# Patient Record
Sex: MALE | Race: WHITE | NOT HISPANIC OR LATINO | Employment: OTHER | URBAN - METROPOLITAN AREA
[De-identification: names, ages, dates, MRNs, and addresses within clinical notes are randomized per-mention and may not be internally consistent; named-entity substitution may affect disease eponyms.]

---

## 2017-02-17 ENCOUNTER — HOSPITAL ENCOUNTER (OUTPATIENT)
Dept: LAB | Facility: MEDICAL CENTER | Age: 77
End: 2017-02-17
Attending: FAMILY MEDICINE
Payer: MEDICARE

## 2017-02-17 LAB
BASOPHILS # BLD AUTO: 0.16 K/UL (ref 0–0.12)
BASOPHILS NFR BLD AUTO: 2 % (ref 0–1.8)
EOSINOPHIL # BLD: 0.45 K/UL (ref 0–0.51)
EOSINOPHIL NFR BLD AUTO: 5.6 % (ref 0–6.9)
ERYTHROCYTE [DISTWIDTH] IN BLOOD BY AUTOMATED COUNT: 53.2 FL (ref 35.9–50)
HCT VFR BLD AUTO: 48 % (ref 42–52)
HGB BLD-MCNC: 15.8 G/DL (ref 14–18)
IMM GRANULOCYTES # BLD AUTO: 0.02 K/UL (ref 0–0.11)
IMM GRANULOCYTES NFR BLD AUTO: 0.3 % (ref 0–0.9)
LYMPHOCYTES # BLD: 1.81 K/UL (ref 1–4.8)
LYMPHOCYTES NFR BLD AUTO: 22.7 % (ref 22–41)
MCH RBC QN AUTO: 31.3 PG (ref 27–33)
MCHC RBC AUTO-ENTMCNC: 32.9 G/DL (ref 33.7–35.3)
MCV RBC AUTO: 95 FL (ref 81.4–97.8)
MONOCYTES # BLD: 0.49 K/UL (ref 0–0.85)
MONOCYTES NFR BLD AUTO: 6.1 % (ref 0–13.4)
NEUTROPHILS # BLD: 5.04 K/UL (ref 1.82–7.42)
NEUTROPHILS NFR BLD AUTO: 63.3 % (ref 44–72)
NRBC # BLD AUTO: 0 K/UL
NRBC BLD-RTO: 0 /100 WBC
PLATELET # BLD AUTO: 294 K/UL (ref 164–446)
PMV BLD AUTO: 9.1 FL (ref 9–12.9)
RBC # BLD AUTO: 5.05 M/UL (ref 4.7–6.1)
VIT B12 SERPL-MCNC: >1500 PG/ML (ref 211–911)
WBC # BLD AUTO: 8 K/UL (ref 4.8–10.8)

## 2017-02-17 PROCEDURE — 85025 COMPLETE CBC W/AUTO DIFF WBC: CPT

## 2017-02-17 PROCEDURE — 82607 VITAMIN B-12: CPT

## 2017-02-17 PROCEDURE — 36415 COLL VENOUS BLD VENIPUNCTURE: CPT

## 2017-09-15 ENCOUNTER — HOSPITAL ENCOUNTER (OUTPATIENT)
Dept: LAB | Facility: MEDICAL CENTER | Age: 77
End: 2017-09-15
Attending: FAMILY MEDICINE
Payer: MEDICARE

## 2017-09-15 LAB
ALBUMIN SERPL BCP-MCNC: 4.5 G/DL (ref 3.2–4.9)
ALBUMIN/GLOB SERPL: 1.4 G/DL
ALP SERPL-CCNC: 100 U/L (ref 30–99)
ALT SERPL-CCNC: 15 U/L (ref 2–50)
ANION GAP SERPL CALC-SCNC: 10 MMOL/L (ref 0–11.9)
AST SERPL-CCNC: 18 U/L (ref 12–45)
BASOPHILS # BLD AUTO: 1.9 % (ref 0–1.8)
BASOPHILS # BLD: 0.15 K/UL (ref 0–0.12)
BILIRUB SERPL-MCNC: 1.8 MG/DL (ref 0.1–1.5)
BUN SERPL-MCNC: 15 MG/DL (ref 8–22)
CALCIUM SERPL-MCNC: 9.8 MG/DL (ref 8.5–10.5)
CHLORIDE SERPL-SCNC: 105 MMOL/L (ref 96–112)
CHOLEST SERPL-MCNC: 153 MG/DL (ref 100–199)
CO2 SERPL-SCNC: 27 MMOL/L (ref 20–33)
CREAT SERPL-MCNC: 0.93 MG/DL (ref 0.5–1.4)
CREAT UR-MCNC: 145.3 MG/DL
EOSINOPHIL # BLD AUTO: 0.26 K/UL (ref 0–0.51)
EOSINOPHIL NFR BLD: 3.2 % (ref 0–6.9)
ERYTHROCYTE [DISTWIDTH] IN BLOOD BY AUTOMATED COUNT: 46.2 FL (ref 35.9–50)
EST. AVERAGE GLUCOSE BLD GHB EST-MCNC: 123 MG/DL
GFR SERPL CREATININE-BSD FRML MDRD: >60 ML/MIN/1.73 M 2
GLOBULIN SER CALC-MCNC: 3.2 G/DL (ref 1.9–3.5)
GLUCOSE SERPL-MCNC: 97 MG/DL (ref 65–99)
HBA1C MFR BLD: 5.9 % (ref 0–5.6)
HCT VFR BLD AUTO: 50.7 % (ref 42–52)
HDLC SERPL-MCNC: 53 MG/DL
HGB BLD-MCNC: 16.7 G/DL (ref 14–18)
IMM GRANULOCYTES # BLD AUTO: 0.04 K/UL (ref 0–0.11)
IMM GRANULOCYTES NFR BLD AUTO: 0.5 % (ref 0–0.9)
LDLC SERPL CALC-MCNC: 88 MG/DL
LYMPHOCYTES # BLD AUTO: 2.03 K/UL (ref 1–4.8)
LYMPHOCYTES NFR BLD: 25.1 % (ref 22–41)
MCH RBC QN AUTO: 32.4 PG (ref 27–33)
MCHC RBC AUTO-ENTMCNC: 32.9 G/DL (ref 33.7–35.3)
MCV RBC AUTO: 98.4 FL (ref 81.4–97.8)
MICROALBUMIN UR-MCNC: 5.9 MG/DL
MICROALBUMIN/CREAT UR: 41 MG/G (ref 0–30)
MONOCYTES # BLD AUTO: 0.53 K/UL (ref 0–0.85)
MONOCYTES NFR BLD AUTO: 6.5 % (ref 0–13.4)
NEUTROPHILS # BLD AUTO: 5.09 K/UL (ref 1.82–7.42)
NEUTROPHILS NFR BLD: 62.8 % (ref 44–72)
NRBC # BLD AUTO: 0 K/UL
NRBC BLD AUTO-RTO: 0 /100 WBC
PLATELET # BLD AUTO: 249 K/UL (ref 164–446)
PMV BLD AUTO: 9.3 FL (ref 9–12.9)
POTASSIUM SERPL-SCNC: 5.3 MMOL/L (ref 3.6–5.5)
PROT SERPL-MCNC: 7.7 G/DL (ref 6–8.2)
RBC # BLD AUTO: 5.15 M/UL (ref 4.7–6.1)
SODIUM SERPL-SCNC: 142 MMOL/L (ref 135–145)
TRIGL SERPL-MCNC: 60 MG/DL (ref 0–149)
WBC # BLD AUTO: 8.1 K/UL (ref 4.8–10.8)

## 2017-09-15 PROCEDURE — 83036 HEMOGLOBIN GLYCOSYLATED A1C: CPT

## 2017-09-15 PROCEDURE — 85025 COMPLETE CBC W/AUTO DIFF WBC: CPT

## 2017-09-15 PROCEDURE — 36415 COLL VENOUS BLD VENIPUNCTURE: CPT

## 2017-09-15 PROCEDURE — 80053 COMPREHEN METABOLIC PANEL: CPT

## 2017-09-15 PROCEDURE — 82043 UR ALBUMIN QUANTITATIVE: CPT

## 2017-09-15 PROCEDURE — 82570 ASSAY OF URINE CREATININE: CPT

## 2017-09-15 PROCEDURE — 80061 LIPID PANEL: CPT

## 2018-08-02 ENCOUNTER — HOSPITAL ENCOUNTER (OUTPATIENT)
Dept: LAB | Facility: MEDICAL CENTER | Age: 78
End: 2018-08-02
Attending: FAMILY MEDICINE
Payer: MEDICARE

## 2018-08-02 LAB
ALBUMIN SERPL BCP-MCNC: 4.7 G/DL (ref 3.2–4.9)
ALBUMIN/GLOB SERPL: 1.9 G/DL
ALP SERPL-CCNC: 84 U/L (ref 30–99)
ALT SERPL-CCNC: 16 U/L (ref 2–50)
ANION GAP SERPL CALC-SCNC: 8 MMOL/L (ref 0–11.9)
AST SERPL-CCNC: 17 U/L (ref 12–45)
BASOPHILS # BLD AUTO: 1.5 % (ref 0–1.8)
BASOPHILS # BLD: 0.11 K/UL (ref 0–0.12)
BILIRUB SERPL-MCNC: 1.6 MG/DL (ref 0.1–1.5)
BUN SERPL-MCNC: 20 MG/DL (ref 8–22)
CALCIUM SERPL-MCNC: 9.4 MG/DL (ref 8.5–10.5)
CHLORIDE SERPL-SCNC: 107 MMOL/L (ref 96–112)
CHOLEST SERPL-MCNC: 155 MG/DL (ref 100–199)
CO2 SERPL-SCNC: 27 MMOL/L (ref 20–33)
CREAT SERPL-MCNC: 1.15 MG/DL (ref 0.5–1.4)
EOSINOPHIL # BLD AUTO: 0.17 K/UL (ref 0–0.51)
EOSINOPHIL NFR BLD: 2.3 % (ref 0–6.9)
ERYTHROCYTE [DISTWIDTH] IN BLOOD BY AUTOMATED COUNT: 46.7 FL (ref 35.9–50)
GLOBULIN SER CALC-MCNC: 2.5 G/DL (ref 1.9–3.5)
GLUCOSE SERPL-MCNC: 134 MG/DL (ref 65–99)
HCT VFR BLD AUTO: 48 % (ref 42–52)
HDLC SERPL-MCNC: 47 MG/DL
HGB BLD-MCNC: 16.3 G/DL (ref 14–18)
IMM GRANULOCYTES # BLD AUTO: 0.03 K/UL (ref 0–0.11)
IMM GRANULOCYTES NFR BLD AUTO: 0.4 % (ref 0–0.9)
LDLC SERPL CALC-MCNC: 95 MG/DL
LYMPHOCYTES # BLD AUTO: 1.52 K/UL (ref 1–4.8)
LYMPHOCYTES NFR BLD: 20.3 % (ref 22–41)
MCH RBC QN AUTO: 33.5 PG (ref 27–33)
MCHC RBC AUTO-ENTMCNC: 34 G/DL (ref 33.7–35.3)
MCV RBC AUTO: 98.6 FL (ref 81.4–97.8)
MONOCYTES # BLD AUTO: 0.42 K/UL (ref 0–0.85)
MONOCYTES NFR BLD AUTO: 5.6 % (ref 0–13.4)
NEUTROPHILS # BLD AUTO: 5.24 K/UL (ref 1.82–7.42)
NEUTROPHILS NFR BLD: 69.9 % (ref 44–72)
NRBC # BLD AUTO: 0 K/UL
NRBC BLD-RTO: 0 /100 WBC
PLATELET # BLD AUTO: 213 K/UL (ref 164–446)
PMV BLD AUTO: 9.7 FL (ref 9–12.9)
POTASSIUM SERPL-SCNC: 4.5 MMOL/L (ref 3.6–5.5)
PROT SERPL-MCNC: 7.2 G/DL (ref 6–8.2)
RBC # BLD AUTO: 4.87 M/UL (ref 4.7–6.1)
SODIUM SERPL-SCNC: 142 MMOL/L (ref 135–145)
TRIGL SERPL-MCNC: 65 MG/DL (ref 0–149)
WBC # BLD AUTO: 7.5 K/UL (ref 4.8–10.8)

## 2018-08-02 PROCEDURE — 80053 COMPREHEN METABOLIC PANEL: CPT

## 2018-08-02 PROCEDURE — 83036 HEMOGLOBIN GLYCOSYLATED A1C: CPT

## 2018-08-02 PROCEDURE — 80061 LIPID PANEL: CPT

## 2018-08-02 PROCEDURE — 85025 COMPLETE CBC W/AUTO DIFF WBC: CPT

## 2018-08-02 PROCEDURE — 36415 COLL VENOUS BLD VENIPUNCTURE: CPT

## 2018-08-03 LAB
EST. AVERAGE GLUCOSE BLD GHB EST-MCNC: 128 MG/DL
HBA1C MFR BLD: 6.1 % (ref 0–5.6)

## 2018-08-10 ENCOUNTER — APPOINTMENT (OUTPATIENT)
Dept: ADMISSIONS | Facility: MEDICAL CENTER | Age: 78
End: 2018-08-10
Attending: OPHTHALMOLOGY
Payer: MEDICARE

## 2018-08-10 DIAGNOSIS — Z01.810 PRE-OPERATIVE CARDIOVASCULAR EXAMINATION: ICD-10-CM

## 2018-08-10 LAB — EKG IMPRESSION: NORMAL

## 2018-08-14 ENCOUNTER — HOSPITAL ENCOUNTER (OUTPATIENT)
Facility: MEDICAL CENTER | Age: 78
End: 2018-08-14
Attending: OPHTHALMOLOGY | Admitting: OPHTHALMOLOGY
Payer: MEDICARE

## 2018-08-14 VITALS
WEIGHT: 171.52 LBS | HEIGHT: 69 IN | RESPIRATION RATE: 16 BRPM | BODY MASS INDEX: 25.4 KG/M2 | TEMPERATURE: 97.1 F | OXYGEN SATURATION: 96 % | SYSTOLIC BLOOD PRESSURE: 184 MMHG | DIASTOLIC BLOOD PRESSURE: 102 MMHG | HEART RATE: 57 BPM

## 2018-08-14 PROCEDURE — 700111 HCHG RX REV CODE 636 W/ 250 OVERRIDE (IP)

## 2018-08-14 PROCEDURE — 99153 MOD SED SAME PHYS/QHP EA: CPT | Performed by: OPHTHALMOLOGY

## 2018-08-14 PROCEDURE — 160002 HCHG RECOVERY MINUTES (STAT): Performed by: OPHTHALMOLOGY

## 2018-08-14 PROCEDURE — 502240 HCHG MISC OR SUPPLY RC 0272: Performed by: OPHTHALMOLOGY

## 2018-08-14 PROCEDURE — 160035 HCHG PACU - 1ST 60 MINS PHASE I: Performed by: OPHTHALMOLOGY

## 2018-08-14 PROCEDURE — 700101 HCHG RX REV CODE 250

## 2018-08-14 PROCEDURE — 160029 HCHG SURGERY MINUTES - 1ST 30 MINS LEVEL 4: Performed by: OPHTHALMOLOGY

## 2018-08-14 PROCEDURE — 160048 HCHG OR STATISTICAL LEVEL 1-5: Performed by: OPHTHALMOLOGY

## 2018-08-14 PROCEDURE — 99152 MOD SED SAME PHYS/QHP 5/>YRS: CPT | Performed by: OPHTHALMOLOGY

## 2018-08-14 PROCEDURE — 501539 HCHG TIP, PHACO: Performed by: OPHTHALMOLOGY

## 2018-08-14 PROCEDURE — 501749 HCHG SHELL REV 276: Performed by: OPHTHALMOLOGY

## 2018-08-14 PROCEDURE — 500855 HCHG NEEDLE, IRRIG CYSTOTOME 27G: Performed by: OPHTHALMOLOGY

## 2018-08-14 DEVICE — IMPLANTABLE DEVICE: Type: IMPLANTABLE DEVICE | Status: FUNCTIONAL

## 2018-08-14 RX ORDER — TETRACAINE HYDROCHLORIDE 5 MG/ML
SOLUTION OPHTHALMIC
Status: COMPLETED
Start: 2018-08-14 | End: 2018-08-14

## 2018-08-14 RX ORDER — TETRACAINE HYDROCHLORIDE 5 MG/ML
SOLUTION OPHTHALMIC
Status: DISCONTINUED | OUTPATIENT
Start: 2018-08-14 | End: 2018-08-14 | Stop reason: HOSPADM

## 2018-08-14 RX ORDER — MOXIFLOXACIN 5 MG/ML
SOLUTION/ DROPS OPHTHALMIC
Status: DISCONTINUED | OUTPATIENT
Start: 2018-08-14 | End: 2018-08-14 | Stop reason: HOSPADM

## 2018-08-14 RX ORDER — EPINEPHRINE 1 MG/ML(1)
AMPUL (ML) INJECTION
Status: DISCONTINUED | OUTPATIENT
Start: 2018-08-14 | End: 2018-08-14 | Stop reason: HOSPADM

## 2018-08-14 RX ORDER — SODIUM CHLORIDE, SODIUM LACTATE, POTASSIUM CHLORIDE, CALCIUM CHLORIDE 600; 310; 30; 20 MG/100ML; MG/100ML; MG/100ML; MG/100ML
INJECTION, SOLUTION INTRAVENOUS CONTINUOUS
Status: DISCONTINUED | OUTPATIENT
Start: 2018-08-14 | End: 2018-08-14 | Stop reason: HOSPADM

## 2018-08-14 RX ORDER — LIDOCAINE HYDROCHLORIDE 10 MG/ML
INJECTION, SOLUTION EPIDURAL; INFILTRATION; INTRACAUDAL; PERINEURAL
Status: DISCONTINUED | OUTPATIENT
Start: 2018-08-14 | End: 2018-08-14 | Stop reason: HOSPADM

## 2018-08-14 RX ORDER — PHENYLEPHRINE HYDROCHLORIDE 25 MG/ML
SOLUTION/ DROPS OPHTHALMIC
Status: COMPLETED
Start: 2018-08-14 | End: 2018-08-14

## 2018-08-14 RX ORDER — TROPICAMIDE 10 MG/ML
SOLUTION/ DROPS OPHTHALMIC
Status: COMPLETED
Start: 2018-08-14 | End: 2018-08-14

## 2018-08-14 RX ADMIN — SODIUM CHLORIDE, SODIUM LACTATE, POTASSIUM CHLORIDE, CALCIUM CHLORIDE: 600; 310; 30; 20 INJECTION, SOLUTION INTRAVENOUS at 06:45

## 2018-08-14 RX ADMIN — TROPICAMIDE 1 DROP: 10 SOLUTION/ DROPS OPHTHALMIC at 06:35

## 2018-08-14 RX ADMIN — TETRACAINE HYDROCHLORIDE 1 DROP: 5 SOLUTION OPHTHALMIC at 06:35

## 2018-08-14 RX ADMIN — PHENYLEPHRINE HYDROCHLORIDE 1 DROP: 2.5 SOLUTION/ DROPS OPHTHALMIC at 06:35

## 2018-08-14 ASSESSMENT — PAIN SCALES - GENERAL: PAINLEVEL_OUTOF10: 0

## 2018-08-14 NOTE — DISCHARGE INSTRUCTIONS
HOME CARE INSTRUCTIONS FOR CATARACT SURGERY    ACTIVITY: Rest and take it easy for the first 24 hours. We strongly suggest that a responsible adult remain with you during that time. It is normal to feel sleepy. We encourage you to not do anything that requires balance, judgment or coordination. Be extra careful when walking (with a dilated eye, it is easier to trip and fall).     FOR 24 HOURS, DO NOT:       Drive, operate machinery or run household appliances.        Drink beer or alcoholic beverages.        Make important decisions or sign legal documents.     DIET: To avoid nausea, slowly advance diet as tolerated, avoiding spicy or greasy foods for the first meal.     MEDICATIONS: Resume taking daily medication. You may take Tylenol for mild discomfort, if needed.     SURGICAL DRESSING: Eye shield as instructed by your doctor. Dark glasses should be worn while in the sunlight.     Follow your Physician's instruction Sheet. Eye Kit Given    A follow-up appointment is scheduled with your doctor tomorrow at ___9:15____ am.     You should call 911 if you develop problems with breathing or chest pain.  You should CALL YOUR PHYSICIAN if you develop: Sharp stabbing pain or sudden change in vision in your operative eye. If you are unable to contact your doctor or the surgical center, you should go to the nearest emergency room or urgent care center.   Physician's telephone # __Dr. Santana [phone]______    If any questions arise, call your doctor. If your doctor is not available, please feel free to call Same Day Surgery at [phone]. You can also call the [employer] Hotline open 24 hours/day, 7 days/week and speak to a nurse at [phone] or [phone].     I acknowledge receipt and understanding of these Home Care Instructions.    ______________________________     _______________________________            Signature of Patient / Responsible Adult                                                       RN  Signature    A registered nurse may call you a few days after your surgery to see how you are doing.   You may also receive a survey in the mail within the next two weeks and we ask that you take a few moments to complete and return the survey. Our goal is to provide you with very good care and we value your comments. Thank you for choosing [facility].

## 2018-08-14 NOTE — OR NURSING
0801 Received pt from OR, received report from Dr. Tan. Pt awake, resting comfortably in bed. Pt.'s VSS. Pt.'s left eye dilated.     0810 Pt.'s Wife called to bedside, pt meets criteria for discharge.     0815 Pt and Wife given instructions for discharge, evidence of understanding demonstrated.      0820 Pt ambulated out, gait steady.

## 2018-08-14 NOTE — OP REPORT
DATE OF SERVICE:  08/14/2018    PREOPERATIVE DIAGNOSIS:  Cataract, left eye.    POSTOPERATIVE DIAGNOSIS:  Cataract, left eye.    PROCEDURE:  Phacoemulsification with intraocular lens implant, left eye.    SURGEON:  Adolfo Santana MD    ANESTHESIA:  Local MAC.    PROSTHETIC DEVICES:  Henry intraocular lens, model AU00T0, 18.5 diopter,   serial number [id number].    INDICATIONS:  The patient has a visually significant cataract in the left eye.    DESCRIPTION OF OPERATION:  The patient was placed in the supine position on   the operating room table.  Appropriate anesthetic monitors were positioned.    The eye was prepped and draped in the usual sterile fashion for ocular surgery   using Betadine solution.  A wire lid speculum was positioned for exposure.  A   clear cornea temporal incision was made with a gretchen keratome and a   paracentesis was made with a gretchen knife.  The anterior chamber was filled   with viscoelastic and a continuous curvilinear capsulorrhexis was made with   the capsulorrhexis forceps.  The lens was hydrodissected and the nucleus was   removed using the phacoemulsification handpiece and the cortex was aspirated   with the IA handpiece.  The capsular bag was filled with viscoelastic and the   intraocular lens was positioned into the capsular bag.  Residual viscoelastic   was aspirated from the anterior chamber, and the wound was hydrated with   saline solution producing a watertight closure.  The wire lid speculum was   removed and the patient was taken to the recovery room in stable condition.       ____________________________________     MD FRANCESCO SALDAÑA / NTS    DD:  08/14/2018 10:31:52  DT:  08/14/2018 10:37:32    D#:  [id number]  Job#:  [id number]

## 2018-08-14 NOTE — OR NURSING
0650: pt's bp very elevated, 184/102 after the 3rd time checking, pt is on bp meds but states he doesn't know if he is usually controlled, anesthesia notified, pt encouraged to monitor bp daily at the same time and f/u with pcp if remains elevated.

## 2018-08-28 ENCOUNTER — HOSPITAL ENCOUNTER (OUTPATIENT)
Facility: MEDICAL CENTER | Age: 78
End: 2018-08-28
Attending: OPHTHALMOLOGY | Admitting: OPHTHALMOLOGY
Payer: MEDICARE

## 2018-08-28 VITALS
SYSTOLIC BLOOD PRESSURE: 159 MMHG | OXYGEN SATURATION: 98 % | WEIGHT: 168.65 LBS | HEIGHT: 69 IN | HEART RATE: 68 BPM | DIASTOLIC BLOOD PRESSURE: 89 MMHG | BODY MASS INDEX: 24.98 KG/M2 | TEMPERATURE: 97.8 F | RESPIRATION RATE: 16 BRPM

## 2018-08-28 PROCEDURE — 99153 MOD SED SAME PHYS/QHP EA: CPT | Performed by: OPHTHALMOLOGY

## 2018-08-28 PROCEDURE — 160002 HCHG RECOVERY MINUTES (STAT): Performed by: OPHTHALMOLOGY

## 2018-08-28 PROCEDURE — 501539 HCHG TIP, PHACO: Performed by: OPHTHALMOLOGY

## 2018-08-28 PROCEDURE — 501749 HCHG SHELL REV 276

## 2018-08-28 PROCEDURE — 700101 HCHG RX REV CODE 250

## 2018-08-28 PROCEDURE — 700111 HCHG RX REV CODE 636 W/ 250 OVERRIDE (IP)

## 2018-08-28 PROCEDURE — 502240 HCHG MISC OR SUPPLY RC 0272: Performed by: OPHTHALMOLOGY

## 2018-08-28 PROCEDURE — V2787 ASTIGMATISM-CORRECT FUNCTION: HCPCS | Performed by: OPHTHALMOLOGY

## 2018-08-28 PROCEDURE — 160035 HCHG PACU - 1ST 60 MINS PHASE I: Performed by: OPHTHALMOLOGY

## 2018-08-28 PROCEDURE — 500855 HCHG NEEDLE, IRRIG CYSTOTOME 27G: Performed by: OPHTHALMOLOGY

## 2018-08-28 PROCEDURE — 160048 HCHG OR STATISTICAL LEVEL 1-5: Performed by: OPHTHALMOLOGY

## 2018-08-28 PROCEDURE — 99152 MOD SED SAME PHYS/QHP 5/>YRS: CPT | Performed by: OPHTHALMOLOGY

## 2018-08-28 PROCEDURE — 160029 HCHG SURGERY MINUTES - 1ST 30 MINS LEVEL 4: Performed by: OPHTHALMOLOGY

## 2018-08-28 PROCEDURE — 160041 HCHG SURGERY MINUTES - EA ADDL 1 MIN LEVEL 4: Performed by: OPHTHALMOLOGY

## 2018-08-28 DEVICE — IMPLANTABLE DEVICE: Type: IMPLANTABLE DEVICE | Status: FUNCTIONAL

## 2018-08-28 RX ORDER — TROPICAMIDE 10 MG/ML
SOLUTION/ DROPS OPHTHALMIC
Status: COMPLETED
Start: 2018-08-28 | End: 2018-08-28

## 2018-08-28 RX ORDER — LIDOCAINE HYDROCHLORIDE 10 MG/ML
INJECTION, SOLUTION EPIDURAL; INFILTRATION; INTRACAUDAL; PERINEURAL
Status: DISCONTINUED
Start: 2018-08-28 | End: 2018-08-28 | Stop reason: HOSPADM

## 2018-08-28 RX ORDER — TETRACAINE HYDROCHLORIDE 5 MG/ML
SOLUTION OPHTHALMIC
Status: COMPLETED
Start: 2018-08-28 | End: 2018-08-28

## 2018-08-28 RX ORDER — SODIUM CHLORIDE, SODIUM LACTATE, POTASSIUM CHLORIDE, CALCIUM CHLORIDE 600; 310; 30; 20 MG/100ML; MG/100ML; MG/100ML; MG/100ML
INJECTION, SOLUTION INTRAVENOUS CONTINUOUS
Status: DISCONTINUED | OUTPATIENT
Start: 2018-08-28 | End: 2018-08-28 | Stop reason: HOSPADM

## 2018-08-28 RX ORDER — PHENYLEPHRINE HYDROCHLORIDE 25 MG/ML
SOLUTION/ DROPS OPHTHALMIC
Status: COMPLETED
Start: 2018-08-28 | End: 2018-08-28

## 2018-08-28 RX ADMIN — TROPICAMIDE 1 DROP: 10 SOLUTION/ DROPS OPHTHALMIC at 06:45

## 2018-08-28 RX ADMIN — SODIUM CHLORIDE, SODIUM LACTATE, POTASSIUM CHLORIDE, CALCIUM CHLORIDE: 600; 310; 30; 20 INJECTION, SOLUTION INTRAVENOUS at 06:50

## 2018-08-28 RX ADMIN — TETRACAINE HYDROCHLORIDE 1 DROP: 5 SOLUTION OPHTHALMIC at 06:45

## 2018-08-28 RX ADMIN — PHENYLEPHRINE HYDROCHLORIDE 1 DROP: 2.5 SOLUTION/ DROPS OPHTHALMIC at 06:45

## 2018-08-28 ASSESSMENT — PAIN SCALES - GENERAL: PAINLEVEL_OUTOF10: 0

## 2018-08-28 NOTE — DISCHARGE INSTRUCTIONS
HOME CARE INSTRUCTIONS FOR CATARACT SURGERY    ACTIVITY: Rest and take it easy for the first 24 hours. We strongly suggest that a responsible adult remain with you during that time. It is normal to feel sleepy. We encourage you to not do anything that requires balance, judgment or coordination. Be extra careful when walking (with a dilated eye, it is easier to trip and fall).     FOR 24 HOURS, DO NOT:       Drive, operate machinery or run household appliances.        Drink beer or alcoholic beverages.        Make important decisions or sign legal documents.     DIET: To avoid nausea, slowly advance diet as tolerated, avoiding spicy or greasy foods for the first meal.     MEDICATIONS: Resume taking daily medication. You may take Tylenol for mild discomfort, if needed.     SURGICAL DRESSING: Eye shield as instructed by your doctor. Dark glasses should be worn while in the sunlight.     Follow your Physician's instruction Sheet. Eye Kit Given    A follow-up appointment is scheduled with your doctor tomorrow at 9:15 am.     You should call 911 if you develop problems with breathing or chest pain.  You should CALL YOUR PHYSICIAN if you develop: Sharp stabbing pain or sudden change in vision in your operative eye. If you are unable to contact your doctor or the surgical center, you should go to the nearest emergency room or urgent care center.   Physician's telephone # [phone]    If any questions arise, call your doctor. If your doctor is not available, please feel free to call Same Day Surgery at [phone]. You can also call the [employer] Hotline open 24 hours/day, 7 days/week and speak to a nurse at [phone] or [phone].     I acknowledge receipt and understanding of these Home Care Instructions.    ______________________________     _______________________________            Signature of Patient / Responsible Adult                                                       RN Signature    A registered nurse may  call you a few days after your surgery to see how you are doing.   You may also receive a survey in the mail within the next two weeks and we ask that you take a few moments to complete and return the survey. Our goal is to provide you with very good care and we value your comments. Thank you for choosing [facility].

## 2018-08-28 NOTE — OP REPORT
DATE OF SERVICE:  08/28/2018    PREOPERATIVE DIAGNOSIS:  Cataract, right eye.    POSTOPERATIVE DIAGNOSIS:  Cataract, right eye.    PROCEDURE:  Phacoemulsification with intraocular lens implant, right eye.    SURGEON:  Adolfo Santana MD    ANESTHESIA:  Local MAC.    PROSTHETIC DEVICES:  Henry intraocular lens, model SN6AT4, 19.0D +2.25D axis 14 degrees, serial #[id number].    INDICATIONS:  The patient has a visually significant cataract in the right   eye.  Additionally, he has corneal astigmatism.  Following discussion of the   risks and benefits of surgery, the decision was made to proceed with elective   cataract surgery using a toric lens implant.    DESCRIPTION OF OPERATION:  The patient was placed in the supine position on   the operating room table.  Appropriate anesthetic monitors were positioned.    The eye was prepped and draped in the usual sterile fashion for ocular surgery   using Betadine solution.  A wire lid speculum was positioned for exposure.  A   clear cornea temporal incision was made with a gretchen keratome and a   paracentesis was made with a gretchen knife.  The anterior chamber was filled   with viscoelastic and a continuous curvilinear capsulorrhexis was made with   the capsulorrhexis forceps.  The lens was hydrodissected and the nucleus was   removed using the phacoemulsification handpiece and the cortex was aspirated   with the IA handpiece.  The capsular bag was filled with viscoelastic and the   intraocular lens was positioned into the capsular bag in the correct orientation to reduce astigmatism. Residual viscoelastic was aspirated from the anterior chamber, and the wound was hydrated with   saline solution producing a watertight closure.  The wire lid speculum was   removed and the patient was taken to the recovery room in stable condition.       ____________________________________     ADOLFO SANTANA MD    CAM / NTS    DD:  08/28/2018 15:40:17  DT:  08/28/2018 15:50:17    D#:   [id number]  Job#:  [id number]

## 2018-09-30 ENCOUNTER — APPOINTMENT (OUTPATIENT)
Dept: RADIOLOGY | Facility: MEDICAL CENTER | Age: 78
DRG: 871 | End: 2018-09-30
Attending: EMERGENCY MEDICINE
Payer: MEDICARE

## 2018-09-30 ENCOUNTER — HOSPITAL ENCOUNTER (INPATIENT)
Facility: MEDICAL CENTER | Age: 78
LOS: 2 days | DRG: 871 | End: 2018-10-02
Attending: EMERGENCY MEDICINE | Admitting: INTERNAL MEDICINE
Payer: MEDICARE

## 2018-09-30 DIAGNOSIS — L03.116 CELLULITIS OF LEFT LOWER EXTREMITY: ICD-10-CM

## 2018-09-30 PROBLEM — I10 HYPERTENSION, ESSENTIAL: Status: ACTIVE | Noted: 2018-09-30

## 2018-09-30 PROBLEM — G93.41 ENCEPHALOPATHY IN SEPSIS: Status: ACTIVE | Noted: 2018-09-30

## 2018-09-30 PROBLEM — F10.90 ALCOHOL USE DISORDER: Status: ACTIVE | Noted: 2018-09-30

## 2018-09-30 PROBLEM — L03.90 CELLULITIS: Status: ACTIVE | Noted: 2018-09-30

## 2018-09-30 PROBLEM — N17.9 AKI (ACUTE KIDNEY INJURY) (HCC): Status: ACTIVE | Noted: 2018-09-30

## 2018-09-30 PROBLEM — R73.03 PRE-DIABETES: Status: ACTIVE | Noted: 2018-09-30

## 2018-09-30 PROBLEM — E78.5 DYSLIPIDEMIA: Status: ACTIVE | Noted: 2018-09-30

## 2018-09-30 PROBLEM — A41.9 SEPSIS (HCC): Status: ACTIVE | Noted: 2018-09-30

## 2018-09-30 LAB
ALBUMIN SERPL BCP-MCNC: 4.2 G/DL (ref 3.2–4.9)
ALBUMIN/GLOB SERPL: 1.2 G/DL
ALP SERPL-CCNC: 94 U/L (ref 30–99)
ALT SERPL-CCNC: 27 U/L (ref 2–50)
ANION GAP SERPL CALC-SCNC: 9 MMOL/L (ref 0–11.9)
APPEARANCE UR: CLEAR
AST SERPL-CCNC: 28 U/L (ref 12–45)
BASOPHILS # BLD AUTO: 0.9 % (ref 0–1.8)
BASOPHILS # BLD: 0.1 K/UL (ref 0–0.12)
BILIRUB SERPL-MCNC: 2.8 MG/DL (ref 0.1–1.5)
BILIRUB UR QL STRIP.AUTO: NEGATIVE
BUN SERPL-MCNC: 30 MG/DL (ref 8–22)
CALCIUM SERPL-MCNC: 8.8 MG/DL (ref 8.4–10.2)
CHLORIDE SERPL-SCNC: 106 MMOL/L (ref 96–112)
CO2 SERPL-SCNC: 20 MMOL/L (ref 20–33)
COLOR UR: YELLOW
CREAT SERPL-MCNC: 1.23 MG/DL (ref 0.5–1.4)
EOSINOPHIL # BLD AUTO: 0.07 K/UL (ref 0–0.51)
EOSINOPHIL NFR BLD: 0.6 % (ref 0–6.9)
ERYTHROCYTE [DISTWIDTH] IN BLOOD BY AUTOMATED COUNT: 45.1 FL (ref 35.9–50)
GLOBULIN SER CALC-MCNC: 3.5 G/DL (ref 1.9–3.5)
GLUCOSE SERPL-MCNC: 128 MG/DL (ref 65–99)
GLUCOSE UR STRIP.AUTO-MCNC: NEGATIVE MG/DL
HCT VFR BLD AUTO: 43.3 % (ref 42–52)
HGB BLD-MCNC: 14.9 G/DL (ref 14–18)
IMM GRANULOCYTES # BLD AUTO: 0.08 K/UL (ref 0–0.11)
IMM GRANULOCYTES NFR BLD AUTO: 0.7 % (ref 0–0.9)
KETONES UR STRIP.AUTO-MCNC: 15 MG/DL
LACTATE BLD-SCNC: 1.3 MMOL/L (ref 0.5–2)
LACTATE BLD-SCNC: 1.3 MMOL/L (ref 0.5–2)
LACTATE BLD-SCNC: 1.4 MMOL/L (ref 0.5–2)
LACTATE BLD-SCNC: 1.5 MMOL/L (ref 0.5–2)
LEUKOCYTE ESTERASE UR QL STRIP.AUTO: NEGATIVE
LYMPHOCYTES # BLD AUTO: 1.02 K/UL (ref 1–4.8)
LYMPHOCYTES NFR BLD: 9 % (ref 22–41)
MCH RBC QN AUTO: 33.1 PG (ref 27–33)
MCHC RBC AUTO-ENTMCNC: 34.4 G/DL (ref 33.7–35.3)
MCV RBC AUTO: 96.2 FL (ref 81.4–97.8)
MICRO URNS: ABNORMAL
MONOCYTES # BLD AUTO: 0.91 K/UL (ref 0–0.85)
MONOCYTES NFR BLD AUTO: 8 % (ref 0–13.4)
NEUTROPHILS # BLD AUTO: 9.16 K/UL (ref 1.82–7.42)
NEUTROPHILS NFR BLD: 80.8 % (ref 44–72)
NITRITE UR QL STRIP.AUTO: NEGATIVE
NRBC # BLD AUTO: 0 K/UL
NRBC BLD-RTO: 0 /100 WBC
PH UR STRIP.AUTO: 5.5 [PH]
PLATELET # BLD AUTO: 198 K/UL (ref 164–446)
PMV BLD AUTO: 10 FL (ref 9–12.9)
POTASSIUM SERPL-SCNC: 3.7 MMOL/L (ref 3.6–5.5)
PROT SERPL-MCNC: 7.7 G/DL (ref 6–8.2)
PROT UR QL STRIP: NEGATIVE MG/DL
RBC # BLD AUTO: 4.5 M/UL (ref 4.7–6.1)
RBC UR QL AUTO: NEGATIVE
SODIUM SERPL-SCNC: 135 MMOL/L (ref 135–145)
SP GR UR REFRACTOMETRY: 1.02
WBC # BLD AUTO: 11.3 K/UL (ref 4.8–10.8)

## 2018-09-30 PROCEDURE — 96366 THER/PROPH/DIAG IV INF ADDON: CPT

## 2018-09-30 PROCEDURE — 36415 COLL VENOUS BLD VENIPUNCTURE: CPT

## 2018-09-30 PROCEDURE — 81003 URINALYSIS AUTO W/O SCOPE: CPT

## 2018-09-30 PROCEDURE — 83605 ASSAY OF LACTIC ACID: CPT | Mod: 91

## 2018-09-30 PROCEDURE — 770006 HCHG ROOM/CARE - MED/SURG/GYN SEMI*

## 2018-09-30 PROCEDURE — 85025 COMPLETE CBC W/AUTO DIFF WBC: CPT

## 2018-09-30 PROCEDURE — 700105 HCHG RX REV CODE 258: Performed by: INTERNAL MEDICINE

## 2018-09-30 PROCEDURE — 71045 X-RAY EXAM CHEST 1 VIEW: CPT

## 2018-09-30 PROCEDURE — 700111 HCHG RX REV CODE 636 W/ 250 OVERRIDE (IP): Performed by: EMERGENCY MEDICINE

## 2018-09-30 PROCEDURE — 80053 COMPREHEN METABOLIC PANEL: CPT

## 2018-09-30 PROCEDURE — A9270 NON-COVERED ITEM OR SERVICE: HCPCS | Performed by: INTERNAL MEDICINE

## 2018-09-30 PROCEDURE — 700105 HCHG RX REV CODE 258: Performed by: EMERGENCY MEDICINE

## 2018-09-30 PROCEDURE — 96367 TX/PROPH/DG ADDL SEQ IV INF: CPT

## 2018-09-30 PROCEDURE — 99223 1ST HOSP IP/OBS HIGH 75: CPT | Mod: AI | Performed by: INTERNAL MEDICINE

## 2018-09-30 PROCEDURE — 96365 THER/PROPH/DIAG IV INF INIT: CPT

## 2018-09-30 PROCEDURE — 700102 HCHG RX REV CODE 250 W/ 637 OVERRIDE(OP): Performed by: INTERNAL MEDICINE

## 2018-09-30 PROCEDURE — 70450 CT HEAD/BRAIN W/O DYE: CPT

## 2018-09-30 PROCEDURE — 99285 EMERGENCY DEPT VISIT HI MDM: CPT

## 2018-09-30 PROCEDURE — 87040 BLOOD CULTURE FOR BACTERIA: CPT

## 2018-09-30 PROCEDURE — 700111 HCHG RX REV CODE 636 W/ 250 OVERRIDE (IP): Performed by: INTERNAL MEDICINE

## 2018-09-30 PROCEDURE — 87086 URINE CULTURE/COLONY COUNT: CPT

## 2018-09-30 PROCEDURE — 93971 EXTREMITY STUDY: CPT | Mod: LT

## 2018-09-30 RX ORDER — LORAZEPAM 2 MG/ML
1.5 INJECTION INTRAMUSCULAR
Status: DISCONTINUED | OUTPATIENT
Start: 2018-09-30 | End: 2018-10-02 | Stop reason: HOSPADM

## 2018-09-30 RX ORDER — HEPARIN SODIUM 5000 [USP'U]/ML
5000 INJECTION, SOLUTION INTRAVENOUS; SUBCUTANEOUS EVERY 8 HOURS
Status: DISCONTINUED | OUTPATIENT
Start: 2018-09-30 | End: 2018-10-02 | Stop reason: HOSPADM

## 2018-09-30 RX ORDER — LORAZEPAM 1 MG/1
3 TABLET ORAL
Status: DISCONTINUED | OUTPATIENT
Start: 2018-09-30 | End: 2018-10-02 | Stop reason: HOSPADM

## 2018-09-30 RX ORDER — SODIUM CHLORIDE 9 MG/ML
1000 INJECTION, SOLUTION INTRAVENOUS CONTINUOUS
Status: DISCONTINUED | OUTPATIENT
Start: 2018-09-30 | End: 2018-10-02 | Stop reason: HOSPADM

## 2018-09-30 RX ORDER — SODIUM CHLORIDE 9 MG/ML
30 INJECTION, SOLUTION INTRAVENOUS
Status: DISCONTINUED | OUTPATIENT
Start: 2018-09-30 | End: 2018-10-02 | Stop reason: HOSPADM

## 2018-09-30 RX ORDER — LORAZEPAM 2 MG/ML
0.5 INJECTION INTRAMUSCULAR EVERY 4 HOURS PRN
Status: DISCONTINUED | OUTPATIENT
Start: 2018-09-30 | End: 2018-10-02 | Stop reason: HOSPADM

## 2018-09-30 RX ORDER — ATORVASTATIN CALCIUM 10 MG/1
10 TABLET, FILM COATED ORAL NIGHTLY
Status: DISCONTINUED | OUTPATIENT
Start: 2018-09-30 | End: 2018-10-02 | Stop reason: HOSPADM

## 2018-09-30 RX ORDER — LORAZEPAM 1 MG/1
0.5 TABLET ORAL EVERY 4 HOURS PRN
Status: DISCONTINUED | OUTPATIENT
Start: 2018-09-30 | End: 2018-10-02 | Stop reason: HOSPADM

## 2018-09-30 RX ORDER — METOPROLOL SUCCINATE 100 MG/1
100 TABLET, EXTENDED RELEASE ORAL DAILY
COMMUNITY
End: 2020-05-05 | Stop reason: SDUPTHER

## 2018-09-30 RX ORDER — AMOXICILLIN 250 MG
2 CAPSULE ORAL 2 TIMES DAILY
Status: DISCONTINUED | OUTPATIENT
Start: 2018-09-30 | End: 2018-10-02 | Stop reason: HOSPADM

## 2018-09-30 RX ORDER — ATORVASTATIN CALCIUM 10 MG/1
10 TABLET, FILM COATED ORAL DAILY
COMMUNITY
End: 2022-11-07

## 2018-09-30 RX ORDER — METOPROLOL SUCCINATE 25 MG/1
100 TABLET, EXTENDED RELEASE ORAL DAILY
Status: DISCONTINUED | OUTPATIENT
Start: 2018-09-30 | End: 2018-10-02 | Stop reason: HOSPADM

## 2018-09-30 RX ORDER — LORAZEPAM 2 MG/ML
1 INJECTION INTRAMUSCULAR
Status: DISCONTINUED | OUTPATIENT
Start: 2018-09-30 | End: 2018-10-02 | Stop reason: HOSPADM

## 2018-09-30 RX ORDER — AMLODIPINE BESYLATE 5 MG/1
5 TABLET ORAL DAILY
Status: DISCONTINUED | OUTPATIENT
Start: 2018-09-30 | End: 2018-10-02 | Stop reason: HOSPADM

## 2018-09-30 RX ORDER — ONDANSETRON 2 MG/ML
4 INJECTION INTRAMUSCULAR; INTRAVENOUS EVERY 4 HOURS PRN
Status: DISCONTINUED | OUTPATIENT
Start: 2018-09-30 | End: 2018-10-02 | Stop reason: HOSPADM

## 2018-09-30 RX ORDER — LORAZEPAM 1 MG/1
2 TABLET ORAL
Status: DISCONTINUED | OUTPATIENT
Start: 2018-09-30 | End: 2018-10-02 | Stop reason: HOSPADM

## 2018-09-30 RX ORDER — POLYETHYLENE GLYCOL 3350 17 G/17G
1 POWDER, FOR SOLUTION ORAL
Status: DISCONTINUED | OUTPATIENT
Start: 2018-09-30 | End: 2018-10-02 | Stop reason: HOSPADM

## 2018-09-30 RX ORDER — BISACODYL 10 MG
10 SUPPOSITORY, RECTAL RECTAL
Status: DISCONTINUED | OUTPATIENT
Start: 2018-09-30 | End: 2018-10-02 | Stop reason: HOSPADM

## 2018-09-30 RX ORDER — SODIUM CHLORIDE 9 MG/ML
500 INJECTION, SOLUTION INTRAVENOUS
Status: DISCONTINUED | OUTPATIENT
Start: 2018-09-30 | End: 2018-10-02 | Stop reason: HOSPADM

## 2018-09-30 RX ORDER — LORAZEPAM 2 MG/ML
2 INJECTION INTRAMUSCULAR
Status: DISCONTINUED | OUTPATIENT
Start: 2018-09-30 | End: 2018-10-02 | Stop reason: HOSPADM

## 2018-09-30 RX ORDER — SODIUM CHLORIDE 9 MG/ML
INJECTION, SOLUTION INTRAVENOUS CONTINUOUS
Status: DISCONTINUED | OUTPATIENT
Start: 2018-09-30 | End: 2018-10-02 | Stop reason: HOSPADM

## 2018-09-30 RX ORDER — AMLODIPINE BESYLATE 5 MG/1
5 TABLET ORAL DAILY
COMMUNITY

## 2018-09-30 RX ORDER — LORAZEPAM 1 MG/1
4 TABLET ORAL
Status: DISCONTINUED | OUTPATIENT
Start: 2018-09-30 | End: 2018-10-02 | Stop reason: HOSPADM

## 2018-09-30 RX ORDER — LORAZEPAM 1 MG/1
1 TABLET ORAL EVERY 4 HOURS PRN
Status: DISCONTINUED | OUTPATIENT
Start: 2018-09-30 | End: 2018-10-02 | Stop reason: HOSPADM

## 2018-09-30 RX ORDER — ONDANSETRON 4 MG/1
4 TABLET, ORALLY DISINTEGRATING ORAL EVERY 4 HOURS PRN
Status: DISCONTINUED | OUTPATIENT
Start: 2018-09-30 | End: 2018-10-02 | Stop reason: HOSPADM

## 2018-09-30 RX ORDER — ACETAMINOPHEN 325 MG/1
650 TABLET ORAL EVERY 6 HOURS PRN
Status: DISCONTINUED | OUTPATIENT
Start: 2018-09-30 | End: 2018-10-02 | Stop reason: HOSPADM

## 2018-09-30 RX ADMIN — SODIUM CHLORIDE: 9 INJECTION, SOLUTION INTRAVENOUS at 15:45

## 2018-09-30 RX ADMIN — VANCOMYCIN HYDROCHLORIDE 1900 MG: 5 INJECTION, POWDER, LYOPHILIZED, FOR SOLUTION INTRAVENOUS at 14:36

## 2018-09-30 RX ADMIN — HEPARIN SODIUM 5000 UNITS: 5000 INJECTION, SOLUTION INTRAVENOUS; SUBCUTANEOUS at 21:12

## 2018-09-30 RX ADMIN — METOPROLOL SUCCINATE 100 MG: 25 TABLET, EXTENDED RELEASE ORAL at 18:22

## 2018-09-30 RX ADMIN — SODIUM CHLORIDE 3 G: 900 INJECTION INTRAVENOUS at 13:50

## 2018-09-30 RX ADMIN — AMLODIPINE BESYLATE 5 MG: 5 TABLET ORAL at 18:21

## 2018-09-30 RX ADMIN — ATORVASTATIN CALCIUM 10 MG: 10 TABLET, FILM COATED ORAL at 21:12

## 2018-09-30 RX ADMIN — SODIUM CHLORIDE 1000 ML: 9 INJECTION, SOLUTION INTRAVENOUS at 16:40

## 2018-09-30 RX ADMIN — SODIUM CHLORIDE 3 G: 900 INJECTION INTRAVENOUS at 23:50

## 2018-09-30 RX ADMIN — SODIUM CHLORIDE 1000 ML: 9 INJECTION, SOLUTION INTRAVENOUS at 13:10

## 2018-09-30 RX ADMIN — HEPARIN SODIUM 5000 UNITS: 5000 INJECTION, SOLUTION INTRAVENOUS; SUBCUTANEOUS at 18:22

## 2018-09-30 RX ADMIN — SODIUM CHLORIDE 3 G: 900 INJECTION INTRAVENOUS at 18:23

## 2018-09-30 RX ADMIN — SENNOSIDES AND DOCUSATE SODIUM 2 TABLET: 8.6; 5 TABLET ORAL at 18:22

## 2018-09-30 ASSESSMENT — PATIENT HEALTH QUESTIONNAIRE - PHQ9
1. LITTLE INTEREST OR PLEASURE IN DOING THINGS: NOT AT ALL
1. LITTLE INTEREST OR PLEASURE IN DOING THINGS: NOT AT ALL
2. FEELING DOWN, DEPRESSED, IRRITABLE, OR HOPELESS: NOT AT ALL
SUM OF ALL RESPONSES TO PHQ9 QUESTIONS 1 AND 2: 0
SUM OF ALL RESPONSES TO PHQ9 QUESTIONS 1 AND 2: 0
2. FEELING DOWN, DEPRESSED, IRRITABLE, OR HOPELESS: NOT AT ALL

## 2018-09-30 ASSESSMENT — LIFESTYLE VARIABLES
NAUSEA AND VOMITING: NO NAUSEA AND NO VOMITING
ORIENTATION AND CLOUDING OF SENSORIUM: ORIENTED AND CAN DO SERIAL ADDITIONS
DOES PATIENT WANT TO STOP DRINKING: NO
ORIENTATION AND CLOUDING OF SENSORIUM: ORIENTED AND CAN DO SERIAL ADDITIONS
EVER FELT BAD OR GUILTY ABOUT YOUR DRINKING: NO
VISUAL DISTURBANCES: NOT PRESENT
NAUSEA AND VOMITING: NO NAUSEA AND NO VOMITING
TREMOR: NO TREMOR
AUDITORY DISTURBANCES: NOT PRESENT
ANXIETY: NO ANXIETY (AT EASE)
TOTAL SCORE: 2
AGITATION: NORMAL ACTIVITY
ON A TYPICAL DAY WHEN YOU DRINK ALCOHOL HOW MANY DRINKS DO YOU HAVE: 3
ALCOHOL_USE: YES
HOW MANY TIMES IN THE PAST YEAR HAVE YOU HAD 5 OR MORE DRINKS IN A DAY: 0
HEADACHE, FULLNESS IN HEAD: NOT PRESENT
HEADACHE, FULLNESS IN HEAD: NOT PRESENT
HAVE YOU EVER FELT YOU SHOULD CUT DOWN ON YOUR DRINKING: YES
AUDITORY DISTURBANCES: NOT PRESENT
CONSUMPTION TOTAL: POSITIVE
PAROXYSMAL SWEATS: NO SWEAT VISIBLE
TOTAL SCORE: 0
EVER HAD A DRINK FIRST THING IN THE MORNING TO STEADY YOUR NERVES TO GET RID OF A HANGOVER: NO
HAVE PEOPLE ANNOYED YOU BY CRITICIZING YOUR DRINKING: YES
VISUAL DISTURBANCES: NOT PRESENT
ANXIETY: NO ANXIETY (AT EASE)
AGITATION: NORMAL ACTIVITY
PAROXYSMAL SWEATS: NO SWEAT VISIBLE
TOTAL SCORE: 2
TOTAL SCORE: 0
TREMOR: NO TREMOR
AVERAGE NUMBER OF DAYS PER WEEK YOU HAVE A DRINK CONTAINING ALCOHOL: 3
TOTAL SCORE: 2

## 2018-09-30 ASSESSMENT — ENCOUNTER SYMPTOMS
CONSTIPATION: 0
NAUSEA: 0
FEVER: 0
SPEECH CHANGE: 1
DEPRESSION: 0
PALPITATIONS: 0
SHORTNESS OF BREATH: 0
DIARRHEA: 0
CHILLS: 1
WEAKNESS: 0
WEIGHT LOSS: 0
DIAPHORESIS: 0
VOMITING: 0
HEADACHES: 1
COUGH: 0
EYE DISCHARGE: 0
BLOOD IN STOOL: 0
DIZZINESS: 1
SPUTUM PRODUCTION: 0
ABDOMINAL PAIN: 0
EYE REDNESS: 0

## 2018-09-30 ASSESSMENT — COGNITIVE AND FUNCTIONAL STATUS - GENERAL
SUGGESTED CMS G CODE MODIFIER MOBILITY: CJ
MOBILITY SCORE: 21
DAILY ACTIVITIY SCORE: 23
SUGGESTED CMS G CODE MODIFIER DAILY ACTIVITY: CI
STANDING UP FROM CHAIR USING ARMS: A LITTLE
WALKING IN HOSPITAL ROOM: A LITTLE
DRESSING REGULAR LOWER BODY CLOTHING: A LITTLE
CLIMB 3 TO 5 STEPS WITH RAILING: A LITTLE

## 2018-09-30 ASSESSMENT — PAIN SCALES - GENERAL
PAINLEVEL_OUTOF10: 4
PAINLEVEL_OUTOF10: 1
PAINLEVEL_OUTOF10: 0

## 2018-09-30 NOTE — ASSESSMENT & PLAN NOTE
This is severe sepsis with the following associated acute organ dysfunction(s): acute kidney failure, metabolic/septic encephalopathy.   2/2 to left leg cellulitis.  Continue with IV Unasyn   Cultures pending.

## 2018-09-30 NOTE — ED TRIAGE NOTES
"C/O a partial fall \"a few days ago.\"  He slipped with his right leg over a tiled floor without hitting the floor, and soon after he experienced pain.  Swelling has also been progressive with diffuse redness since Thursday.   "

## 2018-09-30 NOTE — ED NOTES
Chief Complaint   Patient presents with   • Leg Injury     Mechanical GLF 6 days ago.  Redness/pain/sweling/heat to LLE since GLF.   • Slurred Speech     Since Friday.  Mild confusion per daughter.      Ambulated to room with above complaints.  Leg hot, swollen/paiful to touch.  Chart placed for ERP eval.

## 2018-09-30 NOTE — H&P
Hospital Medicine History & Physical Note    Date of Service  9/30/2018    Primary Care Physician  Pee CUNNINGHAM M.D.    Consultants  none    Code Status  Full    Chief Complaint  Leg injury with progressive swelling and redness and pain  Altered mentation    History of Presenting Illness  77 y.o. male who presented 9/30/2018 with splotchy red swollen left lower extremity, he fell sometime earlier last week date is uncertain as his story changes.  He had no syncope he said his foot just went out from underneath him, since that time he has had progressive redness swelling and pain in his leg, the pain is there initially when he starts to walk and then it improves as he keeps walking.  His daughter spoke with him on the phone Friday and he seemed to be slurring his words, he is slightly confused with inconsistent answers today.  He has had no documented fever or diaphoresis but has had chills.  He states his appetite is normal and he has had no nausea vomiting or diarrhea.  He denies dysuria.  He did have transient headache but says it is improved.  He has felt dizzy for the last couple of days.    Review of Systems  Review of Systems   Constitutional: Positive for chills. Negative for diaphoresis, fever, malaise/fatigue and weight loss.   HENT: Positive for nosebleeds (Recent).         Hoarseness for about the last week which the daughter says is improving   Eyes: Negative for discharge and redness.   Respiratory: Negative for cough, sputum production and shortness of breath.    Cardiovascular: Negative for chest pain and palpitations.   Gastrointestinal: Negative for abdominal pain, blood in stool, constipation, diarrhea, nausea and vomiting.   Musculoskeletal:        Left leg pain   Skin: Negative for itching and rash.   Neurological: Positive for dizziness, speech change (Slurred speech noted by daughter on Friday) and headaches. Negative for weakness.   Psychiatric/Behavioral: Negative for depression.         Mild confusion       Past Medical History   has a past medical history of Bowel habit changes; Cataract; Dental disorder; Snoring; and Stroke (HCC) (2013).  TIA, hypertension, dyslipidemia,, glucose intolerance    Surgical History   has a past surgical history that includes other; cataract phaco with iol (Left, 8/14/2018); and cataract phaco with iol (Right, 8/28/2018).  Remote tonsillectomy    Family History  Breast cancer, prostate cancer, esophageal cancer, aunt had diabetes-denies family history of heart disease    Social History   reports that he has never smoked. He has never used smokeless tobacco. He reports that he drinks alcohol. He reports that he does not use drugs.  , lives with his wife has 3 children two are local    Allergies  Allergies   Allergen Reactions   • Other Environmental      Seasonal allergies       Medications  Prior to Admission Medications   Prescriptions Last Dose Informant Patient Reported? Taking?   amLODIPine (NORVASC) 5 MG Tab UNK at UNK Patient Yes Yes   Sig: Take 5 mg by mouth every day.   atorvastatin (LIPITOR) 10 MG Tab   Yes Yes   Sig: Take 10 mg by mouth every evening.   metoprolol SR (TOPROL XL) 100 MG TABLET SR 24 HR UNK at UNK Patient Yes Yes   Sig: Take 100 mg by mouth every day.      Facility-Administered Medications: None       Physical Exam  Temp:  [36.8 °C (98.3 °F)] 36.8 °C (98.3 °F)  Pulse:  [70-84] 70  Resp:  [16] 16  BP: (132)/(85) 132/85    Physical Exam   Constitutional: He is oriented to person, place, and time. He appears well-developed and well-nourished. No distress.   Not overtly toxic   HENT:   Head: Normocephalic and atraumatic.   Right Ear: External ear normal.   Left Ear: External ear normal.   Mouth/Throat: Oropharynx is clear and moist. No oropharyngeal exudate.   Small eschar on right lower lip states his wife cut him shaving  Hoarse voice   Eyes: Pupils are equal, round, and reactive to light. Conjunctivae and EOM are normal. Right eye  exhibits no discharge. Left eye exhibits no discharge. No scleral icterus.   Neck: Neck supple.   Cardiovascular: Normal rate and regular rhythm.    Pulmonary/Chest: Effort normal.   Slightly diminished   Abdominal: Soft. Bowel sounds are normal. He exhibits no distension. There is no tenderness.   Musculoskeletal: He exhibits edema (2-3+ left lower extremity with splotchy bright red erythema extending from the ankle to the knee) and tenderness.   Neurological: He is alert and oriented to person, place, and time. No cranial nerve deficit.   Inconsistent historian  Strength 5 out of 5 bilateral  No pronator drift   Skin: Skin is warm and dry. He is not diaphoretic. There is erythema.   Psychiatric: He has a normal mood and affect.   Nursing note and vitals reviewed.      Laboratory:  Recent Labs      09/30/18   1255   WBC  11.3*   RBC  4.50*   HEMOGLOBIN  14.9   HEMATOCRIT  43.3   MCV  96.2   MCH  33.1*   MCHC  34.4   RDW  45.1   PLATELETCT  198   MPV  10.0     Recent Labs      09/30/18   1255   SODIUM  135   POTASSIUM  3.7   CHLORIDE  106   CO2  20   GLUCOSE  128*   BUN  30*   CREATININE  1.23   CALCIUM  8.8     Recent Labs      09/30/18   1255   ALTSGPT  27   ASTSGOT  28   ALKPHOSPHAT  94   TBILIRUBIN  2.8*   GLUCOSE  128*                 No results for input(s): TROPONINI in the last 72 hours.    Urinalysis:    No results found     Imaging:  US-EXTREMITY VENOUS LOWER UNILAT LEFT   Final Result      No left lower extremity deep venous thrombosis.      DX-CHEST-PORTABLE (1 VIEW)   Final Result      No acute cardiopulmonary abnormality identified.      CT-HEAD W/O   Final Result      1.  No acute intracranial abnormality      2.  Underlying white matter change, cerebral volume loss, and internal carotid artery atherosclerotic plaque            Assessment/Plan:  I anticipate this patient will require at least two midnights for appropriate medical management, necessitating inpatient admission.    Encephalopathy in  sepsis   Assessment & Plan    Patient has had mild altered mentation for the last few days  He has no focal neurologic deficits  Suspect is toxic encephalopathy due to sepsis  Continue to monitor        Alcohol use disorder   Assessment & Plan    [facility] protocol implemented        Sepsis ([facility])   Assessment & Plan    This is severe sepsis with the following associated acute organ dysfunction(s): acute kidney failure, metabolic/septic encephalopathy.   Due to cellulitis of the leg  Sepsis orders implemented  Continue Unasyn        DAKOTAH (acute kidney injury) ([facility])   Assessment & Plan    Suspect is due to sepsis as he does not appear to be dehydrated  Continue to monitor        Cellulitis   Assessment & Plan    Appears to be streptococcal  Received 1 dose of vancomycin in the ER  Continue Unasyn  Duplex negative for clot        Pre-diabetes   Assessment & Plan    Consistent carbohydrate diet  A1c in August was 6.1        Dyslipidemia   Assessment & Plan    Continue Lipitor        Hypertension, essential   Assessment & Plan    Continue Toprol Norvasc            VTE prophylaxis: Heparin

## 2018-09-30 NOTE — ASSESSMENT & PLAN NOTE
Resume IV Unasyn, was given 1 dose of vancomycin in ER.  LE Duplex: Negative for DVT.  Improving clinically, resume.

## 2018-09-30 NOTE — ED NOTES
Med Rec complete per Pt's daughter at bedside and [employer] @ [phone]  Allergies Reviewed  No ABX filled in the last 30 days

## 2018-10-01 LAB
ANION GAP SERPL CALC-SCNC: 5 MMOL/L (ref 0–11.9)
BASOPHILS # BLD AUTO: 0.9 % (ref 0–1.8)
BASOPHILS # BLD: 0.1 K/UL (ref 0–0.12)
BUN SERPL-MCNC: 18 MG/DL (ref 8–22)
CALCIUM SERPL-MCNC: 8.1 MG/DL (ref 8.4–10.2)
CHLORIDE SERPL-SCNC: 107 MMOL/L (ref 96–112)
CO2 SERPL-SCNC: 23 MMOL/L (ref 20–33)
CREAT SERPL-MCNC: 0.99 MG/DL (ref 0.5–1.4)
EOSINOPHIL # BLD AUTO: 0.22 K/UL (ref 0–0.51)
EOSINOPHIL NFR BLD: 1.9 % (ref 0–6.9)
ERYTHROCYTE [DISTWIDTH] IN BLOOD BY AUTOMATED COUNT: 46.5 FL (ref 35.9–50)
GLUCOSE SERPL-MCNC: 109 MG/DL (ref 65–99)
HCT VFR BLD AUTO: 40.7 % (ref 42–52)
HGB BLD-MCNC: 13.8 G/DL (ref 14–18)
IMM GRANULOCYTES # BLD AUTO: 0.1 K/UL (ref 0–0.11)
IMM GRANULOCYTES NFR BLD AUTO: 0.9 % (ref 0–0.9)
LACTATE BLD-SCNC: 1.4 MMOL/L (ref 0.5–2)
LYMPHOCYTES # BLD AUTO: 1.19 K/UL (ref 1–4.8)
LYMPHOCYTES NFR BLD: 10.3 % (ref 22–41)
MAGNESIUM SERPL-MCNC: 2.1 MG/DL (ref 1.5–2.5)
MCH RBC QN AUTO: 33.5 PG (ref 27–33)
MCHC RBC AUTO-ENTMCNC: 33.9 G/DL (ref 33.7–35.3)
MCV RBC AUTO: 98.8 FL (ref 81.4–97.8)
MONOCYTES # BLD AUTO: 0.88 K/UL (ref 0–0.85)
MONOCYTES NFR BLD AUTO: 7.6 % (ref 0–13.4)
NEUTROPHILS # BLD AUTO: 9.06 K/UL (ref 1.82–7.42)
NEUTROPHILS NFR BLD: 78.4 % (ref 44–72)
NRBC # BLD AUTO: 0 K/UL
NRBC BLD-RTO: 0 /100 WBC
PHOSPHATE SERPL-MCNC: 2.5 MG/DL (ref 2.5–4.5)
PLATELET # BLD AUTO: 205 K/UL (ref 164–446)
PMV BLD AUTO: 9.9 FL (ref 9–12.9)
POTASSIUM SERPL-SCNC: 3.5 MMOL/L (ref 3.6–5.5)
RBC # BLD AUTO: 4.12 M/UL (ref 4.7–6.1)
SODIUM SERPL-SCNC: 135 MMOL/L (ref 135–145)
WBC # BLD AUTO: 11.6 K/UL (ref 4.8–10.8)

## 2018-10-01 PROCEDURE — 99232 SBSQ HOSP IP/OBS MODERATE 35: CPT | Performed by: HOSPITALIST

## 2018-10-01 PROCEDURE — 85025 COMPLETE CBC W/AUTO DIFF WBC: CPT

## 2018-10-01 PROCEDURE — 83735 ASSAY OF MAGNESIUM: CPT

## 2018-10-01 PROCEDURE — 84100 ASSAY OF PHOSPHORUS: CPT

## 2018-10-01 PROCEDURE — A9270 NON-COVERED ITEM OR SERVICE: HCPCS | Performed by: INTERNAL MEDICINE

## 2018-10-01 PROCEDURE — HZ2ZZZZ DETOXIFICATION SERVICES FOR SUBSTANCE ABUSE TREATMENT: ICD-10-PCS | Performed by: HOSPITALIST

## 2018-10-01 PROCEDURE — 700105 HCHG RX REV CODE 258: Performed by: INTERNAL MEDICINE

## 2018-10-01 PROCEDURE — 770006 HCHG ROOM/CARE - MED/SURG/GYN SEMI*

## 2018-10-01 PROCEDURE — 80048 BASIC METABOLIC PNL TOTAL CA: CPT

## 2018-10-01 PROCEDURE — 700102 HCHG RX REV CODE 250 W/ 637 OVERRIDE(OP): Performed by: INTERNAL MEDICINE

## 2018-10-01 PROCEDURE — 83605 ASSAY OF LACTIC ACID: CPT

## 2018-10-01 PROCEDURE — 700111 HCHG RX REV CODE 636 W/ 250 OVERRIDE (IP): Performed by: INTERNAL MEDICINE

## 2018-10-01 PROCEDURE — 36415 COLL VENOUS BLD VENIPUNCTURE: CPT

## 2018-10-01 RX ADMIN — AMLODIPINE BESYLATE 5 MG: 5 TABLET ORAL at 05:33

## 2018-10-01 RX ADMIN — ATORVASTATIN CALCIUM 10 MG: 10 TABLET, FILM COATED ORAL at 21:31

## 2018-10-01 RX ADMIN — HEPARIN SODIUM 5000 UNITS: 5000 INJECTION, SOLUTION INTRAVENOUS; SUBCUTANEOUS at 21:31

## 2018-10-01 RX ADMIN — HEPARIN SODIUM 5000 UNITS: 5000 INJECTION, SOLUTION INTRAVENOUS; SUBCUTANEOUS at 05:33

## 2018-10-01 RX ADMIN — SODIUM CHLORIDE 3 G: 900 INJECTION INTRAVENOUS at 23:59

## 2018-10-01 RX ADMIN — METOPROLOL SUCCINATE 100 MG: 25 TABLET, EXTENDED RELEASE ORAL at 05:32

## 2018-10-01 RX ADMIN — SODIUM CHLORIDE 3 G: 900 INJECTION INTRAVENOUS at 17:16

## 2018-10-01 RX ADMIN — HEPARIN SODIUM 5000 UNITS: 5000 INJECTION, SOLUTION INTRAVENOUS; SUBCUTANEOUS at 13:29

## 2018-10-01 RX ADMIN — SENNOSIDES AND DOCUSATE SODIUM 2 TABLET: 8.6; 5 TABLET ORAL at 05:33

## 2018-10-01 RX ADMIN — SODIUM CHLORIDE 3 G: 900 INJECTION INTRAVENOUS at 12:03

## 2018-10-01 RX ADMIN — SODIUM CHLORIDE 3 G: 900 INJECTION INTRAVENOUS at 05:31

## 2018-10-01 ASSESSMENT — LIFESTYLE VARIABLES
HEADACHE, FULLNESS IN HEAD: NOT PRESENT
ANXIETY: NO ANXIETY (AT EASE)
NAUSEA AND VOMITING: NO NAUSEA AND NO VOMITING
VISUAL DISTURBANCES: NOT PRESENT
VISUAL DISTURBANCES: NOT PRESENT
AGITATION: NORMAL ACTIVITY
TOTAL SCORE: 0
ORIENTATION AND CLOUDING OF SENSORIUM: ORIENTED AND CAN DO SERIAL ADDITIONS
ORIENTATION AND CLOUDING OF SENSORIUM: ORIENTED AND CAN DO SERIAL ADDITIONS
AUDITORY DISTURBANCES: NOT PRESENT
ANXIETY: NO ANXIETY (AT EASE)
HEADACHE, FULLNESS IN HEAD: NOT PRESENT
NAUSEA AND VOMITING: NO NAUSEA AND NO VOMITING
PAROXYSMAL SWEATS: NO SWEAT VISIBLE
AGITATION: NORMAL ACTIVITY
TOTAL SCORE: 0
AUDITORY DISTURBANCES: NOT PRESENT
TREMOR: NO TREMOR
PAROXYSMAL SWEATS: NO SWEAT VISIBLE
TREMOR: NO TREMOR

## 2018-10-01 ASSESSMENT — ENCOUNTER SYMPTOMS
ORTHOPNEA: 0
SPEECH CHANGE: 0
BLURRED VISION: 0
WEAKNESS: 0
VOMITING: 0
BLOOD IN STOOL: 0
HEMOPTYSIS: 0
EYE PAIN: 0
NAUSEA: 0
CLAUDICATION: 0
STRIDOR: 0
TREMORS: 0
MEMORY LOSS: 0
PND: 0
TINGLING: 0
DOUBLE VISION: 0
SHORTNESS OF BREATH: 0
BACK PAIN: 0
DIZZINESS: 0
PALPITATIONS: 0
NECK PAIN: 1
HEADACHES: 0
NERVOUS/ANXIOUS: 0
SENSORY CHANGE: 0
SORE THROAT: 0
COUGH: 0
CHILLS: 0
HEARTBURN: 0
CONSTIPATION: 0
DEPRESSION: 0
PHOTOPHOBIA: 0
SPUTUM PRODUCTION: 0
MYALGIAS: 1
FEVER: 0

## 2018-10-01 ASSESSMENT — PAIN SCALES - GENERAL
PAINLEVEL_OUTOF10: 0
PAINLEVEL_OUTOF10: 1
PAINLEVEL_OUTOF10: 0

## 2018-10-01 ASSESSMENT — PATIENT HEALTH QUESTIONNAIRE - PHQ9
2. FEELING DOWN, DEPRESSED, IRRITABLE, OR HOPELESS: NOT AT ALL
SUM OF ALL RESPONSES TO PHQ9 QUESTIONS 1 AND 2: 0
1. LITTLE INTEREST OR PLEASURE IN DOING THINGS: NOT AT ALL

## 2018-10-01 NOTE — CARE PLAN
Problem: Safety  Goal: Will remain free from injury  Outcome: PROGRESSING AS EXPECTED  Pt has a history of fall. Assess pt's gait and ability to walk. Have the call light within pt's reach. Tell the pt to dangle both feet before getting out of bed and ensure pt is wearning non skid socks with room free from clutter/spill.

## 2018-10-01 NOTE — PROGRESS NOTES
[facility] Medicine Daily Progress Note    Date of Service  10/1/2018    Chief Complaint  77 y.o. male admitted 9/30/2018 for left leg injury, swelling and altered mentation.    Interval Problem Update  No acute overnight events patient is alert oriented ×3. He is comfortable he says his left leg lower extremity discomfort is improving especially when he ambulates  And erythema is regressing are still warm  Will continue IV Unasyn for now  Cultures are pending    Consultants/Specialty  None    Code Status  Full    Disposition  TBD    Review of Systems  Review of Systems   Constitutional: Positive for malaise/fatigue. Negative for chills and fever.   HENT: Negative for congestion, hearing loss, sore throat and tinnitus.    Eyes: Negative for blurred vision, double vision, photophobia and pain.   Respiratory: Negative for cough, hemoptysis, sputum production, shortness of breath and stridor.    Cardiovascular: Negative for chest pain, palpitations, orthopnea, claudication and PND.   Gastrointestinal: Negative for blood in stool, constipation, heartburn, melena, nausea and vomiting.   Genitourinary: Negative for dysuria, frequency and urgency.   Musculoskeletal: Positive for myalgias and neck pain. Negative for back pain.   Neurological: Negative for dizziness, tingling, tremors, sensory change, speech change, weakness and headaches.   Psychiatric/Behavioral: Negative for depression, memory loss and suicidal ideas. The patient is not nervous/anxious.         Physical Exam  Temp:  [36.2 °C (97.2 °F)-36.8 °C (98.3 °F)] 36.7 °C (98 °F)  Pulse:  [66-84] 69  Resp:  [16-18] 18  BP: (115-143)/(64-85) 118/69    Physical Exam   Constitutional: He is oriented to person, place, and time. He appears well-developed and well-nourished. No distress.   HENT:   Head: Normocephalic and atraumatic.   Mouth/Throat: No oropharyngeal exudate.   Eyes: Pupils are equal, round, and reactive to light. Conjunctivae are normal. Right eye exhibits  no discharge. No scleral icterus.   Neck: Neck supple. No JVD present. No thyromegaly present.   Cardiovascular: Normal rate and intact distal pulses.    No murmur heard.  Pulses:       Dorsalis pedis pulses are 2+ on the right side, and 2+ on the left side.   Cap refill < 3 s   Pulmonary/Chest: Effort normal and breath sounds normal. No stridor. No respiratory distress. He has no wheezes. He has no rales.   Abdominal: Soft. Bowel sounds are normal. He exhibits no distension. There is no tenderness. There is no rebound.   Musculoskeletal: Normal range of motion. He exhibits edema and tenderness.   Erythema left lower extremity, extending from knee to his ankle circumferentially, with erythema and warmth to touch. Mild edema +1 pitting   Neurological: He is alert and oriented to person, place, and time. No cranial nerve deficit.   Skin: Skin is warm and dry. He is not diaphoretic. No erythema.   Psychiatric: He has a normal mood and affect. His behavior is normal. Thought content normal.       Fluids    Intake/Output Summary (Last 24 hours) at 10/01/18 0850  Last data filed at 10/01/18 0531   Gross per 24 hour   Intake              480 ml   Output             1300 ml   Net             -820 ml       Laboratory  Recent Labs      09/30/18   1255  10/01/18   0316   WBC  11.3*  11.6*   RBC  4.50*  4.12*   HEMOGLOBIN  14.9  13.8*   HEMATOCRIT  43.3  40.7*   MCV  96.2  98.8*   MCH  33.1*  33.5*   MCHC  34.4  33.9   RDW  45.1  46.5   PLATELETCT  198  205   MPV  10.0  9.9     Recent Labs      09/30/18   1255  10/01/18   0316   SODIUM  135  135   POTASSIUM  3.7  3.5*   CHLORIDE  106  107   CO2  20  23   GLUCOSE  128*  109*   BUN  30*  18   CREATININE  1.23  0.99   CALCIUM  8.8  8.1*                   Imaging  US-EXTREMITY VENOUS LOWER UNILAT LEFT   Final Result      No left lower extremity deep venous thrombosis.      DX-CHEST-PORTABLE (1 VIEW)   Final Result      No acute cardiopulmonary abnormality identified.      CT-HEAD  W/O   Final Result      1.  No acute intracranial abnormality      2.  Underlying white matter change, cerebral volume loss, and internal carotid artery atherosclerotic plaque           Assessment/Plan  Hypertension, essential  Controlled  Resume with metoprolol and amlodipine      Dyslipidemia  Continue home dose of Lipitor      Pre-diabetes  Consistent carbohydrate diet  Last A1c was 6.1  Recommended diet/exercise.         Cellulitis  Resume IV Unasyn, was given 1 dose of vancomycin in ER.  LE Duplex: Negative for DVT.  Improving clinically, resume.    DAKOTAH (acute kidney injury) (HCC)  Most likely 2/2 to sepsis and dehydration  Renal functions improved to within normal limits  CTM    Encephalopathy in sepsis  2/2 to sepsis & alcohol abuse.  Now AAOx3.  Cont with neurochecks.      Alcohol use disorder  Continue with CIWA protocol.  Thiamine/folate/MVI    Sepsis (HCC)  This is severe sepsis with the following associated acute organ dysfunction(s): acute kidney failure, metabolic/septic encephalopathy.   2/2 to left leg cellulitis.  Continue with IV Unasyn   Cultures pending.        VTE prophylaxis: Lovenox

## 2018-10-01 NOTE — PROGRESS NOTES
Patient admitted to floor, daughter in room with him.  Admission profile complete, patient aware that he needs to call before getting out of bed and we need a urine specimen.

## 2018-10-01 NOTE — PROGRESS NOTES
Assumed care of patient from night shift RN  77 year old male  Full code  Environmental allergies  Admitted 09/30 d/t cellulitis of left leg  Pain 0/10 denying interventions at this time  A&O x 4  RA. Lungs clear in the upper lobes and diminished in the lower lobes.   Cardiac WNL. 2+ radial pulses bilaterally, 1 + pedal pulses bilaterally. 2+ edema in LL extremity  Last BM 3 days ago per patient. Abdomen semi firm, round, non tender. Hypoactive bowel sounds.    Voiding appropriately in the bathroom.   Up self. Refusing SCD's at this time.  18 g R forearm, 20 g L forearm. SL  Redness on left lower leg. Hot to the touch  All questions answered and all needs met at this time. Call light within reach and patient verbalizes understanding of proper use. Low fall risk according to the berman fall assessment. RN will implement hourly rounding and answer call lights appropriately.

## 2018-10-01 NOTE — PROGRESS NOTES
1910 Received report from KASI Jackson, POc discussed, safety measures kept in place.    1915 Pt us alert and oriented x4. Denies SOB, said his pain level is closed to nothing in a scale of maybe 1-2 as per pt. Left lower leg looks red and warm to touch. Discussed POC such as medications and tests. Kept safety measure in place.    2100 CIWA score is 0, will continue to monitor pt in a timely manner.

## 2018-10-02 VITALS
RESPIRATION RATE: 16 BRPM | HEART RATE: 65 BPM | SYSTOLIC BLOOD PRESSURE: 121 MMHG | HEIGHT: 69 IN | BODY MASS INDEX: 24.78 KG/M2 | WEIGHT: 167.33 LBS | TEMPERATURE: 98.2 F | OXYGEN SATURATION: 96 % | DIASTOLIC BLOOD PRESSURE: 74 MMHG

## 2018-10-02 LAB
ALBUMIN SERPL BCP-MCNC: 3.4 G/DL (ref 3.2–4.9)
ALBUMIN/GLOB SERPL: 1.1 G/DL
ALP SERPL-CCNC: 83 U/L (ref 30–99)
ALT SERPL-CCNC: 19 U/L (ref 2–50)
ANION GAP SERPL CALC-SCNC: 7 MMOL/L (ref 0–11.9)
AST SERPL-CCNC: 17 U/L (ref 12–45)
BASOPHILS # BLD AUTO: 1.1 % (ref 0–1.8)
BASOPHILS # BLD: 0.12 K/UL (ref 0–0.12)
BILIRUB SERPL-MCNC: 1.3 MG/DL (ref 0.1–1.5)
BUN SERPL-MCNC: 16 MG/DL (ref 8–22)
CALCIUM SERPL-MCNC: 8.6 MG/DL (ref 8.4–10.2)
CHLORIDE SERPL-SCNC: 107 MMOL/L (ref 96–112)
CO2 SERPL-SCNC: 22 MMOL/L (ref 20–33)
CREAT SERPL-MCNC: 1.04 MG/DL (ref 0.5–1.4)
EOSINOPHIL # BLD AUTO: 0.19 K/UL (ref 0–0.51)
EOSINOPHIL NFR BLD: 1.7 % (ref 0–6.9)
ERYTHROCYTE [DISTWIDTH] IN BLOOD BY AUTOMATED COUNT: 45.4 FL (ref 35.9–50)
GLOBULIN SER CALC-MCNC: 3 G/DL (ref 1.9–3.5)
GLUCOSE SERPL-MCNC: 127 MG/DL (ref 65–99)
HCT VFR BLD AUTO: 39 % (ref 42–52)
HGB BLD-MCNC: 13.2 G/DL (ref 14–18)
IMM GRANULOCYTES # BLD AUTO: 0.15 K/UL (ref 0–0.11)
IMM GRANULOCYTES NFR BLD AUTO: 1.4 % (ref 0–0.9)
LYMPHOCYTES # BLD AUTO: 1.31 K/UL (ref 1–4.8)
LYMPHOCYTES NFR BLD: 12 % (ref 22–41)
MAGNESIUM SERPL-MCNC: 2.1 MG/DL (ref 1.5–2.5)
MCH RBC QN AUTO: 33.1 PG (ref 27–33)
MCHC RBC AUTO-ENTMCNC: 33.8 G/DL (ref 33.7–35.3)
MCV RBC AUTO: 97.7 FL (ref 81.4–97.8)
MONOCYTES # BLD AUTO: 0.81 K/UL (ref 0–0.85)
MONOCYTES NFR BLD AUTO: 7.4 % (ref 0–13.4)
NEUTROPHILS # BLD AUTO: 8.3 K/UL (ref 1.82–7.42)
NEUTROPHILS NFR BLD: 76.4 % (ref 44–72)
NRBC # BLD AUTO: 0 K/UL
NRBC BLD-RTO: 0 /100 WBC
PHOSPHATE SERPL-MCNC: 3.1 MG/DL (ref 2.5–4.5)
PLATELET # BLD AUTO: 221 K/UL (ref 164–446)
PMV BLD AUTO: 9.5 FL (ref 9–12.9)
POTASSIUM SERPL-SCNC: 3.5 MMOL/L (ref 3.6–5.5)
PROT SERPL-MCNC: 6.4 G/DL (ref 6–8.2)
RBC # BLD AUTO: 3.99 M/UL (ref 4.7–6.1)
SODIUM SERPL-SCNC: 136 MMOL/L (ref 135–145)
WBC # BLD AUTO: 10.9 K/UL (ref 4.8–10.8)

## 2018-10-02 PROCEDURE — 85025 COMPLETE CBC W/AUTO DIFF WBC: CPT

## 2018-10-02 PROCEDURE — 80053 COMPREHEN METABOLIC PANEL: CPT

## 2018-10-02 PROCEDURE — 99239 HOSP IP/OBS DSCHRG MGMT >30: CPT | Performed by: HOSPITALIST

## 2018-10-02 PROCEDURE — 700105 HCHG RX REV CODE 258: Performed by: INTERNAL MEDICINE

## 2018-10-02 PROCEDURE — 700111 HCHG RX REV CODE 636 W/ 250 OVERRIDE (IP): Performed by: INTERNAL MEDICINE

## 2018-10-02 PROCEDURE — A9270 NON-COVERED ITEM OR SERVICE: HCPCS | Performed by: INTERNAL MEDICINE

## 2018-10-02 PROCEDURE — 84100 ASSAY OF PHOSPHORUS: CPT

## 2018-10-02 PROCEDURE — 83735 ASSAY OF MAGNESIUM: CPT

## 2018-10-02 PROCEDURE — 36415 COLL VENOUS BLD VENIPUNCTURE: CPT

## 2018-10-02 PROCEDURE — 700102 HCHG RX REV CODE 250 W/ 637 OVERRIDE(OP): Performed by: INTERNAL MEDICINE

## 2018-10-02 RX ORDER — POTASSIUM CHLORIDE 20 MEQ/1
40 TABLET, EXTENDED RELEASE ORAL ONCE
Status: DISCONTINUED | OUTPATIENT
Start: 2018-10-02 | End: 2018-10-02 | Stop reason: HOSPADM

## 2018-10-02 RX ORDER — SULFAMETHOXAZOLE AND TRIMETHOPRIM 800; 160 MG/1; MG/1
1 TABLET ORAL 2 TIMES DAILY
Qty: 10 TAB | Refills: 0 | Status: SHIPPED | OUTPATIENT
Start: 2018-10-02 | End: 2018-10-07

## 2018-10-02 RX ADMIN — SODIUM CHLORIDE 3 G: 900 INJECTION INTRAVENOUS at 05:20

## 2018-10-02 RX ADMIN — METOPROLOL SUCCINATE 100 MG: 25 TABLET, EXTENDED RELEASE ORAL at 05:20

## 2018-10-02 RX ADMIN — AMLODIPINE BESYLATE 5 MG: 5 TABLET ORAL at 05:21

## 2018-10-02 RX ADMIN — HEPARIN SODIUM 5000 UNITS: 5000 INJECTION, SOLUTION INTRAVENOUS; SUBCUTANEOUS at 05:22

## 2018-10-02 ASSESSMENT — LIFESTYLE VARIABLES
PAROXYSMAL SWEATS: NO SWEAT VISIBLE
ORIENTATION AND CLOUDING OF SENSORIUM: ORIENTED AND CAN DO SERIAL ADDITIONS
ANXIETY: NO ANXIETY (AT EASE)
NAUSEA AND VOMITING: NO NAUSEA AND NO VOMITING
AGITATION: NORMAL ACTIVITY
VISUAL DISTURBANCES: NOT PRESENT
HEADACHE, FULLNESS IN HEAD: NOT PRESENT
PAROXYSMAL SWEATS: NO SWEAT VISIBLE
AGITATION: NORMAL ACTIVITY
TOTAL SCORE: 0
TREMOR: NO TREMOR
TREMOR: NO TREMOR
TOTAL SCORE: 0
ANXIETY: NO ANXIETY (AT EASE)
VISUAL DISTURBANCES: NOT PRESENT
AUDITORY DISTURBANCES: NOT PRESENT
AUDITORY DISTURBANCES: NOT PRESENT
NAUSEA AND VOMITING: NO NAUSEA AND NO VOMITING
HEADACHE, FULLNESS IN HEAD: NOT PRESENT
ORIENTATION AND CLOUDING OF SENSORIUM: ORIENTED AND CAN DO SERIAL ADDITIONS

## 2018-10-02 ASSESSMENT — PATIENT HEALTH QUESTIONNAIRE - PHQ9
SUM OF ALL RESPONSES TO PHQ9 QUESTIONS 1 AND 2: 0
2. FEELING DOWN, DEPRESSED, IRRITABLE, OR HOPELESS: NOT AT ALL
1. LITTLE INTEREST OR PLEASURE IN DOING THINGS: NOT AT ALL

## 2018-10-02 ASSESSMENT — PAIN SCALES - GENERAL
PAINLEVEL_OUTOF10: 0
PAINLEVEL_OUTOF10: 0

## 2018-10-02 NOTE — PROGRESS NOTES
1100 MD rounded. POC discussed. PT verbalized understanding. Safety precautions in place. Family at bedside. No other needs/complaints at this time.

## 2018-10-02 NOTE — PROGRESS NOTES
Discharged instructions provided and new prescribed med discussed. Questions and concerns clarified. Pt. verbalized understanding. IV removed; pt tolerated well. Pt. opted to walk out off floor w/ family.

## 2018-10-02 NOTE — CARE PLAN
Problem: Infection  Goal: Will remain free from infection  Outcome: PROGRESSING AS EXPECTED  Monitor pt's skin condition for any drainage on the left lower leg, advised the patient not to scratch the leg as it can push bacteria in the skin. Educated on the importance of hygiene such as keeping the hands clean by performing proper handwashing. Monitor pt's vs for any increase in temperature.    Problem: Venous Thromboembolism (VTW)/Deep Vein Thrombosis (DVT) Prevention:  Goal: Patient will participate in Venous Thrombosis (VTE)/Deep Vein Thrombosis (DVT)Prevention Measures    Intervention: Encourage ambulation/mobilization at level directed by Physical Therapy in collaboration with Interdisciplinary Team  Encourage the pt to ambulate as it helps to improve blood circulation in the body thus helps prevent blood clot from forming.

## 2018-10-02 NOTE — PROGRESS NOTES
1855 Received report from KASI Meneses, POC discussed, safety measures kept in place.    1915 Pt is up in bed, ambulatory with steady gait. Alert and oriented x4, denies having tremors, headache, n/v. Reported mild tingling sensation on his left leg but said it doesn't bother him much. Discussed POC such as medications and tests. Kept safety measures in place.

## 2018-10-02 NOTE — DISCHARGE INSTRUCTIONS
Discharge Instructions    Discharged to home by car with relative. Discharged via wheelchair, hospital escort: Yes.  Special equipment needed: Not Applicable    Be sure to schedule a follow-up appointment with your primary care doctor or any specialists as instructed.     Discharge Plan:   Diet Plan: Discussed  Activity Level: Discussed  Confirmed Follow up Appointment: Patient to Call and Schedule Appointment  Confirmed Symptoms Management: Discussed  Medication Reconciliation Updated: Yes  Pneumococcal Vaccine Administered/Refused: Not given - Patient refused pneumococcal vaccine  Influenza Vaccine Indication: Patient Refuses    I understand that a diet low in cholesterol, fat, and sodium is recommended for good health. Unless I have been given specific instructions below for another diet, I accept this instruction as my diet prescription.   Other diet: Regular    Special Instructions: None    · Is patient discharged on Warfarin / Coumadin?   No         Cellulitis, Adult  Introduction  Cellulitis is a skin infection. The infected area is usually red and sore. This condition occurs most often in the arms and lower legs. It is very important to get treated for this condition.  Follow these instructions at home:  · Take over-the-counter and prescription medicines only as told by your doctor.  · If you were prescribed an antibiotic medicine, take it as told by your doctor. Do not stop taking the antibiotic even if you start to feel better.  · Drink enough fluid to keep your pee (urine) clear or pale yellow.  · Do not touch or rub the infected area.  · Raise (elevate) the infected area above the level of your heart while you are sitting or lying down.  · Place warm or cold wet cloths (warm or cold compresses) on the infected area. Do this as told by your doctor.  · Keep all follow-up visits as told by your doctor. This is important. These visits let your doctor make sure your infection is not getting worse.  Contact a  doctor if:  · You have a fever.  · Your symptoms do not get better after 1-2 days of treatment.  · Your bone or joint under the infected area starts to hurt after the skin has healed.  · Your infection comes back. This can happen in the same area or another area.  · You have a swollen bump in the infected area.  · You have new symptoms.  · You feel ill and also have muscle aches and pains.  Get help right away if:  · Your symptoms get worse.  · You feel very sleepy.  · You throw up (vomit) or have watery poop (diarrhea) for a long time.  · There are red streaks coming from the infected area.  · Your red area gets larger.  · Your red area turns darker.  This information is not intended to replace advice given to you by your health care provider. Make sure you discuss any questions you have with your health care provider.  Document Released: 06/05/2009 Document Revised: 05/25/2017 Document Reviewed: 10/26/2016  © 2017 Elsevier      Sulfamethoxazole; Trimethoprim, SMX-TMP tablets  What is this medicine?  SULFAMETHOXAZOLE; TRIMETHOPRIM or SMX-TMP (suhl fuh meth OK sanjana zohl; trye METH oh prim) is a combination of a sulfonamide antibiotic and a second antibiotic, trimethoprim. It is used to treat or prevent certain kinds of bacterial infections. It will not work for colds, flu, or other viral infections.  This medicine may be used for other purposes; ask your health care provider or pharmacist if you have questions.  COMMON BRAND NAME(S): Bacter-Aid DS, Bactrim, Bactrim DS, Septra, Septra DS  What should I tell my health care provider before I take this medicine?  They need to know if you have any of these conditions:  -anemia  -asthma  -being treated with anticonvulsants  -if you frequently drink alcohol containing drinks  -kidney disease  -liver disease  -low level of folic acid or glucose-6-phosphate dehydrogenase  -poor nutrition or malabsorption  -porphyria  -severe allergies  -thyroid disorder  -an unusual or  allergic reaction to sulfamethoxazole, trimethoprim, sulfa drugs, other medicines, foods, dyes, or preservatives  -pregnant or trying to get pregnant  -breast-feeding  How should I use this medicine?  Take this medicine by mouth with a full glass of water. Follow the directions on the prescription label. Take your medicine at regular intervals. Do not take it more often than directed. Do not skip doses or stop your medicine early.  Talk to your pediatrician regarding the use of this medicine in children. Special care may be needed. This medicine has been used in children as young as 2 months of age.  Overdosage: If you think you have taken too much of this medicine contact a poison control center or emergency room at once.  NOTE: This medicine is only for you. Do not share this medicine with others.  What if I miss a dose?  If you miss a dose, take it as soon as you can. If it is almost time for your next dose, take only that dose. Do not take double or extra doses.  What may interact with this medicine?  Do not take this medicine with any of the following medications:  -aminobenzoate potassium  -dofetilide  -metronidazole  This medicine may also interact with the following medications:  -ACE inhibitors like benazepril, enalapril, lisinopril, and ramipril  -birth control pills  -cyclosporine  -digoxin  -diuretics  -indomethacin  -medicines for diabetes  -methenamine  -methotrexate  -phenytoin  -potassium supplements  -pyrimethamine  -sulfinpyrazone  -tricyclic antidepressants  -warfarin  This list may not describe all possible interactions. Give your health care provider a list of all the medicines, herbs, non-prescription drugs, or dietary supplements you use. Also tell them if you smoke, drink alcohol, or use illegal drugs. Some items may interact with your medicine.  What should I watch for while using this medicine?  Tell your doctor or health care professional if your symptoms do not improve. Drink several  glasses of water a day to reduce the risk of kidney problems.  Do not treat diarrhea with over the counter products. Contact your doctor if you have diarrhea that lasts more than 2 days or if it is severe and watery.  This medicine can make you more sensitive to the sun. Keep out of the sun. If you cannot avoid being in the sun, wear protective clothing and use a sunscreen. Do not use sun lamps or tanning beds/booths.  What side effects may I notice from receiving this medicine?  Side effects that you should report to your doctor or health care professional as soon as possible:  -allergic reactions like skin rash or hives, swelling of the face, lips, or tongue  -breathing problems  -fever or chills, sore throat  -irregular heartbeat, chest pain  -joint or muscle pain  -pain or difficulty passing urine  -red pinpoint spots on skin  -redness, blistering, peeling or loosening of the skin, including inside the mouth  -unusual bleeding or bruising  -unusually weak or tired  -yellowing of the eyes or skin  Side effects that usually do not require medical attention (report to your doctor or health care professional if they continue or are bothersome):  -diarrhea  -dizziness  -headache  -loss of appetite  -nausea, vomiting  -nervousness  This list may not describe all possible side effects. Call your doctor for medical advice about side effects. You may report side effects to FDA at 7-625-FDA-5378.  Where should I keep my medicine?  Keep out of the reach of children.  Store at room temperature between 20 to 25 degrees C (68 to 77 degrees F). Protect from light. Throw away any unused medicine after the expiration date.  NOTE: This sheet is a summary. It may not cover all possible information. If you have questions about this medicine, talk to your doctor, pharmacist, or health care provider.  © 2018 Elsevier/Gold Standard (2014-07-25 14:38:26)      Depression / Suicide Risk    As you are discharged from this [facility]  facility, it is important to learn how to keep safe from harming yourself.    Recognize the warning signs:  · Abrupt changes in personality, positive or negative- including increase in energy   · Giving away possessions  · Change in eating patterns- significant weight changes-  positive or negative  · Change in sleeping patterns- unable to sleep or sleeping all the time   · Unwillingness or inability to communicate  · Depression  · Unusual sadness, discouragement and loneliness  · Talk of wanting to die  · Neglect of personal appearance   · Rebelliousness- reckless behavior  · Withdrawal from people/activities they love  · Confusion- inability to concentrate     If you or a loved one observes any of these behaviors or has concerns about self-harm, here's what you can do:  · Talk about it- your feelings and reasons for harming yourself  · Remove any means that you might use to hurt yourself (examples: pills, rope, extension cords, firearm)  · Get professional help from the community (Mental Health, Substance Abuse, psychological counseling)  · Do not be alone:Call your Safe Contact- someone whom you trust who will be there for you.  · Call your local CRISIS HOTLINE 449-1709 or 926-257-5217  · Call your local Children's Mobile Crisis Response Team [place] (868) 709-7418 or www.[employer] Mobile  · Call the toll free National Suicide Prevention Hotlines   · National Suicide Prevention Lifeline 650-068-OVBH (7927)  · National Hope Line Network 800-SUICIDE (795-1063)

## 2018-10-02 NOTE — DISCHARGE SUMMARY
"Discharge Summary    CHIEF COMPLAINT ON ADMISSION  Chief Complaint   Patient presents with   • Leg Injury     Mechanical GLF 6 days ago.  Redness/pain/sweling/heat to LLE since GLF.   • Slurred Speech     Since Friday.  Mild confusion per daughter.        Reason for Admission  left leg swelling, pain     Admission Date  9/30/2018    CODE STATUS  Full Code    HPI & HOSPITAL COURSE  This is a 77 y.o. male here with pain and redness of his leg found to have Left lower extremity cellulitis. He was admitted and treated with IV antibiotics. All cultures were negative. He improved with antibiotics and will complete a course of oral antibiotics at home. His daughter describes him getting \"spider bites\" at home frequently on his hands. He had none of these here and his cellulitis appeared more strep-like than staph-like. 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 He will follow up with his pcp regarding this.        Therefore, he is discharged in good and stable condition to home with close outpatient follow-up.    The patient met 2-midnight criteria for an inpatient stay at the time of discharge.    Discharge Date  10/2/2018    FOLLOW UP ITEMS POST DISCHARGE  none    DISCHARGE DIAGNOSES  Principal Problem:    Sepsis (HCC) POA: Unknown  Active Problems:    Hypertension, essential POA: Unknown    Dyslipidemia POA: Unknown    Pre-diabetes POA: Unknown    Cellulitis POA: Unknown    DAKOTAH (acute kidney injury) (HCC) POA: Unknown    Alcohol use disorder POA: Unknown    Encephalopathy in sepsis POA: Unknown  Resolved Problems:    * No resolved hospital problems. *      FOLLOW UP  No future appointments.  Pee CUNNINGHAM M.D.  [id number] [facility] [id number] [phone]    In 1 week        MEDICATIONS ON DISCHARGE     Medication List      START taking these medications      Instructions   sulfamethoxazole-trimethoprim 800-160 MG tablet  Commonly known as:  BACTRIM DS   Take 1 Tab by mouth 2 " times a day for 5 days.  Dose:  1 Tab        CONTINUE taking these medications      Instructions   amLODIPine 5 MG Tabs  Commonly known as:  NORVASC   Take 5 mg by mouth every day.  Dose:  5 mg     atorvastatin 10 MG Tabs  Commonly known as:  LIPITOR   Take 10 mg by mouth every evening.  Dose:  10 mg     metoprolol  MG Tb24  Commonly known as:  TOPROL XL   Take 100 mg by mouth every day.  Dose:  100 mg            Allergies  Allergies   Allergen Reactions   • Other Environmental      Seasonal allergies       DIET  Orders Placed This Encounter   Procedures   • Diet Order Consistent Carbohydrate     Standing Status:   Standing     Number of Occurrences:   1     Order Specific Question:   Diet:     Answer:   Consistent Carbohydrate [4]   • Discontinue Diet Tray     Standing Status:   Standing     Number of Occurrences:   1       ACTIVITY  As tolerated.  Weight bearing as tolerated    CONSULTATIONS  none    PROCEDURES  none    LABORATORY  Lab Results   Component Value Date    SODIUM 136 10/02/2018    POTASSIUM 3.5 (L) 10/02/2018    CHLORIDE 107 10/02/2018    CO2 22 10/02/2018    GLUCOSE 127 (H) 10/02/2018    BUN 16 10/02/2018    CREATININE 1.04 10/02/2018        Lab Results   Component Value Date    WBC 10.9 (H) 10/02/2018    HEMOGLOBIN 13.2 (L) 10/02/2018    HEMATOCRIT 39.0 (L) 10/02/2018    PLATELETCT 221 10/02/2018        Total time of the discharge process exceeds 33 minutes.

## 2018-10-02 NOTE — CARE PLAN
Problem: Infection  Goal: Will remain free from infection  Outcome: PROGRESSING AS EXPECTED  Pt educated on the s/s of infection and when to report to MD or RN. Encouraged to perform proper hand hygiene.     Problem: Knowledge Deficit  Goal: Knowledge of disease process/condition, treatment plan, diagnostic tests, and medications will improve  Outcome: PROGRESSING AS EXPECTED  Pt educated on the treatment plans and medications. Encouraged to voice out feelings.

## 2018-10-02 NOTE — PROGRESS NOTES
"Assessment completed. Pt AAOx4. Reports intermittent pain in left leg \" only when standing up, walking ok\".  Safety and comfort measures in place. Pt up and walking down the hallway with family, tolerating well. No additional needs/complaints at this time.  "

## 2018-10-02 NOTE — PROGRESS NOTES
Report received from KASI Latham. POC discussed. Pt resting comfortably in bed. Safety precautions in place.

## 2018-10-03 ENCOUNTER — PATIENT OUTREACH (OUTPATIENT)
Dept: HEALTH INFORMATION MANAGEMENT | Facility: OTHER | Age: 78
End: 2018-10-03

## 2018-10-03 LAB
BACTERIA UR CULT: NORMAL
SIGNIFICANT IND 70042: NORMAL
SITE SITE: NORMAL
SOURCE SOURCE: NORMAL

## 2018-10-04 ENCOUNTER — PATIENT OUTREACH (OUTPATIENT)
Dept: HEALTH INFORMATION MANAGEMENT | Facility: OTHER | Age: 78
End: 2018-10-04

## 2018-10-05 LAB
BACTERIA BLD CULT: NORMAL
BACTERIA BLD CULT: NORMAL
SIGNIFICANT IND 70042: NORMAL
SIGNIFICANT IND 70042: NORMAL
SITE SITE: NORMAL
SITE SITE: NORMAL
SOURCE SOURCE: NORMAL
SOURCE SOURCE: NORMAL

## 2018-10-17 ENCOUNTER — HOSPITAL ENCOUNTER (OUTPATIENT)
Dept: LAB | Facility: MEDICAL CENTER | Age: 78
End: 2018-10-17
Attending: FAMILY MEDICINE
Payer: MEDICARE

## 2018-10-17 LAB
BASOPHILS # BLD AUTO: 1.5 % (ref 0–1.8)
BASOPHILS # BLD: 0.14 K/UL (ref 0–0.12)
CRP SERPL HS-MCNC: 0.46 MG/DL (ref 0–0.75)
EOSINOPHIL # BLD AUTO: 0.27 K/UL (ref 0–0.51)
EOSINOPHIL NFR BLD: 3 % (ref 0–6.9)
ERYTHROCYTE [DISTWIDTH] IN BLOOD BY AUTOMATED COUNT: 48.4 FL (ref 35.9–50)
HCT VFR BLD AUTO: 45.5 % (ref 42–52)
HGB BLD-MCNC: 15.2 G/DL (ref 14–18)
IMM GRANULOCYTES # BLD AUTO: 0.06 K/UL (ref 0–0.11)
IMM GRANULOCYTES NFR BLD AUTO: 0.7 % (ref 0–0.9)
LYMPHOCYTES # BLD AUTO: 1.81 K/UL (ref 1–4.8)
LYMPHOCYTES NFR BLD: 19.9 % (ref 22–41)
MCH RBC QN AUTO: 33.2 PG (ref 27–33)
MCHC RBC AUTO-ENTMCNC: 33.4 G/DL (ref 33.7–35.3)
MCV RBC AUTO: 99.3 FL (ref 81.4–97.8)
MONOCYTES # BLD AUTO: 0.78 K/UL (ref 0–0.85)
MONOCYTES NFR BLD AUTO: 8.6 % (ref 0–13.4)
NEUTROPHILS # BLD AUTO: 6.03 K/UL (ref 1.82–7.42)
NEUTROPHILS NFR BLD: 66.3 % (ref 44–72)
NRBC # BLD AUTO: 0 K/UL
NRBC BLD-RTO: 0 /100 WBC
PLATELET # BLD AUTO: 271 K/UL (ref 164–446)
PMV BLD AUTO: 9.3 FL (ref 9–12.9)
RBC # BLD AUTO: 4.58 M/UL (ref 4.7–6.1)
WBC # BLD AUTO: 9.1 K/UL (ref 4.8–10.8)

## 2018-10-17 PROCEDURE — 85025 COMPLETE CBC W/AUTO DIFF WBC: CPT

## 2018-10-17 PROCEDURE — 87040 BLOOD CULTURE FOR BACTERIA: CPT

## 2018-10-17 PROCEDURE — 86140 C-REACTIVE PROTEIN: CPT

## 2018-10-17 PROCEDURE — 36415 COLL VENOUS BLD VENIPUNCTURE: CPT

## 2018-10-22 LAB
BACTERIA BLD CULT: NORMAL
SIGNIFICANT IND 70042: NORMAL
SITE SITE: NORMAL
SOURCE SOURCE: NORMAL

## 2018-10-25 ENCOUNTER — HOSPITAL ENCOUNTER (OUTPATIENT)
Dept: RADIOLOGY | Facility: MEDICAL CENTER | Age: 78
End: 2018-10-25
Attending: FAMILY MEDICINE
Payer: MEDICARE

## 2018-10-25 DIAGNOSIS — R60.0 LOCALIZED EDEMA: ICD-10-CM

## 2018-10-25 PROCEDURE — 93971 EXTREMITY STUDY: CPT | Mod: 26 | Performed by: SURGERY

## 2018-10-25 PROCEDURE — 93971 EXTREMITY STUDY: CPT | Mod: LT

## 2018-10-29 ENCOUNTER — HOSPITAL ENCOUNTER (EMERGENCY)
Facility: MEDICAL CENTER | Age: 78
End: 2018-10-29
Attending: EMERGENCY MEDICINE
Payer: MEDICARE

## 2018-10-29 VITALS
HEIGHT: 70 IN | TEMPERATURE: 97.4 F | DIASTOLIC BLOOD PRESSURE: 99 MMHG | HEART RATE: 87 BPM | SYSTOLIC BLOOD PRESSURE: 160 MMHG | RESPIRATION RATE: 18 BRPM | OXYGEN SATURATION: 96 % | BODY MASS INDEX: 24.68 KG/M2 | WEIGHT: 172.4 LBS

## 2018-10-29 DIAGNOSIS — R04.0 ACUTE ANTERIOR EPISTAXIS: ICD-10-CM

## 2018-10-29 DIAGNOSIS — I10 ESSENTIAL HYPERTENSION: ICD-10-CM

## 2018-10-29 LAB
ANION GAP SERPL CALC-SCNC: 7 MMOL/L (ref 0–11.9)
BASOPHILS # BLD AUTO: 1.5 % (ref 0–1.8)
BASOPHILS # BLD: 0.14 K/UL (ref 0–0.12)
BUN SERPL-MCNC: 27 MG/DL (ref 8–22)
CALCIUM SERPL-MCNC: 8.9 MG/DL (ref 8.4–10.2)
CHLORIDE SERPL-SCNC: 108 MMOL/L (ref 96–112)
CO2 SERPL-SCNC: 22 MMOL/L (ref 20–33)
CREAT SERPL-MCNC: 1.02 MG/DL (ref 0.5–1.4)
EOSINOPHIL # BLD AUTO: 0.35 K/UL (ref 0–0.51)
EOSINOPHIL NFR BLD: 3.7 % (ref 0–6.9)
ERYTHROCYTE [DISTWIDTH] IN BLOOD BY AUTOMATED COUNT: 47.8 FL (ref 35.9–50)
GLUCOSE SERPL-MCNC: 138 MG/DL (ref 65–99)
HCT VFR BLD AUTO: 42.9 % (ref 42–52)
HGB BLD-MCNC: 14.7 G/DL (ref 14–18)
IMM GRANULOCYTES # BLD AUTO: 0.06 K/UL (ref 0–0.11)
IMM GRANULOCYTES NFR BLD AUTO: 0.6 % (ref 0–0.9)
INR PPP: 1.05 (ref 0.87–1.13)
LYMPHOCYTES # BLD AUTO: 1.59 K/UL (ref 1–4.8)
LYMPHOCYTES NFR BLD: 17 % (ref 22–41)
MCH RBC QN AUTO: 33.4 PG (ref 27–33)
MCHC RBC AUTO-ENTMCNC: 34.3 G/DL (ref 33.7–35.3)
MCV RBC AUTO: 97.5 FL (ref 81.4–97.8)
MONOCYTES # BLD AUTO: 0.67 K/UL (ref 0–0.85)
MONOCYTES NFR BLD AUTO: 7.2 % (ref 0–13.4)
NEUTROPHILS # BLD AUTO: 6.53 K/UL (ref 1.82–7.42)
NEUTROPHILS NFR BLD: 70 % (ref 44–72)
NRBC # BLD AUTO: 0 K/UL
NRBC BLD-RTO: 0 /100 WBC
PLATELET # BLD AUTO: 202 K/UL (ref 164–446)
PMV BLD AUTO: 9.3 FL (ref 9–12.9)
POTASSIUM SERPL-SCNC: 3.8 MMOL/L (ref 3.6–5.5)
PROTHROMBIN TIME: 13.6 SEC (ref 12–14.6)
RBC # BLD AUTO: 4.4 M/UL (ref 4.7–6.1)
SODIUM SERPL-SCNC: 137 MMOL/L (ref 135–145)
WBC # BLD AUTO: 9.3 K/UL (ref 4.8–10.8)

## 2018-10-29 PROCEDURE — 85025 COMPLETE CBC W/AUTO DIFF WBC: CPT

## 2018-10-29 PROCEDURE — 85610 PROTHROMBIN TIME: CPT

## 2018-10-29 PROCEDURE — 80048 BASIC METABOLIC PNL TOTAL CA: CPT

## 2018-10-29 PROCEDURE — 700102 HCHG RX REV CODE 250 W/ 637 OVERRIDE(OP)

## 2018-10-29 PROCEDURE — 303620 HCHG EPISTAXIS CONTROL

## 2018-10-29 PROCEDURE — 36415 COLL VENOUS BLD VENIPUNCTURE: CPT

## 2018-10-29 PROCEDURE — A9270 NON-COVERED ITEM OR SERVICE: HCPCS

## 2018-10-29 PROCEDURE — 99284 EMERGENCY DEPT VISIT MOD MDM: CPT

## 2018-10-29 RX ORDER — LABETALOL HYDROCHLORIDE 5 MG/ML
INJECTION, SOLUTION INTRAVENOUS
Status: DISCONTINUED
Start: 2018-10-29 | End: 2018-10-30 | Stop reason: HOSPADM

## 2018-10-29 RX ORDER — METOPROLOL SUCCINATE 25 MG/1
100 TABLET, EXTENDED RELEASE ORAL ONCE
Status: COMPLETED | OUTPATIENT
Start: 2018-10-29 | End: 2018-10-29

## 2018-10-29 RX ORDER — LABETALOL HYDROCHLORIDE 5 MG/ML
10 INJECTION, SOLUTION INTRAVENOUS ONCE
Status: DISCONTINUED | OUTPATIENT
Start: 2018-10-29 | End: 2018-10-30 | Stop reason: HOSPADM

## 2018-10-29 RX ORDER — METOPROLOL SUCCINATE 100 MG/1
TABLET, EXTENDED RELEASE ORAL
Status: COMPLETED
Start: 2018-10-29 | End: 2018-10-29

## 2018-10-29 RX ADMIN — METOPROLOL SUCCINATE 100 MG: 25 TABLET, EXTENDED RELEASE ORAL at 21:58

## 2018-10-29 RX ADMIN — Medication 1 SPRAY: at 21:30

## 2018-10-29 RX ADMIN — METOPROLOL SUCCINATE 100 MG: 100 TABLET, EXTENDED RELEASE ORAL at 21:58

## 2018-10-29 ASSESSMENT — PAIN SCALES - GENERAL: PAINLEVEL_OUTOF10: 0

## 2018-10-30 NOTE — ED NOTES
Pt states his nose has been bleeding about an hour now. Bleeding controlled with nose clamp in place. Nosebleed kit placed at bedside.

## 2018-10-30 NOTE — ED PROVIDER NOTES
ED Provider Note    CHIEF COMPLAINT  Chief Complaint   Patient presents with   • Epistaxis     Started appx an hour and a half ago after blowing nose. Bleeding has not stopped. Nose clamp applied in Triage. -anticoagulants        HPI  Freeman Frost is a 78 y.o. male who presents to emergency room today with complaints of nosebleed.  Patient states this started hour and a half ago after blowing his nose.  Also has noted that he has markedly elevated blood pressure he states he is missed some doses of blood pressure medication blood pressure 174/124 upon arrival to the ER.  Denies any other trauma tearing no nausea no vomiting no chest pain shortness breath or other complaints.  Patient does state he is on a blood thinner he does not know the name that he takes this for TIAs and there is no record in the chart of this medication.    REVIEW OF SYSTEMS  See HPI for further details. All other systems are negative.      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 No pertinent family history.    SOCIAL HISTORY  Social History     Social History   • Marital status:      Spouse name: N/A   • Number of children: N/A   • Years of education: N/A     Social History Main Topics   • Smoking status: Never Smoker   • Smokeless tobacco: Never Used   • Alcohol use Yes      Comment: 2drinks daily (3-4 Large glasses)   • Drug use: No   • Sexual activity: Not on file     Other Topics Concern   • Not on file     Social History Narrative   • No narrative on file       SURGICAL HISTORY  Past Surgical History:   Procedure Laterality Date   • CATARACT PHACO WITH IOL Right 8/28/2018    Procedure: CATARACT PHACO WITH IOL;  Surgeon: Adolfo Santana M.D.;  Location: SURGERY SAME DAY [facility];  Service: Ophthalmology   • CATARACT  "PHACO WITH IOL Left 8/14/2018    Procedure: CATARACT PHACO WITH IOL;  Surgeon: Adolfo Santana M.D.;  [facility] SURGERY [facility];  Service: Ophthalmology   • OTHER      tonsils as a child       CURRENT MEDICATIONS  Home Medications    **Home medications have not yet been reviewed for this encounter**         ALLERGIES  Allergies   Allergen Reactions   • Other Environmental      Seasonal allergies       PHYSICAL EXAM  VITAL SIGNS: /99   Pulse 87   Temp 36.3 °C (97.4 °F)   Resp 18   Ht 1.778 m (5' 10\")   Wt 78.2 kg (172 lb 6.4 oz)   SpO2 96%   BMI 24.74 kg/m²       Constitutional :  Well developed, Well nourished, No acute distress, Non-toxic appearance.   HENT: Bleeding from left nare  Eyes: perrla  Neck: Normal range of motion, No tenderness, Supple, No stridor.   Lymphatic: None noted  Cardiovascular: Normal heart rate, Normal rhythm, No murmurs, No rubs, No gallops.   Thorax & Lungs: Clear to auscultation all fields  Skin: Warm, Dry, No erythema, No rash.   Extremities: Intact distal pulses, No edema, No tenderness, No cyanosis, No clubbing.         COURSE & MEDICAL DECISION MAKING  Pertinent Labs & Imaging studies reviewed. (See chart for details)  Patient's blood pressure is come down to 157/94.  Packing was placed and he has had no further bleeding is tolerated this well is been observed and watched here in the ER no further bleeding.  Patient is to discontinue his blood thinner at this time until he is instructed by his primary care physician to renew this.  Patient given referral to ENT packing to be taken out 3-5 days return if worsening bleeding further problems he verbalizes understanding instructions.      PROCEDURE NOTE; packing placed nasal by ER physician left nare; initially patient was given Jose-Synephrine spray, using muracell packing was placed to the left nare without difficulty some adrenaline was also injected.  He was observed and had no further bleeding tolerated " procedure well.    FINAL IMPRESSION  1.  Acute anterior epistaxis  2.   3.      Electronically signed by: Tavares Al, 10/30/2018

## 2018-11-02 ENCOUNTER — PATIENT OUTREACH (OUTPATIENT)
Dept: HEALTH INFORMATION MANAGEMENT | Facility: OTHER | Age: 78
End: 2018-11-02

## 2018-11-08 NOTE — PROGRESS NOTES
Freeman Frost was admitted to [facility] on 9/30/18 for sepsis, patient was discharged on 10/2/18. [facility] patient advocate was able to successfully engage with patient post-discharge. Per discharge orders, patient had a follow-up appointment with Dr. Hutton (internal medicine) on 10/10/18 and on 10/17/18. Patient also had a vascular ultrasound on 10/25/18. Patient went to [facility] ER on 10/9/18 for epistaxis. There are no future appointments at this time for the patient. PPS screening was not conducted secondary to low LACE score upon hospital discharge.

## 2019-04-18 ENCOUNTER — APPOINTMENT (OUTPATIENT)
Dept: RADIOLOGY | Facility: MEDICAL CENTER | Age: 79
DRG: 415 | End: 2019-04-18
Attending: EMERGENCY MEDICINE
Payer: MEDICARE

## 2019-04-18 LAB
ALBUMIN SERPL BCP-MCNC: 4.6 G/DL (ref 3.2–4.9)
ALBUMIN/GLOB SERPL: 1.5 G/DL
ALP SERPL-CCNC: 176 U/L (ref 30–99)
ALT SERPL-CCNC: 132 U/L (ref 2–50)
ANION GAP SERPL CALC-SCNC: 10 MMOL/L (ref 0–11.9)
AST SERPL-CCNC: 322 U/L (ref 12–45)
BASOPHILS # BLD AUTO: 0.3 % (ref 0–1.8)
BASOPHILS # BLD: 0.05 K/UL (ref 0–0.12)
BILIRUB SERPL-MCNC: 4.9 MG/DL (ref 0.1–1.5)
BUN SERPL-MCNC: 26 MG/DL (ref 8–22)
CALCIUM SERPL-MCNC: 9.1 MG/DL (ref 8.4–10.2)
CHLORIDE SERPL-SCNC: 103 MMOL/L (ref 96–112)
CO2 SERPL-SCNC: 23 MMOL/L (ref 20–33)
CREAT SERPL-MCNC: 1.26 MG/DL (ref 0.5–1.4)
EOSINOPHIL # BLD AUTO: 0 K/UL (ref 0–0.51)
EOSINOPHIL NFR BLD: 0 % (ref 0–6.9)
ERYTHROCYTE [DISTWIDTH] IN BLOOD BY AUTOMATED COUNT: 46.8 FL (ref 35.9–50)
GLOBULIN SER CALC-MCNC: 3.1 G/DL (ref 1.9–3.5)
GLUCOSE SERPL-MCNC: 213 MG/DL (ref 65–99)
HCT VFR BLD AUTO: 46.5 % (ref 42–52)
HGB BLD-MCNC: 15.8 G/DL (ref 14–18)
IMM GRANULOCYTES # BLD AUTO: 0.06 K/UL (ref 0–0.11)
IMM GRANULOCYTES NFR BLD AUTO: 0.3 % (ref 0–0.9)
LYMPHOCYTES # BLD AUTO: 0.8 K/UL (ref 1–4.8)
LYMPHOCYTES NFR BLD: 4 % (ref 22–41)
MCH RBC QN AUTO: 32.4 PG (ref 27–33)
MCHC RBC AUTO-ENTMCNC: 34 G/DL (ref 33.7–35.3)
MCV RBC AUTO: 95.3 FL (ref 81.4–97.8)
MONOCYTES # BLD AUTO: 1.11 K/UL (ref 0–0.85)
MONOCYTES NFR BLD AUTO: 5.6 % (ref 0–13.4)
NEUTROPHILS # BLD AUTO: 17.88 K/UL (ref 1.82–7.42)
NEUTROPHILS NFR BLD: 89.8 % (ref 44–72)
NRBC # BLD AUTO: 0 K/UL
NRBC BLD-RTO: 0 /100 WBC
PLATELET # BLD AUTO: 189 K/UL (ref 164–446)
PMV BLD AUTO: 9.5 FL (ref 9–12.9)
POTASSIUM SERPL-SCNC: 4 MMOL/L (ref 3.6–5.5)
PROT SERPL-MCNC: 7.7 G/DL (ref 6–8.2)
RBC # BLD AUTO: 4.88 M/UL (ref 4.7–6.1)
SODIUM SERPL-SCNC: 136 MMOL/L (ref 135–145)
TROPONIN I SERPL-MCNC: <0.02 NG/ML (ref 0–0.04)
WBC # BLD AUTO: 19.9 K/UL (ref 4.8–10.8)

## 2019-04-18 PROCEDURE — 93005 ELECTROCARDIOGRAM TRACING: CPT

## 2019-04-18 PROCEDURE — 85025 COMPLETE CBC W/AUTO DIFF WBC: CPT

## 2019-04-18 PROCEDURE — 84484 ASSAY OF TROPONIN QUANT: CPT

## 2019-04-18 PROCEDURE — 83036 HEMOGLOBIN GLYCOSYLATED A1C: CPT

## 2019-04-18 PROCEDURE — 71045 X-RAY EXAM CHEST 1 VIEW: CPT

## 2019-04-18 PROCEDURE — 93005 ELECTROCARDIOGRAM TRACING: CPT | Performed by: EMERGENCY MEDICINE

## 2019-04-18 PROCEDURE — 99285 EMERGENCY DEPT VISIT HI MDM: CPT

## 2019-04-18 PROCEDURE — 80053 COMPREHEN METABOLIC PANEL: CPT

## 2019-04-18 PROCEDURE — 83690 ASSAY OF LIPASE: CPT

## 2019-04-19 ENCOUNTER — APPOINTMENT (OUTPATIENT)
Dept: RADIOLOGY | Facility: MEDICAL CENTER | Age: 79
DRG: 415 | End: 2019-04-19
Attending: EMERGENCY MEDICINE
Payer: MEDICARE

## 2019-04-19 ENCOUNTER — APPOINTMENT (OUTPATIENT)
Dept: RADIOLOGY | Facility: MEDICAL CENTER | Age: 79
DRG: 415 | End: 2019-04-19
Attending: SURGERY
Payer: MEDICARE

## 2019-04-19 ENCOUNTER — ANESTHESIA (OUTPATIENT)
Dept: SURGERY | Facility: MEDICAL CENTER | Age: 79
DRG: 415 | End: 2019-04-19
Payer: MEDICARE

## 2019-04-19 ENCOUNTER — HOSPITAL ENCOUNTER (INPATIENT)
Facility: MEDICAL CENTER | Age: 79
LOS: 5 days | DRG: 415 | End: 2019-04-24
Attending: EMERGENCY MEDICINE | Admitting: HOSPITALIST
Payer: MEDICARE

## 2019-04-19 ENCOUNTER — ANESTHESIA EVENT (OUTPATIENT)
Dept: SURGERY | Facility: MEDICAL CENTER | Age: 79
DRG: 415 | End: 2019-04-19
Payer: MEDICARE

## 2019-04-19 ENCOUNTER — APPOINTMENT (OUTPATIENT)
Dept: RADIOLOGY | Facility: MEDICAL CENTER | Age: 79
DRG: 415 | End: 2019-04-19
Attending: HOSPITALIST
Payer: MEDICARE

## 2019-04-19 DIAGNOSIS — D72.829 LEUKOCYTOSIS, UNSPECIFIED TYPE: ICD-10-CM

## 2019-04-19 DIAGNOSIS — R79.89 ELEVATED LFTS: ICD-10-CM

## 2019-04-19 DIAGNOSIS — R10.13 EPIGASTRIC PAIN: ICD-10-CM

## 2019-04-19 PROBLEM — K81.0 ACUTE CHOLECYSTITIS: Status: ACTIVE | Noted: 2019-04-19

## 2019-04-19 LAB
ALBUMIN SERPL BCP-MCNC: 3.8 G/DL (ref 3.2–4.9)
ALBUMIN/GLOB SERPL: 1.3 G/DL
ALP SERPL-CCNC: 182 U/L (ref 30–99)
ALT SERPL-CCNC: 191 U/L (ref 2–50)
ANION GAP SERPL CALC-SCNC: 9 MMOL/L (ref 0–11.9)
AST SERPL-CCNC: 262 U/L (ref 12–45)
BASOPHILS # BLD AUTO: 0.2 % (ref 0–1.8)
BASOPHILS # BLD AUTO: 0.2 % (ref 0–1.8)
BASOPHILS # BLD: 0.04 K/UL (ref 0–0.12)
BASOPHILS # BLD: 0.05 K/UL (ref 0–0.12)
BILIRUB SERPL-MCNC: 7.4 MG/DL (ref 0.1–1.5)
BUN SERPL-MCNC: 19 MG/DL (ref 8–22)
CALCIUM SERPL-MCNC: 8.8 MG/DL (ref 8.4–10.2)
CHLORIDE SERPL-SCNC: 103 MMOL/L (ref 96–112)
CO2 SERPL-SCNC: 24 MMOL/L (ref 20–33)
CREAT SERPL-MCNC: 1.2 MG/DL (ref 0.5–1.4)
EKG IMPRESSION: NORMAL
EOSINOPHIL # BLD AUTO: 0 K/UL (ref 0–0.51)
EOSINOPHIL # BLD AUTO: 0 K/UL (ref 0–0.51)
EOSINOPHIL NFR BLD: 0 % (ref 0–6.9)
EOSINOPHIL NFR BLD: 0 % (ref 0–6.9)
ERYTHROCYTE [DISTWIDTH] IN BLOOD BY AUTOMATED COUNT: 47.4 FL (ref 35.9–50)
ERYTHROCYTE [DISTWIDTH] IN BLOOD BY AUTOMATED COUNT: 48 FL (ref 35.9–50)
EST. AVERAGE GLUCOSE BLD GHB EST-MCNC: 140 MG/DL
GLOBULIN SER CALC-MCNC: 3 G/DL (ref 1.9–3.5)
GLUCOSE SERPL-MCNC: 172 MG/DL (ref 65–99)
HBA1C MFR BLD: 6.5 % (ref 0–5.6)
HCT VFR BLD AUTO: 28.9 % (ref 42–52)
HCT VFR BLD AUTO: 44.7 % (ref 42–52)
HGB BLD-MCNC: 15.3 G/DL (ref 14–18)
HGB BLD-MCNC: 9.8 G/DL (ref 14–18)
IMM GRANULOCYTES # BLD AUTO: 0.14 K/UL (ref 0–0.11)
IMM GRANULOCYTES # BLD AUTO: 0.25 K/UL (ref 0–0.11)
IMM GRANULOCYTES NFR BLD AUTO: 0.6 % (ref 0–0.9)
IMM GRANULOCYTES NFR BLD AUTO: 1 % (ref 0–0.9)
INR PPP: 1.19 (ref 0.87–1.13)
LIPASE SERPL-CCNC: 53 U/L (ref 7–58)
LYMPHOCYTES # BLD AUTO: 0.67 K/UL (ref 1–4.8)
LYMPHOCYTES # BLD AUTO: 0.81 K/UL (ref 1–4.8)
LYMPHOCYTES NFR BLD: 2.9 % (ref 22–41)
LYMPHOCYTES NFR BLD: 3.2 % (ref 22–41)
MCH RBC QN AUTO: 32.6 PG (ref 27–33)
MCH RBC QN AUTO: 33 PG (ref 27–33)
MCHC RBC AUTO-ENTMCNC: 33.9 G/DL (ref 33.7–35.3)
MCHC RBC AUTO-ENTMCNC: 34.2 G/DL (ref 33.7–35.3)
MCV RBC AUTO: 95.3 FL (ref 81.4–97.8)
MCV RBC AUTO: 97.3 FL (ref 81.4–97.8)
MONOCYTES # BLD AUTO: 1.14 K/UL (ref 0–0.85)
MONOCYTES # BLD AUTO: 1.57 K/UL (ref 0–0.85)
MONOCYTES NFR BLD AUTO: 4.5 % (ref 0–13.4)
MONOCYTES NFR BLD AUTO: 6.9 % (ref 0–13.4)
NEUTROPHILS # BLD AUTO: 20.43 K/UL (ref 1.82–7.42)
NEUTROPHILS # BLD AUTO: 22.95 K/UL (ref 1.82–7.42)
NEUTROPHILS NFR BLD: 89.4 % (ref 44–72)
NEUTROPHILS NFR BLD: 91.1 % (ref 44–72)
NRBC # BLD AUTO: 0 K/UL
NRBC # BLD AUTO: 0 K/UL
NRBC BLD-RTO: 0 /100 WBC
NRBC BLD-RTO: 0 /100 WBC
PATHOLOGY CONSULT NOTE: NORMAL
PLATELET # BLD AUTO: 131 K/UL (ref 164–446)
PLATELET # BLD AUTO: 167 K/UL (ref 164–446)
PMV BLD AUTO: 9.8 FL (ref 9–12.9)
PMV BLD AUTO: 9.9 FL (ref 9–12.9)
POTASSIUM SERPL-SCNC: 4.2 MMOL/L (ref 3.6–5.5)
PROT SERPL-MCNC: 6.8 G/DL (ref 6–8.2)
PROTHROMBIN TIME: 15 SEC (ref 12–14.6)
RBC # BLD AUTO: 2.97 M/UL (ref 4.7–6.1)
RBC # BLD AUTO: 4.69 M/UL (ref 4.7–6.1)
SODIUM SERPL-SCNC: 136 MMOL/L (ref 135–145)
WBC # BLD AUTO: 22.9 K/UL (ref 4.8–10.8)
WBC # BLD AUTO: 25.2 K/UL (ref 4.8–10.8)

## 2019-04-19 PROCEDURE — 501838 HCHG SUTURE GENERAL: Performed by: SURGERY

## 2019-04-19 PROCEDURE — A6402 STERILE GAUZE <= 16 SQ IN: HCPCS | Performed by: SURGERY

## 2019-04-19 PROCEDURE — 700105 HCHG RX REV CODE 258: Performed by: SURGERY

## 2019-04-19 PROCEDURE — 85610 PROTHROMBIN TIME: CPT

## 2019-04-19 PROCEDURE — 85025 COMPLETE CBC W/AUTO DIFF WBC: CPT

## 2019-04-19 PROCEDURE — 99356 PR PROLONGED SVC I/P OR OBS SETTING 1ST HOUR: CPT | Performed by: HOSPITALIST

## 2019-04-19 PROCEDURE — 36415 COLL VENOUS BLD VENIPUNCTURE: CPT

## 2019-04-19 PROCEDURE — 76705 ECHO EXAM OF ABDOMEN: CPT

## 2019-04-19 PROCEDURE — 160009 HCHG ANES TIME/MIN: Performed by: SURGERY

## 2019-04-19 PROCEDURE — 500389 HCHG DRAIN, RESERVOIR SUCT JP 100CC: Performed by: SURGERY

## 2019-04-19 PROCEDURE — 160002 HCHG RECOVERY MINUTES (STAT): Performed by: SURGERY

## 2019-04-19 PROCEDURE — 700101 HCHG RX REV CODE 250: Performed by: ANESTHESIOLOGY

## 2019-04-19 PROCEDURE — 501445 HCHG STAPLER, SKIN DISP: Performed by: SURGERY

## 2019-04-19 PROCEDURE — 700101 HCHG RX REV CODE 250: Performed by: EMERGENCY MEDICINE

## 2019-04-19 PROCEDURE — 99221 1ST HOSP IP/OBS SF/LOW 40: CPT | Mod: AI | Performed by: HOSPITALIST

## 2019-04-19 PROCEDURE — 700111 HCHG RX REV CODE 636 W/ 250 OVERRIDE (IP)

## 2019-04-19 PROCEDURE — 700105 HCHG RX REV CODE 258: Performed by: HOSPITALIST

## 2019-04-19 PROCEDURE — 160035 HCHG PACU - 1ST 60 MINS PHASE I: Performed by: SURGERY

## 2019-04-19 PROCEDURE — 80053 COMPREHEN METABOLIC PANEL: CPT

## 2019-04-19 PROCEDURE — 700105 HCHG RX REV CODE 258: Performed by: ANESTHESIOLOGY

## 2019-04-19 PROCEDURE — 88304 TISSUE EXAM BY PATHOLOGIST: CPT

## 2019-04-19 PROCEDURE — 501572 HCHG TROCAR, SHIELD OBTU 5X100: Performed by: SURGERY

## 2019-04-19 PROCEDURE — 501583 HCHG TROCAR, THRD CAN&SEAL 5X100: Performed by: SURGERY

## 2019-04-19 PROCEDURE — 160039 HCHG SURGERY MINUTES - EA ADDL 1 MIN LEVEL 3: Performed by: SURGERY

## 2019-04-19 PROCEDURE — 700111 HCHG RX REV CODE 636 W/ 250 OVERRIDE (IP): Performed by: ANESTHESIOLOGY

## 2019-04-19 PROCEDURE — 500445 HCHG HEMOSTAT, SURGICEL 4X8: Performed by: SURGERY

## 2019-04-19 PROCEDURE — 96365 THER/PROPH/DIAG IV INF INIT: CPT

## 2019-04-19 PROCEDURE — 87040 BLOOD CULTURE FOR BACTERIA: CPT | Mod: 91

## 2019-04-19 PROCEDURE — 500800 HCHG LAPAROSCOPIC J/L HOOK: Performed by: SURGERY

## 2019-04-19 PROCEDURE — 74181 MRI ABDOMEN W/O CONTRAST: CPT

## 2019-04-19 PROCEDURE — 770022 HCHG ROOM/CARE - ICU (200)

## 2019-04-19 PROCEDURE — 700117 HCHG RX CONTRAST REV CODE 255

## 2019-04-19 PROCEDURE — 96367 TX/PROPH/DG ADDL SEQ IV INF: CPT

## 2019-04-19 PROCEDURE — 74018 RADEX ABDOMEN 1 VIEW: CPT

## 2019-04-19 PROCEDURE — 501463 HCHG STAPLES, UNIV. ROTIC: Performed by: SURGERY

## 2019-04-19 PROCEDURE — 502571 HCHG PACK, LAP CHOLE: Performed by: SURGERY

## 2019-04-19 PROCEDURE — 160048 HCHG OR STATISTICAL LEVEL 1-5: Performed by: SURGERY

## 2019-04-19 PROCEDURE — 500514 HCHG ENDOCLIP: Performed by: SURGERY

## 2019-04-19 PROCEDURE — 96375 TX/PRO/DX INJ NEW DRUG ADDON: CPT

## 2019-04-19 PROCEDURE — 500373 HCHG DRAIN, J-P FLAT 10MM 7044: Performed by: SURGERY

## 2019-04-19 PROCEDURE — 700105 HCHG RX REV CODE 258: Performed by: EMERGENCY MEDICINE

## 2019-04-19 PROCEDURE — 700101 HCHG RX REV CODE 250

## 2019-04-19 PROCEDURE — C1765 ADHESION BARRIER: HCPCS | Performed by: SURGERY

## 2019-04-19 PROCEDURE — 700101 HCHG RX REV CODE 250: Performed by: HOSPITALIST

## 2019-04-19 PROCEDURE — 700111 HCHG RX REV CODE 636 W/ 250 OVERRIDE (IP): Performed by: EMERGENCY MEDICINE

## 2019-04-19 PROCEDURE — 160028 HCHG SURGERY MINUTES - 1ST 30 MINS LEVEL 3: Performed by: SURGERY

## 2019-04-19 RX ORDER — MORPHINE SULFATE 10 MG/ML
4 INJECTION, SOLUTION INTRAMUSCULAR; INTRAVENOUS
Status: DISCONTINUED | OUTPATIENT
Start: 2019-04-19 | End: 2019-04-24 | Stop reason: HOSPADM

## 2019-04-19 RX ORDER — SODIUM CHLORIDE, SODIUM GLUCONATE, SODIUM ACETATE, POTASSIUM CHLORIDE AND MAGNESIUM CHLORIDE 526; 502; 368; 37; 30 MG/100ML; MG/100ML; MG/100ML; MG/100ML; MG/100ML
INJECTION, SOLUTION INTRAVENOUS
Status: DISCONTINUED | OUTPATIENT
Start: 2019-04-19 | End: 2019-04-19 | Stop reason: SURG

## 2019-04-19 RX ORDER — CEFOTETAN DISODIUM 2 G/20ML
INJECTION, POWDER, FOR SOLUTION INTRAMUSCULAR; INTRAVENOUS PRN
Status: DISCONTINUED | OUTPATIENT
Start: 2019-04-19 | End: 2019-04-19 | Stop reason: SURG

## 2019-04-19 RX ORDER — AMOXICILLIN 250 MG
2 CAPSULE ORAL 2 TIMES DAILY
Status: DISCONTINUED | OUTPATIENT
Start: 2019-04-19 | End: 2019-04-24 | Stop reason: HOSPADM

## 2019-04-19 RX ORDER — MORPHINE SULFATE 4 MG/ML
2 INJECTION, SOLUTION INTRAMUSCULAR; INTRAVENOUS
Status: DISCONTINUED | OUTPATIENT
Start: 2019-04-19 | End: 2019-04-19

## 2019-04-19 RX ORDER — SODIUM CHLORIDE 9 MG/ML
INJECTION, SOLUTION INTRAVENOUS CONTINUOUS
Status: DISCONTINUED | OUTPATIENT
Start: 2019-04-19 | End: 2019-04-23

## 2019-04-19 RX ORDER — OXYCODONE HCL 5 MG/5 ML
10 SOLUTION, ORAL ORAL
Status: DISCONTINUED | OUTPATIENT
Start: 2019-04-19 | End: 2019-04-19 | Stop reason: HOSPADM

## 2019-04-19 RX ORDER — ONDANSETRON 2 MG/ML
4 INJECTION INTRAMUSCULAR; INTRAVENOUS EVERY 4 HOURS PRN
Status: DISCONTINUED | OUTPATIENT
Start: 2019-04-19 | End: 2019-04-24 | Stop reason: HOSPADM

## 2019-04-19 RX ORDER — DEXAMETHASONE SODIUM PHOSPHATE 4 MG/ML
INJECTION, SOLUTION INTRA-ARTICULAR; INTRALESIONAL; INTRAMUSCULAR; INTRAVENOUS; SOFT TISSUE PRN
Status: DISCONTINUED | OUTPATIENT
Start: 2019-04-19 | End: 2019-04-19 | Stop reason: SURG

## 2019-04-19 RX ORDER — HYDRALAZINE HYDROCHLORIDE 20 MG/ML
5 INJECTION INTRAMUSCULAR; INTRAVENOUS EVERY 6 HOURS PRN
Status: DISCONTINUED | OUTPATIENT
Start: 2019-04-19 | End: 2019-04-24 | Stop reason: HOSPADM

## 2019-04-19 RX ORDER — ONDANSETRON 2 MG/ML
4 INJECTION INTRAMUSCULAR; INTRAVENOUS ONCE
Status: COMPLETED | OUTPATIENT
Start: 2019-04-19 | End: 2019-04-19

## 2019-04-19 RX ORDER — MIDAZOLAM HYDROCHLORIDE 1 MG/ML
1 INJECTION INTRAMUSCULAR; INTRAVENOUS
Status: DISCONTINUED | OUTPATIENT
Start: 2019-04-19 | End: 2019-04-19 | Stop reason: HOSPADM

## 2019-04-19 RX ORDER — SODIUM CHLORIDE 9 MG/ML
1000 INJECTION, SOLUTION INTRAVENOUS ONCE
Status: COMPLETED | OUTPATIENT
Start: 2019-04-19 | End: 2019-04-20

## 2019-04-19 RX ORDER — BISACODYL 10 MG
10 SUPPOSITORY, RECTAL RECTAL
Status: DISCONTINUED | OUTPATIENT
Start: 2019-04-19 | End: 2019-04-24 | Stop reason: HOSPADM

## 2019-04-19 RX ORDER — PHENYLEPHRINE HYDROCHLORIDE 10 MG/ML
INJECTION, SOLUTION INTRAMUSCULAR; INTRAVENOUS; SUBCUTANEOUS PRN
Status: DISCONTINUED | OUTPATIENT
Start: 2019-04-19 | End: 2019-04-19 | Stop reason: SURG

## 2019-04-19 RX ORDER — OXYCODONE HCL 5 MG/5 ML
5 SOLUTION, ORAL ORAL
Status: DISCONTINUED | OUTPATIENT
Start: 2019-04-19 | End: 2019-04-19 | Stop reason: HOSPADM

## 2019-04-19 RX ORDER — SODIUM CHLORIDE, SODIUM LACTATE, POTASSIUM CHLORIDE, CALCIUM CHLORIDE 600; 310; 30; 20 MG/100ML; MG/100ML; MG/100ML; MG/100ML
1000 INJECTION, SOLUTION INTRAVENOUS
Status: DISCONTINUED | OUTPATIENT
Start: 2019-04-19 | End: 2019-04-21

## 2019-04-19 RX ORDER — ONDANSETRON 2 MG/ML
INJECTION INTRAMUSCULAR; INTRAVENOUS PRN
Status: DISCONTINUED | OUTPATIENT
Start: 2019-04-19 | End: 2019-04-19 | Stop reason: SURG

## 2019-04-19 RX ORDER — MEPERIDINE HYDROCHLORIDE 25 MG/ML
12.5 INJECTION INTRAMUSCULAR; INTRAVENOUS; SUBCUTANEOUS
Status: DISCONTINUED | OUTPATIENT
Start: 2019-04-19 | End: 2019-04-19 | Stop reason: HOSPADM

## 2019-04-19 RX ORDER — BUPIVACAINE HYDROCHLORIDE 2.5 MG/ML
INJECTION, SOLUTION INFILTRATION; PERINEURAL
Status: DISCONTINUED | OUTPATIENT
Start: 2019-04-19 | End: 2019-04-19 | Stop reason: HOSPADM

## 2019-04-19 RX ORDER — DIPHENHYDRAMINE HYDROCHLORIDE 50 MG/ML
12.5 INJECTION INTRAMUSCULAR; INTRAVENOUS
Status: DISCONTINUED | OUTPATIENT
Start: 2019-04-19 | End: 2019-04-19 | Stop reason: HOSPADM

## 2019-04-19 RX ORDER — SODIUM CHLORIDE, SODIUM LACTATE, POTASSIUM CHLORIDE, CALCIUM CHLORIDE 600; 310; 30; 20 MG/100ML; MG/100ML; MG/100ML; MG/100ML
INJECTION, SOLUTION INTRAVENOUS
Status: DISCONTINUED | OUTPATIENT
Start: 2019-04-19 | End: 2019-04-19 | Stop reason: SURG

## 2019-04-19 RX ORDER — HYDRALAZINE HYDROCHLORIDE 20 MG/ML
20 INJECTION INTRAMUSCULAR; INTRAVENOUS EVERY 6 HOURS PRN
Status: DISCONTINUED | OUTPATIENT
Start: 2019-04-19 | End: 2019-04-19

## 2019-04-19 RX ORDER — ONDANSETRON 2 MG/ML
4 INJECTION INTRAMUSCULAR; INTRAVENOUS
Status: DISCONTINUED | OUTPATIENT
Start: 2019-04-19 | End: 2019-04-19 | Stop reason: HOSPADM

## 2019-04-19 RX ORDER — POLYETHYLENE GLYCOL 3350 17 G/17G
1 POWDER, FOR SOLUTION ORAL
Status: DISCONTINUED | OUTPATIENT
Start: 2019-04-19 | End: 2019-04-24 | Stop reason: HOSPADM

## 2019-04-19 RX ADMIN — ONDANSETRON 4 MG: 2 INJECTION INTRAMUSCULAR; INTRAVENOUS at 01:01

## 2019-04-19 RX ADMIN — CEFOTETAN DISODIUM 2 G: 2 INJECTION, POWDER, FOR SOLUTION INTRAMUSCULAR; INTRAVENOUS at 19:21

## 2019-04-19 RX ADMIN — METRONIDAZOLE 500 MG: 500 INJECTION, SOLUTION INTRAVENOUS at 02:26

## 2019-04-19 RX ADMIN — SODIUM CHLORIDE, SODIUM GLUCONATE, SODIUM ACETATE, POTASSIUM CHLORIDE AND MAGNESIUM CHLORIDE: 526; 502; 368; 37; 30 INJECTION, SOLUTION INTRAVENOUS at 20:22

## 2019-04-19 RX ADMIN — ONDANSETRON 4 MG: 2 INJECTION INTRAMUSCULAR; INTRAVENOUS at 19:28

## 2019-04-19 RX ADMIN — CEFTRIAXONE SODIUM 2 G: 2 INJECTION, POWDER, FOR SOLUTION INTRAMUSCULAR; INTRAVENOUS at 01:54

## 2019-04-19 RX ADMIN — METRONIDAZOLE 500 MG: 500 INJECTION, SOLUTION INTRAVENOUS at 22:02

## 2019-04-19 RX ADMIN — PHENYLEPHRINE HYDROCHLORIDE 100 MCG: 10 INJECTION INTRAVENOUS at 20:08

## 2019-04-19 RX ADMIN — FENTANYL CITRATE 100 MCG: 50 INJECTION INTRAMUSCULAR; INTRAVENOUS at 01:00

## 2019-04-19 RX ADMIN — SODIUM CHLORIDE: 900 INJECTION INTRAVENOUS at 03:45

## 2019-04-19 RX ADMIN — PHENYLEPHRINE HYDROCHLORIDE 100 MCG: 10 INJECTION INTRAVENOUS at 20:04

## 2019-04-19 RX ADMIN — SODIUM CHLORIDE 1000 ML: 9 INJECTION, SOLUTION INTRAVENOUS at 22:42

## 2019-04-19 RX ADMIN — PHENYLEPHRINE HYDROCHLORIDE 10 MCG/KG/MIN: 10 INJECTION INTRAVENOUS at 20:08

## 2019-04-19 RX ADMIN — PROPOFOL 150 MG: 10 INJECTION, EMULSION INTRAVENOUS at 19:21

## 2019-04-19 RX ADMIN — SODIUM CHLORIDE, SODIUM GLUCONATE, SODIUM ACETATE, POTASSIUM CHLORIDE AND MAGNESIUM CHLORIDE: 526; 502; 368; 37; 30 INJECTION, SOLUTION INTRAVENOUS at 20:18

## 2019-04-19 RX ADMIN — FENTANYL CITRATE 50 MCG: 50 INJECTION, SOLUTION INTRAMUSCULAR; INTRAVENOUS at 19:21

## 2019-04-19 RX ADMIN — SODIUM CHLORIDE, POTASSIUM CHLORIDE, SODIUM LACTATE AND CALCIUM CHLORIDE: 600; 310; 30; 20 INJECTION, SOLUTION INTRAVENOUS at 19:17

## 2019-04-19 RX ADMIN — SUGAMMADEX 160 MG: 100 INJECTION, SOLUTION INTRAVENOUS at 20:30

## 2019-04-19 RX ADMIN — SODIUM CHLORIDE, SODIUM GLUCONATE, SODIUM ACETATE, POTASSIUM CHLORIDE AND MAGNESIUM CHLORIDE: 526; 502; 368; 37; 30 INJECTION, SOLUTION INTRAVENOUS at 20:41

## 2019-04-19 RX ADMIN — METRONIDAZOLE 500 MG: 500 INJECTION, SOLUTION INTRAVENOUS at 10:44

## 2019-04-19 RX ADMIN — EPHEDRINE SULFATE 10 MG: 50 INJECTION INTRAMUSCULAR; INTRAVENOUS; SUBCUTANEOUS at 19:49

## 2019-04-19 RX ADMIN — SODIUM CHLORIDE: 900 INJECTION INTRAVENOUS at 22:01

## 2019-04-19 RX ADMIN — FENTANYL CITRATE 50 MCG: 50 INJECTION, SOLUTION INTRAMUSCULAR; INTRAVENOUS at 20:35

## 2019-04-19 RX ADMIN — DEXAMETHASONE SODIUM PHOSPHATE 6 MG: 4 INJECTION, SOLUTION INTRAMUSCULAR; INTRAVENOUS at 19:28

## 2019-04-19 RX ADMIN — LIDOCAINE HYDROCHLORIDE 80 MG: 20 INJECTION, SOLUTION INFILTRATION; PERINEURAL at 19:21

## 2019-04-19 ASSESSMENT — LIFESTYLE VARIABLES
HAVE YOU EVER FELT YOU SHOULD CUT DOWN ON YOUR DRINKING: NO
TOTAL SCORE: 0
EVER FELT BAD OR GUILTY ABOUT YOUR DRINKING: NO
AVERAGE NUMBER OF DAYS PER WEEK YOU HAVE A DRINK CONTAINING ALCOHOL: 3
EVER_SMOKED: NEVER
HOW MANY TIMES IN THE PAST YEAR HAVE YOU HAD 5 OR MORE DRINKS IN A DAY: 0
ALCOHOL_USE: YES
EVER HAD A DRINK FIRST THING IN THE MORNING TO STEADY YOUR NERVES TO GET RID OF A HANGOVER: NO
ON A TYPICAL DAY WHEN YOU DRINK ALCOHOL HOW MANY DRINKS DO YOU HAVE: 2
TOTAL SCORE: 0
CONSUMPTION TOTAL: NEGATIVE
TOTAL SCORE: 0
HAVE PEOPLE ANNOYED YOU BY CRITICIZING YOUR DRINKING: NO

## 2019-04-19 ASSESSMENT — ENCOUNTER SYMPTOMS
SORE THROAT: 0
VOMITING: 1
NAUSEA: 0
WEAKNESS: 0
PALPITATIONS: 0
DIZZINESS: 0
NAUSEA: 1
VOMITING: 0
DOUBLE VISION: 0
FEVER: 0
DIARRHEA: 0
NECK PAIN: 0
HEADACHES: 0
CHILLS: 0
BLURRED VISION: 0
INSOMNIA: 0
COUGH: 0
MYALGIAS: 0
SHORTNESS OF BREATH: 0
ABDOMINAL PAIN: 1
DEPRESSION: 0
BRUISES/BLEEDS EASILY: 0

## 2019-04-19 ASSESSMENT — COPD QUESTIONNAIRES
IN THE PAST 12 MONTHS DO YOU DO LESS THAN YOU USED TO BECAUSE OF YOUR BREATHING PROBLEMS: DISAGREE/UNSURE
DO YOU EVER COUGH UP ANY MUCUS OR PHLEGM?: NO/ONLY WITH OCCASIONAL COLDS OR INFECTIONS
HAVE YOU SMOKED AT LEAST 100 CIGARETTES IN YOUR ENTIRE LIFE: NO/DON'T KNOW
COPD SCREENING SCORE: 2
DURING THE PAST 4 WEEKS HOW MUCH DID YOU FEEL SHORT OF BREATH: NONE/LITTLE OF THE TIME

## 2019-04-19 ASSESSMENT — COGNITIVE AND FUNCTIONAL STATUS - GENERAL
MOBILITY SCORE: 24
SUGGESTED CMS G CODE MODIFIER DAILY ACTIVITY: CH
SUGGESTED CMS G CODE MODIFIER MOBILITY: CH
DAILY ACTIVITIY SCORE: 24

## 2019-04-19 ASSESSMENT — PATIENT HEALTH QUESTIONNAIRE - PHQ9
1. LITTLE INTEREST OR PLEASURE IN DOING THINGS: NOT AT ALL
SUM OF ALL RESPONSES TO PHQ9 QUESTIONS 1 AND 2: 0
2. FEELING DOWN, DEPRESSED, IRRITABLE, OR HOPELESS: NOT AT ALL

## 2019-04-19 ASSESSMENT — PAIN SCALES - GENERAL: PAIN_LEVEL: 0

## 2019-04-19 NOTE — PROGRESS NOTES
Patient is sleeping in bed comfortably, respirations are even and unlabored. Safety precautions in place, will continue to monitor. Patient refused scheduled stool softener this morning.

## 2019-04-19 NOTE — PROGRESS NOTES
Report received. Assumed care of pt. A/O x4. VSS. Responds appropriately. Denies pain, denies SOB. Assessment completed. Call light and belongings within reach. Bed in the lowest position. Treaded socks in place. Hourly rounding in progress. Safety precautions in place

## 2019-04-19 NOTE — ED TRIAGE NOTES
"Pt bib family with c/o epigastric pain and n/v since 0300. Pt states, \"I'm unable to get comfortable in any position.\"   "

## 2019-04-19 NOTE — H&P
Hospital Medicine History & Physical Note    Date of Service  4/19/2019    Primary Care Physician  Pee CUNNINGHAM M.D.    Consultants  Will need GI Consult in am.    Code Status  Full Code    Chief Complaint  Abdominal Pain    History of Presenting Illness  78 y.o. male who presented to the ER on 4/19/2019 for evaluation of right upper quadrant/epigastric pain.  Pain started 2 days ago and is getting progressively worse.  He describes a severe, 7/10 in intensity pain in his epigastric area just prior to coming to the emergency department.  At this time pain-free.  Patient is having decreased appetite and mild nausea associated with his pain    ER laboratory findings showed elevated LFT along with elevated bilirubin level.  He also has leukocytosis.  Abdominal ultrasound official results are still pending.    Review of Systems  Review of Systems   Constitutional: Negative for fever.   HENT: Negative for congestion and sore throat.    Eyes: Negative for blurred vision and double vision.   Respiratory: Negative for cough and shortness of breath.    Cardiovascular: Negative for chest pain and palpitations.   Gastrointestinal: Positive for abdominal pain. Negative for nausea and vomiting.   Genitourinary: Negative for dysuria and urgency.   Musculoskeletal: Negative for myalgias and neck pain.   Skin: Negative for itching and rash.   Neurological: Negative for dizziness, weakness and headaches.   Endo/Heme/Allergies: Does not bruise/bleed easily.   Psychiatric/Behavioral: Negative for depression. The patient does not have insomnia.        Past Medical History   has a past medical history of Bowel habit changes; Cataract; Dental disorder; Hypercholesterolemia; Hypertension; Snoring; and Stroke ([facility]) (2013).    Surgical History   has a past surgical history that includes other; cataract phaco with iol (Left, 8/14/2018); and cataract phaco with iol (Right, 8/28/2018).     Family History  family history is not on file.      Social History   reports that he has never smoked. He has never used smokeless tobacco. He reports that he drinks alcohol. He reports that he does not use drugs.    Allergies  Allergies   Allergen Reactions   • Other Environmental      Seasonal allergies       Medications  Prior to Admission Medications   Prescriptions Last Dose Informant Patient Reported? Taking?   amLODIPine (NORVASC) 5 MG Tab  Patient Yes No   Sig: Take 5 mg by mouth every day.   atorvastatin (LIPITOR) 10 MG Tab   Yes No   Sig: Take 10 mg by mouth every evening.   metoprolol SR (TOPROL XL) 100 MG TABLET SR 24 HR  Patient Yes No   Sig: Take 100 mg by mouth every day.      Facility-Administered Medications: None       Physical Exam  Temp:  [36.6 °C (97.8 °F)] 36.6 °C (97.8 °F)  Pulse:  [88-92] 92  Resp:  [17-20] 20  BP: (148)/(79) 148/79  SpO2:  [91 %-96 %] 91 %    Physical Exam   Constitutional: He is oriented to person, place, and time. He appears well-developed and well-nourished.   +Jaundice (mild)   HENT:   Head: Normocephalic and atraumatic.   Eyes: Pupils are equal, round, and reactive to light. EOM are normal.   Neck: Normal range of motion. Neck supple.   Cardiovascular: Normal rate and regular rhythm.    Pulmonary/Chest: Effort normal and breath sounds normal.   Abdominal: Soft. Bowel sounds are normal. There is tenderness (Mild tenderness on RUQ).   Musculoskeletal: Normal range of motion. He exhibits no edema.   Neurological: He is alert and oriented to person, place, and time.   Skin: Skin is warm and dry.   Psychiatric: He has a normal mood and affect. His behavior is normal.       Laboratory:  Recent Labs      04/18/19   2250   WBC  19.9*   RBC  4.88   HEMOGLOBIN  15.8   HEMATOCRIT  46.5   MCV  95.3   MCH  32.4   MCHC  34.0   RDW  46.8   PLATELETCT  189   MPV  9.5     Recent Labs      04/18/19   2250   SODIUM  136   POTASSIUM  4.0   CHLORIDE  103   CO2  23   GLUCOSE  213*   BUN  26*   CREATININE  1.26   CALCIUM  9.1     Recent  Labs      04/18/19   2250   ALTSGPT  132*   ASTSGOT  322*   ALKPHOSPHAT  176*   TBILIRUBIN  4.9*   LIPASE  53   GLUCOSE  213*                 Recent Labs      04/18/19   2250   TROPONINI  <0.02       Urinalysis:    No results found     Imaging:  DX-CHEST-LIMITED (1 VIEW)   Final Result         1.  Left basilar atelectasis, no focal infiltrate   2.  Atherosclerosis      US-RUQ    (Results Pending)   [id number]-D/O    (Results Pending)         Assessment/Plan:  I anticipate this patient will require at least two midnights for appropriate medical management, necessitating inpatient admission.    * Acute cholecystitis   Assessment & Plan    -With elevated LFTs.  AST is 222 and .  Bilirubin is 4.9 this suggests obstruction.  -N.p.o.  -MRCP in the morning: We need to call GI consult in the morning  -Abdominal ultrasound official results are still pending however patient does look like having his large on imaging reviewed.  -Pain and nausea control PRN.  -Patient has also elevated WBC which could raise suspicion for choledocholithiasis with a white count of 19.9 therefore will give Rocephin and Flagyl.  First dose was given in the ED.     DAKOTAH (acute kidney injury) (HCC)- (present on admission)   Assessment & Plan    -IV fluids 100 cc an hour overnight.  Creatinine on admission is 1.26.  -Monitor morning labs.     Hypertension, essential- (present on admission)   Assessment & Plan    -Patient is n.p.o.  -Hold metoprolol and amlodipine and give hydralazine as needed if systolic blood pressure above 180.     Pre-diabetes- (present on admission)   Assessment & Plan    -Glucose on admission is 213.  He does have a history of prediabetes and this could be elevated also secondary to #1.  -Added hemoglobin A1c.     Dyslipidemia- (present on admission)   Assessment & Plan    -He is n.p.o., hold atorvastatin          VTE prophylaxis: SCD's

## 2019-04-19 NOTE — CARE PLAN
Problem: Pain Management  Goal: Pain level will decrease to patient's comfort goal   04/19/19 0336   OTHER   Pain Rating Scale (NPRS) 0       Problem: Knowledge Deficit  Goal: Knowledge of disease process/condition, treatment plan, diagnostic tests, and medications will improve  Outcome: PROGRESSING AS EXPECTED  Discussed plan of care with patient, including MRCP scheduled, IV fluids, and IV abx. Allowed time for questions, patient verbalized understanding and declined any further questions at this time. Will continue to monitor.

## 2019-04-19 NOTE — ED PROVIDER NOTES
ED Provider Note    Primary care provider: Pee CUNNINGHAM M.D.  Means of arrival: private vehicle  History obtained from: patient  History limited by: none    CHIEF COMPLAINT  Chief Complaint   Patient presents with   • Epigastric Pain   • N/V       HPI  Freeman Frost is a 78 y.o. male who presents to the Emergency Department for epigastric pain with associated nausea and vomiting.  Patient reports that over the last day he has had this epigastric pain that feels like severe indigestion and is burning in nature.  Patient reports that sometimes he gets indigestion but it typically resolves and this is persisted for 24 hours and therefore he came in for further evaluation.  He reports associated nausea and emesis.  He has also had decreased appetite and has not had anything to eat or drink since yesterday morning.  Patient has no prior history of abdominal surgeries.  Denies fevers, chills, dysuria, hematuria, changes in bowel movements.    REVIEW OF SYSTEMS  Review of Systems   Constitutional: Negative for chills and fever.   Respiratory: Negative for shortness of breath.    Cardiovascular: Negative for chest pain.   Gastrointestinal: Positive for abdominal pain, nausea and vomiting. Negative for diarrhea.   Genitourinary: Negative for dysuria.   Neurological: Negative for dizziness and headaches.   All other systems reviewed and are negative.        PAST MEDICAL HISTORY   has a past medical history of Bowel habit changes; Cataract; Dental disorder; Hypercholesterolemia; Hypertension; Snoring; and Stroke (HCC) (2013).    SURGICAL HISTORY   has a past surgical history that includes other; cataract phaco with iol (Left, 8/14/2018); and cataract phaco with iol (Right, 8/28/2018).    SOCIAL HISTORY  Social History   Substance Use Topics   • Smoking status: Never Smoker   • Smokeless tobacco: Never Used   • Alcohol use Yes      Comment: 2drinks daily (3-4 Large glasses)      History   Drug Use No       FAMILY  "HISTORY  History reviewed. No pertinent family history.    CURRENT MEDICATIONS  Home Medications     Reviewed by Mary Powell R.N. (Registered Nurse) on 04/19/19 at 0349  Med List Status: Complete   Medication Last Dose Status   amLODIPine (NORVASC) 5 MG Tab 4/18/2019 Active   atorvastatin (LIPITOR) 10 MG Tab 4/18/2019 Active   metoprolol SR (TOPROL XL) 100 MG TABLET SR 24 HR 4/18/2019 Active                ALLERGIES  Allergies   Allergen Reactions   • Other Environmental      Seasonal allergies       PHYSICAL EXAM  VITAL SIGNS: /81   Pulse 96   Temp 37.4 °C (99.3 °F) (Temporal)   Resp 20   Ht 1.753 m (5' 9\")   Wt 80 kg (176 lb 5.9 oz)   SpO2 94%   BMI 26.05 kg/m²   Vitals reviewed by myself.  Physical Exam   Constitutional: He is oriented to person, place, and time.   Patient appears uncomfortable   HENT:   Head: Normocephalic.   Eyes: EOM are normal.   Neck: Normal range of motion.   Cardiovascular: Normal rate, regular rhythm and normal heart sounds.  Exam reveals no gallop and no friction rub.    No murmur heard.  Pulmonary/Chest: Effort normal and breath sounds normal. No respiratory distress. He has no wheezes. He has no rales.   Abdominal: Soft.   Mild tenderness to palpation in the epigastric and right upper quadrant region, positive Szymanski sign, negative McBurney's point tenderness, no rebound, non-peritoneal   Musculoskeletal: Normal range of motion.   Neurological: He is alert and oriented to person, place, and time.   Skin: Skin is warm and dry.         DIAGNOSTIC STUDIES /  LABS  Labs Reviewed   CBC WITH DIFFERENTIAL - Abnormal; Notable for the following:        Result Value    WBC 19.9 (*)     Neutrophils-Polys 89.80 (*)     Lymphocytes 4.00 (*)     Neutrophils (Absolute) 17.88 (*)     Lymphs (Absolute) 0.80 (*)     Monos (Absolute) 1.11 (*)     All other components within normal limits   COMP METABOLIC PANEL - Abnormal; Notable for the following:     Glucose 213 (*)     Bun 26 (*)  " "   AST(SGOT) 322 (*)     ALT(SGPT) 132 (*)     Alkaline Phosphatase 176 (*)     Total Bilirubin 4.9 (*)     All other components within normal limits   ESTIMATED GFR - Abnormal; Notable for the following:     GFR If Non  55 (*)     All other components within normal limits   TROPONIN   LIPASE   BLOOD CULTURE    Narrative:     Per Hospital Policy: Only change Specimen Src: to \"Line\" if  specified by physician order.   BLOOD CULTURE    Narrative:     Per Hospital Policy: Only change Specimen Src: to \"Line\" if  specified by physician order.   HEMOGLOBIN A1C   CBC WITH DIFFERENTIAL   COMP METABOLIC PANEL       All labs reviewed by me.    EKG Interpretation:  Interpreted by myself    12 Lead EKG interpreted by me to show:  EKG at 2238: Normal sinus rhythm, heart rate 85, left axis deviation, normal intervals, , , QTc 443, no acute ST-T segment changes  My impression of this EKG: Does not indicate ischemia or arrhythmia at this time.    RADIOLOGY  US-RUQ   Final Result         1.  4.1 cm infrarenal abdominal aortic aneurysm, recommend radiographic follow-up and surveillance as clinically appropriate.   2.  Echogenic liver, compatible with fatty change versus fibrosis.   3.  Mild prominence of the pancreatic duct, could represent age-related changes, could be further evaluated with ERCP as clinically appropriate.   4.  Dependent gallbladder sludge without additional sonographic findings to suggest acute cholecystitis.      DX-CHEST-LIMITED (1 VIEW)   Final Result         1.  Left basilar atelectasis, no focal infiltrate   2.  Atherosclerosis        The radiologist's interpretation of all radiological studies have been reviewed by me.      COURSE & MEDICAL DECISION MAKING  Nursing notes, VS, PMSFHx reviewed in chart.    Patient is a 78-year-old male who comes in for epigastric pain, nausea, vomiting.  Differential diagnosis includes gastritis, pancreatitis, cholecystitis, cholelithiasis, " gastroenteritis.  Diagnostic work-up includes labs and right upper quadrant ultrasound.    Patient's initial vitals notable for slight hypertension likely related to pain.  Patient is treated with Zofran and fentanyl after which he feels greatly improved.  Given his age screening EKG and troponin were done to assess for atypical presentation of acute coronary syndrome, troponin is negative and EKG demonstrates no evidence of acute arrhythmia or ischemia.  Patient's labs returned are notable for grossly elevated LFTs with a bilirubin of 4.9.  Patient is also noted to have a leukocytosis of 19.9.  Given my exam that is positive for right upper quadrant tenderness with associated leukocytosis and elevated LFTs I am concerned about cholecystitis and biliary pathology.  As we are awaiting the right upper quadrant ultrasound I will start patient empirically on antibiotics to treat for cholecystitis, patient is agreeable to this plan.  He started on ceftriaxone and Flagyl.  Ultrasound returns and demonstrates mild prominence of the pancreatic duct, there is gallbladder sludge but no evidence of acute cholecystitis.  Given the elevated LFTs/bilirubin I do believe that patient requires further work-up with an MRCP.  Patient will be admitted for MRCP and continued on pain management and antibiotics.  He is agreeable to this plan.  I discussed the case with Dr. Roe who is accepted the admission.  At time of admission patient is in guarded condition.      FINAL IMPRESSION  1. Epigastric pain    2. Elevated LFTs    3. Leukocytosis, unspecified type

## 2019-04-19 NOTE — ASSESSMENT & PLAN NOTE
-Glucose on admission is 213.  a1c is 6.5  He meets criteria for a new diagnosis of diabetes.   Will start oral meds as outpatient.   Dm education  Has a hx of glucose intolerance.   I discussed this with him today.

## 2019-04-19 NOTE — ASSESSMENT & PLAN NOTE
S/p cholecystectomy   S/p ercp and stone extraction.   Severe cholecystitis and needed open procedure.   IV abx until surgery ok with stopping. Did place 7 day stop today  Dc sanaz  Pt.ot  Pain control.

## 2019-04-19 NOTE — PROGRESS NOTES
0400 Pt arrived to unit via gurney. Ambulated from rney to bed, standby assist, vitals taken. Pt assessed. A&O x4. Admit profile and med rec complete. Discussed POC with pt, including MRI scheduled, IV fluids and IV abx. Welcome folder provided and discussed. Communication board filled out. Questions and concerns addressed, verbalized understanding. Fall precautions in place. Pt demonstrates ability to use call light appropriately. Pt left in lowest position. Bed locked and low.

## 2019-04-20 ENCOUNTER — APPOINTMENT (OUTPATIENT)
Dept: RADIOLOGY | Facility: MEDICAL CENTER | Age: 79
DRG: 415 | End: 2019-04-20
Attending: INTERNAL MEDICINE
Payer: MEDICARE

## 2019-04-20 ENCOUNTER — ANESTHESIA (OUTPATIENT)
Dept: SURGERY | Facility: MEDICAL CENTER | Age: 79
DRG: 415 | End: 2019-04-20
Payer: MEDICARE

## 2019-04-20 ENCOUNTER — ANESTHESIA EVENT (OUTPATIENT)
Dept: SURGERY | Facility: MEDICAL CENTER | Age: 79
DRG: 415 | End: 2019-04-20
Payer: MEDICARE

## 2019-04-20 LAB
ALBUMIN SERPL BCP-MCNC: 2.4 G/DL (ref 3.2–4.9)
ALBUMIN/GLOB SERPL: 1.1 G/DL
ALP SERPL-CCNC: 120 U/L (ref 30–99)
ALT SERPL-CCNC: 115 U/L (ref 2–50)
ANION GAP SERPL CALC-SCNC: 11 MMOL/L (ref 0–11.9)
AST SERPL-CCNC: 104 U/L (ref 12–45)
BASOPHILS # BLD AUTO: 0.2 % (ref 0–1.8)
BASOPHILS # BLD: 0.03 K/UL (ref 0–0.12)
BILIRUB CONJ SERPL-MCNC: 5 MG/DL (ref 0.1–0.5)
BILIRUB SERPL-MCNC: 9.4 MG/DL (ref 0.1–1.5)
BUN SERPL-MCNC: 14 MG/DL (ref 8–22)
CALCIUM SERPL-MCNC: 6.9 MG/DL (ref 8.4–10.2)
CHLORIDE SERPL-SCNC: 102 MMOL/L (ref 96–112)
CO2 SERPL-SCNC: 21 MMOL/L (ref 20–33)
CREAT SERPL-MCNC: 1.23 MG/DL (ref 0.5–1.4)
EOSINOPHIL # BLD AUTO: 0 K/UL (ref 0–0.51)
EOSINOPHIL NFR BLD: 0 % (ref 0–6.9)
ERYTHROCYTE [DISTWIDTH] IN BLOOD BY AUTOMATED COUNT: 49.2 FL (ref 35.9–50)
GLOBULIN SER CALC-MCNC: 2.2 G/DL (ref 1.9–3.5)
GLUCOSE SERPL-MCNC: 182 MG/DL (ref 65–99)
HCT VFR BLD AUTO: 29.7 % (ref 42–52)
HGB BLD-MCNC: 9.8 G/DL (ref 14–18)
IMM GRANULOCYTES # BLD AUTO: 0.12 K/UL (ref 0–0.11)
IMM GRANULOCYTES NFR BLD AUTO: 0.6 % (ref 0–0.9)
LYMPHOCYTES # BLD AUTO: 0.29 K/UL (ref 1–4.8)
LYMPHOCYTES NFR BLD: 1.5 % (ref 22–41)
MCH RBC QN AUTO: 32.7 PG (ref 27–33)
MCHC RBC AUTO-ENTMCNC: 33 G/DL (ref 33.7–35.3)
MCV RBC AUTO: 99 FL (ref 81.4–97.8)
MONOCYTES # BLD AUTO: 0.73 K/UL (ref 0–0.85)
MONOCYTES NFR BLD AUTO: 3.9 % (ref 0–13.4)
NEUTROPHILS # BLD AUTO: 17.7 K/UL (ref 1.82–7.42)
NEUTROPHILS NFR BLD: 93.8 % (ref 44–72)
NRBC # BLD AUTO: 0 K/UL
NRBC BLD-RTO: 0 /100 WBC
PLATELET # BLD AUTO: 131 K/UL (ref 164–446)
PMV BLD AUTO: 9.9 FL (ref 9–12.9)
POTASSIUM SERPL-SCNC: 3.8 MMOL/L (ref 3.6–5.5)
PROT SERPL-MCNC: 4.6 G/DL (ref 6–8.2)
RBC # BLD AUTO: 3 M/UL (ref 4.7–6.1)
SODIUM SERPL-SCNC: 134 MMOL/L (ref 135–145)
WBC # BLD AUTO: 18.9 K/UL (ref 4.8–10.8)

## 2019-04-20 PROCEDURE — 110371 HCHG SHELL REV 272: Performed by: INTERNAL MEDICINE

## 2019-04-20 PROCEDURE — A9270 NON-COVERED ITEM OR SERVICE: HCPCS | Performed by: HOSPITALIST

## 2019-04-20 PROCEDURE — 770001 HCHG ROOM/CARE - MED/SURG/GYN PRIV*

## 2019-04-20 PROCEDURE — 700111 HCHG RX REV CODE 636 W/ 250 OVERRIDE (IP): Performed by: ANESTHESIOLOGY

## 2019-04-20 PROCEDURE — 160048 HCHG OR STATISTICAL LEVEL 1-5: Performed by: INTERNAL MEDICINE

## 2019-04-20 PROCEDURE — 700111 HCHG RX REV CODE 636 W/ 250 OVERRIDE (IP)

## 2019-04-20 PROCEDURE — 85025 COMPLETE CBC W/AUTO DIFF WBC: CPT

## 2019-04-20 PROCEDURE — 160203 HCHG ENDO MINUTES - 1ST 30 MINS LEVEL 4: Performed by: INTERNAL MEDICINE

## 2019-04-20 PROCEDURE — 700101 HCHG RX REV CODE 250: Performed by: ANESTHESIOLOGY

## 2019-04-20 PROCEDURE — 700105 HCHG RX REV CODE 258: Performed by: SURGERY

## 2019-04-20 PROCEDURE — 700101 HCHG RX REV CODE 250

## 2019-04-20 PROCEDURE — 0FT40ZZ RESECTION OF GALLBLADDER, OPEN APPROACH: ICD-10-PCS | Performed by: SURGERY

## 2019-04-20 PROCEDURE — 700105 HCHG RX REV CODE 258: Performed by: HOSPITALIST

## 2019-04-20 PROCEDURE — BF141ZZ FLUOROSCOPY OF GALLBLADDER, BILE DUCTS AND PANCREATIC DUCTS USING LOW OSMOLAR CONTRAST: ICD-10-PCS | Performed by: SURGERY

## 2019-04-20 PROCEDURE — 700101 HCHG RX REV CODE 250: Performed by: HOSPITALIST

## 2019-04-20 PROCEDURE — 502240 HCHG MISC OR SUPPLY RC 0272: Performed by: INTERNAL MEDICINE

## 2019-04-20 PROCEDURE — 0FC98ZZ EXTIRPATION OF MATTER FROM COMMON BILE DUCT, VIA NATURAL OR ARTIFICIAL OPENING ENDOSCOPIC: ICD-10-PCS | Performed by: SURGERY

## 2019-04-20 PROCEDURE — 700105 HCHG RX REV CODE 258: Performed by: ANESTHESIOLOGY

## 2019-04-20 PROCEDURE — 160208 HCHG ENDO MINUTES - EA ADDL 1 MIN LEVEL 4: Performed by: INTERNAL MEDICINE

## 2019-04-20 PROCEDURE — 700111 HCHG RX REV CODE 636 W/ 250 OVERRIDE (IP): Performed by: SURGERY

## 2019-04-20 PROCEDURE — 160002 HCHG RECOVERY MINUTES (STAT): Performed by: INTERNAL MEDICINE

## 2019-04-20 PROCEDURE — 160009 HCHG ANES TIME/MIN: Performed by: INTERNAL MEDICINE

## 2019-04-20 PROCEDURE — 700117 HCHG RX CONTRAST REV CODE 255

## 2019-04-20 PROCEDURE — 700105 HCHG RX REV CODE 258: Performed by: INTERNAL MEDICINE

## 2019-04-20 PROCEDURE — 700102 HCHG RX REV CODE 250 W/ 637 OVERRIDE(OP): Performed by: HOSPITALIST

## 2019-04-20 PROCEDURE — 0FJ44ZZ INSPECTION OF GALLBLADDER, PERCUTANEOUS ENDOSCOPIC APPROACH: ICD-10-PCS | Performed by: SURGERY

## 2019-04-20 PROCEDURE — 82248 BILIRUBIN DIRECT: CPT

## 2019-04-20 PROCEDURE — 160035 HCHG PACU - 1ST 60 MINS PHASE I: Performed by: INTERNAL MEDICINE

## 2019-04-20 PROCEDURE — 0F798DZ DILATION OF COMMON BILE DUCT WITH INTRALUMINAL DEVICE, VIA NATURAL OR ARTIFICIAL OPENING ENDOSCOPIC: ICD-10-PCS | Performed by: SURGERY

## 2019-04-20 PROCEDURE — 700111 HCHG RX REV CODE 636 W/ 250 OVERRIDE (IP): Performed by: HOSPITALIST

## 2019-04-20 PROCEDURE — 80053 COMPREHEN METABOLIC PANEL: CPT

## 2019-04-20 PROCEDURE — 99233 SBSQ HOSP IP/OBS HIGH 50: CPT | Performed by: INTERNAL MEDICINE

## 2019-04-20 RX ORDER — MEPERIDINE HYDROCHLORIDE 25 MG/ML
6.25 INJECTION INTRAMUSCULAR; INTRAVENOUS; SUBCUTANEOUS
Status: DISCONTINUED | OUTPATIENT
Start: 2019-04-20 | End: 2019-04-20 | Stop reason: HOSPADM

## 2019-04-20 RX ORDER — HYDROMORPHONE HYDROCHLORIDE 1 MG/ML
0.2 INJECTION, SOLUTION INTRAMUSCULAR; INTRAVENOUS; SUBCUTANEOUS
Status: DISCONTINUED | OUTPATIENT
Start: 2019-04-20 | End: 2019-04-20 | Stop reason: HOSPADM

## 2019-04-20 RX ORDER — SODIUM CHLORIDE, SODIUM LACTATE, POTASSIUM CHLORIDE, CALCIUM CHLORIDE 600; 310; 30; 20 MG/100ML; MG/100ML; MG/100ML; MG/100ML
INJECTION, SOLUTION INTRAVENOUS
Status: DISCONTINUED | OUTPATIENT
Start: 2019-04-20 | End: 2019-04-20 | Stop reason: SURG

## 2019-04-20 RX ORDER — OXYCODONE HCL 5 MG/5 ML
5 SOLUTION, ORAL ORAL
Status: DISCONTINUED | OUTPATIENT
Start: 2019-04-20 | End: 2019-04-20 | Stop reason: HOSPADM

## 2019-04-20 RX ORDER — HALOPERIDOL 5 MG/ML
1 INJECTION INTRAMUSCULAR
Status: DISCONTINUED | OUTPATIENT
Start: 2019-04-20 | End: 2019-04-20 | Stop reason: HOSPADM

## 2019-04-20 RX ORDER — HYDROMORPHONE HYDROCHLORIDE 1 MG/ML
0.1 INJECTION, SOLUTION INTRAMUSCULAR; INTRAVENOUS; SUBCUTANEOUS
Status: DISCONTINUED | OUTPATIENT
Start: 2019-04-20 | End: 2019-04-20 | Stop reason: HOSPADM

## 2019-04-20 RX ORDER — ONDANSETRON 2 MG/ML
4 INJECTION INTRAMUSCULAR; INTRAVENOUS
Status: DISCONTINUED | OUTPATIENT
Start: 2019-04-20 | End: 2019-04-20 | Stop reason: HOSPADM

## 2019-04-20 RX ORDER — HYDROMORPHONE HYDROCHLORIDE 1 MG/ML
0.4 INJECTION, SOLUTION INTRAMUSCULAR; INTRAVENOUS; SUBCUTANEOUS
Status: DISCONTINUED | OUTPATIENT
Start: 2019-04-20 | End: 2019-04-20 | Stop reason: HOSPADM

## 2019-04-20 RX ORDER — SODIUM CHLORIDE, SODIUM LACTATE, POTASSIUM CHLORIDE, CALCIUM CHLORIDE 600; 310; 30; 20 MG/100ML; MG/100ML; MG/100ML; MG/100ML
INJECTION, SOLUTION INTRAVENOUS CONTINUOUS
Status: DISCONTINUED | OUTPATIENT
Start: 2019-04-20 | End: 2019-04-20 | Stop reason: HOSPADM

## 2019-04-20 RX ORDER — OXYCODONE HCL 5 MG/5 ML
10 SOLUTION, ORAL ORAL
Status: DISCONTINUED | OUTPATIENT
Start: 2019-04-20 | End: 2019-04-20 | Stop reason: HOSPADM

## 2019-04-20 RX ORDER — PHENYLEPHRINE HYDROCHLORIDE 10 MG/ML
INJECTION, SOLUTION INTRAMUSCULAR; INTRAVENOUS; SUBCUTANEOUS PRN
Status: DISCONTINUED | OUTPATIENT
Start: 2019-04-20 | End: 2019-04-20 | Stop reason: SURG

## 2019-04-20 RX ORDER — SODIUM CHLORIDE, SODIUM LACTATE, POTASSIUM CHLORIDE, CALCIUM CHLORIDE 600; 310; 30; 20 MG/100ML; MG/100ML; MG/100ML; MG/100ML
1000 INJECTION, SOLUTION INTRAVENOUS
Status: COMPLETED | OUTPATIENT
Start: 2019-04-20 | End: 2019-04-20

## 2019-04-20 RX ORDER — ONDANSETRON 2 MG/ML
INJECTION INTRAMUSCULAR; INTRAVENOUS PRN
Status: DISCONTINUED | OUTPATIENT
Start: 2019-04-20 | End: 2019-04-20 | Stop reason: SURG

## 2019-04-20 RX ORDER — DIPHENHYDRAMINE HYDROCHLORIDE 50 MG/ML
12.5 INJECTION INTRAMUSCULAR; INTRAVENOUS
Status: DISCONTINUED | OUTPATIENT
Start: 2019-04-20 | End: 2019-04-20 | Stop reason: HOSPADM

## 2019-04-20 RX ADMIN — SODIUM CHLORIDE, SODIUM LACTATE, POTASSIUM CHLORIDE, CALCIUM CHLORIDE 1000 ML: 600; 310; 30; 20 INJECTION, SOLUTION INTRAVENOUS at 13:37

## 2019-04-20 RX ADMIN — MORPHINE SULFATE 4 MG: 10 INJECTION INTRAVENOUS at 22:00

## 2019-04-20 RX ADMIN — METRONIDAZOLE 500 MG: 500 INJECTION, SOLUTION INTRAVENOUS at 22:00

## 2019-04-20 RX ADMIN — PHENYLEPHRINE HYDROCHLORIDE 150 MCG: 10 INJECTION INTRAVENOUS at 14:25

## 2019-04-20 RX ADMIN — SODIUM CHLORIDE: 900 INJECTION INTRAVENOUS at 17:57

## 2019-04-20 RX ADMIN — METRONIDAZOLE 500 MG: 500 INJECTION, SOLUTION INTRAVENOUS at 05:24

## 2019-04-20 RX ADMIN — ONDANSETRON 4 MG: 2 INJECTION INTRAMUSCULAR; INTRAVENOUS at 14:30

## 2019-04-20 RX ADMIN — SODIUM CHLORIDE, POTASSIUM CHLORIDE, SODIUM LACTATE AND CALCIUM CHLORIDE: 600; 310; 30; 20 INJECTION, SOLUTION INTRAVENOUS at 14:15

## 2019-04-20 RX ADMIN — ROCURONIUM BROMIDE 40 MG: 10 INJECTION INTRAVENOUS at 14:22

## 2019-04-20 RX ADMIN — SUGAMMADEX 200 MG: 100 INJECTION, SOLUTION INTRAVENOUS at 14:35

## 2019-04-20 RX ADMIN — SODIUM CHLORIDE: 900 INJECTION INTRAVENOUS at 07:09

## 2019-04-20 RX ADMIN — SENNOSIDES, DOCUSATE SODIUM 2 TABLET: 50; 8.6 TABLET, FILM COATED ORAL at 17:58

## 2019-04-20 RX ADMIN — PROPOFOL 100 MG: 10 INJECTION, EMULSION INTRAVENOUS at 14:22

## 2019-04-20 RX ADMIN — FENTANYL CITRATE 100 MCG: 50 INJECTION, SOLUTION INTRAMUSCULAR; INTRAVENOUS at 14:22

## 2019-04-20 RX ADMIN — CEFTRIAXONE SODIUM 2 G: 2 INJECTION, POWDER, FOR SOLUTION INTRAMUSCULAR; INTRAVENOUS at 17:58

## 2019-04-20 RX ADMIN — METRONIDAZOLE 500 MG: 500 INJECTION, SOLUTION INTRAVENOUS at 13:04

## 2019-04-20 ASSESSMENT — ENCOUNTER SYMPTOMS
MYALGIAS: 0
EYES NEGATIVE: 1
FEVER: 0
CONSTITUTIONAL NEGATIVE: 1
NEUROLOGICAL NEGATIVE: 1
FLANK PAIN: 0
MUSCULOSKELETAL NEGATIVE: 1
DEPRESSION: 0
BACK PAIN: 0
NERVOUS/ANXIOUS: 0
DIZZINESS: 0
ABDOMINAL PAIN: 1
COUGH: 0
NAUSEA: 1
WEAKNESS: 0
LOSS OF CONSCIOUSNESS: 0
SHORTNESS OF BREATH: 0
WEIGHT LOSS: 0
CARDIOVASCULAR NEGATIVE: 1
BRUISES/BLEEDS EASILY: 0
CHILLS: 0
VOMITING: 0
PSYCHIATRIC NEGATIVE: 1
RESPIRATORY NEGATIVE: 1

## 2019-04-20 ASSESSMENT — PAIN SCALES - GENERAL: PAIN_LEVEL: 0

## 2019-04-20 NOTE — ANESTHESIA POSTPROCEDURE EVALUATION
Patient: Freeman Frost    Procedure Summary     Date:  04/20/19 Room / Location:   ENDOSCOPIC ULTRASOUND ROOM / SURGERY [facility]    Anesthesia Start:  1415 Anesthesia Stop:  1452    Procedure:  ERCP, DIAGNOSTIC (N/A Esophagus) Diagnosis:  (obstructive sheryl-duct cholithiasis)    Surgeon:  Antony Morrison M.D. Responsible Provider:  Mario Guy M.D.    Anesthesia Type:  general ASA Status:  3 - Emergent          Final Anesthesia Type: general  Last vitals  BP   Blood Pressure : 140/86, NIBP: 122/74    Temp   36.8 °C (98.2 °F)    Pulse   Pulse: 85, Heart Rate (Monitored): 80   Resp   16    SpO2   97 %      Anesthesia Post Evaluation    Patient location during evaluation: PACU  Patient participation: complete - patient participated  Level of consciousness: awake and alert  Pain score: 0    Airway patency: patent  Anesthetic complications: no  Cardiovascular status: hemodynamically stable  Respiratory status: acceptable  Hydration status: euvolemic    PONV: none           Nurse Pain Score: 0 (NPRS)

## 2019-04-20 NOTE — ANESTHESIA QCDR
2019 [facility] Clinical Data Registry (for Quality Improvement)     Postoperative nausea/vomiting risk protocol (Adult = 18 yrs and Pediatric 3-17 yrs)- (430 and 463)  General inhalation anesthetic (NOT TIVA) with PONV risk factors: No  Provision of anti-emetic therapy with at least 2 different classes of agents: N/A  Patient DID NOT receive anti-emetic therapy and reason is documented in Medical Record: N/A    Multimodal Pain Management- (AQI59)  Patient undergoing Elective Surgery (i.e. Outpatient, or ASC, or Prescheduled Surgery prior to Hospital Admission): Yes  Use of Multimodal Pain Management, two or more drugs and/or interventions, NOT including systemic opioids: Yes   Exception: Documented allergy to multiple classes of analgesics:  N/A    PACU assessment of acute postoperative pain prior to Anesthesia Care End- Applies to Patients Age = 18- (ABG7)  Initial PACU pain score is which of the following: < 7/10  Patient unable to report pain score: N/A    Post-anesthetic transfer of care checklist/protocol to PACU/ICU- (426 and 427)  Upon conclusion of case, patient transferred to which of the following locations: PACU/Non-ICU  Use of transfer checklist/protocol: Yes  Exclusion: Service Performed in Patient Hospital Room (and thus did not require transfer): N/A    PACU Reintubation- (AQI31)  General anesthesia requiring endotracheal intubation (ETT) along with subsequent extubation in OR or PACU: Yes  Required reintubation in the PACU: No   Extubation was a planned trial documented in the medical record prior to removal of the original airway device:  N/A    Unplanned admission to ICU related to anesthesia service up through end of PACU care- (MD51)  Unplanned admission to ICU (not initially anticipated at anesthesia start time): No

## 2019-04-20 NOTE — PROGRESS NOTES
1530 Pt transferred by bed back from OR following EGD to ICU room 324 in stable condition, Dr Schneider updated, Pt transferred to Medical status. Dr Vasquez called and informed. MD in agreement for transfer, Diet ok'd for Clear liquids.

## 2019-04-20 NOTE — ANESTHESIA TIME REPORT
Anesthesia Start and Stop Event Times     Date Time Event    4/19/2019 1917 Anesthesia Start     2102 Anesthesia Stop        Responsible Staff  04/19/19    Name Role Begin End    Geeta Valdes M.D. Anesth 1917 2102        Preop Diagnosis (Free Text):  Pre-op Diagnosis     acute cholecystitis        Preop Diagnosis (Codes):  Diagnosis Information     Diagnosis Code(s):         Post op Diagnosis  Acute cholecystitis      Premium Reason  A. 3PM - 7AM    Comments: Open Cholecytectomy

## 2019-04-20 NOTE — ANESTHESIA PREPROCEDURE EVALUATION
Acute cholecystitis s/p lap sheryl yesterday (open), now with obstructing biliary stone.    Relevant Problems   (+) DAKOTAH (acute kidney injury) (HCC)   (+) Dyslipidemia   (+) Hypertension, essential   (+) Pre-diabetes   TIA no residual    Physical Exam    Airway   Mallampati: II  TM distance: >3 FB  Neck ROM: full       Cardiovascular - normal exam  Rhythm: regular  Rate: normal  (-) murmur     Dental - normal exam  (+) upper dentures         Pulmonary - normal exam  Breath sounds clear to auscultation     Abdominal    Neurological - normal exam             Vitals:    04/20/19 1000 04/20/19 1100 04/20/19 1200 04/20/19 1300   BP:       Pulse: 93 88 92 85   Resp: (!) 26 (!) 28 (!) 35 (!) 26   Temp:   37.1 °C (98.7 °F)    TempSrc:   Temporal    SpO2: 98% 99% 99% 99%   Weight:       Height:           Anesthesia Plan    ASA 3 (obstructing biliary stone) - emergent   ASA physical status 3 criteria: other (comment)ASA physical status emergent criteria: other (comment)    Plan - general       Airway plan will be ETT        Induction: intravenous    Postoperative Plan: Postoperative administration of opioids is intended.    Pertinent diagnostic labs and testing reviewed    Informed Consent:    Anesthetic plan and risks discussed with patient.    Use of blood products discussed with: patient whom consented to blood products.

## 2019-04-20 NOTE — PROGRESS NOTES
Critical Care Progress Note    Date of Service  4/20/2019    Chief Complaint  78 y.o. male admitted 4/19/2019 with abdominal pain    Hospital Course    78 y.o. male who presented to the ER on 4/19/2019 for evaluation of right upper quadrant/epigastric pain.  Pain started 2 days ago and is getting progressively worse.  He describes a severe, 7/10 in intensity pain in his epigastric area just prior to coming to the emergency department.  At this time pain-free.  Patient is having decreased appetite and mild nausea associated with his pain     ER laboratory findings showed elevated LFT along with elevated bilirubin level.  He also has leukocytosis.  Abdominal ultrasound official results are still pending.      Interval Problem Update  S/p laparoscopic cholecystectomy, complicated with cystic artery bleed, converted to open abdomin cholecystectomy.   S/p ERSP today     Consultants/Specialty  Surgery, Dr. Merino  GI  Code Status  Full code    Disposition  Transferred to ICU post surgery yesterday due to the surgery       Review of Systems  Review of Systems   Constitutional: Negative.  Negative for chills, fever, malaise/fatigue and weight loss.   HENT: Negative.    Respiratory: Negative.  Negative for cough and shortness of breath.    Cardiovascular: Negative.  Negative for chest pain and leg swelling.   Gastrointestinal: Positive for abdominal pain and nausea. Negative for vomiting.   Genitourinary: Negative.  Negative for dysuria and flank pain.   Musculoskeletal: Negative.  Negative for back pain and myalgias.   Neurological: Negative.  Negative for dizziness, loss of consciousness and weakness.   Endo/Heme/Allergies: Negative.  Does not bruise/bleed easily.   Psychiatric/Behavioral: Negative.  Negative for depression. The patient is not nervous/anxious.    All other systems reviewed and are negative.       Physical Exam  Temp:  [36.5 °C (97.7 °F)-37.7 °C (99.9 °F)] 36.5 °C (97.7 °F)  Pulse:  [] 79  Resp:   [12-27] 14  BP: (134-144)/(77-86) 140/86  SpO2:  [92 %-100 %] 99 %    Physical Exam   Constitutional: He is oriented to person, place, and time. He appears well-developed and well-nourished. No distress.   HENT:   Head: Normocephalic and atraumatic.   Nose: Nose normal.   Eyes: Conjunctivae are normal. Right eye exhibits no discharge. Left eye exhibits no discharge. No scleral icterus.   Neck: No JVD present.   Cardiovascular: Normal rate and regular rhythm.    Pulmonary/Chest: Effort normal and breath sounds normal. No stridor. No respiratory distress.   Abdominal: Soft. He exhibits no distension. There is tenderness (Right upper quadrant).   Musculoskeletal: He exhibits no edema or tenderness.   Neurological: He is alert and oriented to person, place, and time.   Skin: Skin is warm and dry. He is not diaphoretic. No pallor.   Psychiatric: He has a normal mood and affect. His behavior is normal. Judgment and thought content normal.   Nursing note and vitals reviewed.      Fluids    Intake/Output Summary (Last 24 hours) at 04/20/19 0759  Last data filed at 04/20/19 0600   Gross per 24 hour   Intake         13944.52 ml   Output             2495 ml   Net         11077.52 ml       Laboratory  Recent Labs      04/19/19   0610  04/19/19   2119  04/20/19   0119   WBC  22.9*  25.2*  18.9*   RBC  4.69*  2.97*  3.00*   HEMOGLOBIN  15.3  9.8*  9.8*   HEMATOCRIT  44.7  28.9*  29.7*   MCV  95.3  97.3  99.0*   MCH  32.6  33.0  32.7   MCHC  34.2  33.9  33.0*   RDW  47.4  48.0  49.2   PLATELETCT  167  131*  131*   MPV  9.8  9.9  9.9     Recent Labs      04/18/19   2250  04/19/19   0610  04/20/19   0119   SODIUM  136  136  134*   POTASSIUM  4.0  4.2  3.8   CHLORIDE  103  103  102   CO2  23  24  21   GLUCOSE  213*  172*  182*   BUN  26*  19  14   CREATININE  1.26  1.20  1.23   CALCIUM  9.1  8.8  6.9*     Recent Labs      04/19/19   1824   INR  1.19*               Imaging  [id number] VIEW   Final Result         1.  No visualized  radiopaque surgical foreign bodies, note that the left lateral abdomen and pelvis are excluded on this exam limiting evaluation.   2.  Left lung base infiltrate      [id number]   Final Result      1.  Tiny low T2 signal focus dependently located in the distal common bile duct may represent a small calculus or crossing vessel. The finding cannot be confirmed on oblique sagittal or MRCP images. The distal common bile duct is not dilated to suggest    biliary obstruction.      2.  Cholelithiasis and gallbladder sludge. No wall thickening is identified. Ill-defined fluid inferior to the gallbladder and surrounding the proximal duodenum may be related to inflammation from acute cholecystitis or duodenitis. No associated duodenal    wall thickening is appreciated.      3.  4.8 cm fusiform infrarenal abdominal aortic aneurysm.      4.  Diverticulosis      US-RUQ   Final Result         1.  4.1 cm infrarenal abdominal aortic aneurysm, recommend radiographic follow-up and surveillance as clinically appropriate.   2.  Echogenic liver, compatible with fatty change versus fibrosis.   3.  Mild prominence of the pancreatic duct, could represent age-related changes, could be further evaluated with ERCP as clinically appropriate.   4.  Dependent gallbladder sludge without additional sonographic findings to suggest acute cholecystitis.      DX-CHEST-LIMITED (1 VIEW)   Final Result         1.  Left basilar atelectasis, no focal infiltrate   2.  Atherosclerosis           Assessment/Plan  Acute cholecystitis  -With elevated LFTs.  AST is 222 and .  Bilirubin is 4.9 this suggests obstruction.  -S/p laparoscopic cholecystectomy, complicated with cystic artery bleed, converted to open abdomin cholecystectomy. S/p ERSP today   -Pain and nausea management   Continue abx    DAKOTAH (acute kidney injury) (HCC)  Continue IV fluids, monitor BMP.  Treating cholecystitis.  Improving     Pt can be transferred to floor post ERSP   D/w with GI      No new Assessment & Plan notes have been filed under this hospital service since the last note was generated.  Service: Pulmonary       VTE prophylaxis: SCD

## 2019-04-20 NOTE — PROGRESS NOTES
Full pre-op cleansing done including nasal swabs mouth and teeth brush and rinse and spit and chlorhexidine wipes for body, down to pre-op via bed by transport

## 2019-04-20 NOTE — CARE PLAN
Problem: Pain Management  Goal: Pain level will decrease to patient's comfort goal  Outcome: PROGRESSING AS EXPECTED   04/20/19 0000   OTHER   Pain Rating Scale (NPRS) 0     Pt in no pain even after surgery. Pt told to notify RN if pain develops    Problem: Safety  Goal: Will remain free from falls  Outcome: PROGRESSING AS EXPECTED  Pt call light within reach. Bed alarm on. Bed in low locked position. Non slip socks on

## 2019-04-20 NOTE — ANESTHESIA TIME REPORT
Anesthesia Start and Stop Event Times     Date Time Event    4/20/2019 1415 Anesthesia Start     1452 Anesthesia Stop        Responsible Staff  04/20/19    Name Role Begin End    Mario Guy M.D. Anesth 1415 1453        Preop Diagnosis (Free Text):  Pre-op Diagnosis     obstructive sheryl-duct cholithiasis        Preop Diagnosis (Codes):  Diagnosis Information     Diagnosis Code(s):         Post op Diagnosis  Obstructive jaundice      Premium Reason  E. Weekend    Comments: obstructing biliary stone

## 2019-04-20 NOTE — PROGRESS NOTES
[facility] Medicine Daily Progress Note    Date of Service  4/19/2019    Chief Complaint  78 y.o. male admitted 4/19/2019 with abdominal pain    Hospital Course    78 y.o. male who presented to the ER on 4/19/2019 for evaluation of right upper quadrant/epigastric pain.  Pain started 2 days ago and is getting progressively worse.  He describes a severe, 7/10 in intensity pain in his epigastric area just prior to coming to the emergency department.  At this time pain-free.  Patient is having decreased appetite and mild nausea associated with his pain     ER laboratory findings showed elevated LFT along with elevated bilirubin level.  He also has leukocytosis.  Abdominal ultrasound official results are still pending.      Interval Problem Update  Patient seen and examined for additional clinical time tween 1245 and 1325 hrs. additional care given was consultation of surgery and discussion of care with gastroenterology regarding findings.  Meeting with patient and family and discussing findings as well as plan to go to surgery.  Education and counseling regarding acute cholecystitis.    Pain is well controlled on IV morphine, continue n.p.o. and IV fluids going to OR.    Consultants/Specialty  Surgery, Dr. Merino    Code Status  Full code    Disposition  To be determined    Review of Systems  Review of Systems   Constitutional: Negative.  Negative for chills, fever, malaise/fatigue and weight loss.   HENT: Negative.    Respiratory: Negative.  Negative for cough and shortness of breath.    Cardiovascular: Negative.  Negative for chest pain and leg swelling.   Gastrointestinal: Positive for abdominal pain and nausea. Negative for vomiting.   Genitourinary: Negative.  Negative for dysuria and flank pain.   Musculoskeletal: Negative.  Negative for back pain and myalgias.   Neurological: Negative.  Negative for dizziness, loss of consciousness and weakness.   Endo/Heme/Allergies: Negative.  Does not bruise/bleed easily.    Psychiatric/Behavioral: Negative.  Negative for depression. The patient is not nervous/anxious.    All other systems reviewed and are negative.       Physical Exam  Temp:  [36.6 °C (97.8 °F)-37.9 °C (100.3 °F)] 37.7 °C (99.9 °F)  Pulse:  [] 96  Resp:  [16-21] 17  BP: (125-148)/(75-86) 140/86  SpO2:  [91 %-99 %] 97 %    Physical Exam   Constitutional: He is oriented to person, place, and time. He appears well-developed and well-nourished. No distress.   HENT:   Head: Normocephalic and atraumatic.   Nose: Nose normal.   Eyes: Conjunctivae are normal. Right eye exhibits no discharge. Left eye exhibits no discharge. No scleral icterus.   Neck: No JVD present.   Cardiovascular: Normal rate and regular rhythm.    Pulmonary/Chest: Effort normal and breath sounds normal. No stridor. No respiratory distress.   Abdominal: Soft. He exhibits no distension. There is tenderness (Right upper quadrant).   Musculoskeletal: He exhibits no edema or tenderness.   Neurological: He is alert and oriented to person, place, and time.   Skin: Skin is warm and dry. He is not diaphoretic. No pallor.   Psychiatric: He has a normal mood and affect. His behavior is normal. Judgment and thought content normal.   Nursing note and vitals reviewed.      Fluids    Intake/Output Summary (Last 24 hours) at 04/19/19 2151  Last data filed at 04/19/19 2101   Gross per 24 hour   Intake             4500 ml   Output             1500 ml   Net             3000 ml       Laboratory  Recent Labs      04/18/19 2250 04/19/19   0610  04/19/19   2119   WBC  19.9*  22.9*  25.2*   RBC  4.88  4.69*  2.97*   HEMOGLOBIN  15.8  15.3  9.8*   HEMATOCRIT  46.5  44.7  28.9*   MCV  95.3  95.3  97.3   MCH  32.4  32.6  33.0   MCHC  34.0  34.2  33.9   RDW  46.8  47.4  48.0   PLATELETCT  189  167  131*   MPV  9.5  9.8  9.9     Recent Labs      04/18/19   2250  04/19/19   0610   SODIUM  136  136   POTASSIUM  4.0  4.2   CHLORIDE  103  103   CO2  23  24   GLUCOSE  213*  172*    BUN  26*  19   CREATININE  1.26  1.20   CALCIUM  9.1  8.8     Recent Labs      04/19/19   1824   INR  1.19*               Imaging  [id number] VIEW   Final Result         1.  No visualized radiopaque surgical foreign bodies, note that the left lateral abdomen and pelvis are excluded on this exam limiting evaluation.   2.  Left lung base infiltrate      [id number]   Final Result      1.  Tiny low T2 signal focus dependently located in the distal common bile duct may represent a small calculus or crossing vessel. The finding cannot be confirmed on oblique sagittal or MRCP images. The distal common bile duct is not dilated to suggest    biliary obstruction.      2.  Cholelithiasis and gallbladder sludge. No wall thickening is identified. Ill-defined fluid inferior to the gallbladder and surrounding the proximal duodenum may be related to inflammation from acute cholecystitis or duodenitis. No associated duodenal    wall thickening is appreciated.      3.  4.8 cm fusiform infrarenal abdominal aortic aneurysm.      4.  Diverticulosis      US-RUQ   Final Result         1.  4.1 cm infrarenal abdominal aortic aneurysm, recommend radiographic follow-up and surveillance as clinically appropriate.   2.  Echogenic liver, compatible with fatty change versus fibrosis.   3.  Mild prominence of the pancreatic duct, could represent age-related changes, could be further evaluated with ERCP as clinically appropriate.   4.  Dependent gallbladder sludge without additional sonographic findings to suggest acute cholecystitis.      DX-CHEST-LIMITED (1 VIEW)   Final Result         1.  Left basilar atelectasis, no focal infiltrate   2.  Atherosclerosis           Assessment/Plan  * Acute cholecystitis   Assessment & Plan    -With elevated LFTs.  AST is 222 and .  Bilirubin is 4.9 this suggests obstruction.  -N.p.o.  -MRCP in the morning: We need to call GI consult in the morning  -Abdominal ultrasound official results are still  pending however patient does look like having his large on imaging reviewed.  -Pain and nausea control PRN.  -Patient has also elevated WBC which could raise suspicion for choledocholithiasis with a white count of 19.9 therefore will give Rocephin and Flagyl.  First dose was given in the ED.     DAKOTAH (acute kidney injury) (HCC)- (present on admission)   Assessment & Plan    -IV fluids 100 cc an hour overnight.  Creatinine on admission is 1.26.  -Monitor morning labs.     Hypertension, essential- (present on admission)   Assessment & Plan    -Patient is n.p.o.  -Hold metoprolol and amlodipine and give hydralazine as needed if systolic blood pressure above 180.     Pre-diabetes- (present on admission)   Assessment & Plan    -Glucose on admission is 213.  He does have a history of prediabetes and this could be elevated also secondary to #1.  -Added hemoglobin A1c.     Dyslipidemia- (present on admission)   Assessment & Plan    -He is n.p.o., hold atorvastatin           VTE prophylaxis: SCD

## 2019-04-20 NOTE — PROGRESS NOTES
Dr Merino at bedside.  Order for consent received. Pt declines questions at this time.  Surgical consent signed.

## 2019-04-20 NOTE — PROGRESS NOTES
2058 - Pt arrived to PACU via bed.  Report received from anesthesia & OR RN.  Natural airway patent.  Lung sounds clear, diminished.  Respirations spontaneous.  DRSG CDI.  ABD soft, RICH drain present.  SCD's applied.  No S&S of pain/distress at this time.      2108 - Remains drowsy, follows simple commands.  Denies pain, n/v at this time.    2127 - Pt follows simple commands.  Continues to deny pain & n/v. Oriented to person & place, disoriented to time and situation.  PERRLA.  GARCIA well, denies n/t.  Face symmetrical.  Continue to re-orient to surroundings.  DRSG CDI.  ABD soft, RICH drain present.      2139 - Dr Valdes notified of H&H results (9.8 & 28.91) & mental staus.  Pt ok to be transferred to ICU per Dr Valdes.  No new orders at this time.    2140 - Daughter Carson updated on pt status and POC, states understanding.    2142 - DRSG CDI.  ABD soft, RICH drain present.  Denies pain & n/v.  C/O need to void, reinforced education and use of yo cath, needs reinforcement.  Report given to KASI Rich, states understanding    2145 - Pt transferred with RN to ICU room 324 without incident.

## 2019-04-20 NOTE — OR NURSING
Patient allergies and NPO status verified. Patient verbalizes understanding of pain scale, expected course of stay and plan of care. Procedural site verified with patient. IV access confirmed.

## 2019-04-20 NOTE — ANESTHESIA POSTPROCEDURE EVALUATION
Patient: Freeman Frost    Procedure Summary     Date:  04/19/19 Room / Location:  [place] [address] / SURGERY [facility]    Anesthesia Start:  1917 Anesthesia Stop:  2102    Procedure:  LAPARASCOPIC- CONVERTED TO OPEN CHOLECYSTECTOMY (Abdomen) Diagnosis:  (acute cholecystitis)    Surgeon:  Anita Merino M.D. Responsible Provider:  Geeta Valdes M.D.    Anesthesia Type:  general ASA Status:  2 - Emergent          Final Anesthesia Type: general  Last vitals  BP   Blood Pressure : 140/86, NIBP: 100/60    Temp   37.7 °C (99.9 °F)    Pulse   Pulse: 97, Heart Rate (Monitored): 97   Resp   18    SpO2   97 %      Anesthesia Post Evaluation    Patient location during evaluation: PACU  Patient participation: complete - patient participated  Level of consciousness: awake and alert  Pain score: 0    Airway patency: patent  Anesthetic complications: no  Cardiovascular status: hemodynamically stable  Respiratory status: acceptable  Hydration status: euvolemic    PONV: none           Nurse Pain Score: 0 (NPRS)

## 2019-04-20 NOTE — OP REPORT
DATE OF SERVICE:  04/19/2019    PREOPERATIVE DIAGNOSIS:  Acute cholecystitis and possible choledocholithiasis.    POSTOPERATIVE DIAGNOSES:  Acute cholecystitis and possible   choledocholithiasis.    PROCEDURE:  Attempted laparoscopic converted to open cholecystectomy.    SURGEON:  Anita Merino MD    ASSISTANT:  JACOB Dominguez    ANESTHESIA:  General endotracheal.    ANESTHESIOLOGIST:  Geeta Valdes MD    INDICATIONS:  The patient is a 78-year-old gentleman who presented to the   emergency room with elevated white count and elevated liver function tests.    Ultrasound had showed cholelithiasis and choledocholithiasis.  He subsequently   had MRCP which was inconclusive in determining whether he had a common duct   stone.  Therefore, he is being brought at this time for laparoscopic   cholecystectomy and intraoperative cholangiogram.    FINDINGS:  Densely adherent omentum and duodenum to the gallbladder.  As this   peeled down, there was bleeding from omentum that would not stop.  The density   of the scarring around the cystic duct and the gallbladder prompted it to be   converted to an open procedure.  In the process of bringing down the   gallbladder from the liver bed, there was bleeding encountered, most likely   from the cystic artery.  This was ultimately ligated.  There is ____ between   the bleeding from the omentum and bleeding from the cystic artery, which was   markedly inflamed.  There was approximately 1200 mL blood loss.    DESCRIPTION OF PROCEDURE:  After the patient was identified and consented, he   was brought to the operating room and placed in supine position.  Patient   underwent general endotracheal anesthetic clearance.  Patient's abdomen was   prepped and draped in sterile fashion.  Periumbilical was anesthetized with   0.25% Marcaine; a 1-cm incision was made.  The abdominal wall was lifted.    Veress needle was introduced into the abdominal cavity.  After positive drop   test,  pneumoperitoneum was obtained.  Veress needle was removed.  A 5-mm   trocar was placed.  Under laparoscopic guidance, a 10-mm trocar was placed in   the epigastric position and two 5 mm trocars were placed in the subcostal   position.  The omentum was densely stuck to the gallbladder; it was taken down   using electrocautery and peeled down and.  In the process of peeling it down,   there was some bleeding encountered which could not be seen in the omentum.    This persisted as the dissection of the gallbladder proceeded.  It was unable   to demonstrate where the cystic duct to the end of the gallbladder was because   of all of the scar tissue; we proceeded to convert to an open procedure.  The   gallbladder was mobilized down from the body down to the neck.  It was in the   process of excision using a finger to dissect out the wall from the liver   bed, this was done with electrocautery.  In the process of getting down, there   was a vessel encountered behind the gallbladder which most likely is a cystic   artery and this had to be controlled with 4-0 Prolene as well as Hemoclips.    Once that was done, we elected to stop and amputate the gallbladder at that   point, because of the dense adherence around the cystic duct and common duct.    This was done with the REJI stapler.  Abdomen was copiously irrigated and   hemostasis was noted.  Lucho as well as Surgicel was placed in the liver bed.    A 10 mm drain was left in place and brought through a separate stab   incision, and the incision was closed with running #1 Vicryls for the fascia.    Skin was closed with staples.  Dry dressing placed on the wounds.  Patient   was extubated and taken to recovery in stable condition.  All sponge and   needle counts were correct.       ____________________________________     MD LIBBY FERMIN / DAYAMI    DD:  04/19/2019 20:33:04  DT:  04/19/2019 22:33:39    D#:  [id number]  Job#:  [id number]    cc: RICHA GERARDO MD, BORIS  ADOLFO ERVIN

## 2019-04-20 NOTE — ANESTHESIA PREPROCEDURE EVALUATION
Relevant Problems   (+) DAKOTAH (acute kidney injury) (HCC)   (+) Acute cholecystitis   (+) Alcohol use disorder   (+) Hypertension, essential   (+) Pre-diabetes      Within Normal Limits   ANESTHESIA       Physical Exam    Airway   Mallampati: II  TM distance: >3 FB  Neck ROM: full       Cardiovascular - normal exam  Rhythm: regular  Rate: normal  (-) murmur     Dental - normal exam  (+) upper dentures         Pulmonary - normal exam  Breath sounds clear to auscultation     Abdominal    Neurological - normal exam         Other findings: (+) scleral icterus without asterixis            Anesthesia Plan    ASA 2 - emergent (Obstructive jaundice, elevated transaminase)   ASA physical status emergent criteria: other (comment)    Plan - general       Airway plan will be ETT      Plan Factors:   Patient was not previously instructed to abstain from smoking on day of procedure.  Patient did not smoke on day of procedure.    Induction: intravenous    Postoperative Plan: Postoperative administration of opioids is intended.    Pertinent diagnostic labs and testing reviewed    Informed Consent:    Anesthetic plan and risks discussed with patient.    Use of blood products discussed with: patient whom consented to blood products.

## 2019-04-20 NOTE — ANESTHESIA PROCEDURE NOTES
Airway  Date/Time: 4/19/2019 7:22 PM  Performed by: JONATHAN CALVERT  Authorized by: JONATHAN CALVERT     Location:  OR  Urgency:  Elective  Indications for Airway Management:  Anesthesia  Spontaneous Ventilation: absent    Sedation Level:  Deep  Preoxygenated: Yes    Patient Position:  Sniffing  Final Airway Type:  Endotracheal airway  Final Endotracheal Airway:  ETT  Cuffed: Yes    Technique Used for Successful ETT Placement:  Direct laryngoscopy  Insertion Site:  Oral  Blade Type:  Keisha  Laryngoscope Blade/Videolaryngoscope Blade Size:  4  ETT Size (mm):  7.5  Measured from:  Lips  ETT to Lips (cm):  23  Placement Verified by: auscultation and capnometry    Cormack-Lehane Classification:  Grade I - full view of glottis  Number of Attempts at Approach:  1  Number of Other Approaches Attempted:  0

## 2019-04-20 NOTE — OR NURSING
1449: To PACU from EUS via bed, sleeping, respirations spontaneous and non-labored. Abdo soft, + bowel sounds, no change in recent abdominal surgical and drain dressings from pre-procedure.  1453: rouses briefly, denies discomfort, returns to sleep.  1500: No change.  1515: Awake/alert, continues to deny discomfort. Meets criteria to transfer back to ICU

## 2019-04-20 NOTE — ANESTHESIA QCDR
2019 [facility] Clinical Data Registry (for Quality Improvement)     Postoperative nausea/vomiting risk protocol (Adult = 18 yrs and Pediatric 3-17 yrs)- (430 and 463)  General inhalation anesthetic (NOT TIVA) with PONV risk factors: No  Provision of anti-emetic therapy with at least 2 different classes of agents: N/A  Patient DID NOT receive anti-emetic therapy and reason is documented in Medical Record: N/A    Multimodal Pain Management- (AQI59)  Patient undergoing Elective Surgery (i.e. Outpatient, or ASC, or Prescheduled Surgery prior to Hospital Admission): No  Use of Multimodal Pain Management, two or more drugs and/or interventions, NOT including systemic opioids: N/A  Exception: Documented allergy to multiple classes of analgesics: N/A    PACU assessment of acute postoperative pain prior to Anesthesia Care End- Applies to Patients Age = 18- (ABG7)  Initial PACU pain score is which of the following: < 7/10  Patient unable to report pain score: N/A    Post-anesthetic transfer of care checklist/protocol to PACU/ICU- (426 and 427)  Upon conclusion of case, patient transferred to which of the following locations: PACU/Non-ICU  Use of transfer checklist/protocol: Yes  Exclusion: Service Performed in Patient Hospital Room (and thus did not require transfer): N/A    PACU Reintubation- (AQI31)  General anesthesia requiring endotracheal intubation (ETT) along with subsequent extubation in OR or PACU: Yes  Required reintubation in the PACU: No   Extubation was a planned trial documented in the medical record prior to removal of the original airway device:  N/A    Unplanned admission to ICU related to anesthesia service up through end of PACU care- (MD51)  Unplanned admission to ICU (not initially anticipated at anesthesia start time): No

## 2019-04-20 NOTE — PROGRESS NOTES
"Pt to preop. Triple aim measures completed on medical floor, 1 hour prior to arrival. Patient allergies and NPO status verified.  A&Ox4. Surgical site verified with patient.  Patient verbalizes understanding of pain scale, expected course of stay and plan of care.  IV access patent.  Vital signs - see EPIC flowsheet. Sequentials placed, pt in surgical gown.  Ring taped to L ring finger; informed pt of risks of keeping jewelry in place; states understanding.  Pt states he takes a \"blood thinner\".  No blood thinner noted in medication reconciliation - pt unable to confirm or deny if he is taking a blood thinner, or names of medications he is currently taking other than he takes \"three medications and thought he was on a blood thinner\".  Dr Valdes notified - order received to collect STAT PT/INR. Awaiting results at this time.   Anesthesia consent signed.  Awaiting surgeon & order for surgical consent.    "

## 2019-04-20 NOTE — ANESTHESIA PROCEDURE NOTES
Peripheral IV  Date/Time: 4/19/2019 8:10 PM  Performed by: JONATHAN CALVERT  Authorized by: JONATHAN CALVERT     Size:  18 G  Laterality:  Right  Site Prep:  Alcohol and chlorhexidine  Technique:  Direct puncture  Attempts:  1  Difficult IV necessitating physician skill: IV access difficult    Ultrasound Guidance: No     Acute blood loss intraop

## 2019-04-20 NOTE — PROGRESS NOTES
0700 Report received from [facility], Dr Merino at bedside, MD cleared Pt for transfer out of ICU. Pt resting calmly, daughter at bedside, Pt updated on POC. Surgical dressing to mid abdomen CD&I, VSS.    0800 Full nursing assessment per chart, Pt started on Clear liquids per order Dr Merino, Pt denies any N/V. Call light placed within reach.

## 2019-04-20 NOTE — PROCEDURES
DATE OF SERVICE:  04/20/2019    PROCEDURE:  Endoscopic retrograde cholangiopancreatography with:  1.  Biliary sphincterotomy.  2.  Bile duct balloon stone extraction.  3.  Radiologic interpretation of static and dynamic fluoroscopic images by   myself, at no time was a radiologist present in the room.    INDICATION:  Bile duct obstruction and jaundice.    FINAL IMPRESSION:  1.  Stomach:  Moderate size solid gastric pool consisting of vegetable matter.  2.  Biliary ampulla, unremarkable appearance.  Located just to the right of a   narrow mouth deep periampullary diverticulum.  3.  Pancreatic duct neither injected nor cannulated.  4.  Common bile duct cannulated on first attempt with wire with normal course   and caliber.  Multiple filling defects seen on contrast injection consistent   with stones.    THERAPEUTICS:  1.  A 10 mm biliary sphincterotomy with bow papillotome over covered wire.  2.  Bile duct balloon stone extraction until duct clear.  Productive of   several small black stones ranging in size from 1 to 5 mm.    RECOMMENDATIONS:  1.  Follow liver enzymes.  2.  Plan per surgery, advance diet as tolerated.    DESCRIPTION OF PROCEDURE:  Prior to the procedure, physical exam was stable.    During procedure, vital signs remained within normal limits.  Prior to   sedation, informed consent was obtained.  Risks, benefits, alternatives   including but not limited to risk of bleeding, infection, perforation, adverse   reaction to medication, failure to identify pathology, pancreatitis, and   death explained to the patient, who accepted all risks.  Patient was prepped   in the prone position after intubation and sedation by anesthesia.  Scope tip   of the Olympus flexible side-viewing TJF duodenoscope passed into the stomach.    The stomach had a moderate-sized solid pool consisting of vegetable matter.    This could not be suctioned.  The mucosa was grossly unremarkable.  The   anatomy was grossly unremarkable.   The scope was passed into the duodenum in   the short position.  The biliary ampulla was visualized.  There was a small   red spot on the ampulla, but overall, it appeared well within normal limits.    The ampulla was just to the right of a periampullary diverticulum, which was   narrow mouth but deep.  [place] papillotome with a covered wire was utilized for   selective cannulation of the common bile duct.  This was achieved on the very   first attempt and the wire passed along the expected course of the bile duct.    This was followed with a cannula and aspiration was positive for bile.    Injection of contrast was made demonstrating a 5 mm filling defect in the   distal duct and several smaller filling defects throughout the duct suspicious   for stones.  With the wire left in place in the right intrahepatic biliary   tree, a 10 mm biliary sphincterotomy was performed with bow papillotome over   the covered wire.  The wire was left in place and the tome removed and a 9-12   mm extraction balloon was exchanged over the wire, passed into the distal   duct, inflated to 12 mm, and pulled down the duct with delivery of the 5 mm   stone.  This was black and easily fragmented.  Several further sweeps were   made, marching more proximally and these were productive of multiple tiny   stones ranging in size from 1 to 5 mm.  Finally, 3 consecutive sweeps were   negative for further filling defects.  It was felt that the duct was clear.    The procedure was deemed complete.  The scope was withdrawn.  Air and liquid   were suctioned.  Patient tolerated the procedure well and was sent to recovery   without immediate complications.       ____________________________________     MD VIDYA CERRATO / DAYAMI    DD:  04/20/2019 14:47:50  DT:  04/20/2019 16:12:00    D#:  [id number]  Job#:  [id number]

## 2019-04-20 NOTE — CONSULTS
Date of Consultation:  4/20/2019    Patient: : Freeman Frost  MRN: [id number]    Referring Physician: Dr. Schneider/Dr. Merino/Dr. Rush     GI:Andrew Hernandez M.D.     Reason for Consultation:Elevated liver function testing, possible bile duct stone    History of Present Illness:   Thank you for allowing me to consult on this patient.  This is a 78-year-old male with no significant past medical history who presented to the hospital on 4/19/2019 with right upper quadrant discomfort for 2 days in duration prior to admission severity 7 out of 10 intensity sudden onset without any trauma.  Did have some decreased appetite never had this type of pain before.  ER evaluation showed elevated LFTs as well as elevated bilirubin with leukocytosis.  A right upper quadrant ultrasound was done showing echogenic liver mildly prominence of pancreatic duct with also a bladder sludge without additional findings to suggest acute cholecystitis.  An MRCP was later performed without any ductal dilatation but with cholelithiasis and gallbladder sludge with also ill-defined fluid inferior to the gallbladder and surrounding the proximal duodenum possibly from acute cholecystitis or duodenitis but with no associated duodenal wall thickening.  Due to lack of obvious stone concern for possible acute cholecystitis patient was taken for laparoscopic cholecystectomy that was converted to open cholecystectomy secondary to bleeding dense adhesion was noted.  Intraoperative cholangiogram was not able to perform.  As such due to elevated LFTs ERCP was recommended.  Currently patient reports of only discomfort in the right upper quadrant at the site of the drain.  No fevers no chills.  Leukocytosis has improved.  Does drink alcohol 3-4 glasses/day never had pancreatitis no history liver disease no antibiotic exposure no known sick contact    Otherwise the patient is doing fine without complaints of fever/ chills/ weight loss/ appetite change/ dysphagia/  odynophagia/ heartburn/ nausea/ vomiting/ bloating/ constipation/ melena/ hematochezia or abdominal pain.      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   • Bowel habit changes     constipation   • Cataract    • Dental disorder     upper   • Hypercholesterolemia    • Hypertension    • Snoring          Past Surgical History:   Procedure Laterality Date   • CATARACT PHACO WITH IOL Right 8/28/2018    Procedure: CATARACT PHACO WITH IOL;  Surgeon: Adolfo Santana M.D.;  Location: SURGERY SAME DAY [facility] ORS;  Service: Ophthalmology   • CATARACT PHACO WITH IOL Left 8/14/2018    Procedure: CATARACT PHACO WITH IOL;  Surgeon: Adolfo Santana M.D.;  Location: SURGERY SAME DAY [facility] ORS;  Service: Ophthalmology   • HERNIA REPAIR, INCISIONAL, UNILATERAL Left    • OTHER      tonsils as a child   • TONSILLECTOMY         History reviewed. No pertinent family history.    Social History     Social History   • Marital status:      Spouse name: N/A   • Number of children: N/A   • Years of education: N/A     Social History Main Topics   • Smoking status: Never Smoker   • Smokeless tobacco: Never Used   • Alcohol use Yes      Comment: 2drinks daily (3-4 Large glasses)   • Drug use: No   • Sexual activity: Not on file     Other Topics Concern   • Not on file     Social History Narrative   • No narrative on file       Review of Systems   Constitutional: Negative.    HENT: Negative.    Eyes: Negative.    Respiratory: Negative.    Cardiovascular: Negative.    Gastrointestinal: Positive for abdominal pain.   Genitourinary: Negative.    Musculoskeletal: Negative.    Skin: Negative.    Neurological: Negative.    Endo/Heme/Allergies: Negative.    Psychiatric/Behavioral: Negative.          Physical Exam:  Vitals:    04/20/19 0600 04/20/19 0700 04/20/19 0800 04/20/19 0900   BP:       Pulse: 79 80 86 81   Resp: 14 15 (!) 26 14   Temp:   36.5 °C (97.7 °F)    TempSrc:       SpO2: 99% 100% 98%  100%   Weight:       Height:           Physical Exam   Constitutional: He is oriented to person, place, and time and well-developed, well-nourished, and in no distress. No distress.   HENT:   Head: Normocephalic and atraumatic.   Eyes: Pupils are equal, round, and reactive to light. Conjunctivae are normal. Right eye exhibits no discharge. Left eye exhibits no discharge. Scleral icterus is present.   Neck: Normal range of motion. Neck supple. No JVD present. No tracheal deviation present. No thyromegaly present.   Cardiovascular: Normal rate.  Exam reveals no gallop and no friction rub.    No murmur heard.  Pulmonary/Chest: Effort normal and breath sounds normal. No stridor. No respiratory distress. He has no wheezes. He has no rales. He exhibits no tenderness.   Abdominal: Soft. Bowel sounds are normal. He exhibits no distension and no mass. There is tenderness. There is no rebound and no guarding.   Musculoskeletal: He exhibits no edema, tenderness or deformity.   Lymphadenopathy:     He has no cervical adenopathy.   Neurological: He is oriented to person, place, and time. No cranial nerve deficit. Coordination normal.   Skin: Skin is warm and dry. No rash noted. He is not diaphoretic. No erythema. No pallor.         Labs:  Recent Labs      04/19/19   0610  04/19/19   2119  04/20/19   0119   WBC  22.9*  25.2*  18.9*   RBC  4.69*  2.97*  3.00*   HEMOGLOBIN  15.3  9.8*  9.8*   HEMATOCRIT  44.7  28.9*  29.7*   MCV  95.3  97.3  99.0*   MCH  32.6  33.0  32.7   MCHC  34.2  33.9  33.0*   RDW  47.4  48.0  49.2   PLATELETCT  167  131*  131*   MPV  9.8  9.9  9.9     Recent Labs      04/18/19   2250  04/19/19   0610  04/20/19   0119   SODIUM  136  136  134*   POTASSIUM  4.0  4.2  3.8   CHLORIDE  103  103  102   CO2  23  24  21   GLUCOSE  213*  172*  182*   BUN  26*  19  14     Recent Labs      04/19/19   1824   INR  1.19*         Imaging:  [id number] VIEW  Narrative:  4/19/2019 8:53 PM    HISTORY/REASON FOR EXAM:  Instrument count    TECHNIQUE/EXAM DESCRIPTION:  Single AP view the abdomen.    COMPARISON:  None    FINDINGS:    Hazy left lung base density is seen.    There is surgical flat stranding seen in the right upper quadrant. Surgical clips overlie the right upper quadrant. The bowel gas pattern appears nonspecific.    The bony structures appear age-appropriate. Soft tissue gas in the right chest wall and abdomen is noted.  Impression: 1.  No visualized radiopaque surgical foreign bodies, note that the left lateral abdomen and pelvis are excluded on this exam limiting evaluation.  2.  Left lung base infiltrate  [id number]  Narrative: 4/19/2019 11:45 AM    HISTORY/REASON FOR EXAM:  Abdominal pain.    TECHNIQUE/EXAM DESCRIPTION: Magnetic resonance cholangiopancreatography.    Magnetic resonance cholangiopancreatography was performed on with axial, coronal, and sagittal thin and thick section heavily T2-weighted sequences.    3-D cholangiographic multiplanar maximum intensity projection (MIP) images were created on a workstation..    The study was performed on a [employer] Signa 1.5 Maria Luz MRI scanner.    COMPARISON: Right upper quadrant ultrasound from 1:23 AM    FINDINGS:  No intrahepatic ductal dilatation is appreciated. The distal common bile duct measures approximately 6 mm. The distal common bile duct is slightly blunted. There is a tiny low T2 signal focus dependently located in the distal common bile, possibly   representing a small calculus. This is not confirmed on oblique sagittal images or 3-D MRCP images. There are small calculi and sludge within the gallbladder. No significant wall thickening is identified.    The pancreatic signal is normal. No ductal dilatation. Pancreatic ductal drainage is conventional without evidence of divisum.    The unopacified liver, spleen, and adrenal glands are unremarkable.    Multiple hyperintense renal lesions are consistent with cysts.    There is ill-defined fluid inferior to  the gallbladder and surrounding the second portion of the duodenum. No associated wall thickening is appreciated by MR.    No adenopathy is identified.    There are diverticula in the visualized colon.    Fusiform infrarenal abdominal aortic aneurysm is again noted as described on prior ultrasound. Maximum diameter is approximately 4.7 x 4.8 cm.  Impression: 1.  Tiny low T2 signal focus dependently located in the distal common bile duct may represent a small calculus or crossing vessel. The finding cannot be confirmed on oblique sagittal or MRCP images. The distal common bile duct is not dilated to suggest   biliary obstruction.    2.  Cholelithiasis and gallbladder sludge. No wall thickening is identified. Ill-defined fluid inferior to the gallbladder and surrounding the proximal duodenum may be related to inflammation from acute cholecystitis or duodenitis. No associated duodenal   wall thickening is appreciated.    3.  4.8 cm fusiform infrarenal abdominal aortic aneurysm.    4.  Diverticulosis  [id number]-RUQ  Narrative: 4/19/2019 12:52 AM    HISTORY/REASON FOR EXAM:  Pain  Abdominal pain    TECHNIQUE/EXAM DESCRIPTION AND NUMBER OF VIEWS:  Real-time sonography of the liver and biliary tree.    COMPARISON: None    FINDINGS:    The liver is normal in contour. There is no evidence of solid mass lesion. The liver measures 15.60 cm. The liver is echogenic.    Dependent gallbladder sludge is seen. There is no evidence of cholelithiasis.  The gallbladder wall thickness measures 0.15 cm. There is no pericholecystic fluid.    The common duct measures 0.63 cm.    The visualized pancreas is unremarkable.    Echogenic atherosclerotic plaque is seen within the aorta. Infrarenal abdominal aortic aneurysm is seen measuring 4.1 cm.    Intrahepatic IVC is patent.    The portal vein is patent with hepatopetal flow.    The right kidney measures 11.56 cm. There is anechoic cyst in the upper pole of the right kidney measuring 9.6 x 9.2 x 7.5  mm.    There is no ascites.  Impression: 1.  4.1 cm infrarenal abdominal aortic aneurysm, recommend radiographic follow-up and surveillance as clinically appropriate.  2.  Echogenic liver, compatible with fatty change versus fibrosis.  3.  Mild prominence of the pancreatic duct, could represent age-related changes, could be further evaluated with ERCP as clinically appropriate.  4.  Dependent gallbladder sludge without additional sonographic findings to suggest acute cholecystitis.            Impressions:  1. Elevated liver function (AST/ALT/Tbili)  2. Right upper quadrant pain s/p cholecystectomy  3. Leukocytosis    78-year-old male with no significant past medical history presents with right upper quadrant discomfort for elevated LFTs.  Ultrasound did not demonstrate any gross stones.  Common bile duct measures 0.63 cm.  MRCP also without definitive evidence of stones underwent laparoscopic cholecystectomy converted to open due to dense adhesion and oozing.  Intraoperative cholangiogram was not able to be performed.  LFTs continue to rise leukocytosis improve with antibiotics.  Consideration may be a small stones not visualized versus biliary sludge currently no signs of pancreatitis    Recommendations:  1.  Proceed with ERCP currently scheduled for 3 PM on 4/20/2019 with Dr. Mike Hawkins.  Make n.p.o.  Risk and benefit discussed with patient including but not limited to sedation, bleeding, perforation, missed diagnosis missed lesion unsuccessful procedure as well as pancreatitis.  Consideration for stent placement  2.  Continue antibiotic  3.  Trend LFTs daily  4.  N.p.o. for now  5.  Fractionate bilirubin    This note was generated using voice recognition software which has a small chance of producing errors of grammar and possibly content. I have made every reasonable attempt to find and correct any obvious errors, but expect that some may not be found prior to finalization of this note.

## 2019-04-20 NOTE — CONSULTS
DATE OF SERVICE:  04/19/2019    REQUESTING PHYSICIAN:  Venecia Rush MD    REASON FOR CONSULTATION:  The patient is a 78-year-old gentleman who presented   this morning with acute onset of severe abdominal pain and was found to be   jaundiced.  He had his ultrasound done which demonstrated cholelithiasis.    Liver function tests were all elevated.  He had an MRCP; however, that   demonstrated no significant evidence of intraductal dilatation or a dilated   common duct.  I have been asked to see the patient in regards to doing a   cholecystectomy as well as intraoperative cholangiogram.    PAST MEDICAL HISTORY:  Illnesses are hypertension and diabetes as well as a   history of stroke.    PAST SURGICAL HISTORY:  Cataract surgery.    CURRENT MEDICATIONS:  Norvasc, Lipitor, and Toprol.    ALLERGIES:  None.    SOCIAL HISTORY:  He does not smoke.  He does drink daily.    REVIEW OF SYSTEMS:  Otherwise unremarkable.    PHYSICAL EXAMINATION:  GENERAL:  He is alert.  HEENT:  He is icteric.  NECK:  Supple.  LUNGS:  Clear.  HEART:  No murmur.  ABDOMEN:  Soft with some minimal tenderness in the epigastrium.  EXTREMITIES:  Without edema.  NEUROLOGIC:  Intact.    LABORATORY DATA:  White count is 22,000.  Hemoglobin is 15.  His liver   functions showed elevated transaminases, alkaline phosphatase, and bilirubin   of 7.4.  The ultrasound showed cholelithiasis as well as infrarenal aneurysm.    MRCP shows same with no obvious duct.  Plan will be for him to undergo   laparoscopic cholecystectomy.    IMPRESSION:  This is a 78-year-old male with cholelithiasis, acute   cholecystitis, and possible choledocholithiasis.    PLAN:  He will be taken to the operating room for laparoscopic cholecystectomy   and intraoperative cholangiogram.  The procedure is described to the patient   as well as the risks including bleeding, infection, and conversion to an open   procedure, acknowledges anesthetic risk.  They understand and wished to    proceed.       ____________________________________     MD LIBBY FERMIN / DAYAMI    DD:  04/19/2019 19:10:07  DT:  04/19/2019 22:58:43    D#:  [id number]  Job#:  [id number]    cc: RICHA GERARDO MD, BORIS MATA MD

## 2019-04-20 NOTE — PROGRESS NOTES
Trauma / Surgical Daily Progress Note    Date of Service  4/20/2019    Chief Complaint  78 y.o. male admitted 4/19/2019 with Cholecystitis, acute    Interval Events  SP lap converted to open sheryl for severe scarring, bleeding. Hg stable. BP good. Will need ERCP. Ok to start clears.    Review of Systems  Review of Systems   Gastrointestinal: Positive for abdominal pain.        Vital Signs for last 24 hours  Temp:  [36.5 °C (97.7 °F)-37.9 °C (100.3 °F)] 36.5 °C (97.7 °F)  Pulse:  [] 79  Resp:  [12-27] 14  BP: (125-144)/(75-86) 140/86  SpO2:  [92 %-100 %] 99 %    Hemodynamic parameters for last 24 hours       Respiratory Data     Respiration: 14, Pulse Oximetry: 99 %        RUL Breath Sounds: Diminished, RML Breath Sounds: Diminished, RLL Breath Sounds: Diminished, MOHINDER Breath Sounds: Diminished, LLL Breath Sounds: Diminished    Physical Exam  Physical Exam   Constitutional: He appears well-developed and well-nourished.   HENT:   Head: Normocephalic.   Eyes: Scleral icterus is present.   Neck: Neck supple.   Cardiovascular: Normal rate.    Pulmonary/Chest: Effort normal.   Abdominal: Soft. He exhibits no distension. There is tenderness.   Dressing dry  RICH serosang   Genitourinary:   Genitourinary Comments: Vela to gravity   Musculoskeletal: Normal range of motion.   Neurological: He is alert.   Skin: Skin is warm.       Laboratory  Recent Results (from the past 24 hour(s))   PT    Collection Time: 04/19/19  6:24 PM   Result Value Ref Range    PT 15.0 (H) 12.0 - 14.6 sec    INR 1.19 (H) 0.87 - 1.13   CBC WITH DIFFERENTIAL    Collection Time: 04/19/19  9:19 PM   Result Value Ref Range    WBC 25.2 (H) 4.8 - 10.8 K/uL    RBC 2.97 (L) 4.70 - 6.10 M/uL    Hemoglobin 9.8 (L) 14.0 - 18.0 g/dL    Hematocrit 28.9 (L) 42.0 - 52.0 %    MCV 97.3 81.4 - 97.8 fL    MCH 33.0 27.0 - 33.0 pg    MCHC 33.9 33.7 - 35.3 g/dL    RDW 48.0 35.9 - 50.0 fL    Platelet Count 131 (L) 164 - 446 K/uL    MPV 9.9 9.0 - 12.9 fL     Neutrophils-Polys 91.10 (H) 44.00 - 72.00 %    Lymphocytes 3.20 (L) 22.00 - 41.00 %    Monocytes 4.50 0.00 - 13.40 %    Eosinophils 0.00 0.00 - 6.90 %    Basophils 0.20 0.00 - 1.80 %    Immature Granulocytes 1.00 (H) 0.00 - 0.90 %    Nucleated RBC 0.00 /100 WBC    Neutrophils (Absolute) 22.95 (H) 1.82 - 7.42 K/uL    Lymphs (Absolute) 0.81 (L) 1.00 - 4.80 K/uL    Monos (Absolute) 1.14 (H) 0.00 - 0.85 K/uL    Eos (Absolute) 0.00 0.00 - 0.51 K/uL    Baso (Absolute) 0.05 0.00 - 0.12 K/uL    Immature Granulocytes (abs) 0.25 (H) 0.00 - 0.11 K/uL    NRBC (Absolute) 0.00 K/uL   Histology Request    Collection Time: 04/19/19  9:31 PM   Result Value Ref Range    Pathology Request Sent to Histo    CBC with Differential    Collection Time: 04/20/19  1:19 AM   Result Value Ref Range    WBC 18.9 (H) 4.8 - 10.8 K/uL    RBC 3.00 (L) 4.70 - 6.10 M/uL    Hemoglobin 9.8 (L) 14.0 - 18.0 g/dL    Hematocrit 29.7 (L) 42.0 - 52.0 %    MCV 99.0 (H) 81.4 - 97.8 fL    MCH 32.7 27.0 - 33.0 pg    MCHC 33.0 (L) 33.7 - 35.3 g/dL    RDW 49.2 35.9 - 50.0 fL    Platelet Count 131 (L) 164 - 446 K/uL    MPV 9.9 9.0 - 12.9 fL    Neutrophils-Polys 93.80 (H) 44.00 - 72.00 %    Lymphocytes 1.50 (L) 22.00 - 41.00 %    Monocytes 3.90 0.00 - 13.40 %    Eosinophils 0.00 0.00 - 6.90 %    Basophils 0.20 0.00 - 1.80 %    Immature Granulocytes 0.60 0.00 - 0.90 %    Nucleated RBC 0.00 /100 WBC    Neutrophils (Absolute) 17.70 (H) 1.82 - 7.42 K/uL    Lymphs (Absolute) 0.29 (L) 1.00 - 4.80 K/uL    Monos (Absolute) 0.73 0.00 - 0.85 K/uL    Eos (Absolute) 0.00 0.00 - 0.51 K/uL    Baso (Absolute) 0.03 0.00 - 0.12 K/uL    Immature Granulocytes (abs) 0.12 (H) 0.00 - 0.11 K/uL    NRBC (Absolute) 0.00 K/uL   Comp Metabolic Panel    Collection Time: 04/20/19  1:19 AM   Result Value Ref Range    Sodium 134 (L) 135 - 145 mmol/L    Potassium 3.8 3.6 - 5.5 mmol/L    Chloride 102 96 - 112 mmol/L    Co2 21 20 - 33 mmol/L    Anion Gap 11.0 0.0 - 11.9    Glucose 182 (H) 65 - 99  mg/dL    Bun 14 8 - 22 mg/dL    Creatinine 1.23 0.50 - 1.40 mg/dL    Calcium 6.9 (LL) 8.4 - 10.2 mg/dL    AST(SGOT) 104 (H) 12 - 45 U/L    ALT(SGPT) 115 (H) 2 - 50 U/L    Alkaline Phosphatase 120 (H) 30 - 99 U/L    Total Bilirubin 9.4 (H) 0.1 - 1.5 mg/dL    Albumin 2.4 (L) 3.2 - 4.9 g/dL    Total Protein 4.6 (L) 6.0 - 8.2 g/dL    Globulin 2.2 1.9 - 3.5 g/dL    A-G Ratio 1.1 g/dL   ESTIMATED GFR    Collection Time: 04/20/19  1:19 AM   Result Value Ref Range    GFR If African American >60 >60 mL/min/1.73 m 2    GFR If Non  57 (A) >60 mL/min/1.73 m 2       Fluids    Intake/Output Summary (Last 24 hours) at 04/20/19 0641  Last data filed at 04/20/19 0600   Gross per 24 hour   Intake         87359.52 ml   Output             2495 ml   Net         27300.52 ml       Core Measures & Quality Metrics  Labs reviewed, Medications reviewed and Radiology images reviewed  Vela catheter: Critically Ill - Requiring Accurate Measurement of Urinary Output      DVT Prophylaxis: Enoxaparin (Lovenox)  DVT prophylaxis - mechanical: SCDs  Ulcer prophylaxis: Yes  Antibiotics: Treating active infection/contamination beyond 24 hours perioperative coverage  Assessed for rehab: Patient returned to prior level of function, rehabilitation not indicated at this time    MI Score  ETOH Screening    Assessment/Plan  * Acute cholecystitis- (present on admission)   Assessment & Plan    Severe cholecystitis  4/19 - Lap sheryl converted to open  Still needs ERCP. Unable to do IOC         Discussed patient condition with Family, RN and Patient.  CRITICAL CARE TIME EXCLUDING PROCEDURES: 20    minutes

## 2019-04-20 NOTE — OR SURGEON
Immediate Post OP Note    PreOp Diagnosis: Bile duct obstruction    PostOp Diagnosis: Same    Procedure(s):  ERCP, DIAGNOSTIC - Wound Class: Clean Contaminated    Surgeon(s):  Antony Morrison M.D.    Anesthesiologist/Type of Anesthesia:  Anesthesiologist: Mario Guy M.D./General    Surgical Staff:  Circulator: Chito Sanchez R.N.  Endoscopy Technician: Armida Agustin  Limb Ocampo: Andree Felton  Radiology Technologist: Saida Rodriguez    Specimens removed if any:  * No specimens in log *    Dr. Morrison  [facility]  DAVID Ortega  [phone]    ERCP with: Biliary sphincterotomy    Bile duct balloon stone extraction    Radiologic interpretation of static and dynamic fluoroscopic images    Indication: Bile duct obstruction, jaundice    Sedation: GA (Brooks)    Findings:   ERCP    Stomach     Moderate sized solid gastric vegetable matter    Ampulla     Unremarkable     To right of narrow mouthed, deep periampullary diverticulum    Pancreatic duct     Neither injected nor cannulated    CBD     Cannulated on 1st attempt with wire     Normal course and caliber     Multiple filling defects consistent with stones     Therapeutics    10mm biliary sphincterotomy with bow papilatome over covered wire    Bile duct balloon stone extraction until clear - several small black stones <5mm    Plan:  Follow liver enzymes      4/20/2019 2:39 PM Antony Morrison M.D.

## 2019-04-20 NOTE — ANESTHESIA PROCEDURE NOTES
Airway  Date/Time: 4/20/2019 2:22 PM  Performed by: LUCIEN ADAMS  Authorized by: LUCIEN ADAMS     Location:  OR  Urgency:  Elective  Difficult Airway: No    Indications for Airway Management:  Anesthesia  Spontaneous Ventilation: absent    Sedation Level:  Deep  Preoxygenated: Yes    Patient Position:  Sniffing  Mask Difficulty Assessment:  2 - vent by mask + OA or adjuvant +/- NMBA  Final Airway Type:  Endotracheal airway  Final Endotracheal Airway:  ETT  Cuffed: Yes    Technique Used for Successful ETT Placement:  Direct laryngoscopy  Insertion Site:  Oral  Blade Type:  Keisha  Laryngoscope Blade/Videolaryngoscope Blade Size:  3  ETT Size (mm):  7.5  Measured from:  Teeth  ETT to Teeth (cm):  24  Placement Verified by: auscultation and capnometry    Cormack-Lehane Classification:  Grade I - full view of glottis  Number of Attempts at Approach:  1   Atraumatic Dlx1

## 2019-04-21 PROBLEM — E11.9 DM2 (DIABETES MELLITUS, TYPE 2) (HCC): Status: ACTIVE | Noted: 2018-09-30

## 2019-04-21 PROBLEM — K80.50 CHOLEDOCHOLITHIASIS: Status: ACTIVE | Noted: 2019-04-21

## 2019-04-21 LAB
ALBUMIN SERPL BCP-MCNC: 2.6 G/DL (ref 3.2–4.9)
ALBUMIN/GLOB SERPL: 1 G/DL
ALP SERPL-CCNC: 97 U/L (ref 30–99)
ALT SERPL-CCNC: 71 U/L (ref 2–50)
ANION GAP SERPL CALC-SCNC: 5 MMOL/L (ref 0–11.9)
AST SERPL-CCNC: 37 U/L (ref 12–45)
BASOPHILS # BLD AUTO: 0.1 % (ref 0–1.8)
BASOPHILS # BLD: 0.02 K/UL (ref 0–0.12)
BILIRUB SERPL-MCNC: 2.4 MG/DL (ref 0.1–1.5)
BUN SERPL-MCNC: 15 MG/DL (ref 8–22)
CALCIUM SERPL-MCNC: 7.4 MG/DL (ref 8.4–10.2)
CHLORIDE SERPL-SCNC: 106 MMOL/L (ref 96–112)
CO2 SERPL-SCNC: 24 MMOL/L (ref 20–33)
CREAT SERPL-MCNC: 0.8 MG/DL (ref 0.5–1.4)
EOSINOPHIL # BLD AUTO: 0 K/UL (ref 0–0.51)
EOSINOPHIL NFR BLD: 0 % (ref 0–6.9)
ERYTHROCYTE [DISTWIDTH] IN BLOOD BY AUTOMATED COUNT: 51.2 FL (ref 35.9–50)
GLOBULIN SER CALC-MCNC: 2.6 G/DL (ref 1.9–3.5)
GLUCOSE SERPL-MCNC: 165 MG/DL (ref 65–99)
HCT VFR BLD AUTO: 26.7 % (ref 42–52)
HGB BLD-MCNC: 8.7 G/DL (ref 14–18)
IMM GRANULOCYTES # BLD AUTO: 0.12 K/UL (ref 0–0.11)
IMM GRANULOCYTES NFR BLD AUTO: 0.8 % (ref 0–0.9)
LYMPHOCYTES # BLD AUTO: 0.6 K/UL (ref 1–4.8)
LYMPHOCYTES NFR BLD: 4.1 % (ref 22–41)
MCH RBC QN AUTO: 32.2 PG (ref 27–33)
MCHC RBC AUTO-ENTMCNC: 32.6 G/DL (ref 33.7–35.3)
MCV RBC AUTO: 98.9 FL (ref 81.4–97.8)
MONOCYTES # BLD AUTO: 1.08 K/UL (ref 0–0.85)
MONOCYTES NFR BLD AUTO: 7.4 % (ref 0–13.4)
NEUTROPHILS # BLD AUTO: 12.81 K/UL (ref 1.82–7.42)
NEUTROPHILS NFR BLD: 87.6 % (ref 44–72)
NRBC # BLD AUTO: 0 K/UL
NRBC BLD-RTO: 0 /100 WBC
PLATELET # BLD AUTO: 137 K/UL (ref 164–446)
PMV BLD AUTO: 10.1 FL (ref 9–12.9)
POTASSIUM SERPL-SCNC: 3.7 MMOL/L (ref 3.6–5.5)
PROT SERPL-MCNC: 5.2 G/DL (ref 6–8.2)
RBC # BLD AUTO: 2.7 M/UL (ref 4.7–6.1)
SODIUM SERPL-SCNC: 135 MMOL/L (ref 135–145)
WBC # BLD AUTO: 14.6 K/UL (ref 4.8–10.8)

## 2019-04-21 PROCEDURE — 700105 HCHG RX REV CODE 258: Performed by: SURGERY

## 2019-04-21 PROCEDURE — A9270 NON-COVERED ITEM OR SERVICE: HCPCS | Performed by: HOSPITALIST

## 2019-04-21 PROCEDURE — 700105 HCHG RX REV CODE 258: Performed by: HOSPITALIST

## 2019-04-21 PROCEDURE — 99233 SBSQ HOSP IP/OBS HIGH 50: CPT | Performed by: HOSPITALIST

## 2019-04-21 PROCEDURE — 85025 COMPLETE CBC W/AUTO DIFF WBC: CPT

## 2019-04-21 PROCEDURE — 700111 HCHG RX REV CODE 636 W/ 250 OVERRIDE (IP): Performed by: HOSPITALIST

## 2019-04-21 PROCEDURE — 770001 HCHG ROOM/CARE - MED/SURG/GYN PRIV*

## 2019-04-21 PROCEDURE — 80053 COMPREHEN METABOLIC PANEL: CPT

## 2019-04-21 PROCEDURE — 700102 HCHG RX REV CODE 250 W/ 637 OVERRIDE(OP): Performed by: HOSPITALIST

## 2019-04-21 PROCEDURE — 700101 HCHG RX REV CODE 250: Performed by: HOSPITALIST

## 2019-04-21 RX ADMIN — SODIUM CHLORIDE: 900 INJECTION INTRAVENOUS at 14:57

## 2019-04-21 RX ADMIN — CEFTRIAXONE SODIUM 2 G: 2 INJECTION, POWDER, FOR SOLUTION INTRAMUSCULAR; INTRAVENOUS at 17:11

## 2019-04-21 RX ADMIN — SENNOSIDES, DOCUSATE SODIUM 2 TABLET: 50; 8.6 TABLET, FILM COATED ORAL at 05:00

## 2019-04-21 RX ADMIN — METRONIDAZOLE 500 MG: 500 INJECTION, SOLUTION INTRAVENOUS at 14:57

## 2019-04-21 RX ADMIN — METRONIDAZOLE 500 MG: 500 INJECTION, SOLUTION INTRAVENOUS at 22:00

## 2019-04-21 RX ADMIN — METRONIDAZOLE 500 MG: 500 INJECTION, SOLUTION INTRAVENOUS at 05:01

## 2019-04-21 ASSESSMENT — ENCOUNTER SYMPTOMS
VOMITING: 0
DIZZINESS: 0
HEARTBURN: 0
BLURRED VISION: 0
EYE PAIN: 0
DIARRHEA: 0
SHORTNESS OF BREATH: 0
CHILLS: 0
PALPITATIONS: 0
NAUSEA: 0
COUGH: 0
SORE THROAT: 0
BACK PAIN: 0
ABDOMINAL PAIN: 0
FEVER: 0
NECK PAIN: 0
DEPRESSION: 0
TINGLING: 0
INSOMNIA: 0
ABDOMINAL PAIN: 1

## 2019-04-21 NOTE — PROGRESS NOTES
Patient arrived on the unit via bed accompanied by staff and family. No c/o pain, no N/V. Patient feels comfortable. RICH drain in place with sanginous drainage. Vela in place. Abdominal dressing CDI. Clear liquid diet ordered for patient. Incentive spirometer given. Patient able to go to 1000. Encouraged patient to get OOB. Patient states he is not ready but would be willing to try in the evening.

## 2019-04-21 NOTE — PROGRESS NOTES
Gastroenterology Progress Note     Author: Freeman Amato   Date & Time Created: 4/21/2019 10:52 AM    Chief Complaint:  Abdominal pains     Interval History:  S Patient examined and interviewed, course reviewed.   He denies fevers, chills, nausea, vomiting.  Still with some abdominal discomofrt and sore throat after ERCP with sphincterotomy and stone extraction yesteray.  His labs are markedly improved.     Review of Systems:  Review of Systems   Constitutional: Negative for chills and fever.   Respiratory: Negative for cough and shortness of breath.    Cardiovascular: Negative for chest pain and palpitations.   Gastrointestinal: Negative for abdominal pain, diarrhea, heartburn, nausea and vomiting.   Skin: Negative for itching and rash.       Physical Exam:  Physical Exam   Constitutional: He appears well-developed and well-nourished. No distress.   HENT:   Head: Normocephalic and atraumatic.   Mouth/Throat: No oropharyngeal exudate.   Eyes: Right eye exhibits no discharge. Left eye exhibits no discharge. No scleral icterus.   Cardiovascular: Normal rate and regular rhythm.    Pulmonary/Chest: Effort normal and breath sounds normal. No respiratory distress.   Abdominal: Soft. Bowel sounds are normal. He exhibits no distension. There is tenderness. There is no rebound and no guarding.   Musculoskeletal: He exhibits no edema.   Neurological: He is alert.   Skin: Skin is warm and dry. No rash noted. He is not diaphoretic. No erythema.   Vitals reviewed.      Labs:      Recent Labs      04/18/19 2250   TROPONINI  <0.02     Recent Labs      04/19/19   0610  04/20/19   0119  04/21/19   0449   SODIUM  136  134*  135   POTASSIUM  4.2  3.8  3.7   CHLORIDE  103  102  106   CO2  24  21  24   BUN  19  14  15   CREATININE  1.20  1.23  0.80   CALCIUM  8.8  6.9*  7.4*     Recent Labs      04/18/19   2250  04/19/19   0610  04/20/19   0119  04/21/19   0449   ALTSGPT  132*  191*  115*  71*   ASTSGOT  322*  262*  104*  37    ALKPHOSPHAT  176*  182*  120*  97   TBILIRUBIN  4.9*  7.4*  9.4*  2.4*   DBILIRUBIN   --    --   5.0*   --    LIPASE  53   --    --    --    GLUCOSE  213*  172*  182*  165*     Recent Labs      19   1824  19   0449   RBC   --   2.97*  3.00*  2.70*   HEMOGLOBIN   --   9.8*  9.8*  8.7*   HEMATOCRIT   --   28.9*  29.7*  26.7*   PLATELETCT   --   131*  131*  137*   PROTHROMBTM  15.0*   --    --    --    INR  1.19*   --    --    --      Recent Labs      19   0610  04/19/19   2119  04/20/19   0119  04/21/19   0449   WBC  22.9*  25.2*  18.9*  14.6*   NEUTSPOLYS  89.40*  91.10*  93.80*  87.60*   LYMPHOCYTES  2.90*  3.20*  1.50*  4.10*   MONOCYTES  6.90  4.50  3.90  7.40   EOSINOPHILS  0.00  0.00  0.00  0.00   BASOPHILS  0.20  0.20  0.20  0.10   ASTSGOT  262*   --   104*  37   ALTSGPT  191*   --   115*  71*   ALKPHOSPHAT  182*   --   120*  97   TBILIRUBIN  7.4*   --   9.4*  2.4*     Hemodynamics:  Temp (24hrs), Av.7 °C (98.1 °F), Min:36.3 °C (97.3 °F), Max:37.1 °C (98.7 °F)  Temperature: 36.3 °C (97.3 °F)  Pulse  Av.6  Min: 74  Max: 107Heart Rate (Monitored): 77  Blood Pressure : 128/74, NIBP: 131/74     Respiratory:    Respiration: 18, Pulse Oximetry: 97 %, O2 Daily Delivery Respiratory : Silicone Nasal Cannula        RUL Breath Sounds: Diminished, RML Breath Sounds: Diminished, RLL Breath Sounds: Diminished, MOHINDER Breath Sounds: Diminished, LLL Breath Sounds: Diminished  Fluids:    Intake/Output Summary (Last 24 hours) at 19 1052  Last data filed at 19 0700   Gross per 24 hour   Intake          3621.67 ml   Output             2225 ml   Net          1396.67 ml        GI/Nutrition:  Orders Placed This Encounter   Procedures   • Diet Order Clear Liquid     Standing Status:   Standing     Number of Occurrences:   1     Order Specific Question:   Diet:     Answer:   Clear Liquid [10]     Medical Decision Making, by Problem:  Active Hospital Problems     Diagnosis   • *Acute cholecystitis [K81.0]   • DAKOTAH (acute kidney injury) (HCC) [N17.9]   • Hypertension, essential [I10]   • Dyslipidemia [E78.5]   • Pre-diabetes [R73.03]   • Choledocholithiasis [K80.50]     IMPRESSION(S):  1) CBD stones- status post ERCP with extraction  2) Abnormal hepatic panel- improving  3) Leukocytosis- improving  Plan:  1) GI to sign off.  Call with questions.  OK to discharge to home soon from our standpoint  I will arrange follow up in clinic with Dr Morrison for about 8 weeks      Quality-Core Measures   Reviewed items::  Medications reviewed and Labs reviewed

## 2019-04-21 NOTE — DISCHARGE PLANNING
Care Transition Team Assessment    Patient resides at home with his spouse and the current discharge plan is for him to return home when medically able.  No current SS needs noted.     Information Source  Orientation : Oriented x 4  Information Given By: Patient  Informant's Name: edwin  Who is responsible for making decisions for patient? : Patient    Elopement Risk  Legal Hold: No  Ambulatory or Self Mobile in Wheelchair: Yes  Disoriented: No  Psychiatric Symptoms: None  History of Wandering: No  Elopement this Admit: No  Vocalizing Wanting to Leave: No  Displays Behaviors, Body Language Wanting to Leave: No-Not at Risk for Elopement  Elopement Risk: Not at Risk for Elopement    Interdisciplinary Discharge Planning  Does Admitting Nurse Feel This Could be a Complex Discharge?: No  Primary Care Physician: Dr. Bryson  Lives with - Patient's Self Care Capacity: Spouse  Patient or legal guardian wants to designate a caregiver (see row info): No  Support Systems: Family Member(s), Spouse / Significant Other, Children  Housing / Facility: [address]  Do You Take your Prescribed Medications Regularly: Yes  Able to Return to Previous ADL's: Yes  Mobility Issues: No  Prior Services: None  Assistance Needed: No  Durable Medical Equipment: Not Applicable    Discharge Preparedness  What is your plan after discharge?: Home with help  What are your discharge supports?: Child, Spouse  Prior Functional Level: Independent with Activities of Daily Living    Functional Assesment  Prior Functional Level: Independent with Activities of Daily Living    Finances  Financial Barriers to Discharge: No  Prescription Coverage: Yes    Vision / Hearing Impairment  Vision Impairment : No  Hearing Impairment : No         Advance Directive  Advance Directive?: None    Domestic Abuse  Have you ever been the victim of abuse or violence?: No  Physical Abuse or Sexual Abuse: No  Verbal Abuse or Emotional Abuse: No  Possible Abuse Reported to::  Not Applicable    Psychological Assessment  History of Substance Abuse: None    Discharge Risks or Barriers  Discharge risks or barriers?: No    Anticipated Discharge Information  Anticipated discharge disposition: Home

## 2019-04-21 NOTE — CARE PLAN
Problem: Safety  Goal: Will remain free from injury  Outcome: PROGRESSING AS EXPECTED  Bed in low position, treaded slipper socks on, and call light in reach. Pt instructed to call nurse if ambulating out of bed.       Problem: Knowledge Deficit  Goal: Knowledge of disease process/condition, treatment plan, diagnostic tests, and medications will improve  Outcome: PROGRESSING AS EXPECTED  Discussed POC, use of IS and importance of ambulation OOB. Will assist pt to chair for all meals.

## 2019-04-21 NOTE — PROGRESS NOTES
Pt remains stable, pleasant and cooperative. Midline vertical Island dressing remains intact, 2nd island dressing also dry, and intact (horizontal). frank drain draining dark red blood aprox 60ml this shift. Pt tolerating fluids.no pain meds requested this shift.

## 2019-04-22 PROBLEM — I48.91 ATRIAL FIBRILLATION WITH RVR (HCC): Status: ACTIVE | Noted: 2019-04-22

## 2019-04-22 LAB
ALBUMIN SERPL BCP-MCNC: 2.6 G/DL (ref 3.2–4.9)
ALBUMIN/GLOB SERPL: 1 G/DL
ALP SERPL-CCNC: 93 U/L (ref 30–99)
ALT SERPL-CCNC: 50 U/L (ref 2–50)
ANION GAP SERPL CALC-SCNC: 5 MMOL/L (ref 0–11.9)
AST SERPL-CCNC: 23 U/L (ref 12–45)
BASOPHILS # BLD AUTO: 0.5 % (ref 0–1.8)
BASOPHILS # BLD: 0.06 K/UL (ref 0–0.12)
BILIRUB FLD-MCNC: 5.9 MG/DL
BILIRUB SERPL-MCNC: 1.6 MG/DL (ref 0.1–1.5)
BODY FLD TYPE: NORMAL
BUN SERPL-MCNC: 11 MG/DL (ref 8–22)
CALCIUM SERPL-MCNC: 7.8 MG/DL (ref 8.4–10.2)
CHLORIDE SERPL-SCNC: 107 MMOL/L (ref 96–112)
CO2 SERPL-SCNC: 24 MMOL/L (ref 20–33)
CREAT SERPL-MCNC: 0.86 MG/DL (ref 0.5–1.4)
EKG IMPRESSION: NORMAL
EOSINOPHIL # BLD AUTO: 0.04 K/UL (ref 0–0.51)
EOSINOPHIL NFR BLD: 0.3 % (ref 0–6.9)
ERYTHROCYTE [DISTWIDTH] IN BLOOD BY AUTOMATED COUNT: 50.5 FL (ref 35.9–50)
GLOBULIN SER CALC-MCNC: 2.6 G/DL (ref 1.9–3.5)
GLUCOSE SERPL-MCNC: 141 MG/DL (ref 65–99)
HCT VFR BLD AUTO: 26.8 % (ref 42–52)
HGB BLD-MCNC: 8.8 G/DL (ref 14–18)
IMM GRANULOCYTES # BLD AUTO: 0.12 K/UL (ref 0–0.11)
IMM GRANULOCYTES NFR BLD AUTO: 1 % (ref 0–0.9)
LYMPHOCYTES # BLD AUTO: 0.96 K/UL (ref 1–4.8)
LYMPHOCYTES NFR BLD: 8.1 % (ref 22–41)
MCH RBC QN AUTO: 32.6 PG (ref 27–33)
MCHC RBC AUTO-ENTMCNC: 32.8 G/DL (ref 33.7–35.3)
MCV RBC AUTO: 99.3 FL (ref 81.4–97.8)
MONOCYTES # BLD AUTO: 0.74 K/UL (ref 0–0.85)
MONOCYTES NFR BLD AUTO: 6.2 % (ref 0–13.4)
NEUTROPHILS # BLD AUTO: 9.97 K/UL (ref 1.82–7.42)
NEUTROPHILS NFR BLD: 83.9 % (ref 44–72)
NRBC # BLD AUTO: 0.02 K/UL
NRBC BLD-RTO: 0.2 /100 WBC
PLATELET # BLD AUTO: 181 K/UL (ref 164–446)
PMV BLD AUTO: 10.2 FL (ref 9–12.9)
POTASSIUM SERPL-SCNC: 3.6 MMOL/L (ref 3.6–5.5)
PROT SERPL-MCNC: 5.2 G/DL (ref 6–8.2)
RBC # BLD AUTO: 2.7 M/UL (ref 4.7–6.1)
SODIUM SERPL-SCNC: 136 MMOL/L (ref 135–145)
WBC # BLD AUTO: 11.9 K/UL (ref 4.8–10.8)

## 2019-04-22 PROCEDURE — 85025 COMPLETE CBC W/AUTO DIFF WBC: CPT

## 2019-04-22 PROCEDURE — 93005 ELECTROCARDIOGRAM TRACING: CPT | Performed by: HOSPITALIST

## 2019-04-22 PROCEDURE — 93010 ELECTROCARDIOGRAM REPORT: CPT | Performed by: INTERNAL MEDICINE

## 2019-04-22 PROCEDURE — A9270 NON-COVERED ITEM OR SERVICE: HCPCS | Performed by: HOSPITALIST

## 2019-04-22 PROCEDURE — 700102 HCHG RX REV CODE 250 W/ 637 OVERRIDE(OP): Performed by: HOSPITALIST

## 2019-04-22 PROCEDURE — 770020 HCHG ROOM/CARE - TELE (206)

## 2019-04-22 PROCEDURE — 97161 PT EVAL LOW COMPLEX 20 MIN: CPT

## 2019-04-22 PROCEDURE — 82247 BILIRUBIN TOTAL: CPT

## 2019-04-22 PROCEDURE — 700101 HCHG RX REV CODE 250: Performed by: HOSPITALIST

## 2019-04-22 PROCEDURE — 80053 COMPREHEN METABOLIC PANEL: CPT

## 2019-04-22 PROCEDURE — 700111 HCHG RX REV CODE 636 W/ 250 OVERRIDE (IP): Performed by: HOSPITALIST

## 2019-04-22 PROCEDURE — 700105 HCHG RX REV CODE 258: Performed by: HOSPITALIST

## 2019-04-22 PROCEDURE — 99233 SBSQ HOSP IP/OBS HIGH 50: CPT | Performed by: HOSPITALIST

## 2019-04-22 PROCEDURE — 700105 HCHG RX REV CODE 258: Performed by: SURGERY

## 2019-04-22 RX ORDER — METRONIDAZOLE 500 MG/1
500 TABLET ORAL EVERY 8 HOURS
Status: DISCONTINUED | OUTPATIENT
Start: 2019-04-22 | End: 2019-04-24 | Stop reason: HOSPADM

## 2019-04-22 RX ORDER — METOPROLOL SUCCINATE 100 MG/1
100 TABLET, EXTENDED RELEASE ORAL
Status: DISCONTINUED | OUTPATIENT
Start: 2019-04-22 | End: 2019-04-24 | Stop reason: HOSPADM

## 2019-04-22 RX ORDER — DIGOXIN 0.25 MG/ML
500 INJECTION INTRAMUSCULAR; INTRAVENOUS ONCE
Status: ACTIVE | OUTPATIENT
Start: 2019-04-22 | End: 2019-04-23

## 2019-04-22 RX ADMIN — METOPROLOL SUCCINATE 100 MG: 100 TABLET, EXTENDED RELEASE ORAL at 09:17

## 2019-04-22 RX ADMIN — SENNOSIDES, DOCUSATE SODIUM 2 TABLET: 50; 8.6 TABLET, FILM COATED ORAL at 06:00

## 2019-04-22 RX ADMIN — CEFTRIAXONE SODIUM 2 G: 2 INJECTION, POWDER, FOR SOLUTION INTRAMUSCULAR; INTRAVENOUS at 17:58

## 2019-04-22 RX ADMIN — METRONIDAZOLE 500 MG: 500 INJECTION, SOLUTION INTRAVENOUS at 06:00

## 2019-04-22 RX ADMIN — METRONIDAZOLE 500 MG: 500 TABLET ORAL at 13:59

## 2019-04-22 RX ADMIN — METRONIDAZOLE 500 MG: 500 TABLET ORAL at 22:31

## 2019-04-22 RX ADMIN — SODIUM CHLORIDE: 900 INJECTION INTRAVENOUS at 23:55

## 2019-04-22 ASSESSMENT — ENCOUNTER SYMPTOMS
SHORTNESS OF BREATH: 0
DEPRESSION: 0
EYE PAIN: 0
COUGH: 0
BACK PAIN: 0
DIZZINESS: 0
NECK PAIN: 0
INSOMNIA: 0
SORE THROAT: 0
ABDOMINAL PAIN: 1
FEVER: 0
PALPITATIONS: 0
CHILLS: 0
TINGLING: 0
BLURRED VISION: 0
NAUSEA: 0

## 2019-04-22 ASSESSMENT — GAIT ASSESSMENTS
DISTANCE (FEET): 100
GAIT LEVEL OF ASSIST: CONTACT GUARD ASSIST

## 2019-04-22 NOTE — PROGRESS NOTES
Patient up to unit via wheelchair, telemetry monitor on. Patient converted from AFib into NSR at 1220; HR in the 90s. MD Rush and hospitalist KASI Brito notified at 1242. Digoxin dose from this morning not required, cancelled per MD. Assessment completed, patient in no apparent distress. RICH drain in place, draining small amount of bloody drainage.

## 2019-04-22 NOTE — PROGRESS NOTES
Trauma / Surgical Daily Progress Note    Date of Service  4/22/2019    Chief Complaint  78 y.o. male admitted 4/19/2019 with Cholecystitis, acute    Interval Events  Doing better. RICH less bilious. Will check fluid for bilirubin.  Adv diet. Cont to Mobilize.    Review of Systems  Review of Systems   Gastrointestinal: Positive for abdominal pain.        Vital Signs for last 24 hours  Temp:  [36.3 °C (97.3 °F)-36.4 °C (97.6 °F)] 36.4 °C (97.6 °F)  Pulse:  [] 74  Resp:  [18] 18  BP: (115-128)/(56-74) 115/72  SpO2:  [91 %-97 %] 91 %    Hemodynamic parameters for last 24 hours       Respiratory Data     Respiration: 18, Pulse Oximetry: 91 %        RUL Breath Sounds: Diminished, RML Breath Sounds: Diminished, RLL Breath Sounds: Diminished, MOHINDER Breath Sounds: Diminished, LLL Breath Sounds: Diminished    Physical Exam  Physical Exam   Constitutional: He appears well-developed and well-nourished.   HENT:   Head: Normocephalic.   Eyes: No scleral icterus.   Neck: Neck supple.   Cardiovascular: Normal rate.    Pulmonary/Chest: Effort normal.   Abdominal: Soft. He exhibits no distension. There is tenderness.   Dressing dry  RICH less bilious appearing   Genitourinary:   Genitourinary Comments: Vela to gravity   Musculoskeletal: Normal range of motion.   Neurological: He is alert.   Skin: Skin is warm.       Laboratory  Recent Results (from the past 24 hour(s))   CBC with Differential    Collection Time: 04/22/19  5:07 AM   Result Value Ref Range    WBC 11.9 (H) 4.8 - 10.8 K/uL    RBC 2.70 (L) 4.70 - 6.10 M/uL    Hemoglobin 8.8 (L) 14.0 - 18.0 g/dL    Hematocrit 26.8 (L) 42.0 - 52.0 %    MCV 99.3 (H) 81.4 - 97.8 fL    MCH 32.6 27.0 - 33.0 pg    MCHC 32.8 (L) 33.7 - 35.3 g/dL    RDW 50.5 (H) 35.9 - 50.0 fL    Platelet Count 181 164 - 446 K/uL    MPV 10.2 9.0 - 12.9 fL    Neutrophils-Polys 83.90 (H) 44.00 - 72.00 %    Lymphocytes 8.10 (L) 22.00 - 41.00 %    Monocytes 6.20 0.00 - 13.40 %    Eosinophils 0.30 0.00 - 6.90 %     Basophils 0.50 0.00 - 1.80 %    Immature Granulocytes 1.00 (H) 0.00 - 0.90 %    Nucleated RBC 0.20 /100 WBC    Neutrophils (Absolute) 9.97 (H) 1.82 - 7.42 K/uL    Lymphs (Absolute) 0.96 (L) 1.00 - 4.80 K/uL    Monos (Absolute) 0.74 0.00 - 0.85 K/uL    Eos (Absolute) 0.04 0.00 - 0.51 K/uL    Baso (Absolute) 0.06 0.00 - 0.12 K/uL    Immature Granulocytes (abs) 0.12 (H) 0.00 - 0.11 K/uL    NRBC (Absolute) 0.02 K/uL   Comp Metabolic Panel    Collection Time: 04/22/19  5:07 AM   Result Value Ref Range    Sodium 136 135 - 145 mmol/L    Potassium 3.6 3.6 - 5.5 mmol/L    Chloride 107 96 - 112 mmol/L    Co2 24 20 - 33 mmol/L    Anion Gap 5.0 0.0 - 11.9    Glucose 141 (H) 65 - 99 mg/dL    Bun 11 8 - 22 mg/dL    Creatinine 0.86 0.50 - 1.40 mg/dL    Calcium 7.8 (L) 8.4 - 10.2 mg/dL    AST(SGOT) 23 12 - 45 U/L    ALT(SGPT) 50 2 - 50 U/L    Alkaline Phosphatase 93 30 - 99 U/L    Total Bilirubin 1.6 (H) 0.1 - 1.5 mg/dL    Albumin 2.6 (L) 3.2 - 4.9 g/dL    Total Protein 5.2 (L) 6.0 - 8.2 g/dL    Globulin 2.6 1.9 - 3.5 g/dL    A-G Ratio 1.0 g/dL   ESTIMATED GFR    Collection Time: 04/22/19  5:07 AM   Result Value Ref Range    GFR If African American >60 >60 mL/min/1.73 m 2    GFR If Non African American >60 >60 mL/min/1.73 m 2       Fluids    Intake/Output Summary (Last 24 hours) at 04/22/19 0631  Last data filed at 04/22/19 0500   Gross per 24 hour   Intake              540 ml   Output             1430 ml   Net             -890 ml       Core Measures & Quality Metrics  Labs reviewed, Medications reviewed and Radiology images reviewed  Vela catheter: Critically Ill - Requiring Accurate Measurement of Urinary Output      DVT Prophylaxis: Enoxaparin (Lovenox)  DVT prophylaxis - mechanical: SCDs  Ulcer prophylaxis: Yes  Antibiotics: Treating active infection/contamination beyond 24 hours perioperative coverage  Assessed for rehab: Patient returned to prior level of function, rehabilitation not indicated at this time    MI  Score  ETOH Screening    Assessment/Plan  * Acute cholecystitis- (present on admission)   Assessment & Plan    Severe cholecystitis  4/19 - Lap sheryl converted to open  4/20 - ERCP.   LFTs down         Discussed patient condition with Family, RN and Patient.  CRITICAL CARE TIME EXCLUDING PROCEDURES: 20    minutes

## 2019-04-22 NOTE — PROGRESS NOTES
[facility] Medicine Daily Progress Note    Date of Service  4/22/2019    Chief Complaint  78 y.o. male admitted 4/19/2019 with abdominal pain.     Hospital Course    He was found to have acute cholecystitis and underwent a lap converted to open cholecystectomy.  He was later seen by GI and had an ercp with stone extraction. His admission was complicated by afib with RVR.       Interval Problem Update  4/21- Transferred out of icu yesterday. Advancing diet and ambulating.  frank drain draining dark red blood aprox 60ml this shift.   4/22- in rapid afib this am. I transferred him to [facility] and ordered his toprol to restart in addition to an VI digoxin load. He converted to nsr prior to the digoxin and I held off on giving it.       Consultants/Specialty  GI  Surgery    Code Status  full    Disposition  I suspect home once improved but not sure. Pt/ot orders placed.     Review of Systems  Review of Systems   Constitutional: Positive for malaise/fatigue. Negative for chills and fever.   HENT: Negative for sore throat.    Eyes: Negative for blurred vision and pain.   Respiratory: Negative for cough and shortness of breath.    Cardiovascular: Negative for chest pain and palpitations.   Gastrointestinal: Positive for abdominal pain. Negative for nausea.   Genitourinary: Negative for dysuria and urgency.   Musculoskeletal: Negative for back pain and neck pain.   Skin: Negative for itching and rash.   Neurological: Negative for dizziness and tingling.   Psychiatric/Behavioral: Negative for depression. The patient does not have insomnia.    All other systems reviewed and are negative.       Physical Exam  Temp:  [36.3 °C (97.3 °F)-36.6 °C (97.8 °F)] 36.6 °C (97.8 °F)  Pulse:  [] 131  Resp:  [18-20] 20  BP: (115-133)/(56-75) 133/75  SpO2:  [91 %-97 %] 97 %    Physical Exam   Constitutional: He is oriented to person, place, and time. He appears well-developed and well-nourished. No distress.   Patient seen and examined  Plan  discussed with KASI BEASLEY:   Right Ear: External ear normal.   Left Ear: External ear normal.   Nose: Nose normal.   Eyes: Right eye exhibits no discharge. Left eye exhibits no discharge. No scleral icterus.   Neck: No JVD present. No tracheal deviation present.   Cardiovascular: Normal rate, normal heart sounds and intact distal pulses.    No murmur heard.  Cap refill 2sec  Pulses 2+ throughout  Tachy  irregular     Pulmonary/Chest: Effort normal and breath sounds normal. No respiratory distress. He has no wheezes. He has no rales.   Abdominal: Soft. Bowel sounds are normal. He exhibits no distension. There is tenderness. There is no guarding.   Musculoskeletal: He exhibits no edema or tenderness.   Neurological: He is alert and oriented to person, place, and time.   Skin: Skin is warm and dry. He is not diaphoretic. No erythema.   Normal skin color   Psychiatric: He has a normal mood and affect. His behavior is normal.   Nursing note and vitals reviewed.      Fluids    Intake/Output Summary (Last 24 hours) at 04/22/19 0855  Last data filed at 04/22/19 0834   Gross per 24 hour   Intake              540 ml   Output             1420 ml   Net             -880 ml       Laboratory  Recent Labs      04/20/19   0119  04/21/19   0449  04/22/19   0507   WBC  18.9*  14.6*  11.9*   RBC  3.00*  2.70*  2.70*   HEMOGLOBIN  9.8*  8.7*  8.8*   HEMATOCRIT  29.7*  26.7*  26.8*   MCV  99.0*  98.9*  99.3*   MCH  32.7  32.2  32.6   MCHC  33.0*  32.6*  32.8*   RDW  49.2  51.2*  50.5*   PLATELETCT  131*  137*  181   MPV  9.9  10.1  10.2     Recent Labs      04/20/19   0119  04/21/19   0449  04/22/19   0507   SODIUM  134*  135  136   POTASSIUM  3.8  3.7  3.6   CHLORIDE  102  106  107   CO2  21  24  24   GLUCOSE  182*  165*  141*   BUN  14  15  11   CREATININE  1.23  0.80  0.86   CALCIUM  6.9*  7.4*  7.8*     Recent Labs      04/19/19   1824   INR  1.19*               Imaging  DX-PORTABLE FLUOROSCOPY < 1 HOUR   Final Result       Fluoroscopic images obtained during ERCP      [id number] VIEW   Final Result         1.  No visualized radiopaque surgical foreign bodies, note that the left lateral abdomen and pelvis are excluded on this exam limiting evaluation.   2.  Left lung base infiltrate      [id number]   Final Result      1.  Tiny low T2 signal focus dependently located in the distal common bile duct may represent a small calculus or crossing vessel. The finding cannot be confirmed on oblique sagittal or MRCP images. The distal common bile duct is not dilated to suggest    biliary obstruction.      2.  Cholelithiasis and gallbladder sludge. No wall thickening is identified. Ill-defined fluid inferior to the gallbladder and surrounding the proximal duodenum may be related to inflammation from acute cholecystitis or duodenitis. No associated duodenal    wall thickening is appreciated.      3.  4.8 cm fusiform infrarenal abdominal aortic aneurysm.      4.  Diverticulosis      US-RUQ   Final Result         1.  4.1 cm infrarenal abdominal aortic aneurysm, recommend radiographic follow-up and surveillance as clinically appropriate.   2.  Echogenic liver, compatible with fatty change versus fibrosis.   3.  Mild prominence of the pancreatic duct, could represent age-related changes, could be further evaluated with ERCP as clinically appropriate.   4.  Dependent gallbladder sludge without additional sonographic findings to suggest acute cholecystitis.      DX-CHEST-LIMITED (1 VIEW)   Final Result         1.  Left basilar atelectasis, no focal infiltrate   2.  Atherosclerosis           Assessment/Plan  * Acute cholecystitis- (present on admission)   Assessment & Plan    S/p cholecystectomy   S/p ercp and stone extraction.   Severe cholecystitis and needed open procedure.   IV abx until surgery ok with stopping. Did place 7 day stop today  Dc sanaz  Pt.ot  Pain control.        DAKOTAH (acute kidney injury) (HCC)- (present on admission)    Assessment & Plan    Continue IV fluids, monitor BMP.  Treating cholecystitis.     Hypertension, essential- (present on admission)   Assessment & Plan    Restart home meds as bp increases here.      Atrial fibrillation with RVR ([facility])   Assessment & Plan    New today. No documented history of this in the past.   Restarted toprol and her converted to NSR  I ordered an echo, transferred him to tele  I have not discussed anticoagulation with him but he probably needs to be on xarelto or other anticoagulant after his echo is done.        Choledocholithiasis- (present on admission)   Assessment & Plan    S/p ercp with stone extraction.      DM2 (diabetes mellitus, type 2) ([facility])- (present on admission)   Assessment & Plan    -Glucose on admission is 213.  a1c is 6.5  He meets criteria for a new diagnosis of diabetes.   Will start oral meds as outpatient.   Dm education  Has a hx of glucose intolerance.   I discussed this with him today.      Dyslipidemia- (present on admission)   Assessment & Plan    Restart statin as outpatient          VTE prophylaxis: scd

## 2019-04-22 NOTE — DISCHARGE PLANNING
Transitional Care Navigator:    Chart reviewed by TCN to assess for post acute needs.  Pt is a 78 yom admitted with acute cholecystitis. Current mobility has been limited to 15 feet x2. Patient's LACE score = 71.    This patient is an appropriate candidate for short term home health follow up for nursing and PT for on-going assessment of post-operative condition and physical therapy to improve functional mobility.    Please consider an order for home health service referral prior to day of discharge.

## 2019-04-22 NOTE — ASSESSMENT & PLAN NOTE
New today. No documented history of this in the past.   Restarted toprol and her converted to NSR  I ordered an echo, transferred him to tele  I have not discussed anticoagulation with him but he probably needs to be on xarelto or other anticoagulant after his echo is done.

## 2019-04-22 NOTE — CARE PLAN
Problem: Pain Management  Goal: Pain level will decrease to patient's comfort goal  Outcome: PROGRESSING AS EXPECTED      Problem: Safety  Goal: Will remain free from injury  Outcome: PROGRESSING AS EXPECTED      Problem: Infection  Goal: Will remain free from infection  Outcome: PROGRESSING AS EXPECTED      Problem: Skin Integrity  Goal: Risk for impaired skin integrity will decrease  Outcome: PROGRESSING AS EXPECTED

## 2019-04-22 NOTE — THERAPY
"Physical Therapy Evaluation completed.   Bed Mobility:  Supine to Sit: Minimal Assist (log roll, no rail)  Transfers: Sit to Stand: Stand by Assist  Gait: Level Of Assist: Contact Guard Assist with Front-Wheel Walker       Plan of Care: No further acute PT needs at this time, DC PT.  Discharge Recommendations: Equipment: No Equipment Needed. Post-acute therapy Discharge to home with outpatient or home health for additional skilled therapy services.    Pt is a 79 yo male s/p open sheryl presenting with post-op pain however is able to ambulate safely with FWW, steady without AD as well requiring SBA/CGA. Anticipate pt will be safe to DC home with spouse and may need HHPT if mobility is not at baseline. However expect with further ambulation (walking 3x day at least with RN staff) pt may return back to PLOF prior to DC home. No further acute PT needs, DCPT.    See \"Rehab Therapy-Acute\" Patient Summary Report for complete documentation.     "

## 2019-04-22 NOTE — CARE PLAN
Problem: Communication  Goal: The ability to communicate needs accurately and effectively will improve  Outcome: PROGRESSING AS EXPECTED    Intervention: [place] patient and significant other/support system to call light to alert staff of needs  Patient oriented to call light system and all relevant hospital policies. Call light within reach and used appropriately when in need of assistance.        Problem: Infection  Goal: Will remain free from infection  Outcome: PROGRESSING AS EXPECTED    Intervention: Implement standard precautions and perform hand washing before and after patient contact  Standard precautions and hand hygiene practices implemented before and after patient room entry. Gloves worn during times of patient contact.

## 2019-04-22 NOTE — PROGRESS NOTES
Pt remains pleasant and cooperative. Called in room per pt request, he couldn't find his urinal which had fell on the floor. Pt was anxious and was afraid he was going to urinate on himself before help arrived. Encouraged pt to ring and call out for assistance. Vs elevated at this time. Pulse 150's. o2 84% on room air. o2 @3 liters applied and within a few minutes pt back to baseline vs. Continuing to monitor pt status.

## 2019-04-22 NOTE — PROGRESS NOTES
Received report, assumed pt care. Discussed POC. Encouraged CDB and leg exercises while in bed. Pt taught to inform nurse if experiencing pain above comfort goal, SOB, chest pain or for any further assistance. Assessment done. Pt up to chair for breakfast. VSS. Will cont to monitor.

## 2019-04-23 ENCOUNTER — APPOINTMENT (OUTPATIENT)
Dept: CARDIOLOGY | Facility: MEDICAL CENTER | Age: 79
DRG: 415 | End: 2019-04-23
Attending: HOSPITALIST
Payer: MEDICARE

## 2019-04-23 LAB
ALBUMIN SERPL BCP-MCNC: 2.3 G/DL (ref 3.2–4.9)
ALBUMIN/GLOB SERPL: 0.8 G/DL
ALP SERPL-CCNC: 105 U/L (ref 30–99)
ALT SERPL-CCNC: 36 U/L (ref 2–50)
ANION GAP SERPL CALC-SCNC: 7 MMOL/L (ref 0–11.9)
AST SERPL-CCNC: 15 U/L (ref 12–45)
BASOPHILS # BLD AUTO: 0.5 % (ref 0–1.8)
BASOPHILS # BLD: 0.05 K/UL (ref 0–0.12)
BILIRUB SERPL-MCNC: 1 MG/DL (ref 0.1–1.5)
BUN SERPL-MCNC: 11 MG/DL (ref 8–22)
CALCIUM SERPL-MCNC: 7.7 MG/DL (ref 8.4–10.2)
CHLORIDE SERPL-SCNC: 106 MMOL/L (ref 96–112)
CO2 SERPL-SCNC: 25 MMOL/L (ref 20–33)
CREAT SERPL-MCNC: 0.83 MG/DL (ref 0.5–1.4)
EOSINOPHIL # BLD AUTO: 0.2 K/UL (ref 0–0.51)
EOSINOPHIL NFR BLD: 1.9 % (ref 0–6.9)
ERYTHROCYTE [DISTWIDTH] IN BLOOD BY AUTOMATED COUNT: 51.4 FL (ref 35.9–50)
GLOBULIN SER CALC-MCNC: 2.9 G/DL (ref 1.9–3.5)
GLUCOSE SERPL-MCNC: 135 MG/DL (ref 65–99)
HCT VFR BLD AUTO: 25.9 % (ref 42–52)
HGB BLD-MCNC: 8.4 G/DL (ref 14–18)
IMM GRANULOCYTES # BLD AUTO: 0.23 K/UL (ref 0–0.11)
IMM GRANULOCYTES NFR BLD AUTO: 2.2 % (ref 0–0.9)
LV EJECT FRACT  99904: 65
LV EJECT FRACT MOD 2C 99903: 72.47
LV EJECT FRACT MOD 4C 99902: 69.6
LV EJECT FRACT MOD BP 99901: 71.47
LYMPHOCYTES # BLD AUTO: 1.27 K/UL (ref 1–4.8)
LYMPHOCYTES NFR BLD: 11.9 % (ref 22–41)
MCH RBC QN AUTO: 32.7 PG (ref 27–33)
MCHC RBC AUTO-ENTMCNC: 32.4 G/DL (ref 33.7–35.3)
MCV RBC AUTO: 100.8 FL (ref 81.4–97.8)
MONOCYTES # BLD AUTO: 0.79 K/UL (ref 0–0.85)
MONOCYTES NFR BLD AUTO: 7.4 % (ref 0–13.4)
NEUTROPHILS # BLD AUTO: 8.1 K/UL (ref 1.82–7.42)
NEUTROPHILS NFR BLD: 76.1 % (ref 44–72)
NRBC # BLD AUTO: 0.04 K/UL
NRBC BLD-RTO: 0.4 /100 WBC
PLATELET # BLD AUTO: 201 K/UL (ref 164–446)
PMV BLD AUTO: 9.8 FL (ref 9–12.9)
POTASSIUM SERPL-SCNC: 3.8 MMOL/L (ref 3.6–5.5)
PROT SERPL-MCNC: 5.2 G/DL (ref 6–8.2)
RBC # BLD AUTO: 2.57 M/UL (ref 4.7–6.1)
SODIUM SERPL-SCNC: 138 MMOL/L (ref 135–145)
WBC # BLD AUTO: 10.6 K/UL (ref 4.8–10.8)

## 2019-04-23 PROCEDURE — 99232 SBSQ HOSP IP/OBS MODERATE 35: CPT | Performed by: HOSPITALIST

## 2019-04-23 PROCEDURE — 85025 COMPLETE CBC W/AUTO DIFF WBC: CPT

## 2019-04-23 PROCEDURE — 93306 TTE W/DOPPLER COMPLETE: CPT | Mod: 26 | Performed by: INTERNAL MEDICINE

## 2019-04-23 PROCEDURE — 700105 HCHG RX REV CODE 258: Performed by: HOSPITALIST

## 2019-04-23 PROCEDURE — 700102 HCHG RX REV CODE 250 W/ 637 OVERRIDE(OP): Performed by: HOSPITALIST

## 2019-04-23 PROCEDURE — 97165 OT EVAL LOW COMPLEX 30 MIN: CPT

## 2019-04-23 PROCEDURE — 770020 HCHG ROOM/CARE - TELE (206)

## 2019-04-23 PROCEDURE — 700111 HCHG RX REV CODE 636 W/ 250 OVERRIDE (IP): Performed by: HOSPITALIST

## 2019-04-23 PROCEDURE — A9270 NON-COVERED ITEM OR SERVICE: HCPCS | Performed by: HOSPITALIST

## 2019-04-23 PROCEDURE — 80053 COMPREHEN METABOLIC PANEL: CPT

## 2019-04-23 PROCEDURE — 93306 TTE W/DOPPLER COMPLETE: CPT

## 2019-04-23 RX ADMIN — CEFTRIAXONE SODIUM 2 G: 2 INJECTION, POWDER, FOR SOLUTION INTRAMUSCULAR; INTRAVENOUS at 17:26

## 2019-04-23 RX ADMIN — METRONIDAZOLE 500 MG: 500 TABLET ORAL at 14:19

## 2019-04-23 RX ADMIN — METRONIDAZOLE 500 MG: 500 TABLET ORAL at 22:31

## 2019-04-23 RX ADMIN — METRONIDAZOLE 500 MG: 500 TABLET ORAL at 05:20

## 2019-04-23 RX ADMIN — METOPROLOL SUCCINATE 100 MG: 100 TABLET, EXTENDED RELEASE ORAL at 05:20

## 2019-04-23 ASSESSMENT — ENCOUNTER SYMPTOMS
MEMORY LOSS: 0
DOUBLE VISION: 0
CONSTIPATION: 0
TINGLING: 0
HEADACHES: 0
SENSORY CHANGE: 0
DIZZINESS: 0
NECK PAIN: 0
SPEECH CHANGE: 0
ABDOMINAL PAIN: 1
VOMITING: 0
FEVER: 0
TREMORS: 0
STRIDOR: 0
COUGH: 0
BACK PAIN: 0
WEAKNESS: 0
INSOMNIA: 0
MYALGIAS: 0
PHOTOPHOBIA: 0
BLOOD IN STOOL: 0
CHILLS: 0
BLURRED VISION: 0
SHORTNESS OF BREATH: 0
PALPITATIONS: 0
ORTHOPNEA: 0
PND: 0
CLAUDICATION: 0
DEPRESSION: 0
NERVOUS/ANXIOUS: 0
HEMOPTYSIS: 0
SORE THROAT: 0
EYE PAIN: 0
SPUTUM PRODUCTION: 0
HEARTBURN: 0
NAUSEA: 0

## 2019-04-23 ASSESSMENT — COGNITIVE AND FUNCTIONAL STATUS - GENERAL
HELP NEEDED FOR BATHING: A LITTLE
DRESSING REGULAR LOWER BODY CLOTHING: A LITTLE
TOILETING: A LITTLE
SUGGESTED CMS G CODE MODIFIER DAILY ACTIVITY: CJ
DAILY ACTIVITIY SCORE: 21

## 2019-04-23 ASSESSMENT — ACTIVITIES OF DAILY LIVING (ADL): TOILETING: INDEPENDENT

## 2019-04-23 NOTE — PROGRESS NOTES
Telemetry Shift Summary    Rhythm SR  HR Range 70-80's  Ectopy rare PVC's Frequent PACs  Measurements 0.20/0.08/0.36        Normal Values  Rhythm SR  HR Range    Measurements 0.12-0.20 / 0.06-0.10  / 0.30-0.52

## 2019-04-23 NOTE — PROGRESS NOTES
· 2 RN skin check complete with KASI Navarro.  · Devices in place Nasal Cannula.  · Skin assessed under devices Ears, nose.  · Confirmed pressure ulcers found on N/A.  · New potential pressure ulcers noted on N/A. Wound consult placed and wound reported.  · The following interventions in place mobilize and eat nurtritous food

## 2019-04-23 NOTE — CARE PLAN
Problem: Pain Management  Goal: Pain level will decrease to patient's comfort goal  Outcome: PROGRESSING AS EXPECTED  Pt has pain at incision site with ambulating.  Rest and splinting with pillow have been adequate for controlling pain    Problem: Venous Thromboembolism (VTW)/Deep Vein Thrombosis (DVT) Prevention:  Goal: Patient will participate in Venous Thrombosis (VTE)/Deep Vein Thrombosis (DVT)Prevention Measures  Outcome: PROGRESSING AS EXPECTED  Pt has been wearing SCDs while in bed

## 2019-04-23 NOTE — PROGRESS NOTES
Pt assessed.  He has no complaints at this time.     Review of Systems  Constitutional:  No fever, chills, malaise, generalized weakness.  Eyes:  No visual changes, eye pain, or discharge.  ENMT:  No hearing changes, pain, or discharge. No nasal congestion, discharge, or bleeding. No throat pain, swelling, or difficulty swallowing.  Cardiac:  No chest pain, palpitations, syncope, or edema.  Respiratory:  No orthopnea, stridor, wheezing, or cough. No hemoptysis. (+) SOB  GI:  No nausea, vomiting, or abdominal pain. No melena or hematochezia. (+) diarrhea  :  No dysuria, hematuria, frequency, or burning.   MS:  No myalgia, muscle weakness, gait abnormality, joint swelling, joint pain, or back pain.  Skin:  No skin rash, pruritis, jaundice, or lesions.  Neuro:  No headache, dizziness, loss of sensation, or focal weakness.  No change in mental status.   Endocrine: No history of thyroid disease or diabetes.

## 2019-04-23 NOTE — THERAPY
"Occupational Therapy Evaluation completed.   Functional Status:  Pt is a 77 y/o male, admit with Cholecystitis, s/o surgery. Pt lives with his wife, who is waiting for hip surgery, and will not be able to assist. PLOF- I with AMB ADLS. Pt presents with minimal c/o pain with ADLS and functional mobility, is at the Supervised level for ADLS and functional mobility. Pt will be seen for initial evaluation and treatment only, as he is functional in this setting.  Plan of Care: Patient with no further skilled OT needs in the acute care setting at this time  Discharge Recommendations:  Equipment: No Equipment Needed. Post-acute therapy Discharge to home with outpatient or home health for additional skilled therapy services.    See \"Rehab Therapy-Acute\" Patient Summary Report for complete documentation.    "

## 2019-04-23 NOTE — PROGRESS NOTES
Pt has been sleeping well, denies chest pain, palpitations, nausea or pain. VSS. Pt has been on 3.5 liters of oxygen that has been titrated down to 2.5, pt denies using oxygen at home, dyspnea on exertion. Will continue to monitor.      Eyes with no redness or discharge.  Ears clean and dry. Nose with pink mucosa and no drainage.  Mouth mucous membranes moist and pink.

## 2019-04-23 NOTE — PROGRESS NOTES
Report received from KASI Freeman. Plan of care discussed. Patient resting comfortably in bed, declines any further needs at this time. Safety precautions in place.

## 2019-04-23 NOTE — PROGRESS NOTES
"Pt stated that he was \"having hallucinations\".  He said that he was seeing things, like the sun rising when it was already up and having auditory changes with sounds sounding different than they should.  Pt was vague about specifics of the hallucinations.  He denies feeling dizzy or lightheaded.  Neuro assessment otherwise WDL. Pt's temp was 99.8, PRN Tylenol administered.  MD notified.     1330: Pt stated that the hallucinations occurred during the night.  "

## 2019-04-23 NOTE — PROGRESS NOTES
Trauma / Surgical Daily Progress Note    Date of Service  2019    Chief Complaint  78 y.o. male admitted 2019 with Cholecystitis, acute    Interval Events  Transferred to [facility] for rapid A fib. Converted. Abd benign. Tolerating diet. RICH output down but is positive for bile. Ok to shower. Ok to DC with drain.    Review of Systems  Review of Systems   Gastrointestinal: Positive for abdominal pain.        Vital Signs for last 24 hours  Temp:  [36.4 °C (97.6 °F)-36.9 °C (98.4 °F)] 36.7 °C (98.1 °F)  Pulse:  [] 81  Resp:  [16-20] 18  BP: (103-144)/(62-77) 129/69  SpO2:  [96 %-100 %] 98 %    Hemodynamic parameters for last 24 hours       Respiratory Data     Respiration: 18, Pulse Oximetry: 98 %        RUL Breath Sounds: Diminished, RML Breath Sounds: Diminished, RLL Breath Sounds: Diminished, MOHINDER Breath Sounds: Diminished, LLL Breath Sounds: Diminished    Physical Exam  Physical Exam   Constitutional: He appears well-developed and well-nourished.   HENT:   Head: Normocephalic.   Eyes: No scleral icterus.   Neck: Neck supple.   Cardiovascular: Normal rate.    Pulmonary/Chest: Effort normal.   Abdominal: Soft. He exhibits no distension. There is tenderness.   Dressing dry  RICH less bilious appearing   Genitourinary:   Genitourinary Comments: Vela to gravity   Musculoskeletal: Normal range of motion.   Neurological: He is alert.   Skin: Skin is warm.       Laboratory  Recent Results (from the past 24 hour(s))   FLUID TOTAL BILIRUBIN    Collection Time: 19  8:44 AM   Result Value Ref Range    Fluid Type RICH Drainage     Fluid Bilirubin Total 5.9 mg/dL   EKG    Collection Time: 19  9:10 AM   Result Value Ref Range    Report       [facility]    Test Date:  2019  Pt Name:    ANKITA DAVID                 Department: [facility]  MRN:        [id number]                      Room:       3303  Gender:     Male                         Technician: PM  :        [id number]-                   Requested  By:IRAIDA MATA  Order #:    [id number]                    Reading MD: Robin Agosto MD    Measurements  Intervals                                Axis  Rate:       153                          P:  DE:                                      QRS:        -67  QRSD:       87                           T:          125  QT:         274  QTc:        438    Interpretive Statements  Supraventricular tachycardia, consider atypical flutter  Abnormal R-wave progression, late transition      Electronically Signed On 4- 12:48:25 PDT by Robin Agosto MD     CBC WITH DIFFERENTIAL    Collection Time: 04/23/19  3:37 AM   Result Value Ref Range    WBC 10.6 4.8 - 10.8 K/uL    RBC 2.57 (L) 4.70 - 6.10 M/uL    Hemoglobin 8.4 (L) 14.0 - 18.0 g/dL    Hematocrit 25.9 (L) 42.0 - 52.0 %    .8 (H) 81.4 - 97.8 fL    MCH 32.7 27.0 - 33.0 pg    MCHC 32.4 (L) 33.7 - 35.3 g/dL    RDW 51.4 (H) 35.9 - 50.0 fL    Platelet Count 201 164 - 446 K/uL    MPV 9.8 9.0 - 12.9 fL    Neutrophils-Polys 76.10 (H) 44.00 - 72.00 %    Lymphocytes 11.90 (L) 22.00 - 41.00 %    Monocytes 7.40 0.00 - 13.40 %    Eosinophils 1.90 0.00 - 6.90 %    Basophils 0.50 0.00 - 1.80 %    Immature Granulocytes 2.20 (H) 0.00 - 0.90 %    Nucleated RBC 0.40 /100 WBC    Neutrophils (Absolute) 8.10 (H) 1.82 - 7.42 K/uL    Lymphs (Absolute) 1.27 1.00 - 4.80 K/uL    Monos (Absolute) 0.79 0.00 - 0.85 K/uL    Eos (Absolute) 0.20 0.00 - 0.51 K/uL    Baso (Absolute) 0.05 0.00 - 0.12 K/uL    Immature Granulocytes (abs) 0.23 (H) 0.00 - 0.11 K/uL    NRBC (Absolute) 0.04 K/uL   Comp Metabolic Panel    Collection Time: 04/23/19  3:38 AM   Result Value Ref Range    Sodium 138 135 - 145 mmol/L    Potassium 3.8 3.6 - 5.5 mmol/L    Chloride 106 96 - 112 mmol/L    Co2 25 20 - 33 mmol/L    Anion Gap 7.0 0.0 - 11.9    Glucose 135 (H) 65 - 99 mg/dL    Bun 11 8 - 22 mg/dL    Creatinine 0.83 0.50 - 1.40 mg/dL    Calcium 7.7 (L) 8.4 - 10.2 mg/dL    AST(SGOT) 15 12 - 45 U/L    ALT(SGPT)  36 2 - 50 U/L    Alkaline Phosphatase 105 (H) 30 - 99 U/L    Total Bilirubin 1.0 0.1 - 1.5 mg/dL    Albumin 2.3 (L) 3.2 - 4.9 g/dL    Total Protein 5.2 (L) 6.0 - 8.2 g/dL    Globulin 2.9 1.9 - 3.5 g/dL    A-G Ratio 0.8 g/dL   ESTIMATED GFR    Collection Time: 04/23/19  3:38 AM   Result Value Ref Range    GFR If African American >60 >60 mL/min/1.73 m 2    GFR If Non African American >60 >60 mL/min/1.73 m 2       Fluids    Intake/Output Summary (Last 24 hours) at 04/23/19 0639  Last data filed at 04/23/19 0346   Gross per 24 hour   Intake              480 ml   Output              915 ml   Net             -435 ml       Core Measures & Quality Metrics  Labs reviewed, Medications reviewed and Radiology images reviewed  Vela catheter: Critically Ill - Requiring Accurate Measurement of Urinary Output      DVT Prophylaxis: Enoxaparin (Lovenox)  DVT prophylaxis - mechanical: SCDs  Ulcer prophylaxis: Yes  Antibiotics: Treating active infection/contamination beyond 24 hours perioperative coverage  Assessed for rehab: Patient returned to prior level of function, rehabilitation not indicated at this time    MI Score  ETOH Screening    Assessment/Plan  * Acute cholecystitis- (present on admission)   Assessment & Plan    Severe cholecystitis  4/19 - Lap sheryl converted to open  4/20 - ERCP.   LFTs down         Discussed patient condition with Family, RN and Patient.  CRITICAL CARE TIME EXCLUDING PROCEDURES: 20    minutes

## 2019-04-23 NOTE — CARE PLAN
Problem: Safety  Goal: Will remain free from injury  Outcome: PROGRESSING AS EXPECTED  Instructed patinet on use of call light, call for assistance when ambulating, frequent checks, offer toileting on a regular basis.      Problem: Knowledge Deficit  Goal: Knowledge of disease process/condition, treatment plan, diagnostic tests, and medications will improve  Outcome: PROGRESSING AS EXPECTED  Discussed plan of care with patient, answered questions best to my ability.

## 2019-04-23 NOTE — PROGRESS NOTES
Telemetry Shift Summary    Rhythm Afib with RVR (140s-150s) to SR (converted at 1220)  HR Range 80s-90s  Ectopy rare PVC, frequent PAC  Measurements 0.20/0.08/0.34        Normal Values  Rhythm SR  HR Range    Measurements 0.12-0.20 / 0.06-0.10  / 0.30-0.52

## 2019-04-23 NOTE — PROGRESS NOTES
Dressing over pt's abdominal incision removed per MD order.  Wound edges are well approximated, closed via staples.  Dressing over umbilical incision also removed, wound is closed via staples as well.  Both incisions are free of signs of infection and drainage. RICH drain in RLQ emptied with 25ml of sanguinous drainage. Sterile guaze placed over drain entrance.

## 2019-04-23 NOTE — PROGRESS NOTES
[facility] Medicine Daily Progress Note    Date of Service  4/23/2019    Chief Complaint  78 y.o. male admitted 4/19/2019 with abdominal pain.     Hospital Course    He was found to have acute cholecystitis and underwent a lap converted to open cholecystectomy.  He was later seen by GI and had an ercp with stone extraction. His admission was complicated by afib with RVR.       Interval Problem Update  No acute issues, patient's only complaint is mild abdominal pain around the incision site, 2 out of 10 in severity, exacerbated by movement.  Otherwise denies have any chest pain shortness of breath or nausea vomiting    Consultants/Specialty  GI  Surgery    Code Status  full    Disposition  I suspect home once improved but not sure. Pt/ot orders placed.     Review of Systems  Review of Systems   Constitutional: Positive for malaise/fatigue. Negative for chills and fever.   HENT: Negative for congestion, hearing loss, sore throat and tinnitus.    Eyes: Negative for blurred vision, double vision, photophobia and pain.   Respiratory: Negative for cough, hemoptysis, sputum production, shortness of breath and stridor.    Cardiovascular: Negative for chest pain, palpitations, orthopnea, claudication and PND.   Gastrointestinal: Positive for abdominal pain. Negative for blood in stool, constipation, heartburn, melena, nausea and vomiting.   Genitourinary: Negative for dysuria, frequency and urgency.   Musculoskeletal: Negative for back pain, myalgias and neck pain.   Skin: Negative for itching and rash.   Neurological: Negative for dizziness, tingling, tremors, sensory change, speech change, weakness and headaches.   Psychiatric/Behavioral: Negative for depression, memory loss and suicidal ideas. The patient is not nervous/anxious and does not have insomnia.         Physical Exam  Temp:  [36.4 °C (97.6 °F)-36.9 °C (98.4 °F)] 36.5 °C (97.7 °F)  Pulse:  [] 87  Resp:  [16-18] 16  BP: (103-144)/(62-77) 134/77  SpO2:  [96  %-100 %] 97 %    Physical Exam   Constitutional: He is oriented to person, place, and time. He appears well-developed and well-nourished. No distress.   HENT:   Head: Normocephalic and atraumatic.   Mouth/Throat: No oropharyngeal exudate.   Eyes: Pupils are equal, round, and reactive to light. Conjunctivae are normal. Right eye exhibits no discharge. No scleral icterus.   Neck: Neck supple. No JVD present. No thyromegaly present.   Cardiovascular: Normal rate and intact distal pulses.    No murmur heard.  Pulses:       Dorsalis pedis pulses are 2+ on the right side, and 2+ on the left side.   Cap refill < 3 s   Pulmonary/Chest: Effort normal and breath sounds normal. No stridor. No respiratory distress. He has no wheezes. He has no rales.   Abdominal: Soft. Bowel sounds are normal. He exhibits no distension and no mass. There is tenderness (around incision site, clean, no issues ). There is no rebound and no guarding.   Musculoskeletal: Normal range of motion. He exhibits no edema.   Neurological: He is alert and oriented to person, place, and time. No cranial nerve deficit.   Skin: Skin is warm and dry. He is not diaphoretic. No erythema.   Psychiatric: He has a normal mood and affect. His behavior is normal. Thought content normal.   Nursing note and vitals reviewed.      Fluids    Intake/Output Summary (Last 24 hours) at 04/23/19 1037  Last data filed at 04/23/19 0346   Gross per 24 hour   Intake              360 ml   Output              565 ml   Net             -205 ml       Laboratory  Recent Labs      04/21/19   0449 04/22/19   0507  04/23/19   0337   WBC  14.6*  11.9*  10.6   RBC  2.70*  2.70*  2.57*   HEMOGLOBIN  8.7*  8.8*  8.4*   HEMATOCRIT  26.7*  26.8*  25.9*   MCV  98.9*  99.3*  100.8*   MCH  32.2  32.6  32.7   MCHC  32.6*  32.8*  32.4*   RDW  51.2*  50.5*  51.4*   PLATELETCT  137*  181  201   MPV  10.1  10.2  9.8     Recent Labs      04/21/19   0449  04/22/19   0507  04/23/19   0338   SODIUM  135  136   138   POTASSIUM  3.7  3.6  3.8   CHLORIDE  106  107  106   CO2  24  24  25   GLUCOSE  165*  141*  135*   BUN  15  11  11   CREATININE  0.80  0.86  0.83   CALCIUM  7.4*  7.8*  7.7*                   Imaging  DX-PORTABLE FLUOROSCOPY < 1 HOUR   Final Result      Fluoroscopic images obtained during ERCP      [id number] VIEW   Final Result         1.  No visualized radiopaque surgical foreign bodies, note that the left lateral abdomen and pelvis are excluded on this exam limiting evaluation.   2.  Left lung base infiltrate      [id number]   Final Result      1.  Tiny low T2 signal focus dependently located in the distal common bile duct may represent a small calculus or crossing vessel. The finding cannot be confirmed on oblique sagittal or MRCP images. The distal common bile duct is not dilated to suggest    biliary obstruction.      2.  Cholelithiasis and gallbladder sludge. No wall thickening is identified. Ill-defined fluid inferior to the gallbladder and surrounding the proximal duodenum may be related to inflammation from acute cholecystitis or duodenitis. No associated duodenal    wall thickening is appreciated.      3.  4.8 cm fusiform infrarenal abdominal aortic aneurysm.      4.  Diverticulosis      US-RUQ   Final Result         1.  4.1 cm infrarenal abdominal aortic aneurysm, recommend radiographic follow-up and surveillance as clinically appropriate.   2.  Echogenic liver, compatible with fatty change versus fibrosis.   3.  Mild prominence of the pancreatic duct, could represent age-related changes, could be further evaluated with ERCP as clinically appropriate.   4.  Dependent gallbladder sludge without additional sonographic findings to suggest acute cholecystitis.      DX-CHEST-LIMITED (1 VIEW)   Final Result         1.  Left basilar atelectasis, no focal infiltrate   2.  Atherosclerosis      EC-ECHOCARDIOGRAM COMPLETE W/O CONT    (Results Pending)        Assessment/Plan  * Acute cholecystitis-  (present on admission)   Assessment & Plan    S/p cholecystectomy open.  S/p ercp and stone extraction.   Severe cholecystitis and needed open procedure.   Resume IV antibiotics.  Pain control  Advance diet as tolerated per surgery       DAKOTAH (acute kidney injury) ([facility])- (present on admission)   Assessment & Plan    Resolved  CTM     Hypertension, essential- (present on admission)   Assessment & Plan    Controlled  Resume home dose of metoprolol     Atrial fibrillation with RVR ([facility])   Assessment & Plan    New onset.  Continue with metoprolol  Needs Anticoagulation at some point, will start once cleared by surgery  Echocardiogram pending       Choledocholithiasis- (present on admission)   Assessment & Plan    S/p ercp with stone extraction.      DM2 (diabetes mellitus, type 2) ([facility])- (present on admission)   Assessment & Plan    A1c 6.5  Patient will need home medication such as metformin  Diabetic education provided     Dyslipidemia- (present on admission)   Assessment & Plan    Resume home dose of Lipitor on discharge        Patient plan of care discussed at multidisplinary team rounds and with patient and R.N at beside.      VTE prophylaxis: SCDs

## 2019-04-24 ENCOUNTER — PATIENT OUTREACH (OUTPATIENT)
Dept: HEALTH INFORMATION MANAGEMENT | Facility: OTHER | Age: 79
End: 2019-04-24

## 2019-04-24 VITALS
TEMPERATURE: 98.2 F | RESPIRATION RATE: 20 BRPM | WEIGHT: 182.32 LBS | SYSTOLIC BLOOD PRESSURE: 121 MMHG | BODY MASS INDEX: 27 KG/M2 | HEART RATE: 103 BPM | DIASTOLIC BLOOD PRESSURE: 60 MMHG | OXYGEN SATURATION: 93 % | HEIGHT: 69 IN

## 2019-04-24 PROBLEM — N17.9 AKI (ACUTE KIDNEY INJURY) (HCC): Status: RESOLVED | Noted: 2018-09-30 | Resolved: 2019-04-24

## 2019-04-24 PROBLEM — K80.50 CHOLEDOCHOLITHIASIS: Status: RESOLVED | Noted: 2019-04-21 | Resolved: 2019-04-24

## 2019-04-24 PROBLEM — K81.0 ACUTE CHOLECYSTITIS: Status: RESOLVED | Noted: 2019-04-19 | Resolved: 2019-04-24

## 2019-04-24 PROBLEM — I48.91 ATRIAL FIBRILLATION WITH RVR (HCC): Status: RESOLVED | Noted: 2019-04-22 | Resolved: 2019-04-24

## 2019-04-24 LAB
ALBUMIN SERPL BCP-MCNC: 2.3 G/DL (ref 3.2–4.9)
ALBUMIN/GLOB SERPL: 0.8 G/DL
ALP SERPL-CCNC: 112 U/L (ref 30–99)
ALT SERPL-CCNC: 30 U/L (ref 2–50)
ANION GAP SERPL CALC-SCNC: 8 MMOL/L (ref 0–11.9)
AST SERPL-CCNC: 14 U/L (ref 12–45)
BACTERIA BLD CULT: NORMAL
BACTERIA BLD CULT: NORMAL
BASOPHILS # BLD AUTO: 0.8 % (ref 0–1.8)
BASOPHILS # BLD: 0.1 K/UL (ref 0–0.12)
BILIRUB SERPL-MCNC: 1.3 MG/DL (ref 0.1–1.5)
BUN SERPL-MCNC: 12 MG/DL (ref 8–22)
CALCIUM SERPL-MCNC: 7.9 MG/DL (ref 8.4–10.2)
CHLORIDE SERPL-SCNC: 104 MMOL/L (ref 96–112)
CO2 SERPL-SCNC: 26 MMOL/L (ref 20–33)
CREAT SERPL-MCNC: 0.99 MG/DL (ref 0.5–1.4)
EOSINOPHIL # BLD AUTO: 0.36 K/UL (ref 0–0.51)
EOSINOPHIL NFR BLD: 2.9 % (ref 0–6.9)
ERYTHROCYTE [DISTWIDTH] IN BLOOD BY AUTOMATED COUNT: 52.2 FL (ref 35.9–50)
GLOBULIN SER CALC-MCNC: 2.9 G/DL (ref 1.9–3.5)
GLUCOSE SERPL-MCNC: 129 MG/DL (ref 65–99)
HCT VFR BLD AUTO: 26.5 % (ref 42–52)
HGB BLD-MCNC: 8.5 G/DL (ref 14–18)
IMM GRANULOCYTES # BLD AUTO: 0.4 K/UL (ref 0–0.11)
IMM GRANULOCYTES NFR BLD AUTO: 3.2 % (ref 0–0.9)
LYMPHOCYTES # BLD AUTO: 1.35 K/UL (ref 1–4.8)
LYMPHOCYTES NFR BLD: 10.7 % (ref 22–41)
MCH RBC QN AUTO: 32.8 PG (ref 27–33)
MCHC RBC AUTO-ENTMCNC: 32.1 G/DL (ref 33.7–35.3)
MCV RBC AUTO: 102.3 FL (ref 81.4–97.8)
MONOCYTES # BLD AUTO: 0.91 K/UL (ref 0–0.85)
MONOCYTES NFR BLD AUTO: 7.2 % (ref 0–13.4)
NEUTROPHILS # BLD AUTO: 9.47 K/UL (ref 1.82–7.42)
NEUTROPHILS NFR BLD: 75.2 % (ref 44–72)
NRBC # BLD AUTO: 0.09 K/UL
NRBC BLD-RTO: 0.7 /100 WBC
PLATELET # BLD AUTO: 219 K/UL (ref 164–446)
PMV BLD AUTO: 9.8 FL (ref 9–12.9)
POTASSIUM SERPL-SCNC: 4.4 MMOL/L (ref 3.6–5.5)
PROT SERPL-MCNC: 5.2 G/DL (ref 6–8.2)
RBC # BLD AUTO: 2.59 M/UL (ref 4.7–6.1)
SIGNIFICANT IND 70042: NORMAL
SIGNIFICANT IND 70042: NORMAL
SITE SITE: NORMAL
SITE SITE: NORMAL
SODIUM SERPL-SCNC: 138 MMOL/L (ref 135–145)
SOURCE SOURCE: NORMAL
SOURCE SOURCE: NORMAL
WBC # BLD AUTO: 12.6 K/UL (ref 4.8–10.8)

## 2019-04-24 PROCEDURE — 85025 COMPLETE CBC W/AUTO DIFF WBC: CPT

## 2019-04-24 PROCEDURE — 80053 COMPREHEN METABOLIC PANEL: CPT

## 2019-04-24 PROCEDURE — 99239 HOSP IP/OBS DSCHRG MGMT >30: CPT | Performed by: HOSPITALIST

## 2019-04-24 PROCEDURE — 700102 HCHG RX REV CODE 250 W/ 637 OVERRIDE(OP): Performed by: HOSPITALIST

## 2019-04-24 PROCEDURE — A9270 NON-COVERED ITEM OR SERVICE: HCPCS | Performed by: HOSPITALIST

## 2019-04-24 RX ORDER — AMOXICILLIN AND CLAVULANATE POTASSIUM 875; 125 MG/1; MG/1
1 TABLET, FILM COATED ORAL 2 TIMES DAILY
Qty: 8 TAB | Refills: 0 | Status: SHIPPED | OUTPATIENT
Start: 2019-04-24 | End: 2019-04-28

## 2019-04-24 RX ADMIN — METOPROLOL SUCCINATE 100 MG: 100 TABLET, EXTENDED RELEASE ORAL at 05:50

## 2019-04-24 RX ADMIN — METRONIDAZOLE 500 MG: 500 TABLET ORAL at 05:50

## 2019-04-24 RX ADMIN — APIXABAN 5 MG: 5 TABLET, FILM COATED ORAL at 11:25

## 2019-04-24 ASSESSMENT — ENCOUNTER SYMPTOMS: ABDOMINAL PAIN: 1

## 2019-04-24 NOTE — PROGRESS NOTES
Telemetry Shift Summary    Rhythm SR/ST  HR Range 70s-100s  Ectopy Frequent PACs and PVCs, rare couplets  Measurements 0.16/0.08/0.36        Normal Values  Rhythm SR  HR Range    Measurements 0.12-0.20 / 0.06-0.10  / 0.30-0.52

## 2019-04-24 NOTE — PROGRESS NOTES
Trauma / Surgical Daily Progress Note    Date of Service  4/24/2019    Chief Complaint  78 y.o. male admitted 4/19/2019 with Cholecystitis, acute    Interval Events  Still on tele. Ok to anticoagulate from surgical standpoint. Tolerating diet. RICH output down but is positive for bile. Ok to shower. Ok to DC with drain. Follow up with me 4/29    Review of Systems  Review of Systems   Gastrointestinal: Positive for abdominal pain.        Vital Signs for last 24 hours  Temp:  [36.5 °C (97.7 °F)-37.7 °C (99.8 °F)] 37.1 °C (98.8 °F)  Pulse:  [50-87] 69  Resp:  [16-18] 18  BP: (118-134)/(69-79) 118/69  SpO2:  [90 %-100 %] 95 %    Hemodynamic parameters for last 24 hours       Respiratory Data     Respiration: 18, Pulse Oximetry: 95 %        RUL Breath Sounds: Clear, RML Breath Sounds: Diminished, RLL Breath Sounds: Diminished, MOHINDER Breath Sounds: Clear, LLL Breath Sounds: Diminished    Physical Exam  Physical Exam   Constitutional: He appears well-developed and well-nourished.   HENT:   Head: Normocephalic.   Eyes: No scleral icterus.   Neck: Neck supple.   Cardiovascular: Normal rate.    Pulmonary/Chest: Effort normal.   Abdominal: Soft. He exhibits no distension. There is tenderness.   Dressing dry  RICH less bilious appearing   Genitourinary:   Genitourinary Comments: Vela to gravity   Musculoskeletal: Normal range of motion.   Neurological: He is alert.   Skin: Skin is warm.       Laboratory  Recent Results (from the past 24 hour(s))   EC-ECHOCARDIOGRAM COMPLETE W/O CONT    Collection Time: 04/23/19  4:53 PM   Result Value Ref Range    Eject.Frac. MOD BP 71.47     Eject.Frac. MOD 4C 69.6     Eject.Frac. MOD 2C 72.47     Left Ventrical Ejection Fraction 65    CBC WITH DIFFERENTIAL    Collection Time: 04/24/19  4:20 AM   Result Value Ref Range    WBC 12.6 (H) 4.8 - 10.8 K/uL    RBC 2.59 (L) 4.70 - 6.10 M/uL    Hemoglobin 8.5 (L) 14.0 - 18.0 g/dL    Hematocrit 26.5 (L) 42.0 - 52.0 %    .3 (H) 81.4 - 97.8 fL    MCH  32.8 27.0 - 33.0 pg    MCHC 32.1 (L) 33.7 - 35.3 g/dL    RDW 52.2 (H) 35.9 - 50.0 fL    Platelet Count 219 164 - 446 K/uL    MPV 9.8 9.0 - 12.9 fL    Neutrophils-Polys 75.20 (H) 44.00 - 72.00 %    Lymphocytes 10.70 (L) 22.00 - 41.00 %    Monocytes 7.20 0.00 - 13.40 %    Eosinophils 2.90 0.00 - 6.90 %    Basophils 0.80 0.00 - 1.80 %    Immature Granulocytes 3.20 (H) 0.00 - 0.90 %    Nucleated RBC 0.70 /100 WBC    Neutrophils (Absolute) 9.47 (H) 1.82 - 7.42 K/uL    Lymphs (Absolute) 1.35 1.00 - 4.80 K/uL    Monos (Absolute) 0.91 (H) 0.00 - 0.85 K/uL    Eos (Absolute) 0.36 0.00 - 0.51 K/uL    Baso (Absolute) 0.10 0.00 - 0.12 K/uL    Immature Granulocytes (abs) 0.40 (H) 0.00 - 0.11 K/uL    NRBC (Absolute) 0.09 K/uL   Comp Metabolic Panel    Collection Time: 04/24/19  4:20 AM   Result Value Ref Range    Sodium 138 135 - 145 mmol/L    Potassium 4.4 3.6 - 5.5 mmol/L    Chloride 104 96 - 112 mmol/L    Co2 26 20 - 33 mmol/L    Anion Gap 8.0 0.0 - 11.9    Glucose 129 (H) 65 - 99 mg/dL    Bun 12 8 - 22 mg/dL    Creatinine 0.99 0.50 - 1.40 mg/dL    Calcium 7.9 (L) 8.4 - 10.2 mg/dL    AST(SGOT) 14 12 - 45 U/L    ALT(SGPT) 30 2 - 50 U/L    Alkaline Phosphatase 112 (H) 30 - 99 U/L    Total Bilirubin 1.3 0.1 - 1.5 mg/dL    Albumin 2.3 (L) 3.2 - 4.9 g/dL    Total Protein 5.2 (L) 6.0 - 8.2 g/dL    Globulin 2.9 1.9 - 3.5 g/dL    A-G Ratio 0.8 g/dL   ESTIMATED GFR    Collection Time: 04/24/19  4:20 AM   Result Value Ref Range    GFR If African American >60 >60 mL/min/1.73 m 2    GFR If Non African American >60 >60 mL/min/1.73 m 2       Fluids    Intake/Output Summary (Last 24 hours) at 04/24/19 0646  Last data filed at 04/24/19 0600   Gross per 24 hour   Intake                0 ml   Output               45 ml   Net              -45 ml       Core Measures & Quality Metrics  Labs reviewed, Medications reviewed and Radiology images reviewed  Vela catheter: Critically Ill - Requiring Accurate Measurement of Urinary Output      DVT  Prophylaxis: Enoxaparin (Lovenox)  DVT prophylaxis - mechanical: SCDs  Ulcer prophylaxis: Yes  Antibiotics: Treating active infection/contamination beyond 24 hours perioperative coverage  Assessed for rehab: Patient returned to prior level of function, rehabilitation not indicated at this time    IM Score  ETOH Screening    Assessment/Plan  * Acute cholecystitis- (present on admission)   Assessment & Plan    Severe cholecystitis  4/19 - Lap sheryl converted to open  4/20 - ERCP.   LFTs down         Discussed patient condition with Family, RN and Patient.  CRITICAL CARE TIME EXCLUDING PROCEDURES: 20    minutes

## 2019-04-24 NOTE — PROGRESS NOTES
Received report from Rishi VILLASEÑOR. Discussed plan of care and safety measures in place. No further needs at this time.

## 2019-04-24 NOTE — DISCHARGE INSTRUCTIONS
Discharge Instructions    Discharged to home by car with relative. Discharged via wheelchair, hospital escort: Yes.  Special equipment needed: Not Applicable    Be sure to schedule a follow-up appointment with your primary care doctor or any specialists as instructed.     Discharge Plan:   Diet Plan: Discussed  Activity Level: Discussed  Confirmed Follow up Appointment: Appointment Scheduled  Confirmed Symptoms Management: Discussed  Medication Reconciliation Updated: Yes  Influenza Vaccine Indication: Not indicated: Previously immunized this influenza season and > 8 years of age    I understand that a diet low in cholesterol, fat, and sodium is recommended for good health. Unless I have been given specific instructions below for another diet, I accept this instruction as my diet prescription.   Other diet: none    Special Instructions: None    · Is patient discharged on Warfarin / Coumadin?   No       Laparoscopic Cholecystectomy  Laparoscopic cholecystectomy is surgery to remove the gallbladder. The gallbladder is a pear-shaped organ that lies beneath the liver on the right side of the body. The gallbladder stores bile, which is a fluid that helps the body to digest fats. Cholecystectomy is often done for inflammation of the gallbladder (cholecystitis). This condition is usually caused by a buildup of gallstones (cholelithiasis) in the gallbladder. Gallstones can block the flow of bile, which can result in inflammation and pain. In severe cases, emergency surgery may be required.  This procedure is done though small incisions in your abdomen (laparoscopic surgery). A thin scope with a camera (laparoscope) is inserted through one incision. Thin surgical instruments are inserted through the other incisions. In some cases, a laparoscopic procedure may be turned into a type of surgery that is done through a larger incision (open surgery).  Tell a health care provider about:  · Any allergies you have.  · All medicines  you are taking, including vitamins, herbs, eye drops, creams, and over-the-counter medicines.  · Any problems you or family members have had with anesthetic medicines.  · Any blood disorders you have.  · Any surgeries you have had.  · Any medical conditions you have.  · Whether you are pregnant or may be pregnant.  What are the risks?  Generally, this is a safe procedure. However, problems may occur, including:  · Infection.  · Bleeding.  · Allergic reactions to medicines.  · Damage to other structures or organs.  · A stone remaining in the common bile duct. The common bile duct carries bile from the gallbladder into the small intestine.  · A bile leak from the cyst duct that is clipped when your gallbladder is removed.  What happens before the procedure?  Staying hydrated   Follow instructions from your health care provider about hydration, which may include:  · Up to 2 hours before the procedure - you may continue to drink clear liquids, such as water, clear fruit juice, black coffee, and plain tea.  Eating and drinking restrictions   Follow instructions from your health care provider about eating and drinking, which may include:  · 8 hours before the procedure - stop eating heavy meals or foods such as meat, fried foods, or fatty foods.  · 6 hours before the procedure - stop eating light meals or foods, such as toast or cereal.  · 6 hours before the procedure - stop drinking milk or drinks that contain milk.  · 2 hours before the procedure - stop drinking clear liquids.  Medicines  · Ask your health care provider about:  ¨ Changing or stopping your regular medicines. This is especially important if you are taking diabetes medicines or blood thinners.  ¨ Taking medicines such as aspirin and ibuprofen. These medicines can thin your blood. Do not take these medicines before your procedure if your health care provider instructs you not to.  · You may be given antibiotic medicine to help prevent infection.  General  instructions  · Let your health care provider know if you develop a cold or an infection before surgery.  · Plan to have someone take you home from the hospital or clinic.  · Ask your health care provider how your surgical site will be marked or identified.  What happens during the procedure?  · To reduce your risk of infection:  ¨ Your health care team will wash or sanitize their hands.  ¨ Your skin will be washed with soap.  ¨ Hair may be removed from the surgical area.  · An IV tube may be inserted into one of your veins.  · You will be given one or more of the following:  ¨ A medicine to help you relax (sedative).  ¨ A medicine to make you fall asleep (general anesthetic).  · A breathing tube will be placed in your mouth.  · Your surgeon will make several small cuts (incisions) in your abdomen.  · The laparoscope will be inserted through one of the small incisions. The camera on the laparoscope will send images to a TV screen (monitor) in the operating room. This lets your surgeon see inside your abdomen.  · Air-like gas will be pumped into your abdomen. This will expand your abdomen to give the surgeon more room to perform the surgery.  · Other tools that are needed for the procedure will be inserted through the other incisions. The gallbladder will be removed through one of the incisions.  · Your common bile duct may be examined. If stones are found in the common bile duct, they may be removed.  · After your gallbladder has been removed, the incisions will be closed with stitches (sutures), staples, or skin glue.  · Your incisions may be covered with a bandage (dressing).  The procedure may vary among health care providers and hospitals.  What happens after the procedure?  · Your blood pressure, heart rate, breathing rate, and blood oxygen level will be monitored until the medicines you were given have worn off.  · You will be given medicines as needed to control your pain.  · Do not drive for 24 hours if you  were given a sedative.  This information is not intended to replace advice given to you by your health care provider. Make sure you discuss any questions you have with your health care provider.  Document Released: 12/18/2006 Document Revised: 07/09/2017 Document Reviewed: 06/05/2017  [employer] Interactive Patient Education © 2017 [employer] Inc.  Apixaban oral tablets  What is this medicine?  APIXABAN (a PIX a ban) is an anticoagulant (blood thinner). It is used to lower the chance of stroke in people with a medical condition called atrial fibrillation. It is also used to treat or prevent blood clots in the lungs or in the veins.  This medicine may be used for other purposes; ask your health care provider or pharmacist if you have questions.  COMMON BRAND NAME(S): Eliquis  What should I tell my health care provider before I take this medicine?  They need to know if you have any of these conditions:  -bleeding disorders  -bleeding in the brain  -blood in your stools (black or tarry stools) or if you have blood in your vomit  -history of stomach bleeding  -kidney disease  -liver disease  -mechanical heart valve  -an unusual or allergic reaction to apixaban, other medicines, foods, dyes, or preservatives  -pregnant or trying to get pregnant  -breast-feeding  How should I use this medicine?  Take this medicine by mouth with a glass of water. Follow the directions on the prescription label. You can take it with or without food. If it upsets your stomach, take it with food. Take your medicine at regular intervals. Do not take it more often than directed. Do not stop taking except on your doctor's advice. Stopping this medicine may increase your risk of a blot clot. Be sure to refill your prescription before you run out of medicine.  Talk to your pediatrician regarding the use of this medicine in children. Special care may be needed.  Overdosage: If you think you have taken too much of this medicine contact a poison control  center or emergency room at once.  NOTE: This medicine is only for you. Do not share this medicine with others.  What if I miss a dose?  If you miss a dose, take it as soon as you can. If it is almost time for your next dose, take only that dose. Do not take double or extra doses.  What may interact with this medicine?  This medicine may interact with the following:  -aspirin and aspirin-like medicines  -certain medicines for fungal infections like ketoconazole and itraconazole  -certain medicines for seizures like carbamazepine and phenytoin  -certain medicines that treat or prevent blood clots like warfarin, enoxaparin, and dalteparin  -clarithromycin  -NSAIDs, medicines for pain and inflammation, like ibuprofen or naproxen  -rifampin  -ritonavir  -[place]'s wort  This list may not describe all possible interactions. Give your health care provider a list of all the medicines, herbs, non-prescription drugs, or dietary supplements you use. Also tell them if you smoke, drink alcohol, or use illegal drugs. Some items may interact with your medicine.  What should I watch for while using this medicine?  Visit your doctor or health care professional for regular checks on your progress.  Notify your doctor or health care professional and seek emergency treatment if you develop breathing problems; changes in vision; chest pain; severe, sudden headache; pain, swelling, warmth in the leg; trouble speaking; sudden numbness or weakness of the face, arm or leg. These can be signs that your condition has gotten worse.  If you are going to have surgery or other procedure, tell your doctor that you are taking this medicine.  What side effects may I notice from receiving this medicine?  Side effects that you should report to your doctor or health care professional as soon as possible:  -allergic reactions like skin rash, itching or hives, swelling of the face, lips, or tongue  -signs and symptoms of bleeding such as bloody or  black, tarry stools; red or dark-brown urine; spitting up blood or brown material that looks like coffee grounds; red spots on the skin; unusual bruising or bleeding from the eye, gums, or nose  This list may not describe all possible side effects. Call your doctor for medical advice about side effects. You may report side effects to FDA at 5-562-HAU-3775.  Where should I keep my medicine?  Keep out of the reach of children.  Store at room temperature between 20 and 25 degrees C (68 and 77 degrees F). Throw away any unused medicine after the expiration date.  NOTE: This sheet is a summary. It may not cover all possible information. If you have questions about this medicine, talk to your doctor, pharmacist, or health care provider.  © 2018 Elsevier/Gold Standard (2017-07-10 11:54:23)    Amoxicillin capsules or tablets  What is this medicine?  AMOXICILLIN (a mox i CL in) is a penicillin antibiotic. It is used to treat certain kinds of bacterial infections. It will not work for colds, flu, or other viral infections.  This medicine may be used for other purposes; ask your health care provider or pharmacist if you have questions.  COMMON BRAND NAME(S): Amoxil, Moxilin, Sumox, Trimox  What should I tell my health care provider before I take this medicine?  They need to know if you have any of these conditions:  -asthma  -kidney disease  -an unusual or allergic reaction to amoxicillin, other penicillins, cephalosporin antibiotics, other medicines, foods, dyes, or preservatives  -pregnant or trying to get pregnant  -breast-feeding  How should I use this medicine?  Take this medicine by mouth with a glass of water. Follow the directions on your prescription label. You may take this medicine with food or on an empty stomach. Take your medicine at regular intervals. Do not take your medicine more often than directed. Take all of your medicine as directed even if you think your are better. Do not skip doses or stop your  medicine early.  Talk to your pediatrician regarding the use of this medicine in children. While this drug may be prescribed for selected conditions, precautions do apply.  Overdosage: If you think you have taken too much of this medicine contact a poison control center or emergency room at once.  NOTE: This medicine is only for you. Do not share this medicine with others.  What if I miss a dose?  If you miss a dose, take it as soon as you can. If it is almost time for your next dose, take only that dose. Do not take double or extra doses.  What may interact with this medicine?  -amiloride  -birth control pills  -chloramphenicol  -macrolides  -probenecid  -sulfonamides  -tetracyclines  This list may not describe all possible interactions. Give your health care provider a list of all the medicines, herbs, non-prescription drugs, or dietary supplements you use. Also tell them if you smoke, drink alcohol, or use illegal drugs. Some items may interact with your medicine.  What should I watch for while using this medicine?  Tell your doctor or health care professional if your symptoms do not improve in 2 or 3 days. Take all of the doses of your medicine as directed. Do not skip doses or stop your medicine early.  If you are diabetic, you may get a false positive result for sugar in your urine with certain brands of urine tests. Check with your doctor.  Do not treat diarrhea with over-the-counter products. Contact your doctor if you have diarrhea that lasts more than 2 days or if the diarrhea is severe and watery.  What side effects may I notice from receiving this medicine?  Side effects that you should report to your doctor or health care professional as soon as possible:  -allergic reactions like skin rash, itching or hives, swelling of the face, lips, or tongue  -breathing problems  -dark urine  -redness, blistering, peeling or loosening of the skin, including inside the mouth  -seizures  -severe or watery  diarrhea  -trouble passing urine or change in the amount of urine  -unusual bleeding or bruising  -unusually weak or tired  -yellowing of the eyes or skin  Side effects that usually do not require medical attention (report to your doctor or health care professional if they continue or are bothersome):  -dizziness  -headache  -stomach upset  -trouble sleeping  This list may not describe all possible side effects. Call your doctor for medical advice about side effects. You may report side effects to FDA at 9-646-TPZ-0730.  Where should I keep my medicine?  Keep out of the reach of children.  Store between 68 and 77 degrees F (20 and 25 degrees C). Keep bottle closed tightly. Throw away any unused medicine after the expiration date.  NOTE: This sheet is a summary. It may not cover all possible information. If you have questions about this medicine, talk to your doctor, pharmacist, or health care provider.  © 2018 Elsevier/Gold Standard (2009-03-10 14:10:59)      Depression / Suicide Risk    As you are discharged from this [facility] facility, it is important to learn how to keep safe from harming yourself.    Recognize the warning signs:  · Abrupt changes in personality, positive or negative- including increase in energy   · Giving away possessions  · Change in eating patterns- significant weight changes-  positive or negative  · Change in sleeping patterns- unable to sleep or sleeping all the time   · Unwillingness or inability to communicate  · Depression  · Unusual sadness, discouragement and loneliness  · Talk of wanting to die  · Neglect of personal appearance   · Rebelliousness- reckless behavior  · Withdrawal from people/activities they love  · Confusion- inability to concentrate     If you or a loved one observes any of these behaviors or has concerns about self-harm, here's what you can do:  · Talk about it- your feelings and reasons for harming yourself  · Remove any means that you might use to hurt  yourself (examples: pills, rope, extension cords, firearm)  · Get professional help from the community (Mental Health, Substance Abuse, psychological counseling)  · Do not be alone:Call your Safe Contact- someone whom you trust who will be there for you.  · Call your local CRISIS HOTLINE 752-7939 or 172-528-8774  · Call your local Children's Mobile Crisis Response Team [place] (457) 549-3042 or www.Bookya  · Call the toll free National Suicide Prevention Hotlines   · National Suicide Prevention Lifeline 215-917-OFWW (3004)  · National Hope Line Network 800-SUICIDE (592-1917)

## 2019-04-24 NOTE — PROGRESS NOTES
Educated family and patient how to take care of RICH drain at home. All questions answered and family and patient understood how to care for RICH drain.

## 2019-04-24 NOTE — PROGRESS NOTES
Telemetry Shift Summary    Rhythm SR ST  HR Range 70-110s  Ectopy freq pac pvc  Measurements 0.18/0.08/0.36        Normal Values  Rhythm SR  HR Range    Measurements 0.12-0.20 / 0.06-0.10  / 0.30-0.52

## 2019-04-24 NOTE — PROGRESS NOTES
Pt is A&Ox4, resting comfortably in bed. Assessment completed. Due medication have been given. Bed is in lowest postion, and side rails up x2, pt calls when needs assistance and call light within reach.      Pt is off oxygen (RA) and oxygen saturation is 93%.

## 2019-04-24 NOTE — PROGRESS NOTES
Pt discharged to home. Discharge instructions provided to pt. Pt verbalizes understanding. Pt states all questions have been answered. Signed copy in chart. Prescriptions sent to [facility]. Pt states that all personal belongings are in possession. Pt off unit via wheelchair, escorted by Tab.

## 2019-04-24 NOTE — DISCHARGE SUMMARY
Discharge Summary    CHIEF COMPLAINT ON ADMISSION  Chief Complaint   Patient presents with   • Epigastric Pain   • N/V       Reason for Admission  abdominal pain/vomiting     Admission Date  4/19/2019    CODE STATUS  Full Code    HPI & HOSPITAL COURSE  This is a 78 y.o. male here for abdominal pain, hence patient was found to have acute cholecystitis, at this point general surgery was consulted, patient underwent a laparoscopic procedure however converted to open cholecystectomy.  In addition postoperatively patient underwent a ERCP with stone extraction.  In addition his hospitalization was complicated by having atrial fibrillation with rapid ventricular response.  Patient did not have a history of atrial fibrillation in the past as this was a new diagnosis.  As result his metoprolol was restarted, patient luckily converted back to normal sinus rhythm.  An echocardiogram was performed which showed a normal LVEF, and also noted 4 cm ascending aortic aneurysm which has been stable.  This finding was discussed with the patient and daughter at bedside, and I instructed him to follow-up with her primary care physician and for further echocardiograms in the future to make sure that that aneurysm does not worsen.  Hence given that patient reported having history of TIAs in the past this could certainly be because of atrial fibrillation, which she was not aware of.  Hence at this point general surgery has cleared the patient and is okay to anticoagulate from a surgical standpoint.  Patient will be started on Eliquis, and metoprolol.  In terms of his RICH drain, he is still having mild output of bile.  However patient will follow with Dr. Merino on 4/29, for which his drain will be removed.  He will be given several additional days of antibiotics. Overall, Patient denies fevers/chills, chest pain, shortness of breath or nausea/vommiting.     Therefore, he is discharged in good and stable condition to home with close outpatient  follow-up.    The patient met 2-midnight criteria for an inpatient stay at the time of discharge.    Discharge Date  4/24/2019    FOLLOW UP ITEMS POST DISCHARGE  PCP in 1 week   Surgery 4/29    DISCHARGE DIAGNOSES  Principal Problem (Resolved):    Acute cholecystitis POA: Yes  Active Problems:    Hypertension, essential POA: Yes    Dyslipidemia POA: Yes    DM2 (diabetes mellitus, type 2) (HCC) POA: Yes  Resolved Problems:    DAKOTAH (acute kidney injury) (HCC) POA: Yes    Choledocholithiasis POA: Yes    Atrial fibrillation with RVR (HCC) POA: Unknown      FOLLOW UP  No future appointments.  Anita Merino M.D.  [id number]  [address]  [facility] [address]  [phone]    On 4/29/2019        MEDICATIONS ON DISCHARGE     Medication List      START taking these medications      Instructions   amoxicillin-clavulanate 875-125 MG Tabs  Commonly known as:  AUGMENTIN   Take 1 Tab by mouth 2 times a day for 4 days.  Dose:  1 Tab     apixaban 5mg Tabs  Commonly known as:  ELIQUIS   Take 1 Tab by mouth 2 Times a Day.  Dose:  5 mg        CONTINUE taking these medications      Instructions   amLODIPine 5 MG Tabs  Commonly known as:  NORVASC   Take 5 mg by mouth every day.  Dose:  5 mg     atorvastatin 10 MG Tabs  Commonly known as:  LIPITOR   Take 10 mg by mouth every evening.  Dose:  10 mg     metoprolol  MG Tb24  Commonly known as:  TOPROL XL   Take 100 mg by mouth every day.  Dose:  100 mg            Allergies  Allergies   Allergen Reactions   • Other Environmental      Seasonal allergies       DIET  Orders Placed This Encounter   Procedures   • Diet Order Regular     Standing Status:   Standing     Number of Occurrences:   1     Order Specific Question:   Diet:     Answer:   Regular [1]       ACTIVITY  As tolerated.  Weight bearing as tolerated    CONSULTATIONS  Surgery   GI     PROCEDURES  Attempted laparoscopic converted to open cholecystectomy.  PROCEDURE:  Endoscopic retrograde  cholangiopancreatography with:  1.  Biliary sphincterotomy.  2.  Bile duct balloon stone extraction.    LABORATORY  Lab Results   Component Value Date    SODIUM 138 04/24/2019    POTASSIUM 4.4 04/24/2019    CHLORIDE 104 04/24/2019    CO2 26 04/24/2019    GLUCOSE 129 (H) 04/24/2019    BUN 12 04/24/2019    CREATININE 0.99 04/24/2019        Lab Results   Component Value Date    WBC 12.6 (H) 04/24/2019    HEMOGLOBIN 8.5 (L) 04/24/2019    HEMATOCRIT 26.5 (L) 04/24/2019    PLATELETCT 219 04/24/2019        Total time of the discharge process exceeds 35 minutes.

## 2019-04-25 ENCOUNTER — PATIENT OUTREACH (OUTPATIENT)
Dept: HEALTH INFORMATION MANAGEMENT | Facility: OTHER | Age: 79
End: 2019-04-25

## 2019-04-25 NOTE — PROGRESS NOTES
Telemetry Shift Summary    Rhythm SR  HR Range 70-96  Ectopy Freq PAC  Measurements 0.14/0.08/0.42        Normal Values  Rhythm SR  HR Range    Measurements 0.12-0.20 / 0.06-0.10  / 0.30-0.52

## 2019-04-25 NOTE — PROGRESS NOTES
Outbound call to [facility] for post discharge medication review. Patient unavailable. Provided my contact information with request for return call. Reviewed medications. No clinically significant interactions noted.     Received return call from [facility]. SCP post discharge med rec completed. No clinically significant medication issues noted. Patient denies any side effects, barriers to accessing medications, or trouble with adherence.     Niharika Juárez, PharmD

## 2019-05-09 ENCOUNTER — APPOINTMENT (OUTPATIENT)
Dept: RADIOLOGY | Facility: MEDICAL CENTER | Age: 79
End: 2019-05-09
Attending: EMERGENCY MEDICINE
Payer: MEDICARE

## 2019-05-09 ENCOUNTER — HOSPITAL ENCOUNTER (OUTPATIENT)
Facility: MEDICAL CENTER | Age: 79
End: 2019-05-10
Attending: EMERGENCY MEDICINE | Admitting: HOSPITALIST
Payer: MEDICARE

## 2019-05-09 DIAGNOSIS — S62.639A CLOSED FRACTURE OF TUFT OF DISTAL PHALANX OF FINGER: ICD-10-CM

## 2019-05-09 DIAGNOSIS — R55 SYNCOPE, UNSPECIFIED SYNCOPE TYPE: ICD-10-CM

## 2019-05-09 PROBLEM — I48.0 PAROXYSMAL ATRIAL FIBRILLATION (HCC): Status: ACTIVE | Noted: 2019-05-09

## 2019-05-09 LAB
ALBUMIN SERPL BCP-MCNC: 3.4 G/DL (ref 3.2–4.9)
ALBUMIN/GLOB SERPL: 1 G/DL
ALP SERPL-CCNC: 106 U/L (ref 30–99)
ALT SERPL-CCNC: 13 U/L (ref 2–50)
ANION GAP SERPL CALC-SCNC: 9 MMOL/L (ref 0–11.9)
AST SERPL-CCNC: 17 U/L (ref 12–45)
BASOPHILS # BLD AUTO: 2.3 % (ref 0–1.8)
BASOPHILS # BLD: 0.17 K/UL (ref 0–0.12)
BILIRUB SERPL-MCNC: 1.1 MG/DL (ref 0.1–1.5)
BUN SERPL-MCNC: 23 MG/DL (ref 8–22)
CALCIUM SERPL-MCNC: 8.4 MG/DL (ref 8.4–10.2)
CHLORIDE SERPL-SCNC: 105 MMOL/L (ref 96–112)
CO2 SERPL-SCNC: 21 MMOL/L (ref 20–33)
CREAT SERPL-MCNC: 1.18 MG/DL (ref 0.5–1.4)
EKG IMPRESSION: NORMAL
EOSINOPHIL # BLD AUTO: 0.28 K/UL (ref 0–0.51)
EOSINOPHIL NFR BLD: 3.8 % (ref 0–6.9)
ERYTHROCYTE [DISTWIDTH] IN BLOOD BY AUTOMATED COUNT: 51.2 FL (ref 35.9–50)
GLOBULIN SER CALC-MCNC: 3.4 G/DL (ref 1.9–3.5)
GLUCOSE SERPL-MCNC: 180 MG/DL (ref 65–99)
HCT VFR BLD AUTO: 32 % (ref 42–52)
HGB BLD-MCNC: 9.9 G/DL (ref 14–18)
IMM GRANULOCYTES # BLD AUTO: 0.07 K/UL (ref 0–0.11)
IMM GRANULOCYTES NFR BLD AUTO: 0.9 % (ref 0–0.9)
LYMPHOCYTES # BLD AUTO: 1.21 K/UL (ref 1–4.8)
LYMPHOCYTES NFR BLD: 16.3 % (ref 22–41)
MCH RBC QN AUTO: 30.5 PG (ref 27–33)
MCHC RBC AUTO-ENTMCNC: 30.9 G/DL (ref 33.7–35.3)
MCV RBC AUTO: 98.5 FL (ref 81.4–97.8)
MONOCYTES # BLD AUTO: 0.42 K/UL (ref 0–0.85)
MONOCYTES NFR BLD AUTO: 5.7 % (ref 0–13.4)
NEUTROPHILS # BLD AUTO: 5.28 K/UL (ref 1.82–7.42)
NEUTROPHILS NFR BLD: 71 % (ref 44–72)
NRBC # BLD AUTO: 0 K/UL
NRBC BLD-RTO: 0 /100 WBC
PLATELET # BLD AUTO: 352 K/UL (ref 164–446)
PMV BLD AUTO: 8.4 FL (ref 9–12.9)
POTASSIUM SERPL-SCNC: 3.8 MMOL/L (ref 3.6–5.5)
PROT SERPL-MCNC: 6.8 G/DL (ref 6–8.2)
RBC # BLD AUTO: 3.25 M/UL (ref 4.7–6.1)
SODIUM SERPL-SCNC: 135 MMOL/L (ref 135–145)
TROPONIN I SERPL-MCNC: <0.02 NG/ML (ref 0–0.04)
WBC # BLD AUTO: 7.4 K/UL (ref 4.8–10.8)

## 2019-05-09 PROCEDURE — 71045 X-RAY EXAM CHEST 1 VIEW: CPT

## 2019-05-09 PROCEDURE — 73120 X-RAY EXAM OF HAND: CPT | Mod: RT

## 2019-05-09 PROCEDURE — 80053 COMPREHEN METABOLIC PANEL: CPT

## 2019-05-09 PROCEDURE — 70450 CT HEAD/BRAIN W/O DYE: CPT

## 2019-05-09 PROCEDURE — G0378 HOSPITAL OBSERVATION PER HR: HCPCS

## 2019-05-09 PROCEDURE — 700102 HCHG RX REV CODE 250 W/ 637 OVERRIDE(OP): Performed by: HOSPITALIST

## 2019-05-09 PROCEDURE — 700111 HCHG RX REV CODE 636 W/ 250 OVERRIDE (IP): Performed by: EMERGENCY MEDICINE

## 2019-05-09 PROCEDURE — 85025 COMPLETE CBC W/AUTO DIFF WBC: CPT

## 2019-05-09 PROCEDURE — 700105 HCHG RX REV CODE 258: Performed by: HOSPITALIST

## 2019-05-09 PROCEDURE — 90471 IMMUNIZATION ADMIN: CPT

## 2019-05-09 PROCEDURE — 93005 ELECTROCARDIOGRAM TRACING: CPT

## 2019-05-09 PROCEDURE — 90715 TDAP VACCINE 7 YRS/> IM: CPT | Performed by: EMERGENCY MEDICINE

## 2019-05-09 PROCEDURE — 84484 ASSAY OF TROPONIN QUANT: CPT

## 2019-05-09 PROCEDURE — 99219 PR INITIAL OBSERVATION CARE,LEVL II: CPT | Performed by: HOSPITALIST

## 2019-05-09 PROCEDURE — 36415 COLL VENOUS BLD VENIPUNCTURE: CPT

## 2019-05-09 PROCEDURE — 99285 EMERGENCY DEPT VISIT HI MDM: CPT

## 2019-05-09 PROCEDURE — 73120 X-RAY EXAM OF HAND: CPT | Mod: LT

## 2019-05-09 PROCEDURE — A9270 NON-COVERED ITEM OR SERVICE: HCPCS | Performed by: HOSPITALIST

## 2019-05-09 RX ORDER — POLYETHYLENE GLYCOL 3350 17 G/17G
1 POWDER, FOR SOLUTION ORAL
Status: DISCONTINUED | OUTPATIENT
Start: 2019-05-09 | End: 2019-05-10 | Stop reason: HOSPADM

## 2019-05-09 RX ORDER — VIT A/VIT C/VIT E/ZINC/COPPER 2148-113
1 TABLET ORAL 2 TIMES DAILY
COMMUNITY
End: 2022-07-21 | Stop reason: CLARIF

## 2019-05-09 RX ORDER — AMLODIPINE BESYLATE 5 MG/1
5 TABLET ORAL DAILY
Status: DISCONTINUED | OUTPATIENT
Start: 2019-05-10 | End: 2019-05-10 | Stop reason: HOSPADM

## 2019-05-09 RX ORDER — METOPROLOL SUCCINATE 100 MG/1
100 TABLET, EXTENDED RELEASE ORAL DAILY
Status: DISCONTINUED | OUTPATIENT
Start: 2019-05-10 | End: 2019-05-10 | Stop reason: HOSPADM

## 2019-05-09 RX ORDER — SODIUM CHLORIDE 9 MG/ML
INJECTION, SOLUTION INTRAVENOUS CONTINUOUS
Status: DISCONTINUED | OUTPATIENT
Start: 2019-05-09 | End: 2019-05-10 | Stop reason: HOSPADM

## 2019-05-09 RX ORDER — ATORVASTATIN CALCIUM 10 MG/1
10 TABLET, FILM COATED ORAL NIGHTLY
Status: DISCONTINUED | OUTPATIENT
Start: 2019-05-09 | End: 2019-05-10 | Stop reason: HOSPADM

## 2019-05-09 RX ORDER — BISACODYL 10 MG
10 SUPPOSITORY, RECTAL RECTAL
Status: DISCONTINUED | OUTPATIENT
Start: 2019-05-09 | End: 2019-05-10 | Stop reason: HOSPADM

## 2019-05-09 RX ORDER — AMOXICILLIN 250 MG
2 CAPSULE ORAL 2 TIMES DAILY
Status: DISCONTINUED | OUTPATIENT
Start: 2019-05-09 | End: 2019-05-10 | Stop reason: HOSPADM

## 2019-05-09 RX ADMIN — CLOSTRIDIUM TETANI TOXOID ANTIGEN (FORMALDEHYDE INACTIVATED), CORYNEBACTERIUM DIPHTHERIAE TOXOID ANTIGEN (FORMALDEHYDE INACTIVATED), BORDETELLA PERTUSSIS TOXOID ANTIGEN (GLUTARALDEHYDE INACTIVATED), BORDETELLA PERTUSSIS FILAMENTOUS HEMAGGLUTININ ANTIGEN (FORMALDEHYDE INACTIVATED), BORDETELLA PERTUSSIS PERTACTIN ANTIGEN, AND BORDETELLA PERTUSSIS FIMBRIAE 2/3 ANTIGEN 0.5 ML: 5; 2; 2.5; 5; 3; 5 INJECTION, SUSPENSION INTRAMUSCULAR at 19:38

## 2019-05-09 RX ADMIN — SODIUM CHLORIDE: 9 INJECTION, SOLUTION INTRAVENOUS at 22:33

## 2019-05-09 RX ADMIN — ATORVASTATIN CALCIUM 10 MG: 10 TABLET, FILM COATED ORAL at 21:39

## 2019-05-09 RX ADMIN — APIXABAN 5 MG: 5 TABLET, FILM COATED ORAL at 21:39

## 2019-05-09 ASSESSMENT — LIFESTYLE VARIABLES
TOTAL SCORE: 2
AVERAGE NUMBER OF DAYS PER WEEK YOU HAVE A DRINK CONTAINING ALCOHOL: 5
ON A TYPICAL DAY WHEN YOU DRINK ALCOHOL HOW MANY DRINKS DO YOU HAVE: 2
EVER HAD A DRINK FIRST THING IN THE MORNING TO STEADY YOUR NERVES TO GET RID OF A HANGOVER: NO
ALCOHOL_USE: YES
DOES PATIENT WANT TO STOP DRINKING: NO
TOTAL SCORE: 2
HAVE PEOPLE ANNOYED YOU BY CRITICIZING YOUR DRINKING: YES
HOW MANY TIMES IN THE PAST YEAR HAVE YOU HAD 5 OR MORE DRINKS IN A DAY: 1
HAVE YOU EVER FELT YOU SHOULD CUT DOWN ON YOUR DRINKING: YES
TOTAL SCORE: 2
EVER_SMOKED: NEVER
CONSUMPTION TOTAL: POSITIVE
EVER FELT BAD OR GUILTY ABOUT YOUR DRINKING: NO

## 2019-05-09 ASSESSMENT — ENCOUNTER SYMPTOMS
SHORTNESS OF BREATH: 0
NECK PAIN: 0
CONSTIPATION: 0
PND: 0
CLAUDICATION: 0
DIZZINESS: 0
SPEECH CHANGE: 0
NERVOUS/ANXIOUS: 0
CHILLS: 0
MYALGIAS: 0
VOMITING: 0
ORTHOPNEA: 0
HEMOPTYSIS: 0
BACK PAIN: 0
PALPITATIONS: 0
FEVER: 0
NAUSEA: 0
SENSORY CHANGE: 0
HEADACHES: 0
FALLS: 1
BLURRED VISION: 0
DEPRESSION: 0
BLOOD IN STOOL: 0
PHOTOPHOBIA: 0
STRIDOR: 0
SPUTUM PRODUCTION: 0
COUGH: 0
MEMORY LOSS: 0
TREMORS: 0
TINGLING: 0
EYE PAIN: 0
HEARTBURN: 0
DOUBLE VISION: 0
SORE THROAT: 0

## 2019-05-09 ASSESSMENT — COGNITIVE AND FUNCTIONAL STATUS - GENERAL
SUGGESTED CMS G CODE MODIFIER MOBILITY: CH
SUGGESTED CMS G CODE MODIFIER DAILY ACTIVITY: CH
DAILY ACTIVITIY SCORE: 24
MOBILITY SCORE: 24

## 2019-05-09 ASSESSMENT — PATIENT HEALTH QUESTIONNAIRE - PHQ9
2. FEELING DOWN, DEPRESSED, IRRITABLE, OR HOPELESS: NOT AT ALL
1. LITTLE INTEREST OR PLEASURE IN DOING THINGS: NOT AT ALL
SUM OF ALL RESPONSES TO PHQ9 QUESTIONS 1 AND 2: 0

## 2019-05-10 ENCOUNTER — PATIENT OUTREACH (OUTPATIENT)
Dept: HEALTH INFORMATION MANAGEMENT | Facility: OTHER | Age: 79
End: 2019-05-10

## 2019-05-10 VITALS
DIASTOLIC BLOOD PRESSURE: 65 MMHG | HEIGHT: 69 IN | WEIGHT: 161.6 LBS | SYSTOLIC BLOOD PRESSURE: 119 MMHG | HEART RATE: 59 BPM | BODY MASS INDEX: 23.93 KG/M2 | OXYGEN SATURATION: 99 % | RESPIRATION RATE: 20 BRPM | TEMPERATURE: 97.4 F

## 2019-05-10 LAB
ALBUMIN SERPL BCP-MCNC: 3.2 G/DL (ref 3.2–4.9)
ALBUMIN/GLOB SERPL: 1 G/DL
ALP SERPL-CCNC: 95 U/L (ref 30–99)
ALT SERPL-CCNC: 12 U/L (ref 2–50)
ANION GAP SERPL CALC-SCNC: 11 MMOL/L (ref 0–11.9)
AST SERPL-CCNC: 16 U/L (ref 12–45)
BASOPHILS # BLD AUTO: 1.9 % (ref 0–1.8)
BASOPHILS # BLD: 0.13 K/UL (ref 0–0.12)
BILIRUB SERPL-MCNC: 1.3 MG/DL (ref 0.1–1.5)
BUN SERPL-MCNC: 17 MG/DL (ref 8–22)
CALCIUM SERPL-MCNC: 8.5 MG/DL (ref 8.4–10.2)
CHLORIDE SERPL-SCNC: 106 MMOL/L (ref 96–112)
CO2 SERPL-SCNC: 20 MMOL/L (ref 20–33)
COMMENT 1642: NORMAL
CREAT SERPL-MCNC: 0.92 MG/DL (ref 0.5–1.4)
EOSINOPHIL # BLD AUTO: 0.2 K/UL (ref 0–0.51)
EOSINOPHIL NFR BLD: 2.9 % (ref 0–6.9)
ERYTHROCYTE [DISTWIDTH] IN BLOOD BY AUTOMATED COUNT: 49.2 FL (ref 35.9–50)
GLOBULIN SER CALC-MCNC: 3.2 G/DL (ref 1.9–3.5)
GLUCOSE SERPL-MCNC: 106 MG/DL (ref 65–99)
HCT VFR BLD AUTO: 31.2 % (ref 42–52)
HGB BLD-MCNC: 9.6 G/DL (ref 14–18)
IMM GRANULOCYTES # BLD AUTO: 0.02 K/UL (ref 0–0.11)
IMM GRANULOCYTES NFR BLD AUTO: 0.3 % (ref 0–0.9)
LYMPHOCYTES # BLD AUTO: 1.32 K/UL (ref 1–4.8)
LYMPHOCYTES NFR BLD: 19.2 % (ref 22–41)
MCH RBC QN AUTO: 29.9 PG (ref 27–33)
MCHC RBC AUTO-ENTMCNC: 30.8 G/DL (ref 33.7–35.3)
MCV RBC AUTO: 97.2 FL (ref 81.4–97.8)
MONOCYTES # BLD AUTO: 0.46 K/UL (ref 0–0.85)
MONOCYTES NFR BLD AUTO: 6.7 % (ref 0–13.4)
NEUTROPHILS # BLD AUTO: 4.76 K/UL (ref 1.82–7.42)
NEUTROPHILS NFR BLD: 69 % (ref 44–72)
NRBC # BLD AUTO: 0 K/UL
NRBC BLD-RTO: 0 /100 WBC
PLATELET # BLD AUTO: 267 K/UL (ref 164–446)
PMV BLD AUTO: 9.7 FL (ref 9–12.9)
POTASSIUM SERPL-SCNC: 3.7 MMOL/L (ref 3.6–5.5)
PROT SERPL-MCNC: 6.4 G/DL (ref 6–8.2)
RBC # BLD AUTO: 3.21 M/UL (ref 4.7–6.1)
SODIUM SERPL-SCNC: 137 MMOL/L (ref 135–145)
TROPONIN I SERPL-MCNC: <0.02 NG/ML (ref 0–0.04)
WBC # BLD AUTO: 6.9 K/UL (ref 4.8–10.8)

## 2019-05-10 PROCEDURE — 80053 COMPREHEN METABOLIC PANEL: CPT

## 2019-05-10 PROCEDURE — 700102 HCHG RX REV CODE 250 W/ 637 OVERRIDE(OP): Performed by: HOSPITALIST

## 2019-05-10 PROCEDURE — 84484 ASSAY OF TROPONIN QUANT: CPT

## 2019-05-10 PROCEDURE — A9270 NON-COVERED ITEM OR SERVICE: HCPCS | Performed by: HOSPITALIST

## 2019-05-10 PROCEDURE — 700105 HCHG RX REV CODE 258: Performed by: HOSPITALIST

## 2019-05-10 PROCEDURE — G0378 HOSPITAL OBSERVATION PER HR: HCPCS

## 2019-05-10 PROCEDURE — 85025 COMPLETE CBC W/AUTO DIFF WBC: CPT

## 2019-05-10 PROCEDURE — 99217 PR OBSERVATION CARE DISCHARGE: CPT | Performed by: INTERNAL MEDICINE

## 2019-05-10 RX ADMIN — Medication 2 TABLET: at 05:33

## 2019-05-10 RX ADMIN — AMLODIPINE BESYLATE 5 MG: 5 TABLET ORAL at 05:35

## 2019-05-10 RX ADMIN — SODIUM CHLORIDE: 9 INJECTION, SOLUTION INTRAVENOUS at 06:30

## 2019-05-10 RX ADMIN — METOPROLOL SUCCINATE 100 MG: 100 TABLET, EXTENDED RELEASE ORAL at 05:36

## 2019-05-10 RX ADMIN — APIXABAN 5 MG: 5 TABLET, FILM COATED ORAL at 05:32

## 2019-05-10 ASSESSMENT — PATIENT HEALTH QUESTIONNAIRE - PHQ9
1. LITTLE INTEREST OR PLEASURE IN DOING THINGS: NOT AT ALL
2. FEELING DOWN, DEPRESSED, IRRITABLE, OR HOPELESS: NOT AT ALL
SUM OF ALL RESPONSES TO PHQ9 QUESTIONS 1 AND 2: 0

## 2019-05-10 ASSESSMENT — CHA2DS2 SCORE
AGE 65 TO 74: NO
HYPERTENSION: YES
DIABETES: YES
CHF OR LEFT VENTRICULAR DYSFUNCTION: NO
SEX: MALE
CHA2DS2 VASC SCORE: 6
VASCULAR DISEASE: NO
AGE 75 OR GREATER: YES
PRIOR STROKE OR TIA OR THROMBOEMBOLISM: YES

## 2019-05-10 NOTE — H&P
Hospital Medicine History & Physical Note    Date of Service  5/9/2019    Primary Care Physician  Pee CUNNINGHAM M.D.    Consultants  None    Code Status  Full Code    Chief Complaint  Chief Complaint   Patient presents with   • Syncope       History of Presenting Illness  Santosh is a very pleasant 78 y.o. male with a past medical history of atrial fibrillation, history of TIAs in the past, hypertension, dyslipidemia, recently discharged after being admitted for acute cholecystitis about 2 weeks ago, presented to the emergency room on 5/9/2019 for evaluation of syncope.  Patient around 3 PM today was trying to close his garage door manually were his both hands fingers got stuck in between, although he was able to do well after that about an hour after that as he was talking to 1 of his family relatives he passed out.  Patient denied having any presyncopal symptoms such as dizziness or vision changes.  The only complaint currently that he has a mild headache global 3 out of 10 in severity, exacerbating or relieving factors.  No vision changes.  Denies any chest pain shortness of breath or nausea vomiting.  At this point patient will be admitted under observation for close monitoring    Review of Systems  Review of Systems   Constitutional: Negative for chills, fever and malaise/fatigue.   HENT: Negative for congestion, hearing loss, sore throat and tinnitus.    Eyes: Negative for blurred vision, double vision, photophobia and pain.   Respiratory: Negative for cough, hemoptysis, sputum production, shortness of breath and stridor.    Cardiovascular: Negative for chest pain, palpitations, orthopnea, claudication and PND.   Gastrointestinal: Negative for blood in stool, constipation, heartburn, melena, nausea and vomiting.   Genitourinary: Negative for dysuria, frequency and urgency.   Musculoskeletal: Positive for falls and joint pain. Negative for back pain, myalgias and neck pain.   Neurological: Negative for  dizziness, tingling, tremors, sensory change, speech change and headaches.   Psychiatric/Behavioral: Negative for depression, memory loss and suicidal ideas. The patient is not nervous/anxious.        Past Medical History  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   • Bowel habit changes     constipation   • Cataract    • Dental disorder     upper   • Hypercholesterolemia    • Hypertension    • Snoring        Surgical History   has a past surgical history that includes other; cataract phaco with iol (Left, 8/14/2018); cataract phaco with iol (Right, 8/28/2018); hernia repair, incisional, unilateral (Left); tonsillectomy; sheryl by laparoscopy (4/19/2019); and pr ercp,diagnostic (N/A, 4/20/2019).    Family History  Denies significant past family history    Social History   reports that he has never smoked. He has never used smokeless tobacco. He reports that he drinks alcohol. He reports that he does not use drugs.    Allergies  Allergies   Allergen Reactions   • Other Environmental      Seasonal allergies       Medications  Prior to Admission medications    Medication Sig Start Date End Date Taking? Authorizing Provider   apixaban (ELIQUIS) 5mg Tab Take 1 Tab by mouth 2 Times a Day. 4/24/19   Latanya Mitchell M.D.   amLODIPine (NORVASC) 5 MG Tab Take 5 mg by mouth every day.    Physician Outpatient   metoprolol SR (TOPROL XL) 100 MG TABLET SR 24 HR Take 100 mg by mouth every day.    Physician Outpatient   atorvastatin (LIPITOR) 10 MG Tab Take 10 mg by mouth every evening.    Physician Outpatient       Physical Exam  Temp:  [35.7 °C (96.2 °F)] 35.7 °C (96.2 °F)  Pulse:  [70-74] 70  Resp:  [18-23] 18  BP: (139)/(77) 139/77  SpO2:  [97 %-99 %] 97 %  Physical Exam   Constitutional: He is oriented to person, place, and time. He appears well-developed and well-nourished. No distress.   HENT:   Head: Normocephalic and atraumatic.   Mouth/Throat: No oropharyngeal exudate.   Eyes:  Pupils are equal, round, and reactive to light. Conjunctivae are normal. Right eye exhibits no discharge. No scleral icterus.   Neck: Neck supple. No JVD present. No thyromegaly present.   Cardiovascular: Normal rate and intact distal pulses.    No murmur heard.  Pulses:       Dorsalis pedis pulses are 2+ on the right side, and 2+ on the left side.   Cap refill < 3 s   Pulmonary/Chest: Effort normal and breath sounds normal. No stridor. No respiratory distress. He has no wheezes. He has no rales.   Abdominal: Soft. Bowel sounds are normal. He exhibits no distension. There is no tenderness. There is no rebound.   Musculoskeletal: Normal range of motion. He exhibits no edema.   Neurological: He is alert and oriented to person, place, and time. No cranial nerve deficit.   Skin: Skin is warm and dry. He is not diaphoretic. No erythema.   Small abrasion on his nose, multiple nail mild ecchymosis as result of trauma with arm superficial skin ecchymosis   Psychiatric: He has a normal mood and affect. His behavior is normal. Thought content normal.   Nursing note and vitals reviewed.      Laboratory:  Recent Labs      05/09/19   1850   WBC  7.4   RBC  3.25*   HEMOGLOBIN  9.9*   HEMATOCRIT  32.0*   MCV  98.5*   MCH  30.5   MCHC  30.9*   RDW  51.2*   PLATELETCT  352   MPV  8.4*     Recent Labs      05/09/19   1850   SODIUM  135   POTASSIUM  3.8   CHLORIDE  105   CO2  21   GLUCOSE  180*   BUN  23*   CREATININE  1.18   CALCIUM  8.4     Recent Labs      05/09/19   1850   ALTSGPT  13   ASTSGOT  17   ALKPHOSPHAT  106*   TBILIRUBIN  1.1   GLUCOSE  180*         Recent Labs      05/09/19   1850   TROPONINI  <0.02       Urinalysis:          Imaging:  DX-HAND 2- LEFT   Final Result         No acute osseous abnormality.      DX-HAND 2- RIGHT   Final Result      Fracture involving the distal tip of the fourth distal phalanx.            CT-HEAD W/O   Final Result      1.  No evidence of acute intracranial process.      2.  Atrophy.       DX-CHEST-PORTABLE (1 VIEW)   Final Result         1. No acute cardiopulmonary abnormalities are identified.          Assessment/Plan:  I anticipate this patient is appropriate for observation status at this time.    * Syncope   Assessment & Plan    Most likely vasovagal, he had pain from having his finger stuck in the garage door, he also appears dehydrated.  He just recently had an echocardiogram which showed normal LVEF function.  Hence this will not be repeated  We will monitor him on telemetry to make sure he does not have any bradycardic episodes or any arrhythmias.  Orthostatics ordered  IV fluids started     Hypertension, essential- (present on admission)   Assessment & Plan    Controlled resume current home dose of Norvasc and metoprolol     Paroxysmal atrial fibrillation (HCC)   Assessment & Plan    Recently diagnosed, rate appears controlled and heart rate in the 70s, will resume home dose of metoprolol and Eliquis     DM2 (diabetes mellitus, type 2) (HCC)- (present on admission)   Assessment & Plan    Glycemic globin A1c was 6.5 3 weeks ago  Recommended diet and exercise, he may benefit from metformin, however I will defer to outpatient management     Dyslipidemia- (present on admission)   Assessment & Plan    Continue home dose of atorvastatin         VTE prophylaxis: Prophylaxis: apixaban

## 2019-05-10 NOTE — PROGRESS NOTES
Gave bedside report to KASI Riggins. Plan of care discussed. Safety precautions in place. Personal belongings and call light are with in reach. Patient concerned regarding wife. Wife has surgery in [facility] and wants to see her. Family at bedside.

## 2019-05-10 NOTE — ASSESSMENT & PLAN NOTE
Glycemic globin A1c was 6.5 3 weeks ago  Recommended diet and exercise, he may benefit from metformin, however I will defer to outpatient management

## 2019-05-10 NOTE — ED TRIAGE NOTES
Pt to er izaiah watkins after syncopal episode at home, reports closed garage door on fingers earlier today, is on eliquis for afib and d/c last week from [facility] s/p open sheryl. ekg in progress, recvd approx 150cc ns en route

## 2019-05-10 NOTE — DISCHARGE INSTRUCTIONS
Discharge Instructions    Discharged to home by car with relative. Discharged via wheelchair, hospital escort: Yes.  Special equipment needed: Not Applicable    Be sure to schedule a follow-up appointment with your primary care doctor or any specialists as instructed.     Discharge Plan:   Diet Plan: Discussed  Activity Level: Discussed  Confirmed Follow up Appointment: Appointment Scheduled  Confirmed Symptoms Management: Discussed  Medication Reconciliation Updated: Yes  Influenza Vaccine Indication: Not indicated: Previously immunized this influenza season and > 8 years of age    I understand that a diet low in cholesterol, fat, and sodium is recommended for good health. Unless I have been given specific instructions below for another diet, I accept this instruction as my diet prescription.   Other diet: none    Special Instructions: None    · Is patient discharged on Warfarin / Coumadin?   No       Syncope  Introduction  Syncope is when you lose temporarily pass out (faint). Signs that you may be about to pass out include:  · Feeling dizzy or light-headed.  · Feeling sick to your stomach (nauseous).  · Seeing all white or all black.  · Having cold, clammy skin.  If you passed out, get help right away. Call your local emergency services (911 in the [place] Do not drive yourself to the hospital.  Follow these instructions at home:  Pay attention to any changes in your symptoms. Take these actions to help with your condition:  · Have someone stay with you until you feel stable.  · Do not drive, use machinery, or play sports until your doctor says it is okay.  · Keep all follow-up visits as told by your doctor. This is important.  · If you start to feel like you might pass out, lie down right away and raise (elevate) your feet above the level of your heart. Breathe deeply and steadily. Wait until all of the symptoms are gone.  · Drink enough fluid to keep your pee (urine) clear or pale yellow.  · If you are taking  blood pressure or heart medicine, get up slowly and spend many minutes getting ready to sit and then stand. This can help with dizziness.  · Take over-the-counter and prescription medicines only as told by your doctor.  Get help right away if:  · You have a very bad headache.  · You have unusual pain in your chest, tummy, or back.  · You are bleeding from your mouth or rectum.  · You have black or tarry poop (stool).  · You have a very fast or uneven heartbeat (palpitations).  · It hurts to breathe.  · You pass out once or more than once.  · You have jerky movements that you cannot control (seizure).  · You are confused.  · You have trouble walking.  · You are very weak.  · You have vision problems.  These symptoms may be an emergency. Do not wait to see if the symptoms will go away. Get medical help right away. Call your local emergency services (911 in the [place] Do not drive yourself to the hospital.   This information is not intended to replace advice given to you by your health care provider. Make sure you discuss any questions you have with your health care provider.  Document Released: 06/05/2009 Document Revised: 05/25/2017 Document Reviewed: 08/31/2016  © 2017 [employer]      Depression / Suicide Risk    As you are discharged from this [facility] facility, it is important to learn how to keep safe from harming yourself.    Recognize the warning signs:  · Abrupt changes in personality, positive or negative- including increase in energy   · Giving away possessions  · Change in eating patterns- significant weight changes-  positive or negative  · Change in sleeping patterns- unable to sleep or sleeping all the time   · Unwillingness or inability to communicate  · Depression  · Unusual sadness, discouragement and loneliness  · Talk of wanting to die  · Neglect of personal appearance   · Rebelliousness- reckless behavior  · Withdrawal from people/activities they love  · Confusion- inability to  concentrate     If you or a loved one observes any of these behaviors or has concerns about self-harm, here's what you can do:  · Talk about it- your feelings and reasons for harming yourself  · Remove any means that you might use to hurt yourself (examples: pills, rope, extension cords, firearm)  · Get professional help from the community (Mental Health, Substance Abuse, psychological counseling)  · Do not be alone:Call your Safe Contact- someone whom you trust who will be there for you.  · Call your local CRISIS HOTLINE 828-6398 or 365-155-6569  · Call your local Children's Mobile Crisis Response Team [place] (022) 705-6249 or www.[employer]  · Call the toll free National Suicide Prevention Hotlines   · National Suicide Prevention Lifeline 982-747-DWOS (6455)  · National Hope Line Network 800-SUICIDE (106-9538)

## 2019-05-10 NOTE — PROGRESS NOTES
Pt is A&Ox4, resting comfortably in bed. Assessment completed and pain level 3/10. Due medication have been given. Bed is in lowest postion, and side rails up x2, pt calls when needs assistance and call light within reach.

## 2019-05-10 NOTE — ASSESSMENT & PLAN NOTE
Most likely vasovagal, he had pain from having his finger stuck in the garage door, he also appears dehydrated.  He just recently had an echocardiogram which showed normal LVEF function.  Hence this will not be repeated  We will monitor him on telemetry to make sure he does not have any bradycardic episodes or any arrhythmias.  Orthostatics ordered  IV fluids started

## 2019-05-10 NOTE — PROGRESS NOTES
Pt discharged to home. Discharge instructions provided to pt. Pt verbalizes understanding. Pt states all questions have been answered. Signed copy in chart. Pt states that all personal belongings are in possession. Pt off unit via wheelchair to 217 (his wife's room), escorted by Vaishnavi.

## 2019-05-10 NOTE — PROGRESS NOTES
Telemetry Shift Summary    Rhythm SR/SB  HR Range 50s-80s  Ectopy -  Measurements 0.20/0.08/0.38        Normal Values  Rhythm SR  HR Range    Measurements 0.12-0.20 / 0.06-0.10  / 0.30-0.52

## 2019-05-10 NOTE — PROGRESS NOTES
Received report from Valentín VILLASEÑOR. Discussed plan of care and safety measures in place. No further needs at this time.

## 2019-05-10 NOTE — ASSESSMENT & PLAN NOTE
Recently diagnosed, rate appears controlled and heart rate in the 70s, will resume home dose of metoprolol and Eliquis

## 2019-05-10 NOTE — ED PROVIDER NOTES
"ED Provider Note    CHIEF COMPLAINT  Chief Complaint   Patient presents with   • Syncope       HPI  Freeman Frost is a 78 y.o. male here for evaluation of syncope.  The patient was at home, standing at a stool, when he all of a sudden \"fell forward and backward.  This is witnessed by family who states that patient was completely passed out.  Patient denies any chest pain or shortness of breath prior to the event.  He only complains of a mild headache now, but denies any abdominal pain.  He has no back pain.  Patient is on Eliquis at this time for atrial fibrillation.  He has yet to see cardiology.  Patient also earlier in the day had the garage door pinched his fingertips when he was trying to hold it up.  He does have some pain to the right hand distal tips.  Accompanied by some superficial abrasions.  His tetanus is not up-to-date.  Patient has no fever chills, no vision changes, no paresthesias, and no neck pain.  Nothing seems to alleviate or exacerbate his symptoms, and he does state that he has been having some increasing dizziness as well.    PAST MEDICAL HISTORY   has a past medical history of Bowel habit changes; Cataract; Dental disorder; Hypercholesterolemia; Hypertension; Snoring; and Stroke (HCC) (2013).    SOCIAL HISTORY  Social History     Social History Main Topics   • Smoking status: Never Smoker   • Smokeless tobacco: Never Used   • Alcohol use Yes      Comment: 2drinks daily (3-4 Large glasses)   • Drug use: No   • Sexual activity: Not on file       Family History  No bleeding disorders     SURGICAL HISTORY   has a past surgical history that includes other; cataract phaco with iol (Left, 8/14/2018); cataract phaco with iol (Right, 8/28/2018); hernia repair, incisional, unilateral (Left); tonsillectomy; sheryl by laparoscopy (4/19/2019); and ercp,diagnostic (N/A, 4/20/2019).    CURRENT MEDICATIONS  Home Medications    **Home medications have not yet been reviewed for this encounter**   "       ALLERGIES  Allergies   Allergen Reactions   • Other Environmental      Seasonal allergies       REVIEW OF SYSTEMS  See HPI for further details. Review of systems as above, otherwise all other systems are negative.     PHYSICAL EXAM  Constitutional: Well developed, well nourished. mild acute distress.  HEENT: Normocephalic, atraumatic. Posterior pharynx clear and moist.  Eyes:  EOMI. Normal sclera.  Neck: Supple, Full range of motion, nontender.  Chest/Pulmonary: clear to ausculation. Symmetrical expansion.   Cardio: Regular rate and rhythm with no murmur.   Abdomen: Soft, nontender. No peritoneal signs. No guarding. No palpable masses.  Musculoskeletal: No deformity, no edema, neurovascular intact.  Bilateral hand with distal digits, showing ecchymosis. Abrasions noted. N/v intact distally. Non tender wrists.   Neuro: Clear speech, appropriate, cooperative, cranial nerves II-XII grossly intact.  Psych: Normal mood and affect    Results for orders placed or performed during the hospital encounter of 05/09/19   CBC WITH DIFFERENTIAL   Result Value Ref Range    WBC 7.4 4.8 - 10.8 K/uL    RBC 3.25 (L) 4.70 - 6.10 M/uL    Hemoglobin 9.9 (L) 14.0 - 18.0 g/dL    Hematocrit 32.0 (L) 42.0 - 52.0 %    MCV 98.5 (H) 81.4 - 97.8 fL    MCH 30.5 27.0 - 33.0 pg    MCHC 30.9 (L) 33.7 - 35.3 g/dL    RDW 51.2 (H) 35.9 - 50.0 fL    Platelet Count 352 164 - 446 K/uL    MPV 8.4 (L) 9.0 - 12.9 fL    Neutrophils-Polys 71.00 44.00 - 72.00 %    Lymphocytes 16.30 (L) 22.00 - 41.00 %    Monocytes 5.70 0.00 - 13.40 %    Eosinophils 3.80 0.00 - 6.90 %    Basophils 2.30 (H) 0.00 - 1.80 %    Immature Granulocytes 0.90 0.00 - 0.90 %    Nucleated RBC 0.00 /100 WBC    Neutrophils (Absolute) 5.28 1.82 - 7.42 K/uL    Lymphs (Absolute) 1.21 1.00 - 4.80 K/uL    Monos (Absolute) 0.42 0.00 - 0.85 K/uL    Eos (Absolute) 0.28 0.00 - 0.51 K/uL    Baso (Absolute) 0.17 (H) 0.00 - 0.12 K/uL    Immature Granulocytes (abs) 0.07 0.00 - 0.11 K/uL    NRBC  (Absolute) 0.00 K/uL   COMP METABOLIC PANEL   Result Value Ref Range    Sodium 135 135 - 145 mmol/L    Potassium 3.8 3.6 - 5.5 mmol/L    Chloride 105 96 - 112 mmol/L    Co2 21 20 - 33 mmol/L    Anion Gap 9.0 0.0 - 11.9    Glucose 180 (H) 65 - 99 mg/dL    Bun 23 (H) 8 - 22 mg/dL    Creatinine 1.18 0.50 - 1.40 mg/dL    Calcium 8.4 8.4 - 10.2 mg/dL    AST(SGOT) 17 12 - 45 U/L    ALT(SGPT) 13 2 - 50 U/L    Alkaline Phosphatase 106 (H) 30 - 99 U/L    Total Bilirubin 1.1 0.1 - 1.5 mg/dL    Albumin 3.4 3.2 - 4.9 g/dL    Total Protein 6.8 6.0 - 8.2 g/dL    Globulin 3.4 1.9 - 3.5 g/dL    A-G Ratio 1.0 g/dL   TROPONIN   Result Value Ref Range    Troponin I <0.02 0.00 - 0.04 ng/mL   ESTIMATED GFR   Result Value Ref Range    GFR If African American >60 >60 mL/min/1.73 m 2    GFR If Non African American 60 >60 mL/min/1.73 m 2   EKG   Result Value Ref Range    Report       [facility] Emergency Dept.    Test Date:  2019  Pt Name:    ANKITA DAVID                 Department: [facility]  MRN:        [id number]                      Room:       [place] [address]  Gender:     Male                         Technician: OZZY  :        [id number]                   Requested By:ER TRIAGE PROTOCOL  Order #:    [id number]                    Reading MD:    Measurements  Intervals                                Axis  Rate:       72                           P:          -44  VA:         192                          QRS:        -41  QRSD:       88                           T:          40  QT:         412  QTc:        451    Interpretive Statements  SINUS RHYTHM  LEFT ANTERIOR FASCICULAR BLOCK  RSR' IN V1 OR V2, PROBABLY NORMAL VARIANT  Compared to ECG 2019 09:10:44  Left anterior fascicular block now present  RSR' in V1 or V2 now present       DX-HAND 2- LEFT   Final Result         No acute osseous abnormality.      DX-HAND 2- RIGHT   Final Result      Fracture involving the distal tip of the fourth distal phalanx.             CT-HEAD W/O   Final Result      1.  No evidence of acute intracranial process.      2.  Atrophy.      DX-CHEST-PORTABLE (1 VIEW)   Final Result         1. No acute cardiopulmonary abnormalities are identified.        Ekg; normal sinus rhythm at 72, normal intervals, left anterior fascicular block noted.  No ectopy.        PROCEDURES     MEDICAL RECORD  I have reviewed patient's medical record and pertinent results are listed above.    COURSE & MEDICAL DECISION MAKING  I have reviewed any medical record information, laboratory studies and radiographic results as noted above.    Patient will be admitted to  for further evaluation and treatment.  He will also be splinted for his distal tuft fracture.         FINAL IMPRESSION  Syncope  Tuft fracture       Electronically signed by: Victor Hugo Reyes, 5/9/2019 8:01 PM

## 2019-05-11 NOTE — DISCHARGE SUMMARY
Discharge Summary    CHIEF COMPLAINT ON ADMISSION  Chief Complaint   Patient presents with   • Syncope       Reason for Admission  Syncope     Admission Date  5/9/2019    CODE STATUS  Full    HPI & HOSPITAL COURSE  This is a 78 y.o. male with a history of TIA, recent diagnosis of afib, started on eliquis, HTN, HLD, recent admit for cholecystitis here with syncope after accidentally closing his fingers in his garage door.  Several minutes later he had loss of consciousness.  Upon his arrival, BP was low normal and orthostatics were positive.  He was hydrated and he had no recurrence of dizziness or syncope.  He underwent an echo which showed normal EF and no significant findings.  Head CT was negative.  Etiology was thought to be vasovagal on top of dehydration and he was educated on these topics.  He was feeling very well and ready for discharge.       Therefore, he is discharged in good and stable condition to home with close outpatient follow-up.    The patient recovered much more quickly than anticipated on admission.    Discharge Date  5/10/2019    FOLLOW UP ITEMS POST DISCHARGE  F/U with PCP at next available apt    DISCHARGE DIAGNOSES  Principal Problem:    Syncope POA: Unknown  Active Problems:    Hypertension, essential POA: Yes    Dyslipidemia POA: Yes    DM2 (diabetes mellitus, type 2) (HCC) POA: Yes    Paroxysmal atrial fibrillation (HCC) POA: Unknown  Resolved Problems:    * No resolved hospital problems. *      FOLLOW UP  No future appointments.  Pee CUNNINGHAM M.D.  [id number] [facility]  [place] [id number]  [phone]    Schedule an appointment as soon as possible for a visit in 1 week        MEDICATIONS ON DISCHARGE     Medication List      CONTINUE taking these medications      Instructions   amLODIPine 5 MG Tabs  Commonly known as:  NORVASC   Take 5 mg by mouth every day.  Dose:  5 mg     apixaban 5mg Tabs  Commonly known as:  ELIQUIS   Take 1 Tab by mouth 2 Times a Day.  Dose:  5 mg      atorvastatin 10 MG Tabs  Commonly known as:  LIPITOR   Take 10 mg by mouth every evening.  Dose:  10 mg     metoprolol  MG Tb24  Commonly known as:  TOPROL XL   Take 100 mg by mouth every day.  Dose:  100 mg     PRESERVISION AREDS Tabs   Take 1 Capsule by mouth every evening.  Dose:  1 Capsule            Allergies  Allergies   Allergen Reactions   • Other Environmental      Seasonal allergies       DIET  No orders of the defined types were placed in this encounter.      ACTIVITY  As tolerated.  Weight bearing as tolerated    CONSULTATIONS  None    PROCEDURES  None    LABORATORY  Lab Results   Component Value Date    SODIUM 137 05/10/2019    POTASSIUM 3.7 05/10/2019    CHLORIDE 106 05/10/2019    CO2 20 05/10/2019    GLUCOSE 106 (H) 05/10/2019    BUN 17 05/10/2019    CREATININE 0.92 05/10/2019        Lab Results   Component Value Date    WBC 6.9 05/10/2019    HEMOGLOBIN 9.6 (L) 05/10/2019    HEMATOCRIT 31.2 (L) 05/10/2019    PLATELETCT 267 05/10/2019        Total time of the discharge process exceeds 37 minutes.

## 2019-05-11 NOTE — PROGRESS NOTES
Telemetry Shift Summary    Rhythm SR with 1st degree block  HR Range 60s  Ectopy none  Measurements 0.22/0.08/0.40        Normal Values  Rhythm SR  HR Range    Measurements 0.12-0.20 / 0.06-0.10  / 0.30-0.52

## 2019-05-13 ENCOUNTER — PATIENT OUTREACH (OUTPATIENT)
Dept: HEALTH INFORMATION MANAGEMENT | Facility: OTHER | Age: 79
End: 2019-05-13

## 2019-05-14 NOTE — PROGRESS NOTES
Outbound call to Freeman for post discharge medication review. SCP post discharge med rec completed. No clinically significant medication issues noted. Patient denies any side effects, barriers to accessing medications, or trouble with adherence. Reviewed immunizations and discussed shingrix vaccination.     Niharika Juárez, PharmD

## 2019-05-22 ENCOUNTER — HOSPITAL ENCOUNTER (OUTPATIENT)
Dept: LAB | Facility: MEDICAL CENTER | Age: 79
End: 2019-05-22
Attending: FAMILY MEDICINE
Payer: MEDICARE

## 2019-05-22 LAB
ALBUMIN SERPL BCP-MCNC: 4.1 G/DL (ref 3.2–4.9)
ALBUMIN/GLOB SERPL: 1.3 G/DL
ALP SERPL-CCNC: 115 U/L (ref 30–99)
ALT SERPL-CCNC: 9 U/L (ref 2–50)
ANION GAP SERPL CALC-SCNC: 7 MMOL/L (ref 0–11.9)
AST SERPL-CCNC: 12 U/L (ref 12–45)
BASOPHILS # BLD AUTO: 1.3 % (ref 0–1.8)
BASOPHILS # BLD: 0.1 K/UL (ref 0–0.12)
BILIRUB SERPL-MCNC: 1.5 MG/DL (ref 0.1–1.5)
BUN SERPL-MCNC: 28 MG/DL (ref 8–22)
CALCIUM SERPL-MCNC: 9.4 MG/DL (ref 8.5–10.5)
CHLORIDE SERPL-SCNC: 105 MMOL/L (ref 96–112)
CO2 SERPL-SCNC: 26 MMOL/L (ref 20–33)
CREAT SERPL-MCNC: 1.01 MG/DL (ref 0.5–1.4)
EOSINOPHIL # BLD AUTO: 0.3 K/UL (ref 0–0.51)
EOSINOPHIL NFR BLD: 3.9 % (ref 0–6.9)
ERYTHROCYTE [DISTWIDTH] IN BLOOD BY AUTOMATED COUNT: 48.6 FL (ref 35.9–50)
FERRITIN SERPL-MCNC: 16.4 NG/ML (ref 22–322)
GLOBULIN SER CALC-MCNC: 3.1 G/DL (ref 1.9–3.5)
GLUCOSE SERPL-MCNC: 116 MG/DL (ref 65–99)
HCT VFR BLD AUTO: 35.6 % (ref 42–52)
HGB BLD-MCNC: 11 G/DL (ref 14–18)
HGB RETIC QN AUTO: 27.7 PG/CELL (ref 29–35)
IMM GRANULOCYTES # BLD AUTO: 0.02 K/UL (ref 0–0.11)
IMM GRANULOCYTES NFR BLD AUTO: 0.3 % (ref 0–0.9)
IMM RETICS NFR: 28.3 % (ref 9.3–17.4)
IRON SATN MFR SERPL: 6 % (ref 15–55)
IRON SERPL-MCNC: 25 UG/DL (ref 50–180)
LYMPHOCYTES # BLD AUTO: 1.72 K/UL (ref 1–4.8)
LYMPHOCYTES NFR BLD: 22.3 % (ref 22–41)
MCH RBC QN AUTO: 29.6 PG (ref 27–33)
MCHC RBC AUTO-ENTMCNC: 30.9 G/DL (ref 33.7–35.3)
MCV RBC AUTO: 95.7 FL (ref 81.4–97.8)
MONOCYTES # BLD AUTO: 0.44 K/UL (ref 0–0.85)
MONOCYTES NFR BLD AUTO: 5.7 % (ref 0–13.4)
NEUTROPHILS # BLD AUTO: 5.13 K/UL (ref 1.82–7.42)
NEUTROPHILS NFR BLD: 66.5 % (ref 44–72)
NRBC # BLD AUTO: 0 K/UL
NRBC BLD-RTO: 0 /100 WBC
PLATELET # BLD AUTO: 280 K/UL (ref 164–446)
PMV BLD AUTO: 9.3 FL (ref 9–12.9)
POTASSIUM SERPL-SCNC: 4.8 MMOL/L (ref 3.6–5.5)
PROT SERPL-MCNC: 7.2 G/DL (ref 6–8.2)
RBC # BLD AUTO: 3.72 M/UL (ref 4.7–6.1)
RETICS # AUTO: 0.08 M/UL (ref 0.04–0.06)
RETICS/RBC NFR: 2.2 % (ref 0.8–2.1)
SODIUM SERPL-SCNC: 138 MMOL/L (ref 135–145)
TIBC SERPL-MCNC: 405 UG/DL (ref 250–450)
VIT B12 SERPL-MCNC: 256 PG/ML (ref 211–911)
WBC # BLD AUTO: 7.7 K/UL (ref 4.8–10.8)

## 2019-05-22 PROCEDURE — 85025 COMPLETE CBC W/AUTO DIFF WBC: CPT

## 2019-05-22 PROCEDURE — 82728 ASSAY OF FERRITIN: CPT

## 2019-05-22 PROCEDURE — 82607 VITAMIN B-12: CPT

## 2019-05-22 PROCEDURE — 83550 IRON BINDING TEST: CPT

## 2019-05-22 PROCEDURE — 80053 COMPREHEN METABOLIC PANEL: CPT

## 2019-05-22 PROCEDURE — 36415 COLL VENOUS BLD VENIPUNCTURE: CPT

## 2019-05-22 PROCEDURE — 83540 ASSAY OF IRON: CPT

## 2019-05-22 PROCEDURE — 85046 RETICYTE/HGB CONCENTRATE: CPT

## 2019-05-24 ENCOUNTER — TELEPHONE (OUTPATIENT)
Dept: CARDIOLOGY | Facility: MEDICAL CENTER | Age: 79
End: 2019-05-24

## 2019-05-24 NOTE — TELEPHONE ENCOUNTER
Per TW, make follow up visit with patient on one of his ADD days.  FV scheduled for 5/28 at 9:30a.      Attempted to contact patient to confirm if this time will work with patient's schedule.  No answer. LVM to call back to confirm.     FYI to TW's RN

## 2019-05-24 NOTE — TELEPHONE ENCOUNTER
returning your call   Received: Today   Message Contents   KIERSTEN Ramírez,       Patient is returning your call from today. He can be reached at [phone].      Contacted patient.  Patient confirms yes, he will be at the  on 5/28 at 9:30a

## 2019-05-28 ENCOUNTER — OFFICE VISIT (OUTPATIENT)
Dept: CARDIOLOGY | Facility: MEDICAL CENTER | Age: 79
End: 2019-05-28
Payer: MEDICARE

## 2019-05-28 VITALS
OXYGEN SATURATION: 98 % | SYSTOLIC BLOOD PRESSURE: 126 MMHG | WEIGHT: 166.6 LBS | HEART RATE: 80 BPM | DIASTOLIC BLOOD PRESSURE: 70 MMHG | HEIGHT: 69 IN | BODY MASS INDEX: 24.68 KG/M2

## 2019-05-28 DIAGNOSIS — I10 HYPERTENSION, ESSENTIAL: ICD-10-CM

## 2019-05-28 DIAGNOSIS — Z79.01 ON CONTINUOUS ORAL ANTICOAGULATION: ICD-10-CM

## 2019-05-28 DIAGNOSIS — I48.4 ATYPICAL ATRIAL FLUTTER (HCC): ICD-10-CM

## 2019-05-28 DIAGNOSIS — E78.5 DYSLIPIDEMIA: ICD-10-CM

## 2019-05-28 DIAGNOSIS — I48.0 PAROXYSMAL ATRIAL FIBRILLATION (HCC): ICD-10-CM

## 2019-05-28 DIAGNOSIS — G45.9 TIA (TRANSIENT ISCHEMIC ATTACK): ICD-10-CM

## 2019-05-28 DIAGNOSIS — I71.21 THORACIC ASCENDING AORTIC ANEURYSM (HCC): ICD-10-CM

## 2019-05-28 PROCEDURE — 99204 OFFICE O/P NEW MOD 45 MIN: CPT | Performed by: INTERNAL MEDICINE

## 2019-05-28 RX ORDER — LOSARTAN POTASSIUM 25 MG/1
25 TABLET ORAL EVERY MORNING
Refills: 3 | COMMUNITY
Start: 2019-05-07

## 2019-05-28 NOTE — PROGRESS NOTES
Chief Complaint   Patient presents with   • Atrial Fibrillation   • Syncope       Subjective:   Freeman Frost is a 78 y.o. male who presents today for initial consultation regarding new onset atrial flutter and syncope.  Has a history of hypertension and TIAs in 2012 and was recently hospitalized for acute cholecystitis which was complicated and required conversion to an open procedure.  During this time he was noted to have atypical atrial flutter which spontaneously resolved.  He was unaware of it as he was quite ill.  He has been initiated on oral anticoagulation and is tolerating this well.  Echocardiogram showed mild elevated pulmonary pressures and a mildly dilated ascending aorta 4 cm.  He has not had a symptomatic recurrence of atrial flutter since that time.  However he did have a single syncopal episode right after being out of the hospital.  The garage door fell on his fingers and he broke a fingertip and bruised multiple nailbeds.  Shortly thereafter he stood up and had a syncopal event.  He was lightheaded and orthostatic and was seen in the emergency department and released.  This was not arrhythmic in nature.  He is a non-smoker who does not drink excessively or do drugs and has no family history of precocious CAD.  His daughter is our  supervisor.    Past Medical History:   Diagnosis Date   • Bowel habit changes     constipation   • Cataract    • Dental disorder     upper   • Hypercholesterolemia    • Hypertension    • Snoring    • Stroke (HCC) 2013    tia no residual def     Past Surgical History:   Procedure Laterality Date   • PB ERCP,DIAGNOSTIC N/A 4/20/2019    Procedure: ERCP, DIAGNOSTIC;  Surgeon: Antony Morrison M.D.;  Location: [facility];  Service: Gastroenterology   • FRANCIA BY LAPAROSCOPY  4/19/2019    Procedure: LAPARASCOPIC- CONVERTED TO OPEN CHOLECYSTECTOMY;  Surgeon: Anita Merino M.D.;  Location: SURGERY [facility];  Service: General   •  "CATARACT PHACO WITH IOL Right 8/28/2018    Procedure: CATARACT PHACO WITH IOL;  Surgeon: Adolfo Santana M.D.;  Location: SURGERY SAME DAY [facility];  Service: Ophthalmology   • CATARACT PHACO WITH IOL Left 8/14/2018    Procedure: CATARACT PHACO WITH IOL;  Surgeon: Adolfo Santana M.D.;  Location: SURGERY SAME DAY [facility];  Service: Ophthalmology   • HERNIA REPAIR, INCISIONAL, UNILATERAL Left    • OTHER      tonsils as a child   • TONSILLECTOMY       History reviewed. No pertinent family history.  Social History     Social History   • Marital status:      Spouse name: N/A   • Number of children: N/A   • Years of education: N/A     Occupational History   • Not on file.     Social History Main Topics   • Smoking status: Never Smoker   • Smokeless tobacco: Never Used   • Alcohol use Yes      Comment: 2drinks daily (3-4 Large glasses)   • Drug use: No   • Sexual activity: Not on file     Other Topics Concern   • Not on file     Social History Narrative   • No narrative on file     Allergies   Allergen Reactions   • Other Environmental      Seasonal allergies     Outpatient Encounter Prescriptions as of 5/28/2019   Medication Sig Dispense Refill   • losartan (COZAAR) 25 MG Tab   3   • Multiple Vitamins-Minerals (PRESERVISION AREDS) Tab Take 1 Capsule by mouth every evening.     • apixaban (ELIQUIS) 5mg Tab Take 1 Tab by mouth 2 Times a Day. 60 Tab 1   • amLODIPine (NORVASC) 5 MG Tab Take 5 mg by mouth every day.     • metoprolol SR (TOPROL XL) 100 MG TABLET SR 24 HR Take 100 mg by mouth every day.     • atorvastatin (LIPITOR) 10 MG Tab Take 10 mg by mouth every evening.       No facility-administered encounter medications on file as of 5/28/2019.      Review of Systems   All other systems reviewed and are negative.       Objective:   /70 (BP Location: Left arm, Patient Position: Sitting, BP Cuff Size: Adult)   Pulse 80   Ht 1.753 m (5' 9\")   Wt 75.6 kg (166 lb 9.6 oz)   SpO2 98%   BMI 24.60 " kg/m²     Physical Exam   Constitutional: He is oriented to person, place, and time. He appears well-developed and well-nourished. No distress.   HENT:   Head: Normocephalic and atraumatic.   Right Ear: External ear normal.   Left Ear: External ear normal.   Eyes: Pupils are equal, round, and reactive to light. Conjunctivae and EOM are normal. Right eye exhibits no discharge. Left eye exhibits no discharge. No scleral icterus.   Neck: Normal range of motion. Neck supple. No JVD present. No tracheal deviation present. No thyromegaly present.   Cardiovascular: Normal rate, regular rhythm and intact distal pulses.  PMI is not displaced.  Exam reveals no gallop and no friction rub.    No murmur heard.  Pulses:       Carotid pulses are 2+ on the right side, and 2+ on the left side.       Radial pulses are 2+ on the left side.        Popliteal pulses are 2+ on the right side, and 2+ on the left side.        Dorsalis pedis pulses are 2+ on the right side, and 2+ on the left side.        Posterior tibial pulses are 2+ on the right side, and 2+ on the left side.   Pulmonary/Chest: Effort normal and breath sounds normal. No respiratory distress. He has no wheezes. He has no rales. He exhibits no tenderness.   Abdominal: Soft. Bowel sounds are normal. He exhibits no distension. There is no tenderness.   Musculoskeletal: Normal range of motion. He exhibits no edema, tenderness or deformity.   Neurological: He is alert and oriented to person, place, and time. No cranial nerve deficit (cranial nerves II through XII grossly intact). Coordination normal.   Skin: Skin is warm and dry. No rash noted. He is not diaphoretic. No erythema. No pallor.   Psychiatric: He has a normal mood and affect. His behavior is normal. Thought content normal.   Vitals reviewed.    LABS:  Lab Results   Component Value Date/Time    CHOLSTRLTOT 155 08/02/2018 09:25 AM    LDL 95 08/02/2018 09:25 AM    HDL 47 08/02/2018 09:25 AM    TRIGLYCERIDE 65  08/02/2018 09:25 AM       Lab Results   Component Value Date/Time    WBC 7.7 05/22/2019 09:00 AM    RBC 3.72 (L) 05/22/2019 09:00 AM    HEMOGLOBIN 11.0 (L) 05/22/2019 09:00 AM    HEMATOCRIT 35.6 (L) 05/22/2019 09:00 AM    MCV 95.7 05/22/2019 09:00 AM    NEUTSPOLYS 66.50 05/22/2019 09:00 AM    LYMPHOCYTES 22.30 05/22/2019 09:00 AM    MONOCYTES 5.70 05/22/2019 09:00 AM    EOSINOPHILS 3.90 05/22/2019 09:00 AM    BASOPHILS 1.30 05/22/2019 09:00 AM    ANISOCYTOSIS 2+ 10/31/2016 10:10 AM     Lab Results   Component Value Date/Time    SODIUM 138 05/22/2019 09:00 AM    POTASSIUM 4.8 05/22/2019 09:00 AM    CHLORIDE 105 05/22/2019 09:00 AM    CO2 26 05/22/2019 09:00 AM    GLUCOSE 116 (H) 05/22/2019 09:00 AM    BUN 28 (H) 05/22/2019 09:00 AM    CREATININE 1.01 05/22/2019 09:00 AM     Lab Results   Component Value Date    HBA1C 6.5 (H) 04/18/2019      Lab Results   Component Value Date/Time    ALKPHOSPHAT 115 (H) 05/22/2019 09:00 AM    ASTSGOT 12 05/22/2019 09:00 AM    ALTSGPT 9 05/22/2019 09:00 AM    TBILIRUBIN 1.5 05/22/2019 09:00 AM      No results found for: BNPBTYPENAT   No results found for: TSH  Lab Results   Component Value Date/Time    PROTHROMBTM 15.0 (H) 04/19/2019 06:24 PM    INR 1.19 (H) 04/19/2019 06:24 PM      EKGs from hospital stay reviewed showing sinus rhythm and alternatively atrial flutter to 153 bpm, atypical.    ECHO CONCLUSIONS (4/23/2019):  Normal left ventricular systolic function.  Left ventricular ejection fraction is visually estimated to be 65%.  Normal right ventricular size and systolic function.  Mild mitral regurgitation.  Mild tricuspid regurgitation.  Estimated right ventricular systolic pressure  is 45 mmHg.  Ascending aorta diameter is 4 cm.  Compared to the images of the prior study done 11/16/10 -  there has   been no significant change. The ascending aorta was not well seen on   the prior exam.        Assessment:     1. Atypical atrial flutter (HCC)  Basic Metabolic Panel   2. Paroxysmal  atrial fibrillation (HCC)     3. Hypertension, essential     4. TIA (transient ischemic attack)     5. Dyslipidemia     6. On continuous oral anticoagulation  Basic Metabolic Panel   7. Thoracic ascending aortic aneurysm (HCC)         Medical Decision Making:  Today's Assessment / Status / Plan:     He appears to have a single event of precipitated atrial flutter without recurrence and spontaneous reversion to sinus rhythm.  Usually we discussed that would recommend conservative management and expectant management for recurrence however given his history of TIAs without clear etiology and other risk factors for stroke it is more prudent to proceed with anti-coagulation.  This is Shayne been initiated and he is tolerating it well.  We discussed symptoms of concern appropriate use of EMS and the rationale for his blood thinners.  Should he have a recurrence of atrial flutter that is sustained or symptomatic then ablation could be entertained.  He is Shayne on antihyperlipidemic therapy and we discussed his thoracic aneurysm which is borderline but requires surveillance echocardiogram next year.  I will see him back in 6 months with a metabolic panel to monitor his renal function during anticoagulation.  Recommend echocardiogram a year from now.    Thank you for this interesting consultation. It was my pleasure to see Freeman Frost today.    Yayo Wills MD, FACC, [facility]  Division of Interventional Cardiology  [facility]

## 2019-06-12 ENCOUNTER — PATIENT OUTREACH (OUTPATIENT)
Dept: HEALTH INFORMATION MANAGEMENT | Facility: OTHER | Age: 79
End: 2019-06-12

## 2019-06-19 NOTE — PROGRESS NOTES
Freeman Frost was admitted to [facility] from 4/19/19-4/24/19 for abdominal pain. Patient readmitted for abdominal pain. Patient readmitted from 5/9/19-5/10/19 for a syncope after crushing his finger in a garage door. [facility] patient advocate was able to successfully engage with patient post-discharge and throughout the case. Per discharge orders, patient was instructed to see his PCP and . Patient saw his PCP on 5/21/19 and then his surgeon on 5/17/19. PPS screening was conducted and scored above 50%.   
Private car

## 2019-07-01 ENCOUNTER — HOSPITAL ENCOUNTER (OUTPATIENT)
Dept: LAB | Facility: MEDICAL CENTER | Age: 79
End: 2019-07-01
Attending: FAMILY MEDICINE
Payer: MEDICARE

## 2019-07-01 LAB
ALBUMIN SERPL BCP-MCNC: 4.5 G/DL (ref 3.2–4.9)
ALBUMIN/GLOB SERPL: 1.4 G/DL
ALP SERPL-CCNC: 109 U/L (ref 30–99)
ALT SERPL-CCNC: 9 U/L (ref 2–50)
ANION GAP SERPL CALC-SCNC: 7 MMOL/L (ref 0–11.9)
ANISOCYTOSIS BLD QL SMEAR: ABNORMAL
AST SERPL-CCNC: 13 U/L (ref 12–45)
BASOPHILS # BLD AUTO: 4.4 % (ref 0–1.8)
BASOPHILS # BLD: 0.3 K/UL (ref 0–0.12)
BILIRUB SERPL-MCNC: 1.8 MG/DL (ref 0.1–1.5)
BUN SERPL-MCNC: 18 MG/DL (ref 8–22)
CALCIUM SERPL-MCNC: 9.4 MG/DL (ref 8.5–10.5)
CHLORIDE SERPL-SCNC: 107 MMOL/L (ref 96–112)
CHOLEST SERPL-MCNC: 136 MG/DL (ref 100–199)
CO2 SERPL-SCNC: 26 MMOL/L (ref 20–33)
CREAT SERPL-MCNC: 1.11 MG/DL (ref 0.5–1.4)
EOSINOPHIL # BLD AUTO: 0.18 K/UL (ref 0–0.51)
EOSINOPHIL NFR BLD: 2.6 % (ref 0–6.9)
ERYTHROCYTE [DISTWIDTH] IN BLOOD BY AUTOMATED COUNT: 51.1 FL (ref 35.9–50)
FASTING STATUS PATIENT QL REPORTED: NORMAL
FERRITIN SERPL-MCNC: 12.7 NG/ML (ref 22–322)
GIANT PLATELETS BLD QL SMEAR: NORMAL
GLOBULIN SER CALC-MCNC: 3.2 G/DL (ref 1.9–3.5)
GLUCOSE SERPL-MCNC: 104 MG/DL (ref 65–99)
HCT VFR BLD AUTO: 44.9 % (ref 42–52)
HDLC SERPL-MCNC: 43 MG/DL
HGB BLD-MCNC: 13.1 G/DL (ref 14–18)
IRON SATN MFR SERPL: 8 % (ref 15–55)
IRON SERPL-MCNC: 34 UG/DL (ref 50–180)
LDLC SERPL CALC-MCNC: 84 MG/DL
LYMPHOCYTES # BLD AUTO: 1.31 K/UL (ref 1–4.8)
LYMPHOCYTES NFR BLD: 19.3 % (ref 22–41)
MACROCYTES BLD QL SMEAR: ABNORMAL
MANUAL DIFF BLD: NORMAL
MCH RBC QN AUTO: 27 PG (ref 27–33)
MCHC RBC AUTO-ENTMCNC: 29.2 G/DL (ref 33.7–35.3)
MCV RBC AUTO: 92.4 FL (ref 81.4–97.8)
MONOCYTES # BLD AUTO: 0.06 K/UL (ref 0–0.85)
MONOCYTES NFR BLD AUTO: 0.9 % (ref 0–13.4)
MORPHOLOGY BLD-IMP: NORMAL
NEUTROPHILS # BLD AUTO: 4.95 K/UL (ref 1.82–7.42)
NEUTROPHILS NFR BLD: 72.8 % (ref 44–72)
NRBC # BLD AUTO: 0 K/UL
NRBC BLD-RTO: 0 /100 WBC
PLATELET # BLD AUTO: 292 K/UL (ref 164–446)
PLATELET BLD QL SMEAR: NORMAL
PMV BLD AUTO: 9.5 FL (ref 9–12.9)
POLYCHROMASIA BLD QL SMEAR: NORMAL
POTASSIUM SERPL-SCNC: 4.2 MMOL/L (ref 3.6–5.5)
PROT SERPL-MCNC: 7.7 G/DL (ref 6–8.2)
RBC # BLD AUTO: 4.86 M/UL (ref 4.7–6.1)
RBC BLD AUTO: PRESENT
SMUDGE CELLS BLD QL SMEAR: NORMAL
SODIUM SERPL-SCNC: 140 MMOL/L (ref 135–145)
TIBC SERPL-MCNC: 424 UG/DL (ref 250–450)
TRIGL SERPL-MCNC: 46 MG/DL (ref 0–149)
VARIANT LYMPHS BLD QL SMEAR: NORMAL
VIT B12 SERPL-MCNC: 433 PG/ML (ref 211–911)
WBC # BLD AUTO: 6.8 K/UL (ref 4.8–10.8)

## 2019-07-01 PROCEDURE — 85027 COMPLETE CBC AUTOMATED: CPT

## 2019-07-01 PROCEDURE — 82728 ASSAY OF FERRITIN: CPT

## 2019-07-01 PROCEDURE — 83550 IRON BINDING TEST: CPT

## 2019-07-01 PROCEDURE — 80053 COMPREHEN METABOLIC PANEL: CPT

## 2019-07-01 PROCEDURE — 83036 HEMOGLOBIN GLYCOSYLATED A1C: CPT

## 2019-07-01 PROCEDURE — 80061 LIPID PANEL: CPT

## 2019-07-01 PROCEDURE — 36415 COLL VENOUS BLD VENIPUNCTURE: CPT

## 2019-07-01 PROCEDURE — 83540 ASSAY OF IRON: CPT

## 2019-07-01 PROCEDURE — 82607 VITAMIN B-12: CPT

## 2019-07-01 PROCEDURE — 85007 BL SMEAR W/DIFF WBC COUNT: CPT

## 2019-07-02 LAB
EST. AVERAGE GLUCOSE BLD GHB EST-MCNC: 114 MG/DL
HBA1C MFR BLD: 5.6 % (ref 0–5.6)

## 2019-08-20 ENCOUNTER — HOSPITAL ENCOUNTER (OUTPATIENT)
Dept: LAB | Facility: MEDICAL CENTER | Age: 79
End: 2019-08-20
Attending: INTERNAL MEDICINE
Payer: MEDICARE

## 2019-08-20 LAB
ALBUMIN SERPL BCP-MCNC: 4.3 G/DL (ref 3.2–4.9)
ALBUMIN/GLOB SERPL: 1.5 G/DL
ALP SERPL-CCNC: 94 U/L (ref 30–99)
ALT SERPL-CCNC: 9 U/L (ref 2–50)
ANION GAP SERPL CALC-SCNC: 7 MMOL/L (ref 0–11.9)
AST SERPL-CCNC: 13 U/L (ref 12–45)
BASOPHILS # BLD AUTO: 1.9 % (ref 0–1.8)
BASOPHILS # BLD: 0.14 K/UL (ref 0–0.12)
BILIRUB SERPL-MCNC: 2.4 MG/DL (ref 0.1–1.5)
BUN SERPL-MCNC: 22 MG/DL (ref 8–22)
CALCIUM SERPL-MCNC: 9.3 MG/DL (ref 8.5–10.5)
CHLORIDE SERPL-SCNC: 105 MMOL/L (ref 96–112)
CO2 SERPL-SCNC: 27 MMOL/L (ref 20–33)
CREAT SERPL-MCNC: 1.16 MG/DL (ref 0.5–1.4)
EOSINOPHIL # BLD AUTO: 0.2 K/UL (ref 0–0.51)
EOSINOPHIL NFR BLD: 2.8 % (ref 0–6.9)
ERYTHROCYTE [DISTWIDTH] IN BLOOD BY AUTOMATED COUNT: 62.1 FL (ref 35.9–50)
FASTING STATUS PATIENT QL REPORTED: NORMAL
GLOBULIN SER CALC-MCNC: 2.8 G/DL (ref 1.9–3.5)
GLUCOSE SERPL-MCNC: 106 MG/DL (ref 65–99)
HCT VFR BLD AUTO: 46.8 % (ref 42–52)
HGB BLD-MCNC: 14.9 G/DL (ref 14–18)
IMM GRANULOCYTES # BLD AUTO: 0.01 K/UL (ref 0–0.11)
IMM GRANULOCYTES NFR BLD AUTO: 0.1 % (ref 0–0.9)
LYMPHOCYTES # BLD AUTO: 1.64 K/UL (ref 1–4.8)
LYMPHOCYTES NFR BLD: 22.7 % (ref 22–41)
MCH RBC QN AUTO: 29.8 PG (ref 27–33)
MCHC RBC AUTO-ENTMCNC: 31.8 G/DL (ref 33.7–35.3)
MCV RBC AUTO: 93.6 FL (ref 81.4–97.8)
MONOCYTES # BLD AUTO: 0.48 K/UL (ref 0–0.85)
MONOCYTES NFR BLD AUTO: 6.6 % (ref 0–13.4)
NEUTROPHILS # BLD AUTO: 4.75 K/UL (ref 1.82–7.42)
NEUTROPHILS NFR BLD: 65.9 % (ref 44–72)
NRBC # BLD AUTO: 0 K/UL
NRBC BLD-RTO: 0 /100 WBC
PLATELET # BLD AUTO: 221 K/UL (ref 164–446)
PMV BLD AUTO: 10 FL (ref 9–12.9)
POTASSIUM SERPL-SCNC: 4.5 MMOL/L (ref 3.6–5.5)
PROT SERPL-MCNC: 7.1 G/DL (ref 6–8.2)
RBC # BLD AUTO: 5 M/UL (ref 4.7–6.1)
SODIUM SERPL-SCNC: 139 MMOL/L (ref 135–145)
WBC # BLD AUTO: 7.2 K/UL (ref 4.8–10.8)

## 2019-08-20 PROCEDURE — 36415 COLL VENOUS BLD VENIPUNCTURE: CPT

## 2019-08-20 PROCEDURE — 85025 COMPLETE CBC W/AUTO DIFF WBC: CPT

## 2019-08-20 PROCEDURE — 80053 COMPREHEN METABOLIC PANEL: CPT

## 2019-09-12 ENCOUNTER — HOSPITAL ENCOUNTER (OUTPATIENT)
Dept: LAB | Facility: MEDICAL CENTER | Age: 79
End: 2019-09-12
Attending: FAMILY MEDICINE
Payer: MEDICARE

## 2019-09-12 LAB
BASOPHILS # BLD AUTO: 1.8 % (ref 0–1.8)
BASOPHILS # BLD: 0.14 K/UL (ref 0–0.12)
EOSINOPHIL # BLD AUTO: 0.23 K/UL (ref 0–0.51)
EOSINOPHIL NFR BLD: 2.9 % (ref 0–6.9)
ERYTHROCYTE [DISTWIDTH] IN BLOOD BY AUTOMATED COUNT: 60.7 FL (ref 35.9–50)
FERRITIN SERPL-MCNC: 24.6 NG/ML (ref 22–322)
HCT VFR BLD AUTO: 49.9 % (ref 42–52)
HGB BLD-MCNC: 15.6 G/DL (ref 14–18)
IMM GRANULOCYTES # BLD AUTO: 0.03 K/UL (ref 0–0.11)
IMM GRANULOCYTES NFR BLD AUTO: 0.4 % (ref 0–0.9)
IRON SATN MFR SERPL: 52 % (ref 15–55)
IRON SERPL-MCNC: 190 UG/DL (ref 50–180)
LYMPHOCYTES # BLD AUTO: 1.55 K/UL (ref 1–4.8)
LYMPHOCYTES NFR BLD: 19.4 % (ref 22–41)
MCH RBC QN AUTO: 29.7 PG (ref 27–33)
MCHC RBC AUTO-ENTMCNC: 31.3 G/DL (ref 33.7–35.3)
MCV RBC AUTO: 94.9 FL (ref 81.4–97.8)
MONOCYTES # BLD AUTO: 0.45 K/UL (ref 0–0.85)
MONOCYTES NFR BLD AUTO: 5.6 % (ref 0–13.4)
NEUTROPHILS # BLD AUTO: 5.58 K/UL (ref 1.82–7.42)
NEUTROPHILS NFR BLD: 69.9 % (ref 44–72)
NRBC # BLD AUTO: 0 K/UL
NRBC BLD-RTO: 0 /100 WBC
PLATELET # BLD AUTO: 243 K/UL (ref 164–446)
PMV BLD AUTO: 9.3 FL (ref 9–12.9)
RBC # BLD AUTO: 5.26 M/UL (ref 4.7–6.1)
TIBC SERPL-MCNC: 364 UG/DL (ref 250–450)
WBC # BLD AUTO: 8 K/UL (ref 4.8–10.8)

## 2019-09-12 PROCEDURE — 85025 COMPLETE CBC W/AUTO DIFF WBC: CPT

## 2019-09-12 PROCEDURE — 36415 COLL VENOUS BLD VENIPUNCTURE: CPT

## 2019-09-12 PROCEDURE — 83550 IRON BINDING TEST: CPT

## 2019-09-12 PROCEDURE — 82728 ASSAY OF FERRITIN: CPT

## 2019-09-12 PROCEDURE — 83540 ASSAY OF IRON: CPT

## 2019-11-25 ENCOUNTER — HOSPITAL ENCOUNTER (OUTPATIENT)
Dept: LAB | Facility: MEDICAL CENTER | Age: 79
End: 2019-11-25
Attending: INTERNAL MEDICINE
Payer: MEDICARE

## 2019-11-25 DIAGNOSIS — Z79.01 ON CONTINUOUS ORAL ANTICOAGULATION: ICD-10-CM

## 2019-11-25 DIAGNOSIS — I48.4 ATYPICAL ATRIAL FLUTTER (HCC): ICD-10-CM

## 2019-11-25 LAB
ANION GAP SERPL CALC-SCNC: 8 MMOL/L (ref 0–11.9)
BUN SERPL-MCNC: 25 MG/DL (ref 8–22)
CALCIUM SERPL-MCNC: 9.1 MG/DL (ref 8.5–10.5)
CHLORIDE SERPL-SCNC: 104 MMOL/L (ref 96–112)
CO2 SERPL-SCNC: 29 MMOL/L (ref 20–33)
CREAT SERPL-MCNC: 1.08 MG/DL (ref 0.5–1.4)
GLUCOSE SERPL-MCNC: 116 MG/DL (ref 65–99)
POTASSIUM SERPL-SCNC: 4.2 MMOL/L (ref 3.6–5.5)
SODIUM SERPL-SCNC: 141 MMOL/L (ref 135–145)

## 2019-11-25 PROCEDURE — 80048 BASIC METABOLIC PNL TOTAL CA: CPT

## 2019-11-25 PROCEDURE — 36415 COLL VENOUS BLD VENIPUNCTURE: CPT

## 2019-12-04 ENCOUNTER — OFFICE VISIT (OUTPATIENT)
Dept: CARDIOLOGY | Facility: MEDICAL CENTER | Age: 79
End: 2019-12-04
Payer: MEDICARE

## 2019-12-04 VITALS
DIASTOLIC BLOOD PRESSURE: 78 MMHG | WEIGHT: 170 LBS | HEART RATE: 66 BPM | SYSTOLIC BLOOD PRESSURE: 130 MMHG | HEIGHT: 69 IN | BODY MASS INDEX: 25.18 KG/M2 | OXYGEN SATURATION: 96 %

## 2019-12-04 DIAGNOSIS — I71.21 THORACIC ASCENDING AORTIC ANEURYSM (HCC): ICD-10-CM

## 2019-12-04 DIAGNOSIS — I48.4 ATYPICAL ATRIAL FLUTTER (HCC): ICD-10-CM

## 2019-12-04 DIAGNOSIS — I10 HYPERTENSION, ESSENTIAL: ICD-10-CM

## 2019-12-04 DIAGNOSIS — Z79.01 ON CONTINUOUS ORAL ANTICOAGULATION: ICD-10-CM

## 2019-12-04 DIAGNOSIS — E78.5 DYSLIPIDEMIA: ICD-10-CM

## 2019-12-04 PROCEDURE — 99214 OFFICE O/P EST MOD 30 MIN: CPT | Performed by: INTERNAL MEDICINE

## 2019-12-04 RX ORDER — FERROUS SULFATE 325(65) MG
325 TABLET ORAL EVERY EVENING
COMMUNITY
End: 2023-06-30

## 2019-12-04 NOTE — PROGRESS NOTES
Chief Complaint   Patient presents with   • Atrial Fibrillation       Subjective:   Freeman Frost is a 79 y.o. male who presents today for initial consultation regarding new onset atrial flutter and syncope.  Has a history of hypertension and TIAs in 2012 and was recently hospitalized for acute cholecystitis which was complicated and required conversion to an open procedure.  During this time he was noted to have atypical atrial flutter which spontaneously resolved.  He was unaware of it as he was quite ill.  He has been initiated on oral anticoagulation and is tolerating this well.  Echocardiogram showed mild elevated pulmonary pressures and a mildly dilated ascending aorta 4 cm.  He has not had a symptomatic recurrence of atrial flutter since that time.  However he did have a single syncopal episode right after being out of the hospital.  The garage door fell on his fingers and he broke a fingertip and bruised multiple nailbeds.  Shortly thereafter he stood up and had a syncopal event.  He was lightheaded and orthostatic and was seen in the emergency department and released.  This was not arrhythmic in nature.  He is a non-smoker who does not drink excessively or do drugs and has no family history of precocious CAD.  His daughter is our  supervisor Mrs. Byrd.    In the interim he has been feeling well with no palpitations or other arrhythmic events.  His memory is difficult and his daughter and wife were here to corroborate this.  He does not recall quite a few pieces of our conversation from last visit.  He is tolerating his anticoagulation well.    Past Medical History:   Diagnosis Date   • Bowel habit changes     constipation   • Cataract    • Dental disorder     upper   • Hypercholesterolemia    • Hypertension    • Snoring    • Stroke (HCC) 2013    tia no residual def     Past Surgical History:   Procedure Laterality Date   • PB ERCP,DIAGNOSTIC N/A 4/20/2019    Procedure: ERCP, DIAGNOSTIC;   Surgeon: Antony Morrisno M.D.;  Location: SURGERY [facility];  Service: Gastroenterology   • FRANCIA BY LAPAROSCOPY  4/19/2019    Procedure: LAPARASCOPIC- CONVERTED TO OPEN CHOLECYSTECTOMY;  Surgeon: Anita Merino M.D.;  Location: SURGERY [facility];  Service: General   • CATARACT PHACO WITH IOL Right 8/28/2018    Procedure: CATARACT PHACO WITH IOL;  Surgeon: Adolfo Santana M.D.;  Location: SURGERY SAME DAY [facility];  Service: Ophthalmology   • CATARACT PHACO WITH IOL Left 8/14/2018    Procedure: CATARACT PHACO WITH IOL;  Surgeon: Adolfo Santana M.D.;  Location: SURGERY SAME DAY [facility];  Service: Ophthalmology   • HERNIA REPAIR, INCISIONAL, UNILATERAL Left    • OTHER      tonsils as a child   • TONSILLECTOMY       History reviewed. No pertinent family history.  Social History     Socioeconomic History   • Marital status:      Spouse name: Not on file   • Number of children: Not on file   • Years of education: Not on file   • Highest education level: Not on file   Occupational History   • Not on file   Social Needs   • Financial resource strain: Not on file   • Food insecurity:     Worry: Not on file     Inability: Not on file   • Transportation needs:     Medical: Not on file     Non-medical: Not on file   Tobacco Use   • Smoking status: Never Smoker   • Smokeless tobacco: Never Used   Substance and Sexual Activity   • Alcohol use: Yes     Comment: 2drinks daily (3-4 Large glasses)   • Drug use: No   • Sexual activity: Not on file   Lifestyle   • Physical activity:     Days per week: Not on file     Minutes per session: Not on file   • Stress: Not on file   Relationships   • Social connections:     Talks on phone: Not on file     Gets together: Not on file     Attends Jainism service: Not on file     Active member of club or organization: Not on file     Attends meetings of clubs or organizations: Not on file     Relationship status: Not on file   • Intimate partner  "violence:     Fear of current or ex partner: Not on file     Emotionally abused: Not on file     Physically abused: Not on file     Forced sexual activity: Not on file   Other Topics Concern   • Not on file   Social History Narrative   • Not on file     Allergies   Allergen Reactions   • Other Environmental      Seasonal allergies     Outpatient Encounter Medications as of 12/4/2019   Medication Sig Dispense Refill   • ferrous sulfate 325 (65 Fe) MG tablet Take 325 mg by mouth every day.     • losartan (COZAAR) 25 MG Tab   3   • Multiple Vitamins-Minerals (PRESERVISION AREDS) Tab Take 1 Capsule by mouth every evening.     • apixaban (ELIQUIS) 5mg Tab Take 1 Tab by mouth 2 Times a Day. 60 Tab 1   • amLODIPine (NORVASC) 5 MG Tab Take 5 mg by mouth every day.     • metoprolol SR (TOPROL XL) 100 MG TABLET SR 24 HR Take 100 mg by mouth every day.     • atorvastatin (LIPITOR) 10 MG Tab Take 10 mg by mouth every evening.       No facility-administered encounter medications on file as of 12/4/2019.      Review of Systems   All other systems reviewed and are negative.       Objective:   /78 (BP Location: Left arm, Patient Position: Sitting, BP Cuff Size: Adult)   Pulse 66   Ht 1.753 m (5' 9\")   Wt 77.1 kg (170 lb)   SpO2 96%   BMI 25.10 kg/m²     Physical Exam   Constitutional: He is oriented to person, place, and time. He appears well-developed and well-nourished. No distress.   HENT:   Head: Normocephalic and atraumatic.   Right Ear: External ear normal.   Left Ear: External ear normal.   Eyes: Pupils are equal, round, and reactive to light. Conjunctivae and EOM are normal. Right eye exhibits no discharge. Left eye exhibits no discharge. No scleral icterus.   Neck: Normal range of motion. Neck supple. No JVD present. No tracheal deviation present. No thyromegaly present.   Cardiovascular: Normal rate, regular rhythm and intact distal pulses. PMI is not displaced. Exam reveals no gallop and no friction rub.   No " murmur heard.  Pulses:       Carotid pulses are 2+ on the right side and 2+ on the left side.       Radial pulses are 2+ on the left side.        Popliteal pulses are 2+ on the right side and 2+ on the left side.        Dorsalis pedis pulses are 2+ on the right side and 2+ on the left side.        Posterior tibial pulses are 2+ on the right side and 2+ on the left side.   Pulmonary/Chest: Effort normal and breath sounds normal. No respiratory distress. He has no wheezes. He has no rales. He exhibits no tenderness.   Abdominal: Soft. Bowel sounds are normal. He exhibits no distension. There is no tenderness.   Musculoskeletal: Normal range of motion.         General: No tenderness, deformity or edema.   Neurological: He is alert and oriented to person, place, and time. No cranial nerve deficit (cranial nerves II through XII grossly intact). Coordination normal.   Skin: Skin is warm and dry. No rash noted. He is not diaphoretic. No erythema. No pallor.   Psychiatric: He has a normal mood and affect. His behavior is normal. Thought content normal.   Vitals reviewed.    LABS:  Lab Results   Component Value Date/Time    CHOLSTRLTOT 136 07/01/2019 11:37 AM    LDL 84 07/01/2019 11:37 AM    HDL 43 07/01/2019 11:37 AM    TRIGLYCERIDE 46 07/01/2019 11:37 AM       Lab Results   Component Value Date/Time    WBC 8.0 09/12/2019 11:51 AM    RBC 5.26 09/12/2019 11:51 AM    HEMOGLOBIN 15.6 09/12/2019 11:51 AM    HEMATOCRIT 49.9 09/12/2019 11:51 AM    MCV 94.9 09/12/2019 11:51 AM    NEUTSPOLYS 69.90 09/12/2019 11:51 AM    LYMPHOCYTES 19.40 (L) 09/12/2019 11:51 AM    MONOCYTES 5.60 09/12/2019 11:51 AM    EOSINOPHILS 2.90 09/12/2019 11:51 AM    BASOPHILS 1.80 09/12/2019 11:51 AM    ANISOCYTOSIS 1+ 07/01/2019 11:37 AM     Lab Results   Component Value Date/Time    SODIUM 141 11/25/2019 08:23 AM    POTASSIUM 4.2 11/25/2019 08:23 AM    CHLORIDE 104 11/25/2019 08:23 AM    CO2 29 11/25/2019 08:23 AM    GLUCOSE 116 (H) 11/25/2019 08:23 AM     BUN 25 (H) 11/25/2019 08:23 AM    CREATININE 1.08 11/25/2019 08:23 AM     Lab Results   Component Value Date    HBA1C 5.6 07/01/2019      Lab Results   Component Value Date/Time    ALKPHOSPHAT 94 08/20/2019 10:22 AM    ASTSGOT 13 08/20/2019 10:22 AM    ALTSGPT 9 08/20/2019 10:22 AM    TBILIRUBIN 2.4 (H) 08/20/2019 10:22 AM      No results found for: BNPBTYPENAT   No results found for: TSH  Lab Results   Component Value Date/Time    PROTHROMBTM 15.0 (H) 04/19/2019 06:24 PM    INR 1.19 (H) 04/19/2019 06:24 PM      EKGs from hospital stay reviewed showing sinus rhythm and alternatively atrial flutter to 153 bpm, atypical.    ECHO CONCLUSIONS (4/23/2019):  Normal left ventricular systolic function.  Left ventricular ejection fraction is visually estimated to be 65%.  Normal right ventricular size and systolic function.  Mild mitral regurgitation.  Mild tricuspid regurgitation.  Estimated right ventricular systolic pressure  is 45 mmHg.  Ascending aorta diameter is 4 cm.  Compared to the images of the prior study done 11/16/10 -  there has   been no significant change. The ascending aorta was not well seen on   the prior exam.        Assessment:     1. Atypical atrial flutter (HCC)     2. Hypertension, essential     3. Dyslipidemia     4. Thoracic ascending aortic aneurysm (HCC)  EC-ECHOCARDIOGRAM COMPLETE W/O CONT   5. On continuous oral anticoagulation         Medical Decision Making:  Today's Assessment / Status / Plan:     He is doing well tolerating anticoagulation without recurrence of his atrial flutter.  If it should recur could consider ablation.  The meantime continue current medical therapy.  I am concerned regarding his apparent cognitive impairment and memory difficulties and I have recommended formal assessment but suggested that they talk to their primary physician regarding this.  We discussed there are some medications that can help improve his memory including memantine and or Aricept if appropriate.   Echocardiogram 1 year from prior to surveilled his aortic aneurysm.  Follow-up in 6 months.

## 2020-04-23 ENCOUNTER — HOSPITAL ENCOUNTER (OUTPATIENT)
Dept: CARDIOLOGY | Facility: MEDICAL CENTER | Age: 80
End: 2020-04-23
Attending: INTERNAL MEDICINE
Payer: MEDICARE

## 2020-04-29 ENCOUNTER — HOSPITAL ENCOUNTER (OUTPATIENT)
Dept: CARDIOLOGY | Facility: MEDICAL CENTER | Age: 80
End: 2020-04-29
Attending: INTERNAL MEDICINE
Payer: MEDICARE

## 2020-04-29 DIAGNOSIS — I71.21 THORACIC ASCENDING AORTIC ANEURYSM (HCC): ICD-10-CM

## 2020-04-29 PROCEDURE — 93306 TTE W/DOPPLER COMPLETE: CPT

## 2020-04-29 PROCEDURE — 93306 TTE W/DOPPLER COMPLETE: CPT | Mod: 26 | Performed by: INTERNAL MEDICINE

## 2020-04-30 LAB
LV EJECT FRACT  99904: 70
LV EJECT FRACT MOD 2C 99903: 56.28
LV EJECT FRACT MOD 4C 99902: 71.95
LV EJECT FRACT MOD BP 99901: 69.45

## 2020-05-05 ENCOUNTER — OFFICE VISIT (OUTPATIENT)
Dept: CARDIOLOGY | Facility: MEDICAL CENTER | Age: 80
End: 2020-05-05
Payer: MEDICARE

## 2020-05-05 VITALS — HEIGHT: 70 IN | BODY MASS INDEX: 22.9 KG/M2 | WEIGHT: 160 LBS

## 2020-05-05 DIAGNOSIS — Z79.01 ON CONTINUOUS ORAL ANTICOAGULATION: ICD-10-CM

## 2020-05-05 DIAGNOSIS — E11.9 TYPE 2 DIABETES MELLITUS WITHOUT COMPLICATION, WITHOUT LONG-TERM CURRENT USE OF INSULIN (HCC): ICD-10-CM

## 2020-05-05 DIAGNOSIS — I71.21 THORACIC ASCENDING AORTIC ANEURYSM (HCC): ICD-10-CM

## 2020-05-05 DIAGNOSIS — I10 HYPERTENSION, ESSENTIAL: ICD-10-CM

## 2020-05-05 DIAGNOSIS — I48.0 PAROXYSMAL ATRIAL FIBRILLATION (HCC): ICD-10-CM

## 2020-05-05 PROBLEM — A41.9 SEPSIS (HCC): Status: RESOLVED | Noted: 2018-09-30 | Resolved: 2020-05-05

## 2020-05-05 PROBLEM — R55 SYNCOPE: Status: RESOLVED | Noted: 2019-05-09 | Resolved: 2020-05-05

## 2020-05-05 PROCEDURE — 99442 PR PHYSICIAN TELEPHONE EVALUATION 11-20 MIN: CPT | Mod: CR | Performed by: INTERNAL MEDICINE

## 2020-05-05 RX ORDER — METOPROLOL SUCCINATE 100 MG/1
100 TABLET, EXTENDED RELEASE ORAL DAILY
Qty: 90 TAB | Refills: 3 | Status: SHIPPED | OUTPATIENT
Start: 2020-05-05 | End: 2021-06-03 | Stop reason: SDUPTHER

## 2020-05-05 ASSESSMENT — FIBROSIS 4 INDEX: FIB4 SCORE: 1.41

## 2020-05-05 NOTE — PROGRESS NOTES
Chief Complaint   Patient presents with   • Follow-Up     5 Month Follow Up Atypical Atrial Flutter       Subjective:   Freeman Frost is a 79 y.o. male who presents today for initial follow up regarding new onset atrial flutter and syncope.  Has a history of hypertension and TIAs in 2012 and was recently hospitalized for acute cholecystitis which was complicated and required conversion to an open procedure.  During this time he was noted to have atypical atrial flutter which spontaneously resolved.  He was unaware of it as he was quite ill.  He has been initiated on oral anticoagulation and is tolerating this well.  Echocardiogram showed mild elevated pulmonary pressures and a mildly dilated ascending aorta 4 cm.  He has not had a symptomatic recurrence of atrial flutter since that time.  However he did have a single syncopal episode right after being out of the hospital.  The garage door fell on his fingers and he broke a fingertip and bruised multiple nailbeds.  Shortly thereafter he stood up and had a syncopal event.  He was lightheaded and orthostatic and was seen in the emergency department and released.  This was not arrhythmic in nature.  He is a non-smoker who does not drink excessively or do drugs and has no family history of precocious CAD.  His daughter is our  supervisor Mrs. Byrd.    In the interim he has been feeling well without any significant changes, tolerating OAC well without recurrent symptomatic flutter.    Past Medical History:   Diagnosis Date   • Bowel habit changes     constipation   • Cataract    • Dental disorder     upper   • Hypercholesterolemia    • Hypertension    • Snoring    • Stroke (HCC) 2013    tia no residual def     Past Surgical History:   Procedure Laterality Date   • PB ERCP,DIAGNOSTIC N/A 4/20/2019    Procedure: ERCP, DIAGNOSTIC;  Surgeon: Antony Morrison M.D.;  Location: SURGERY [facility];  Service: Gastroenterology   • FRANCIA BY LAPAROSCOPY   4/19/2019    Procedure: LAPARASCOPIC- CONVERTED TO OPEN CHOLECYSTECTOMY;  Surgeon: Anita Merino M.D.;  Location: SURGERY [facility];  Service: General   • CATARACT PHACO WITH IOL Right 8/28/2018    Procedure: CATARACT PHACO WITH IOL;  Surgeon: Adolfo Santana M.D.;  Location: SURGERY SAME DAY [facility];  Service: Ophthalmology   • CATARACT PHACO WITH IOL Left 8/14/2018    Procedure: CATARACT PHACO WITH IOL;  Surgeon: Adolfo Santana M.D.;  Location: SURGERY SAME DAY [facility];  Service: Ophthalmology   • HERNIA REPAIR, INCISIONAL, UNILATERAL Left    • OTHER      tonsils as a child   • TONSILLECTOMY       History reviewed. No pertinent family history.  Social History     Socioeconomic History   • Marital status:      Spouse name: Not on file   • Number of children: Not on file   • Years of education: Not on file   • Highest education level: Not on file   Occupational History   • Not on file   Social Needs   • Financial resource strain: Not on file   • Food insecurity     Worry: Not on file     Inability: Not on file   • Transportation needs     Medical: Not on file     Non-medical: Not on file   Tobacco Use   • Smoking status: Never Smoker   • Smokeless tobacco: Never Used   Substance and Sexual Activity   • Alcohol use: Yes     Comment: 2drinks daily (3-4 Large glasses)   • Drug use: No   • Sexual activity: Not on file   Lifestyle   • Physical activity     Days per week: Not on file     Minutes per session: Not on file   • Stress: Not on file   Relationships   • Social connections     Talks on phone: Not on file     Gets together: Not on file     Attends Yazidism service: Not on file     Active member of club or organization: Not on file     Attends meetings of clubs or organizations: Not on file     Relationship status: Not on file   • Intimate partner violence     Fear of current or ex partner: Not on file     Emotionally abused: Not on file     Physically abused: Not on file     Forced  "sexual activity: Not on file   Other Topics Concern   • Not on file   Social History Narrative   • Not on file     Allergies   Allergen Reactions   • Other Environmental      Seasonal allergies     Outpatient Encounter Medications as of 5/5/2020   Medication Sig Dispense Refill   • apixaban (ELIQUIS) 5mg Tab Take 1 Tab by mouth 2 Times a Day. 180 Tab 3   • metoprolol SR (TOPROL XL) 100 MG TABLET SR 24 HR Take 1 Tab by mouth every day. 90 Tab 3   • amLODIPine (NORVASC) 5 MG Tab Take 5 mg by mouth every day.     • atorvastatin (LIPITOR) 10 MG Tab Take 10 mg by mouth every evening.     • ferrous sulfate 325 (65 Fe) MG tablet Take 325 mg by mouth every day.     • losartan (COZAAR) 25 MG Tab   3   • Multiple Vitamins-Minerals (PRESERVISION AREDS) Tab Take 1 Capsule by mouth every evening.     • [DISCONTINUED] apixaban (ELIQUIS) 5mg Tab Take 1 Tab by mouth 2 Times a Day. 60 Tab 1   • [DISCONTINUED] metoprolol SR (TOPROL XL) 100 MG TABLET SR 24 HR Take 100 mg by mouth every day.       No facility-administered encounter medications on file as of 5/5/2020.      Review of Systems   All other systems reviewed and are negative.       Objective:   BP (!) 0/0 (BP Location: Left arm, Patient Position: Sitting, BP Cuff Size: Adult)   Ht 1.778 m (5' 10\")   Wt 72.6 kg (160 lb)   BMI 22.96 kg/m²     Physical Exam  LABS:  Lab Results   Component Value Date/Time    CHOLSTRLTOT 136 07/01/2019 11:37 AM    LDL 84 07/01/2019 11:37 AM    HDL 43 07/01/2019 11:37 AM    TRIGLYCERIDE 46 07/01/2019 11:37 AM       Lab Results   Component Value Date/Time    WBC 8.0 09/12/2019 11:51 AM    RBC 5.26 09/12/2019 11:51 AM    HEMOGLOBIN 15.6 09/12/2019 11:51 AM    HEMATOCRIT 49.9 09/12/2019 11:51 AM    MCV 94.9 09/12/2019 11:51 AM    NEUTSPOLYS 69.90 09/12/2019 11:51 AM    LYMPHOCYTES 19.40 (L) 09/12/2019 11:51 AM    MONOCYTES 5.60 09/12/2019 11:51 AM    EOSINOPHILS 2.90 09/12/2019 11:51 AM    BASOPHILS 1.80 09/12/2019 11:51 AM    ANISOCYTOSIS 1+ " 07/01/2019 11:37 AM     Lab Results   Component Value Date/Time    SODIUM 141 11/25/2019 08:23 AM    POTASSIUM 4.2 11/25/2019 08:23 AM    CHLORIDE 104 11/25/2019 08:23 AM    CO2 29 11/25/2019 08:23 AM    GLUCOSE 116 (H) 11/25/2019 08:23 AM    BUN 25 (H) 11/25/2019 08:23 AM    CREATININE 1.08 11/25/2019 08:23 AM     Lab Results   Component Value Date    HBA1C 5.6 07/01/2019      Lab Results   Component Value Date/Time    ALKPHOSPHAT 94 08/20/2019 10:22 AM    ASTSGOT 13 08/20/2019 10:22 AM    ALTSGPT 9 08/20/2019 10:22 AM    TBILIRUBIN 2.4 (H) 08/20/2019 10:22 AM      No results found for: BNPBTYPENAT   No results found for: TSH  Lab Results   Component Value Date/Time    PROTHROMBTM 15.0 (H) 04/19/2019 06:24 PM    INR 1.19 (H) 04/19/2019 06:24 PM      EKGs from hospital stay reviewed showing sinus rhythm and alternatively atrial flutter to 153 bpm, atypical.    ECHO CONCLUSIONS (4/29/20):  Compared to the report of the study done 04/23/2019  there has been no   significant change.  Normal left ventricular chamber size.  Left ventricular ejection fraction is visually estimated to be 70%.  Moderate concentric left ventricular hypertrophy.  No significant valve abnormalities.   Right heart pressures are normal.   Ascending aorta is borderline dilated with a diameter of  3.9 cm.    ECHO CONCLUSIONS (4/23/2019):  Normal left ventricular systolic function.  Left ventricular ejection fraction is visually estimated to be 65%.  Normal right ventricular size and systolic function.  Mild mitral regurgitation.  Mild tricuspid regurgitation.  Estimated right ventricular systolic pressure  is 45 mmHg.  Ascending aorta diameter is 4 cm.  Compared to the images of the prior study done 11/16/10 -  there has   been no significant change. The ascending aorta was not well seen on   the prior exam.        Assessment:     1. Paroxysmal atrial fibrillation (HCC)  apixaban (ELIQUIS) 5mg Tab    metoprolol SR (TOPROL XL) 100 MG TABLET SR 24  HR    Comp Metabolic Panel   2. On continuous oral anticoagulation  apixaban (ELIQUIS) 5mg Tab    Comp Metabolic Panel    CBC WITHOUT DIFFERENTIAL   3. Thoracic ascending aortic aneurysm (HCC)     4. Type 2 diabetes mellitus without complication, without long-term current use of insulin (HCC)     5. Hypertension, essential         Medical Decision Making:  Today's Assessment / Status / Plan:     He is doing well tolerating anticoagulation without recurrence of his atrial flutter.  If it should recur could consider ablation.  Continue current medical therapy.  I am concerned regarding his apparent cognitive impairment and memory difficulties and I have recommended formal assessment but suggested that they talk to their primary physician regarding this.  We discussed there are some medications that can help improve his memory including memantine and or Aricept if appropriate again this visit. They have not made it back to his PCP yet, indicating they have called severeal times and left messages with no call back. They will check his BP soon and notify his PCP. If they continue to be unable to maintain reliable communication with Dr. Brandt office going forward I suggested they find a new, reliable PCP. Echocardiogram 1 year from prior to surveilled his aortic aneurysm.  Follow-up in 6 months.      Telephone Appointment Visit   As a means of avoiding spread of COVID-19, this visit is being conducted by telephone. This telephone visit was initiated by the patient and they verbally consented.    Time at start of call: 140pm    Reason for Call:  Symptom Follow-up    Patient Comments / History:   As above     Labs / Images Reviewed   As above     Assessment and Plan:     1. Paroxysmal atrial fibrillation (HCC)  - apixaban (ELIQUIS) 5mg Tab; Take 1 Tab by mouth 2 Times a Day.  Dispense: 180 Tab; Refill: 3  - metoprolol SR (TOPROL XL) 100 MG TABLET SR 24 HR; Take 1 Tab by mouth every day.  Dispense: 90 Tab; Refill: 3  -  Comp Metabolic Panel; Future    2. On continuous oral anticoagulation  - apixaban (ELIQUIS) 5mg Tab; Take 1 Tab by mouth 2 Times a Day.  Dispense: 180 Tab; Refill: 3  - Comp Metabolic Panel; Future  - CBC WITHOUT DIFFERENTIAL; Future    3. Thoracic ascending aortic aneurysm (HCC)    4. Type 2 diabetes mellitus without complication, without long-term current use of insulin (HCC)    5. Hypertension, essential      Follow-up: Return in about 6 months (around 11/5/2020).    Time at end of call: 155pm  Total Time Spent: 11-20 minutes    Yayo Wills M.D.

## 2020-07-31 ENCOUNTER — HOSPITAL ENCOUNTER (OUTPATIENT)
Dept: LAB | Facility: MEDICAL CENTER | Age: 80
End: 2020-07-31
Attending: FAMILY MEDICINE
Payer: MEDICARE

## 2020-07-31 LAB
ALBUMIN SERPL BCP-MCNC: 4.5 G/DL (ref 3.2–4.9)
ALBUMIN/GLOB SERPL: 1.8 G/DL
ALP SERPL-CCNC: 94 U/L (ref 30–99)
ALT SERPL-CCNC: 15 U/L (ref 2–50)
ANION GAP SERPL CALC-SCNC: 9 MMOL/L (ref 7–16)
AST SERPL-CCNC: 16 U/L (ref 12–45)
BASOPHILS # BLD AUTO: 1.8 % (ref 0–1.8)
BASOPHILS # BLD: 0.12 K/UL (ref 0–0.12)
BILIRUB SERPL-MCNC: 3.4 MG/DL (ref 0.1–1.5)
BUN SERPL-MCNC: 21 MG/DL (ref 8–22)
CALCIUM SERPL-MCNC: 9.3 MG/DL (ref 8.5–10.5)
CHLORIDE SERPL-SCNC: 100 MMOL/L (ref 96–112)
CHOLEST SERPL-MCNC: 172 MG/DL (ref 100–199)
CO2 SERPL-SCNC: 27 MMOL/L (ref 20–33)
CREAT SERPL-MCNC: 1.03 MG/DL (ref 0.5–1.4)
CREAT UR-MCNC: 174.88 MG/DL
CREAT UR-MCNC: 177.06 MG/DL
CRP SERPL HS-MCNC: 0.2 MG/DL (ref 0–0.75)
EOSINOPHIL # BLD AUTO: 0.17 K/UL (ref 0–0.51)
EOSINOPHIL NFR BLD: 2.6 % (ref 0–6.9)
ERYTHROCYTE [DISTWIDTH] IN BLOOD BY AUTOMATED COUNT: 49.1 FL (ref 35.9–50)
EST. AVERAGE GLUCOSE BLD GHB EST-MCNC: 114 MG/DL
FASTING STATUS PATIENT QL REPORTED: NORMAL
FOLATE SERPL-MCNC: 13.6 NG/ML
GLOBULIN SER CALC-MCNC: 2.5 G/DL (ref 1.9–3.5)
GLUCOSE SERPL-MCNC: 133 MG/DL (ref 65–99)
HBA1C MFR BLD: 5.6 % (ref 0–5.6)
HCT VFR BLD AUTO: 49.7 % (ref 42–52)
HCYS SERPL-SCNC: 13.41 UMOL/L
HDLC SERPL-MCNC: 56 MG/DL
HGB BLD-MCNC: 16.6 G/DL (ref 14–18)
IMM GRANULOCYTES # BLD AUTO: 0.03 K/UL (ref 0–0.11)
IMM GRANULOCYTES NFR BLD AUTO: 0.5 % (ref 0–0.9)
LDLC SERPL CALC-MCNC: 101 MG/DL
LYMPHOCYTES # BLD AUTO: 1.29 K/UL (ref 1–4.8)
LYMPHOCYTES NFR BLD: 19.5 % (ref 22–41)
MCH RBC QN AUTO: 34.3 PG (ref 27–33)
MCHC RBC AUTO-ENTMCNC: 33.4 G/DL (ref 33.7–35.3)
MCV RBC AUTO: 102.7 FL (ref 81.4–97.8)
MICROALBUMIN UR-MCNC: 9.8 MG/DL
MICROALBUMIN/CREAT UR: 56 MG/G (ref 0–30)
MONOCYTES # BLD AUTO: 0.38 K/UL (ref 0–0.85)
MONOCYTES NFR BLD AUTO: 5.8 % (ref 0–13.4)
NEUTROPHILS # BLD AUTO: 4.61 K/UL (ref 1.82–7.42)
NEUTROPHILS NFR BLD: 69.8 % (ref 44–72)
NRBC # BLD AUTO: 0 K/UL
NRBC BLD-RTO: 0 /100 WBC
PLATELET # BLD AUTO: 192 K/UL (ref 164–446)
PMV BLD AUTO: 9.6 FL (ref 9–12.9)
POTASSIUM SERPL-SCNC: 4.7 MMOL/L (ref 3.6–5.5)
PROT SERPL-MCNC: 7 G/DL (ref 6–8.2)
RBC # BLD AUTO: 4.84 M/UL (ref 4.7–6.1)
SODIUM SERPL-SCNC: 136 MMOL/L (ref 135–145)
TREPONEMA PALLIDUM IGG+IGM AB [PRESENCE] IN SERUM OR PLASMA BY IMMUNOASSAY: NORMAL
TRIGL SERPL-MCNC: 74 MG/DL (ref 0–149)
TSH SERPL DL<=0.005 MIU/L-ACNC: 2.54 UIU/ML (ref 0.38–5.33)
VIT B12 SERPL-MCNC: 1048 PG/ML (ref 211–911)
WBC # BLD AUTO: 6.6 K/UL (ref 4.8–10.8)

## 2020-07-31 PROCEDURE — 82746 ASSAY OF FOLIC ACID SERUM: CPT

## 2020-07-31 PROCEDURE — 82570 ASSAY OF URINE CREATININE: CPT

## 2020-07-31 PROCEDURE — 85025 COMPLETE CBC W/AUTO DIFF WBC: CPT

## 2020-07-31 PROCEDURE — 83090 ASSAY OF HOMOCYSTEINE: CPT

## 2020-07-31 PROCEDURE — 80053 COMPREHEN METABOLIC PANEL: CPT

## 2020-07-31 PROCEDURE — 82043 UR ALBUMIN QUANTITATIVE: CPT

## 2020-07-31 PROCEDURE — 84443 ASSAY THYROID STIM HORMONE: CPT

## 2020-07-31 PROCEDURE — 82607 VITAMIN B-12: CPT

## 2020-07-31 PROCEDURE — 86780 TREPONEMA PALLIDUM: CPT

## 2020-07-31 PROCEDURE — 36415 COLL VENOUS BLD VENIPUNCTURE: CPT

## 2020-07-31 PROCEDURE — 80061 LIPID PANEL: CPT

## 2020-07-31 PROCEDURE — 86140 C-REACTIVE PROTEIN: CPT

## 2020-07-31 PROCEDURE — 83036 HEMOGLOBIN GLYCOSYLATED A1C: CPT

## 2020-08-03 ENCOUNTER — HOSPITAL ENCOUNTER (OUTPATIENT)
Dept: RADIOLOGY | Facility: MEDICAL CENTER | Age: 80
End: 2020-08-03
Attending: FAMILY MEDICINE
Payer: MEDICARE

## 2020-08-03 DIAGNOSIS — G31.84 MILD COGNITIVE IMPAIRMENT: ICD-10-CM

## 2020-08-03 PROCEDURE — 70551 MRI BRAIN STEM W/O DYE: CPT

## 2020-11-17 ENCOUNTER — OFFICE VISIT (OUTPATIENT)
Dept: NEUROLOGY | Facility: MEDICAL CENTER | Age: 80
End: 2020-11-17
Payer: MEDICARE

## 2020-11-17 VITALS
DIASTOLIC BLOOD PRESSURE: 72 MMHG | HEIGHT: 69 IN | SYSTOLIC BLOOD PRESSURE: 120 MMHG | HEART RATE: 90 BPM | OXYGEN SATURATION: 97 % | BODY MASS INDEX: 24.73 KG/M2 | WEIGHT: 167 LBS | TEMPERATURE: 98.3 F | RESPIRATION RATE: 14 BRPM

## 2020-11-17 DIAGNOSIS — Z78.9 ALCOHOL USE: ICD-10-CM

## 2020-11-17 DIAGNOSIS — I48.0 PAROXYSMAL ATRIAL FIBRILLATION (HCC): ICD-10-CM

## 2020-11-17 DIAGNOSIS — I10 HYPERTENSION, ESSENTIAL: ICD-10-CM

## 2020-11-17 DIAGNOSIS — R41.3 MEMORY LOSS: ICD-10-CM

## 2020-11-17 DIAGNOSIS — R25.1 TREMOR: ICD-10-CM

## 2020-11-17 DIAGNOSIS — F43.21 GRIEVING: ICD-10-CM

## 2020-11-17 PROCEDURE — 99205 OFFICE O/P NEW HI 60 MIN: CPT | Performed by: PSYCHIATRY & NEUROLOGY

## 2020-11-17 ASSESSMENT — ENCOUNTER SYMPTOMS
SORE THROAT: 0
BRUISES/BLEEDS EASILY: 0
WEIGHT LOSS: 0
EYE DISCHARGE: 0
SHORTNESS OF BREATH: 0
FEVER: 0
FALLS: 0
HALLUCINATIONS: 0
ABDOMINAL PAIN: 0

## 2020-11-17 ASSESSMENT — PATIENT HEALTH QUESTIONNAIRE - PHQ9: CLINICAL INTERPRETATION OF PHQ2 SCORE: 0

## 2020-11-17 ASSESSMENT — FIBROSIS 4 INDEX: FIB4 SCORE: 1.721325931647740838

## 2020-11-17 NOTE — PROGRESS NOTES
"Chief Complaint   Patient presents with   • New Patient     dementia     Patient is referred by Juan Hutton M.D for initial consult.    History of present illness:  Freeman Frost 80 y.o. male presents today for memory loss.   History is obtained from patient and daughter.  and Patient is accompanied by Daughter Carson.    Duration/timing: ~2017 with gradual progression  Context:   Memory loss: Sometimes he is doing things and doesn't remember that he did it (example doing dishes, what he ate for dinner) - forgetting the next day and can remember eventually. Daughter said he was \"in a different decade\" - he though daughter was his mother and their house was the old house. More recently forgets appointments. Memory was bad when he was caring for wife with cancer but got better after she passed. She passed away in 8/2020. He forgets appointments and why he has them. He will lose instructions daughter places for him.   Baseline: Lives alone - he does all ADLs and IADLs. Wife used to manage finances. Daughter took over finances as a promise to her mom. He was never good at that. Drives without safety concerns but couple of times drove past an office three times. Wife diagnosed in 2019. Retired . Highest education: some college  Location: Memory circuits  Quality: Loss  Severity: Mild/moderate  Modifying factors: Worse at night  Associated signs/symptoms: Insomnia -wakes up worrying about if certain things were completed. Emotionally hs is doing ok with grieving process. Some bowel and bladder urgency. +Macular degeneration - treated by Dr. Santana. +Snoring with possible apnea. Hx of TIA - he was in the shower and he felt faint and he collapsed; chronic alcohol intake  Denies: bladder incontinence, bowel incontinence, headaches, head trauma , weakness, numbness/tingling, swallowing difficulties, speech disturbance, incoordination, depression, anxiety, hearing loss, loss of consciousness, " hallucinations, seizures, abnormal movements, falls and HIV or syphilis risk factors, dietary restrictions     Past medical history:   Past Medical History:   Diagnosis Date   • Arrhythmia    • Bowel habit changes     constipation   • Cataract    • Dental disorder     upper   • Hypercholesterolemia    • Hypertension    • Snoring    • Stroke (HCC) 2013    tia no residual def       Past surgical history:   Past Surgical History:   Procedure Laterality Date   • PB ERCP,DIAGNOSTIC N/A 4/20/2019    Procedure: ERCP, DIAGNOSTIC;  Surgeon: Antony Morrison M.D.;  Location: SURGERY [facility];  Service: Gastroenterology   • FRANCIA BY LAPAROSCOPY  4/19/2019    Procedure: LAPARASCOPIC- CONVERTED TO OPEN CHOLECYSTECTOMY;  Surgeon: Anita Merino M.D.;  Location: SURGERY [facility];  Service: General   • CATARACT PHACO WITH IOL Right 8/28/2018    Procedure: CATARACT PHACO WITH IOL;  Surgeon: Adolfo Santana M.D.;  Location: SURGERY SAME DAY [facility];  Service: Ophthalmology   • CATARACT PHACO WITH IOL Left 8/14/2018    Procedure: CATARACT PHACO WITH IOL;  Surgeon: Adolfo Santana M.D.;  Location: SURGERY SAME DAY [facility];  Service: Ophthalmology   • HERNIA REPAIR, INCISIONAL, UNILATERAL Left    • OTHER      tonsils as a child   • TONSILLECTOMY         Family history:   Family History   Problem Relation Age of Onset   • Dementia Mother    • Cancer Mother         Breast   • Cancer Brother         Esophageal    Mom in her 80s when she had memory loss.     Social history:   Tobacco Use   • Smoking status: Never Smoker   • Smokeless tobacco: Never Used   Substance and Sexual Activity   • Alcohol use: Yes     Comment: 2drinks daily (3-4 Large glasses)   • Drug use: No   2 glasses of vodka a week. Though daughter states there is wine as well.      Current medications:   Current Outpatient Medications   Medication   • apixaban (ELIQUIS) 5mg Tab   • metoprolol SR (TOPROL XL) 100 MG TABLET SR 24 HR   •  "ferrous sulfate 325 (65 Fe) MG tablet   • losartan (COZAAR) 25 MG Tab   • Multiple Vitamins-Minerals (PRESERVISION AREDS) Tab   • amLODIPine (NORVASC) 5 MG Tab   • atorvastatin (LIPITOR) 10 MG Tab     No current facility-administered medications for this visit.        Medication Allergy:  Allergies   Allergen Reactions   • Other Environmental      Seasonal allergies       Review of Systems   Constitutional: Negative for fever and weight loss.   HENT: Negative for sore throat.    Eyes: Negative for discharge.   Respiratory: Negative for shortness of breath.    Cardiovascular: Negative for leg swelling.   Gastrointestinal: Negative for abdominal pain.   Genitourinary: Negative for dysuria.   Musculoskeletal: Negative for falls.   Skin: Negative for rash.   Neurological:        As per HPI   Endo/Heme/Allergies: Does not bruise/bleed easily.   Psychiatric/Behavioral: Negative for hallucinations.       Physical examination:   Vitals:    11/17/20 0852   BP: 120/72   BP Location: Left arm   Patient Position: Sitting   BP Cuff Size: Adult   Pulse: 90   Resp: 14   Temp: 36.8 °C (98.3 °F)   TempSrc: Temporal   SpO2: 97%   Weight: 75.8 kg (167 lb)   Height: 1.753 m (5' 9\")     General: Patient in well nourished in no apparent distress.  Eyes: Ophthalmoscopic examination performed but discs cannot be visualized well enough to characterize bilaterally.  HENT: Normocephalic, atraumatic. Mallapatic score 2  Cardiovascular: No lower extremity edema.  Respiratory: Normal respiratory effort.   Skin: No appreciable signs of acute rashes or bruising.   Musculoskeletal: No signs of joint or muscle swelling.   Psychiatric: Pleasant.     NEUROLOGICAL EXAM:   Mental status: Awake, alert and fully oriented to person, place, time and situation. Normal attention, concentration and fund of knowledge for education level. MOCA (22/30, see scanned document 11/2020)  Speech and language: Speech is fluent without errors and clear.  Cranial nerve " exam:  II: Pupils are equally round and reactive to light. Visual fields are intact by confrontation.  III, IV, VI: EOMI, no diplopia, no ptosis.  Nonsustained nystagmus with end gaze, horizontal.  V: Sensation to light touch is normal over V1-3 distributions bilaterally.  .  VII: Facial movements are symmetrical. There is no facial droop. .  VIII: Hearing intact to soft speech and finger rub bilaterally  IX: Palate elevates symmetrically, uvula is midline. Dysarthria is not present.  XI: Shoulder shrug are symmetrical and strong.   XII: Tongue protrudes midline.    Motor exam:  Muscle tone is normal in all 4 limbs. and Fine kinetic tremor R > L on FTN.    Muscle strength:     Right  Left  Deltoid   5/5  5/5      Biceps   5/5  5/5  Triceps  4/5  5/5   Wrist extensors 5/5  5/5  Wrist flexors  5/5  5/5     5/5  5/5  Interossei  5/5  5/5  Thenar (APB)  NT/5  NT/5   Hip flexors  5/5  5/5  Quadriceps  5/5  5/5    Hamstrings  5/5  5/5  Dorsiflexors  5/5  5/5  Plantarflexors  5/5  5/5  Toe extension  NT/5  NT/5  NT = not tested    Sensory exam:  Intact to Light touch and Temperature in bilateral upper and lower extremity and face.    Reflexes:       Right  Left  Biceps   0/4  0/4  Triceps  0/4  0/4  Brachioradialis 0/4  0/4  Knee jerk  trace/4  trace/4  Ankle jerk  0/4  0/4   bilateral toes are downgoing to plantar stimulation..  Trace gabellar but no other frontal release signs - palmomental/snout.    Coordination: shows a normal finger-nose-finger bilaterally. HTS slightly more challenging on the L > R.  Gait: Angtalgic, slow, with symmetric arm swing      ANCILLARY DATA REVIEWED:   Lab Data Review:  Lab Results   Component Value Date/Time    WBC 6.6 07/31/2020 09:12 AM    RBC 4.84 07/31/2020 09:12 AM    HEMOGLOBIN 16.6 07/31/2020 09:12 AM    HEMATOCRIT 49.7 07/31/2020 09:12 AM    .7 (H) 07/31/2020 09:12 AM    MCH 34.3 (H) 07/31/2020 09:12 AM    MCHC 33.4 (L) 07/31/2020 09:12 AM    MPV 9.6 07/31/2020 09:12 AM     NEUTSPOLYS 69.80 07/31/2020 09:12 AM    LYMPHOCYTES 19.50 (L) 07/31/2020 09:12 AM    MONOCYTES 5.80 07/31/2020 09:12 AM    EOSINOPHILS 2.60 07/31/2020 09:12 AM    BASOPHILS 1.80 07/31/2020 09:12 AM    ANISOCYTOSIS 1+ 07/01/2019 11:37 AM      Lab Results   Component Value Date/Time    SODIUM 136 07/31/2020 09:12 AM    POTASSIUM 4.7 07/31/2020 09:12 AM    CHLORIDE 100 07/31/2020 09:12 AM    CO2 27 07/31/2020 09:12 AM    GLUCOSE 133 (H) 07/31/2020 09:12 AM    BUN 21 07/31/2020 09:12 AM    CREATININE 1.03 07/31/2020 09:12 AM     Lab Results   Component Value Date/Time    ASTSGOT 16 07/31/2020 0912    ALTSGPT 15 07/31/2020 0912    ALKPHOSPHAT 94 07/31/2020 0912    ALBUMIN 4.5 07/31/2020 0912     Lab Results   Component Value Date/Time    HBA1C 5.6 07/31/2020 09:12 AM      Workup to date:  B12 1048  Folate 13.6  TSH 2.54   Syphilis nonreactive      Imaging:   MRI brain without contrast August 2020:  1.  No evidence of acute territorial infarct, intracranial hemorrhage or mass lesion.  2.  Advanced diffuse cerebral substance loss.  3.  Mild microangiopathic ischemic change versus demyelination or gliosis.  4.  Chronic left posterior periventricular infarct.  5.  Right paramedian chronic pontine lacunar infarct.    Records reviewed: PCP note reviewed.  Patient having some progressive forgetfulness over the last year.      ASSESSMENT AND PLAN:    1. Memory loss: Uncertain etiology.  +Confounding factors including but not limited to grieving process, alcohol use.  Basic memory loss work-up to date has been unremarkable.  No HIV risk factors.  No culprit medications.  No strong evidence of obstructive sleep apnea.  Differential continues to include metabolic factors given his alcohol intake and neurological exam as well as early dementia process.  Given his [place] performance will need further work-up for clarification.  - VITAMIN B1; Future  - REFERRAL TO OTHER -neuropsychological testing  -Consider PET     2. Tremor:  Kinetic tremor in bilateral upper extremities fairly symmetric suspicious for essential type tremor.  Not affecting ADLs at this point in time.  Monitor clinically.    3. Paroxysmal atrial fibrillation (HCC): On Eliquis    4. Hypertension, essential: Of note with regards to his tremor.  Currently controlled.  Does have evidence of chronic lacunar infarcts on MRI brain in August 2020.    5. Grieving: Patient actively grieving for wife's death in August 2020     6. Alcohol use: Patient says he is been chronically drinking 2 glasses of outlook per week however her daughter's history is suggestive that there is higher amounts consumed.  -B1 as above      FOLLOW-UP: Return for after testing.  EDUCATION AND COUNSELING:  -I personally discussed the following with the patient:   Differential diagnosis, medical reasoning as above, effects of chronic heavy alcohol use, reviewing work-up to date, emotional support    The patient communicates understanding of the above and agrees that due to the complexity of his/her diagnosis, results of any testing and further recommendations will typically be discussed/made during a face to face encounter in my office. The patient and/or family further understands it is their responsibility to keep proper follow up.     Disclaimer  This dictation was created using voice recognition software. I have made every reasonable attempt to avoid dictation errors, but this document may contain an error not identified before finalizing. If the error changes the accuracy of the document, I would appreciate it being brought to my attention. Thank you very much.     Carolyn Mullins MD  Neurology  [facility]

## 2021-01-11 DIAGNOSIS — Z23 NEED FOR VACCINATION: ICD-10-CM

## 2021-05-17 ENCOUNTER — APPOINTMENT (OUTPATIENT)
Dept: RADIOLOGY | Facility: MEDICAL CENTER | Age: 81
End: 2021-05-17
Attending: EMERGENCY MEDICINE
Payer: MEDICARE

## 2021-05-17 ENCOUNTER — HOSPITAL ENCOUNTER (OUTPATIENT)
Facility: MEDICAL CENTER | Age: 81
End: 2021-05-18
Attending: EMERGENCY MEDICINE | Admitting: HOSPITALIST
Payer: MEDICARE

## 2021-05-17 DIAGNOSIS — R55 SYNCOPE, UNSPECIFIED SYNCOPE TYPE: ICD-10-CM

## 2021-05-17 DIAGNOSIS — S00.03XA CONTUSION OF SCALP, INITIAL ENCOUNTER: ICD-10-CM

## 2021-05-17 DIAGNOSIS — S61.411A LACERATION OF RIGHT HAND WITHOUT FOREIGN BODY, INITIAL ENCOUNTER: ICD-10-CM

## 2021-05-17 PROBLEM — M25.561 ACUTE PAIN OF RIGHT KNEE: Status: ACTIVE | Noted: 2021-05-17

## 2021-05-17 PROBLEM — R41.3 MEMORY CHANGE: Status: ACTIVE | Noted: 2021-05-17

## 2021-05-17 LAB
ALBUMIN SERPL BCP-MCNC: 4.3 G/DL (ref 3.2–4.9)
ALBUMIN/GLOB SERPL: 1.4 G/DL
ALP SERPL-CCNC: 127 U/L (ref 30–99)
ALT SERPL-CCNC: 10 U/L (ref 2–50)
ANION GAP SERPL CALC-SCNC: 15 MMOL/L (ref 7–16)
APTT PPP: 30.9 SEC (ref 24.7–36)
AST SERPL-CCNC: 19 U/L (ref 12–45)
BASOPHILS # BLD AUTO: 1.4 % (ref 0–1.8)
BASOPHILS # BLD: 0.09 K/UL (ref 0–0.12)
BILIRUB SERPL-MCNC: 1.3 MG/DL (ref 0.1–1.5)
BUN SERPL-MCNC: 19 MG/DL (ref 8–22)
CALCIUM SERPL-MCNC: 9.3 MG/DL (ref 8.4–10.2)
CHLORIDE SERPL-SCNC: 103 MMOL/L (ref 96–112)
CO2 SERPL-SCNC: 20 MMOL/L (ref 20–33)
CREAT SERPL-MCNC: 1.11 MG/DL (ref 0.5–1.4)
EKG IMPRESSION: NORMAL
EOSINOPHIL # BLD AUTO: 0.14 K/UL (ref 0–0.51)
EOSINOPHIL NFR BLD: 2.2 % (ref 0–6.9)
ERYTHROCYTE [DISTWIDTH] IN BLOOD BY AUTOMATED COUNT: 50.3 FL (ref 35.9–50)
GLOBULIN SER CALC-MCNC: 3.1 G/DL (ref 1.9–3.5)
GLUCOSE SERPL-MCNC: 100 MG/DL (ref 65–99)
HCT VFR BLD AUTO: 44 % (ref 42–52)
HGB BLD-MCNC: 14.6 G/DL (ref 14–18)
IMM GRANULOCYTES # BLD AUTO: 0.05 K/UL (ref 0–0.11)
IMM GRANULOCYTES NFR BLD AUTO: 0.8 % (ref 0–0.9)
INR PPP: 1.2 (ref 0.87–1.13)
LYMPHOCYTES # BLD AUTO: 1.16 K/UL (ref 1–4.8)
LYMPHOCYTES NFR BLD: 17.9 % (ref 22–41)
MCH RBC QN AUTO: 34.3 PG (ref 27–33)
MCHC RBC AUTO-ENTMCNC: 33.2 G/DL (ref 33.7–35.3)
MCV RBC AUTO: 103.3 FL (ref 81.4–97.8)
MONOCYTES # BLD AUTO: 0.54 K/UL (ref 0–0.85)
MONOCYTES NFR BLD AUTO: 8.3 % (ref 0–13.4)
NEUTROPHILS # BLD AUTO: 4.51 K/UL (ref 1.82–7.42)
NEUTROPHILS NFR BLD: 69.4 % (ref 44–72)
NRBC # BLD AUTO: 0 K/UL
NRBC BLD-RTO: 0 /100 WBC
PLATELET # BLD AUTO: 246 K/UL (ref 164–446)
PMV BLD AUTO: 8.9 FL (ref 9–12.9)
POTASSIUM SERPL-SCNC: 4.3 MMOL/L (ref 3.6–5.5)
PROT SERPL-MCNC: 7.4 G/DL (ref 6–8.2)
PROTHROMBIN TIME: 14.9 SEC (ref 12–14.6)
RBC # BLD AUTO: 4.26 M/UL (ref 4.7–6.1)
SODIUM SERPL-SCNC: 138 MMOL/L (ref 135–145)
TROPONIN T SERPL-MCNC: 16 NG/L (ref 6–19)
WBC # BLD AUTO: 6.5 K/UL (ref 4.8–10.8)

## 2021-05-17 PROCEDURE — 80053 COMPREHEN METABOLIC PANEL: CPT

## 2021-05-17 PROCEDURE — 99285 EMERGENCY DEPT VISIT HI MDM: CPT

## 2021-05-17 PROCEDURE — 73564 X-RAY EXAM KNEE 4 OR MORE: CPT | Mod: RT

## 2021-05-17 PROCEDURE — 304999 HCHG REPAIR-SIMPLE/INTERMED LEVEL 1

## 2021-05-17 PROCEDURE — U0005 INFEC AGEN DETEC AMPLI PROBE: HCPCS

## 2021-05-17 PROCEDURE — 85730 THROMBOPLASTIN TIME PARTIAL: CPT

## 2021-05-17 PROCEDURE — 93005 ELECTROCARDIOGRAM TRACING: CPT

## 2021-05-17 PROCEDURE — G1004 CDSM NDSC: HCPCS

## 2021-05-17 PROCEDURE — 700101 HCHG RX REV CODE 250: Performed by: EMERGENCY MEDICINE

## 2021-05-17 PROCEDURE — G0378 HOSPITAL OBSERVATION PER HR: HCPCS

## 2021-05-17 PROCEDURE — 85025 COMPLETE CBC W/AUTO DIFF WBC: CPT

## 2021-05-17 PROCEDURE — 303747 HCHG EXTRA SUTURE

## 2021-05-17 PROCEDURE — 71045 X-RAY EXAM CHEST 1 VIEW: CPT

## 2021-05-17 PROCEDURE — 700102 HCHG RX REV CODE 250 W/ 637 OVERRIDE(OP): Performed by: HOSPITALIST

## 2021-05-17 PROCEDURE — A9270 NON-COVERED ITEM OR SERVICE: HCPCS | Performed by: HOSPITALIST

## 2021-05-17 PROCEDURE — 84484 ASSAY OF TROPONIN QUANT: CPT

## 2021-05-17 PROCEDURE — 99220 PR INITIAL OBSERVATION CARE,LEVL III: CPT | Performed by: HOSPITALIST

## 2021-05-17 PROCEDURE — 85610 PROTHROMBIN TIME: CPT

## 2021-05-17 PROCEDURE — 93005 ELECTROCARDIOGRAM TRACING: CPT | Performed by: EMERGENCY MEDICINE

## 2021-05-17 PROCEDURE — U0003 INFECTIOUS AGENT DETECTION BY NUCLEIC ACID (DNA OR RNA); SEVERE ACUTE RESPIRATORY SYNDROME CORONAVIRUS 2 (SARS-COV-2) (CORONAVIRUS DISEASE [COVID-19]), AMPLIFIED PROBE TECHNIQUE, MAKING USE OF HIGH THROUGHPUT TECHNOLOGIES AS DESCRIBED BY CMS-2020-01-R: HCPCS

## 2021-05-17 RX ORDER — ACETAMINOPHEN 325 MG/1
650 TABLET ORAL EVERY 6 HOURS PRN
Status: DISCONTINUED | OUTPATIENT
Start: 2021-05-17 | End: 2021-05-18 | Stop reason: HOSPADM

## 2021-05-17 RX ORDER — ATORVASTATIN CALCIUM 10 MG/1
10 TABLET, FILM COATED ORAL DAILY
Status: DISCONTINUED | OUTPATIENT
Start: 2021-05-18 | End: 2021-05-18 | Stop reason: HOSPADM

## 2021-05-17 RX ORDER — LOSARTAN POTASSIUM 25 MG/1
25 TABLET ORAL
Status: DISCONTINUED | OUTPATIENT
Start: 2021-05-18 | End: 2021-05-18 | Stop reason: HOSPADM

## 2021-05-17 RX ORDER — POLYETHYLENE GLYCOL 3350 17 G/17G
1 POWDER, FOR SOLUTION ORAL
Status: DISCONTINUED | OUTPATIENT
Start: 2021-05-17 | End: 2021-05-18 | Stop reason: HOSPADM

## 2021-05-17 RX ORDER — M-VIT,TX,IRON,MINS/CALC/FOLIC 27MG-0.4MG
1 TABLET ORAL DAILY
Status: DISCONTINUED | OUTPATIENT
Start: 2021-05-18 | End: 2021-05-18 | Stop reason: HOSPADM

## 2021-05-17 RX ORDER — LIDOCAINE HYDROCHLORIDE AND EPINEPHRINE 10; 10 MG/ML; UG/ML
10 INJECTION, SOLUTION INFILTRATION; PERINEURAL ONCE
Status: COMPLETED | OUTPATIENT
Start: 2021-05-17 | End: 2021-05-17

## 2021-05-17 RX ORDER — AMOXICILLIN 250 MG
2 CAPSULE ORAL 2 TIMES DAILY
Status: DISCONTINUED | OUTPATIENT
Start: 2021-05-18 | End: 2021-05-18 | Stop reason: HOSPADM

## 2021-05-17 RX ORDER — ATORVASTATIN CALCIUM 10 MG/1
10 TABLET, FILM COATED ORAL NIGHTLY
Status: DISCONTINUED | OUTPATIENT
Start: 2021-05-17 | End: 2021-05-17

## 2021-05-17 RX ORDER — ONDANSETRON 4 MG/1
4 TABLET, ORALLY DISINTEGRATING ORAL EVERY 4 HOURS PRN
Status: DISCONTINUED | OUTPATIENT
Start: 2021-05-17 | End: 2021-05-18 | Stop reason: HOSPADM

## 2021-05-17 RX ORDER — BISACODYL 10 MG
10 SUPPOSITORY, RECTAL RECTAL
Status: DISCONTINUED | OUTPATIENT
Start: 2021-05-17 | End: 2021-05-18 | Stop reason: HOSPADM

## 2021-05-17 RX ORDER — ONDANSETRON 2 MG/ML
4 INJECTION INTRAMUSCULAR; INTRAVENOUS EVERY 4 HOURS PRN
Status: DISCONTINUED | OUTPATIENT
Start: 2021-05-17 | End: 2021-05-18 | Stop reason: HOSPADM

## 2021-05-17 RX ORDER — METOPROLOL SUCCINATE 100 MG/1
100 TABLET, EXTENDED RELEASE ORAL DAILY
Status: DISCONTINUED | OUTPATIENT
Start: 2021-05-18 | End: 2021-05-18 | Stop reason: HOSPADM

## 2021-05-17 RX ORDER — FERROUS SULFATE 325(65) MG
325 TABLET ORAL DAILY
Status: DISCONTINUED | OUTPATIENT
Start: 2021-05-18 | End: 2021-05-18 | Stop reason: HOSPADM

## 2021-05-17 RX ORDER — AMLODIPINE BESYLATE 5 MG/1
5 TABLET ORAL DAILY
Status: DISCONTINUED | OUTPATIENT
Start: 2021-05-18 | End: 2021-05-18 | Stop reason: HOSPADM

## 2021-05-17 RX ADMIN — LIDOCAINE HYDROCHLORIDE AND EPINEPHRINE 10 ML: 10; 10 INJECTION, SOLUTION INFILTRATION; PERINEURAL at 19:02

## 2021-05-17 RX ADMIN — APIXABAN 5 MG: 5 TABLET, FILM COATED ORAL at 21:57

## 2021-05-17 ASSESSMENT — LIFESTYLE VARIABLES
CONSUMPTION TOTAL: NEGATIVE
TOTAL SCORE: 1
ALCOHOL_USE: YES
ON A TYPICAL DAY WHEN YOU DRINK ALCOHOL HOW MANY DRINKS DO YOU HAVE: 2
TOTAL SCORE: 1
AVERAGE NUMBER OF DAYS PER WEEK YOU HAVE A DRINK CONTAINING ALCOHOL: 3
HAVE PEOPLE ANNOYED YOU BY CRITICIZING YOUR DRINKING: NO
EVER HAD A DRINK FIRST THING IN THE MORNING TO STEADY YOUR NERVES TO GET RID OF A HANGOVER: NO
HAVE YOU EVER FELT YOU SHOULD CUT DOWN ON YOUR DRINKING: YES
EVER FELT BAD OR GUILTY ABOUT YOUR DRINKING: NO
HOW MANY TIMES IN THE PAST YEAR HAVE YOU HAD 5 OR MORE DRINKS IN A DAY: 0
TOTAL SCORE: 1

## 2021-05-17 ASSESSMENT — ENCOUNTER SYMPTOMS
NERVOUS/ANXIOUS: 0
BRUISES/BLEEDS EASILY: 1
COUGH: 0
SHORTNESS OF BREATH: 0
FOCAL WEAKNESS: 0
MYALGIAS: 0
FEVER: 0
EYE REDNESS: 0
VOMITING: 0
FLANK PAIN: 0
CHILLS: 0
ABDOMINAL PAIN: 0
FALLS: 1
LOSS OF CONSCIOUSNESS: 1
EYE DISCHARGE: 0
STRIDOR: 0

## 2021-05-17 ASSESSMENT — PATIENT HEALTH QUESTIONNAIRE - PHQ9
5. POOR APPETITE OR OVEREATING: NOT AT ALL
6. FEELING BAD ABOUT YOURSELF - OR THAT YOU ARE A FAILURE OR HAVE LET YOURSELF OR YOUR FAMILY DOWN: NOT AL ALL
7. TROUBLE CONCENTRATING ON THINGS, SUCH AS READING THE NEWSPAPER OR WATCHING TELEVISION: NOT AT ALL
SUM OF ALL RESPONSES TO PHQ9 QUESTIONS 1 AND 2: 1
9. THOUGHTS THAT YOU WOULD BE BETTER OFF DEAD, OR OF HURTING YOURSELF: NOT AT ALL
4. FEELING TIRED OR HAVING LITTLE ENERGY: NOT AT ALL
2. FEELING DOWN, DEPRESSED, IRRITABLE, OR HOPELESS: SEVERAL DAYS
SUM OF ALL RESPONSES TO PHQ QUESTIONS 1-9: 1
3. TROUBLE FALLING OR STAYING ASLEEP OR SLEEPING TOO MUCH: NOT AT ALL
8. MOVING OR SPEAKING SO SLOWLY THAT OTHER PEOPLE COULD HAVE NOTICED. OR THE OPPOSITE, BEING SO FIGETY OR RESTLESS THAT YOU HAVE BEEN MOVING AROUND A LOT MORE THAN USUAL: NOT AT ALL
1. LITTLE INTEREST OR PLEASURE IN DOING THINGS: NOT AT ALL

## 2021-05-17 ASSESSMENT — COGNITIVE AND FUNCTIONAL STATUS - GENERAL
DAILY ACTIVITIY SCORE: 24
MOBILITY SCORE: 24
SUGGESTED CMS G CODE MODIFIER MOBILITY: CH
SUGGESTED CMS G CODE MODIFIER DAILY ACTIVITY: CH

## 2021-05-17 ASSESSMENT — FIBROSIS 4 INDEX
FIB4 SCORE: 1.95
FIB4 SCORE: 1.721325931647740838

## 2021-05-17 ASSESSMENT — PAIN DESCRIPTION - PAIN TYPE: TYPE: ACUTE PAIN

## 2021-05-17 NOTE — ED TRIAGE NOTES
"Chief Complaint   Patient presents with   • Syncope     Pt states remembers walking out to deck, found face down by daughter   • Hand Injury     Pt c/o bilat hand pain     /62   Pulse 94   Temp 36.7 °C (98 °F) (Temporal)   Resp 15   Ht 1.753 m (5' 9\")   Wt 75.9 kg (167 lb 5.3 oz)   SpO2 94%   BMI 24.71 kg/m²     Pt ambulated to ED w/ family member for c/o possible syncopal event while at home today.  Pt states daughter was on way over to home and found pt lying supine on deck.  Pt denies c/o CP, abd pain or back pain.  Pt c/o pain bilat hands, small skin tear to Right Elbow.    Pt states does take Eliquis.    Covid Screen Negative  "

## 2021-05-18 ENCOUNTER — PATIENT OUTREACH (OUTPATIENT)
Dept: HEALTH INFORMATION MANAGEMENT | Facility: OTHER | Age: 81
End: 2021-05-18

## 2021-05-18 ENCOUNTER — PATIENT MESSAGE (OUTPATIENT)
Dept: HEALTH INFORMATION MANAGEMENT | Facility: OTHER | Age: 81
End: 2021-05-18

## 2021-05-18 ENCOUNTER — APPOINTMENT (OUTPATIENT)
Dept: CARDIOLOGY | Facility: MEDICAL CENTER | Age: 81
End: 2021-05-18
Attending: HOSPITALIST
Payer: MEDICARE

## 2021-05-18 VITALS
TEMPERATURE: 97.8 F | WEIGHT: 164.9 LBS | DIASTOLIC BLOOD PRESSURE: 75 MMHG | BODY MASS INDEX: 24.42 KG/M2 | HEART RATE: 82 BPM | SYSTOLIC BLOOD PRESSURE: 112 MMHG | RESPIRATION RATE: 19 BRPM | OXYGEN SATURATION: 91 % | HEIGHT: 69 IN

## 2021-05-18 PROBLEM — M25.561 ACUTE PAIN OF RIGHT KNEE: Status: RESOLVED | Noted: 2021-05-17 | Resolved: 2021-05-18

## 2021-05-18 PROBLEM — E78.5 DYSLIPIDEMIA: Status: RESOLVED | Noted: 2018-09-30 | Resolved: 2021-05-18

## 2021-05-18 PROBLEM — R55 SYNCOPE: Status: RESOLVED | Noted: 2019-05-09 | Resolved: 2021-05-18

## 2021-05-18 LAB
ALBUMIN SERPL BCP-MCNC: 3.7 G/DL (ref 3.2–4.9)
ALBUMIN/GLOB SERPL: 1.3 G/DL
ALP SERPL-CCNC: 115 U/L (ref 30–99)
ALT SERPL-CCNC: 9 U/L (ref 2–50)
ANION GAP SERPL CALC-SCNC: 11 MMOL/L (ref 7–16)
AST SERPL-CCNC: 16 U/L (ref 12–45)
BASOPHILS # BLD AUTO: 1.5 % (ref 0–1.8)
BASOPHILS # BLD: 0.09 K/UL (ref 0–0.12)
BILIRUB SERPL-MCNC: 1.8 MG/DL (ref 0.1–1.5)
BUN SERPL-MCNC: 17 MG/DL (ref 8–22)
CALCIUM SERPL-MCNC: 8.9 MG/DL (ref 8.4–10.2)
CHLORIDE SERPL-SCNC: 105 MMOL/L (ref 96–112)
CO2 SERPL-SCNC: 23 MMOL/L (ref 20–33)
CREAT SERPL-MCNC: 0.86 MG/DL (ref 0.5–1.4)
EOSINOPHIL # BLD AUTO: 0.2 K/UL (ref 0–0.51)
EOSINOPHIL NFR BLD: 3.3 % (ref 0–6.9)
ERYTHROCYTE [DISTWIDTH] IN BLOOD BY AUTOMATED COUNT: 49.3 FL (ref 35.9–50)
GLOBULIN SER CALC-MCNC: 2.8 G/DL (ref 1.9–3.5)
GLUCOSE SERPL-MCNC: 106 MG/DL (ref 65–99)
HCT VFR BLD AUTO: 41.5 % (ref 42–52)
HGB BLD-MCNC: 13.5 G/DL (ref 14–18)
IMM GRANULOCYTES # BLD AUTO: 0.05 K/UL (ref 0–0.11)
IMM GRANULOCYTES NFR BLD AUTO: 0.8 % (ref 0–0.9)
LV EJECT FRACT  99904: 70
LV EJECT FRACT MOD 2C 99903: 65.41
LV EJECT FRACT MOD 4C 99902: 70.53
LV EJECT FRACT MOD BP 99901: 67.04
LYMPHOCYTES # BLD AUTO: 1.3 K/UL (ref 1–4.8)
LYMPHOCYTES NFR BLD: 21.3 % (ref 22–41)
MAGNESIUM SERPL-MCNC: 2.2 MG/DL (ref 1.5–2.5)
MCH RBC QN AUTO: 33.1 PG (ref 27–33)
MCHC RBC AUTO-ENTMCNC: 32.5 G/DL (ref 33.7–35.3)
MCV RBC AUTO: 101.7 FL (ref 81.4–97.8)
MONOCYTES # BLD AUTO: 0.66 K/UL (ref 0–0.85)
MONOCYTES NFR BLD AUTO: 10.8 % (ref 0–13.4)
NEUTROPHILS # BLD AUTO: 3.81 K/UL (ref 1.82–7.42)
NEUTROPHILS NFR BLD: 62.3 % (ref 44–72)
NRBC # BLD AUTO: 0 K/UL
NRBC BLD-RTO: 0 /100 WBC
PLATELET # BLD AUTO: 232 K/UL (ref 164–446)
PMV BLD AUTO: 9.3 FL (ref 9–12.9)
POTASSIUM SERPL-SCNC: 4.2 MMOL/L (ref 3.6–5.5)
PROT SERPL-MCNC: 6.5 G/DL (ref 6–8.2)
RBC # BLD AUTO: 4.08 M/UL (ref 4.7–6.1)
SARS-COV-2 RNA RESP QL NAA+PROBE: NOTDETECTED
SODIUM SERPL-SCNC: 139 MMOL/L (ref 135–145)
SPECIMEN SOURCE: NORMAL
WBC # BLD AUTO: 6.1 K/UL (ref 4.8–10.8)

## 2021-05-18 PROCEDURE — 99217 PR OBSERVATION CARE DISCHARGE: CPT | Performed by: HOSPITALIST

## 2021-05-18 PROCEDURE — 93306 TTE W/DOPPLER COMPLETE: CPT

## 2021-05-18 PROCEDURE — 80053 COMPREHEN METABOLIC PANEL: CPT

## 2021-05-18 PROCEDURE — G0378 HOSPITAL OBSERVATION PER HR: HCPCS

## 2021-05-18 PROCEDURE — 700102 HCHG RX REV CODE 250 W/ 637 OVERRIDE(OP): Performed by: HOSPITALIST

## 2021-05-18 PROCEDURE — 700105 HCHG RX REV CODE 258: Performed by: HOSPITALIST

## 2021-05-18 PROCEDURE — 93306 TTE W/DOPPLER COMPLETE: CPT | Mod: 26 | Performed by: INTERNAL MEDICINE

## 2021-05-18 PROCEDURE — 97165 OT EVAL LOW COMPLEX 30 MIN: CPT

## 2021-05-18 PROCEDURE — 85025 COMPLETE CBC W/AUTO DIFF WBC: CPT

## 2021-05-18 PROCEDURE — 83735 ASSAY OF MAGNESIUM: CPT

## 2021-05-18 PROCEDURE — A9270 NON-COVERED ITEM OR SERVICE: HCPCS | Performed by: HOSPITALIST

## 2021-05-18 RX ORDER — SODIUM CHLORIDE 9 MG/ML
500 INJECTION, SOLUTION INTRAVENOUS ONCE
Status: COMPLETED | OUTPATIENT
Start: 2021-05-18 | End: 2021-05-18

## 2021-05-18 RX ADMIN — FERROUS SULFATE TAB 325 MG (65 MG ELEMENTAL FE) 325 MG: 325 (65 FE) TAB at 05:29

## 2021-05-18 RX ADMIN — MULTIPLE VITAMINS W/ MINERALS TAB 1 TABLET: TAB at 05:29

## 2021-05-18 RX ADMIN — ATORVASTATIN CALCIUM 10 MG: 10 TABLET, FILM COATED ORAL at 05:29

## 2021-05-18 RX ADMIN — APIXABAN 5 MG: 5 TABLET, FILM COATED ORAL at 05:29

## 2021-05-18 RX ADMIN — SODIUM CHLORIDE 500 ML: 9 INJECTION, SOLUTION INTRAVENOUS at 09:21

## 2021-05-18 SDOH — ECONOMIC STABILITY: FOOD INSECURITY: WITHIN THE PAST 12 MONTHS, YOU WORRIED THAT YOUR FOOD WOULD RUN OUT BEFORE YOU GOT MONEY TO BUY MORE.: NEVER TRUE

## 2021-05-18 SDOH — ECONOMIC STABILITY: INCOME INSECURITY: HOW HARD IS IT FOR YOU TO PAY FOR THE VERY BASICS LIKE FOOD, HOUSING, MEDICAL CARE, AND HEATING?: NOT HARD AT ALL

## 2021-05-18 SDOH — ECONOMIC STABILITY: TRANSPORTATION INSECURITY
IN THE PAST 12 MONTHS, HAS LACK OF TRANSPORTATION KEPT YOU FROM MEETINGS, WORK, OR FROM GETTING THINGS NEEDED FOR DAILY LIVING?: NO

## 2021-05-18 SDOH — ECONOMIC STABILITY: FOOD INSECURITY: WITHIN THE PAST 12 MONTHS, THE FOOD YOU BOUGHT JUST DIDN'T LAST AND YOU DIDN'T HAVE MONEY TO GET MORE.: NEVER TRUE

## 2021-05-18 SDOH — ECONOMIC STABILITY: TRANSPORTATION INSECURITY
IN THE PAST 12 MONTHS, HAS THE LACK OF TRANSPORTATION KEPT YOU FROM MEDICAL APPOINTMENTS OR FROM GETTING MEDICATIONS?: NO

## 2021-05-18 ASSESSMENT — ACTIVITIES OF DAILY LIVING (ADL): TOILETING: INDEPENDENT

## 2021-05-18 ASSESSMENT — COGNITIVE AND FUNCTIONAL STATUS - GENERAL
DRESSING REGULAR LOWER BODY CLOTHING: A LITTLE
SUGGESTED CMS G CODE MODIFIER DAILY ACTIVITY: CJ
HELP NEEDED FOR BATHING: A LITTLE
DAILY ACTIVITIY SCORE: 21
TOILETING: A LITTLE

## 2021-05-18 ASSESSMENT — CHA2DS2 SCORE
SEX: FEMALE
VASCULAR DISEASE: NO
CHF OR LEFT VENTRICULAR DYSFUNCTION: NO
AGE 75 OR GREATER: YES
DIABETES: NO
CHA2DS2 VASC SCORE: 6
AGE 65 TO 74: NO
HYPERTENSION: YES
PRIOR STROKE OR TIA OR THROMBOEMBOLISM: YES

## 2021-05-18 ASSESSMENT — GAIT ASSESSMENTS: DISTANCE (FEET): 10

## 2021-05-18 NOTE — H&P
Hospital Medicine History & Physical Note    Date of Service  5/17/2021    Primary Care Physician  Juan Hutton M.D.    Consultants  None     Code Status  Full Code    Chief Complaint  Chief Complaint   Patient presents with   • Syncope     Pt states remembers walking out to deck, found lying supine on deck by daughter     History of Presenting Illness  80 y.o. male with a past medical history of hypertension, memory defects atrial fibrillation on anticoagulation with apixaban who presented 5/17/2021 with syncopal episode.  Patient has been found by his daughter on the floor.  Patient does not remember how this happened.  He denies having chest pain palpitations shortness of breath abnormal jerking movements fecal or urinary incontinence.  According to the daughter the patient recovered quickly and was alert and oriented.  He does have memory problems and he is undergoing work-up for vascular dementia.  Patient does have right knee pain and swelling that is new since his syncopal event.  Reports that the pain is mild-moderate no relieving or aggravating factors.  No radiation to other places.      Review of Systems  Review of Systems   Constitutional: Negative for chills and fever.   Eyes: Negative for discharge and redness.   Respiratory: Negative for cough, shortness of breath and stridor.    Cardiovascular: Negative for chest pain and leg swelling.   Gastrointestinal: Negative for abdominal pain and vomiting.   Genitourinary: Negative for flank pain.   Musculoskeletal: Positive for falls and joint pain (Right knee). Negative for myalgias.   Skin: Negative.    Neurological: Positive for loss of consciousness. Negative for focal weakness.   Endo/Heme/Allergies: Bruises/bleeds easily.   Psychiatric/Behavioral: The patient is not nervous/anxious.      Past Medical History   has a past medical history of Arrhythmia, Bowel habit changes, Cataract, Dental disorder, Hypercholesterolemia, Hypertension, Snoring, and  Stroke (HCC) (2013).    Surgical History   has a past surgical history that includes other; cataract phaco with iol (Left, 8/14/2018); cataract phaco with iol (Right, 8/28/2018); hernia repair, incisional, unilateral (Left); tonsillectomy; sheryl by laparoscopy (4/19/2019); and pr ercp,diagnostic (N/A, 4/20/2019).     Family History  family history includes Cancer in his brother and mother; Dementia in his mother.     Social History   reports that he has never smoked. He has never used smokeless tobacco. He reports current alcohol use. He reports that he does not use drugs.    Allergies  Allergies   Allergen Reactions   • Other Environmental      Seasonal allergies     Medications  Prior to Admission Medications   Prescriptions Last Dose Informant Patient Reported? Taking?   Multiple Vitamins-Minerals (PRESERVISION AREDS) Tab   Yes No   Sig: Take 1 Capsule by mouth every evening.   amLODIPine (NORVASC) 5 MG Tab  Patient Yes No   Sig: Take 5 mg by mouth every day.   apixaban (ELIQUIS) 5mg Tab   No No   Sig: Take 1 Tab by mouth 2 Times a Day.   atorvastatin (LIPITOR) 10 MG Tab  Patient Yes No   Sig: Take 10 mg by mouth every evening.   ferrous sulfate 325 (65 Fe) MG tablet   Yes No   Sig: Take 325 mg by mouth every day.   losartan (COZAAR) 25 MG Tab   Yes No   metoprolol SR (TOPROL XL) 100 MG TABLET SR 24 HR   No No   Sig: Take 1 Tab by mouth every day.      Facility-Administered Medications: None     Physical Exam  Temp:  [36.5 °C (97.7 °F)-36.7 °C (98 °F)] 36.5 °C (97.7 °F)  Pulse:  [81-94] 86  Resp:  [15-20] 20  BP: (100-158)/(62-93) 158/93  SpO2:  [94 %-98 %] 97 %    Physical Exam  Constitutional:       General: He is not in acute distress.     Appearance: He is not ill-appearing or diaphoretic.   HENT:      Head: Atraumatic.      Right Ear: External ear normal.      Left Ear: External ear normal.      Nose: No congestion or rhinorrhea.      Mouth/Throat:      Mouth: Mucous membranes are moist.   Eyes:       General: No scleral icterus.        Right eye: No discharge.         Left eye: No discharge.      Pupils: Pupils are equal, round, and reactive to light.   Cardiovascular:      Rate and Rhythm: Normal rate and regular rhythm.   Pulmonary:      Effort: Pulmonary effort is normal.   Abdominal:      General: There is no distension.   Musculoskeletal:         General: Swelling, tenderness, deformity and signs of injury present.      Cervical back: Neck supple. No rigidity. No muscular tenderness.      Right lower leg: No edema.      Left lower leg: No edema.      Comments: Right knee swelling and tenderness   Skin:     General: Skin is dry.      Capillary Refill: Capillary refill takes 2 to 3 seconds.      Coloration: Skin is pale. Skin is not jaundiced.      Findings: Bruising present.   Neurological:      Mental Status: He is alert and oriented to person, place, and time.      Coordination: Coordination normal.   Psychiatric:         Mood and Affect: Mood normal.         Behavior: Behavior normal.       Laboratory:  Recent Labs     05/17/21  1810   WBC 6.5   RBC 4.26*   HEMOGLOBIN 14.6   HEMATOCRIT 44.0   .3*   MCH 34.3*   MCHC 33.2*   RDW 50.3*   PLATELETCT 246   MPV 8.9*     Recent Labs     05/17/21  1810   SODIUM 138   POTASSIUM 4.3   CHLORIDE 103   CO2 20   GLUCOSE 100*   BUN 19   CREATININE 1.11   CALCIUM 9.3     Recent Labs     05/17/21  1810   ALTSGPT 10   ASTSGOT 19   ALKPHOSPHAT 127*   TBILIRUBIN 1.3   GLUCOSE 100*     Recent Labs     05/17/21  1810   APTT 30.9   INR 1.20*     No results for input(s): NTPROBNP in the last 72 hours.      Recent Labs     05/17/21  1810   TROPONINT 16     Imaging:  DX-KNEE COMPLETE 4+ RIGHT   Final Result      No evidence of acute fracture or dislocation.      Prominent prepatellar soft tissue swelling.      Atherosclerotic plaque.            CT-HEAD W/O   Final Result      1.  Diffuse atrophy and mild white matter changes.   2.  No acute intracranial hemorrhage or  territorial infarct.   3.  Small chronic LEFT occipital infarct.         DX-CHEST-PORTABLE (1 VIEW)   Final Result      No radiographic evidence of acute cardiopulmonary process.      EC-ECHOCARDIOGRAM COMPLETE W/O CONT    (Results Pending)     I personally reviewed patient's EKG it shows a Sinus tachycardia with a rate of 91, there is no ST elevation, there is no significant ST depression there is flattening of T wave in lead I, III and aVL.  QTc 434.    Assessment/Plan:  I anticipate this patient is appropriate for observation status at this time.    Syncope  Assessment & Plan  EKG shows sinus tachycardia with a rate of 91, there is no ST elevation, there is no significant ST depression there is flattening of T wave in lead I, III and aVL.  QTc 434.  Orthostatic vital  Continuous telemetry monitor  I will check echocardiography     Acute pain of right knee- (present on admission)  Assessment & Plan  Knee pain and swelling since a syncopal episode.  X-ray shows no evidence of acute fracture or dislocation.   Multimodal pain control.    Memory change undergoing evaluation for vascular dementia- (present on admission)  Assessment & Plan  Going on for the past few years according to patient and his daughter.  Undergoing evaluation for vascular dementia    Paroxysmal atrial fibrillation (HCC)- (present on admission)  Assessment & Plan  Currently in sinus tachycardia.  Resume home metoprolol with hold parameters.  Resume home apixaban    Hypertension, essential- (present on admission)  Assessment & Plan  Resume home amlodipine, losartan and metoprolol with hold parameters    Dyslipidemia- (present on admission)  Assessment & Plan  Resume home atorvastatin

## 2021-05-18 NOTE — ASSESSMENT & PLAN NOTE
EKG shows sinus tachycardia with a rate of 91, there is no ST elevation, there is no significant ST depression there is flattening of T wave in lead I, III and aVL.  QTc 434.  Orthostatic vital  Continuous telemetry monitor  I will check echocardiography

## 2021-05-18 NOTE — PROGRESS NOTES
5/18- JJ Garcia met pt bedside to introduce CCM services. Completed SDOH screening and inpatient assessment. Pt states he is established with PCP but declined assistance with follow up visit as his daughter manages all care needs at this time. Pt declined assistance with scheduling, CCM services/resources but accepted [facility] at home follow up visit. He mentions good support from family and daughter. Pt recently lost his wife to cancer and has since been living alone, but does mentions good support. No medical equipment used at home. Pt is confident in ability to manage care post d/c. No issues keeping appointments or financial barriers to care.Pt denies need for resources such as food, transportation or housing. CCM contact info left with pt bedside.     Community Health Worker Intake  • Social determinates of health intake completed.   • Identified barriers to none.   • Contact information provided to Freeman Frost. Yes   • Has PCP appointment scheduled for. [facility] referral for at home follow up.   • Scheduled Food Delivery/Home Visit/Outpatient Visit: Declined.   • Accepted/Declined Meds-To-Beds. Declined.   • Inpatient/Outpatient assessment completed. Inpatient.   • Did the patient receive medications post discharge: N/a    Plan:  [facility] referral warm handoff. Email sent to care team prior to d/c.

## 2021-05-18 NOTE — ASSESSMENT & PLAN NOTE
Going on for the past few years according to patient and his daughter.  Undergoing evaluation for vascular dementia

## 2021-05-18 NOTE — PROGRESS NOTES
Telemetry Shift Summary    Rhythm SR  HR Range 78-90  Ectopy r-oPVC  Measurements 0.16/0.08/0.32        Normal Values  Rhythm SR  HR Range    Measurements 0.12-0.20 / 0.06-0.10  / 0.30-0.52

## 2021-05-18 NOTE — PROGRESS NOTES
0700 Received report from KASI Pena. Pt was admitted following a syncolap event at home. Pt has been ambulated SBA well with staff since admit. VSS. Echo pending.    0832 Assessment complete. No needs at this time. Possible D/C today.     0913 Orthostatic BP taken. MD notified. Bolus initiated.     1130 Discharge paperwork completed with family at bedside, questions answered.

## 2021-05-18 NOTE — DISCHARGE INSTRUCTIONS
Discharge Instructions    Syncope    Syncope refers to a condition in which a person temporarily loses consciousness. Syncope may also be called fainting or passing out. It is caused by a sudden decrease in blood flow to the brain. Even though most causes of syncope are not dangerous, syncope can be a sign of a serious medical problem. Your health care provider may do tests to find the reason why you are having syncope.  Signs that you may be about to faint include:  · Feeling dizzy or light-headed.  · Feeling nauseous.  · Seeing all white or all black in your field of vision.  · Having cold, clammy skin.  If you faint, get medical help right away. Call your local emergency services (911 in the [place] Do not drive yourself to the hospital.  Follow these instructions at home:  Pay attention to any changes in your symptoms. Take these actions to stay safe and to help relieve your symptoms:  Lifestyle  · Do not drive, use machinery, or play sports until your health care provider says it is okay.  · Do not drink alcohol.  · Do not use any products that contain nicotine or tobacco, such as cigarettes and e-cigarettes. If you need help quitting, ask your health care provider.  · Drink enough fluid to keep your urine pale yellow.  General instructions  · Take over-the-counter and prescription medicines only as told by your health care provider.  · If you are taking blood pressure or heart medicine, get up slowly and take several minutes to sit and then stand. This can reduce dizziness or light-headedness.  · Have someone stay with you until you feel stable.  · If you start to feel like you might faint, lie down right away and raise (elevate) your feet above the level of your heart. Breathe deeply and steadily. Wait until all the symptoms have passed.  · Keep all follow-up visits as told by your health care provider. This is important.  Get help right away if you:  · Have a severe headache.  · Faint once or  repeatedly.  · Have pain in your chest, abdomen, or back.  · Have a very fast or irregular heartbeat (palpitations).  · Have pain when you breathe.  · Are bleeding from your mouth or rectum, or you have black or tarry stool.  · Have a seizure.  · Are confused.  · Have trouble walking.  · Have severe weakness.  · Have vision problems.  These symptoms may represent a serious problem that is an emergency. Do not wait to see if your symptoms will go away. Get medical help right away. Call your local emergency services (911 in the [place] Do not drive yourself to the hospital.  Summary  · Syncope refers to a condition in which a person temporarily loses consciousness. It is caused by a sudden decrease in blood flow to the brain.  · Signs that you may be about to faint include dizziness, feeling light-headed, feeling nauseous, sudden vision changes, or cold, clammy skin.  · Although most causes of syncope are not dangerous, syncope can be a sign of a serious medical problem. If you faint, get medical help right away.  This information is not intended to replace advice given to you by your health care provider. Make sure you discuss any questions you have with your health care provider.  Document Released: 12/18/2006 Document Revised: 11/30/2018 Document Reviewed: 11/26/2018  [employer] Patient Education © 2020 [employer]      Discharged to home by car with relative. Discharged via wheelchair, hospital escort: Yes.  Special equipment needed: Not Applicable    Be sure to schedule a follow-up appointment with your primary care doctor or any specialists as instructed.     Discharge Plan:   Diet Plan: Discussed  Activity Level: Discussed  Confirmed Follow up Appointment: Patient to Call and Schedule Appointment  Confirmed Symptoms Management: Discussed  Medication Reconciliation Updated: Yes    I understand that a diet low in cholesterol, fat, and sodium is recommended for good health. Unless I have been given specific  instructions below for another diet, I accept this instruction as my diet prescription.   Other diet: regular    Special Instructions: None    · Is patient discharged on Warfarin / Coumadin?   No     Depression / Suicide Risk    As you are discharged from this [facility] facility, it is important to learn how to keep safe from harming yourself.    Recognize the warning signs:  · Abrupt changes in personality, positive or negative- including increase in energy   · Giving away possessions  · Change in eating patterns- significant weight changes-  positive or negative  · Change in sleeping patterns- unable to sleep or sleeping all the time   · Unwillingness or inability to communicate  · Depression  · Unusual sadness, discouragement and loneliness  · Talk of wanting to die  · Neglect of personal appearance   · Rebelliousness- reckless behavior  · Withdrawal from people/activities they love  · Confusion- inability to concentrate     If you or a loved one observes any of these behaviors or has concerns about self-harm, here's what you can do:  · Talk about it- your feelings and reasons for harming yourself  · Remove any means that you might use to hurt yourself (examples: pills, rope, extension cords, firearm)  · Get professional help from the community (Mental Health, Substance Abuse, psychological counseling)  · Do not be alone:Call your Safe Contact- someone whom you trust who will be there for you.  · Call your local CRISIS HOTLINE 551-3843 or 443-713-5557  · Call your local Children's Mobile Crisis Response Team [place] (022) 071-5376 or www.5to1  · Call the toll free National Suicide Prevention Hotlines   · National Suicide Prevention Lifeline 416-308-SWCJ (5679)  · National Hope Line Network 800-SUICIDE (810-8019)

## 2021-05-18 NOTE — ED NOTES
1810 Iv placed , labs drawn and taken to lab. poc update given to pt, results pending at this time  1820 pt to ct  1830 pt back from CT

## 2021-05-18 NOTE — ASSESSMENT & PLAN NOTE
Currently in sinus tachycardia.  Resume home metoprolol with hold parameters.  Resume home apixaban

## 2021-05-18 NOTE — ASSESSMENT & PLAN NOTE
Knee pain and swelling since a syncopal episode.  X-ray shows no evidence of acute fracture or dislocation.   Multimodal pain control.

## 2021-05-18 NOTE — ED NOTES
Med rec completed per patient.   Allergies reviewed.   Patient states no outpatient antibiotics in the last 14 days.

## 2021-05-18 NOTE — THERAPY
"Occupational Therapy   Initial Evaluation     Patient Name: Freeman Frost  Age:  80 y.o., Sex:  male  Medical Record #: [id number]  Today's Date: 5/18/2021     Precautions  Precautions: Fall Risk    Assessment    Patient is 80 y.o. male with a diagnosis of syncope with GLF on 5/17/21. Additional factors influencing patient status / progress: HTN, memory deficits, A-fib, on anticoag. Pt lives alone, but has a dtr who is very involved in his care, and regularly visits him. He was I with all ADLs/IADLs, ambulates without AD use, and drives at PLOF. He does have a cane that he can use if needed. Pt presents today closely at baseline, only required extra time to complete tasks involving functional mob and ADLs d/t pain on R knee from his fall. He was instructed in home safety techniques, and possible need for HH OT upon dc home. Pt and dtr reports they both received brochures about home health and are willing to consider should a need arise. Patient will not be actively followed for occupational therapy services at this time, however may be seen if requested by physician for 1 more visit within 30 days to address any discharge or equipment needs.     Plan    Recommend Occupational Therapy for DC needs only.     DC Equipment Recommendations: None  Discharge Recommendations: Anticipate that the patient will have no further occupational therapy needs after discharge from the hospital     Subjective    \"My knee hurts when I walk, but I can do it.\"     Objective       05/18/21 1052   Prior Living Situation   Prior Services None   Housing / Facility 2 Story House   Steps Into Home 0   Steps In Home 15  (1 flight)   Rail Left Rail (Steps in Home)   Bathroom Set up Walk In Shower;Shower Glass Doors  (handheld shower)   Equipment Owned Single Point Cane;Hand Held Shower   Lives with - Patient's Self Care Capacity Alone and Able to Care For Self   Comments pt lives alone, but has a dtr who frequently checks on him. He mainly " stays on the bottom level of home, both master bedroom and bath located on main level as well.    Prior Level of ADL Function   Self Feeding Independent   Grooming / Hygiene Independent   Bathing Independent   Dressing Independent   Toileting Independent   Prior Level of IADL Function   Medication Management Independent   Laundry Independent   Kitchen Mobility Independent   Finances Independent   Home Management Independent   Shopping Independent   Prior Level Of Mobility Independent Without Device in Community;Independent Without Device in Home   Driving / Transportation Driving Independent   Occupation (Pre-Hospital Vocational) Retired Due To Age   Leisure Interests Gardening   History of Falls   History of Falls Yes   Date of Last Fall 05/17/21  (reason for admit)   Precautions   Precautions Fall Risk   Pain   Pain Scales 0 to 10 Scale    Intervention Repositioned;Rest   Pain 0 - 10 Group   Location Knee   Location Orientation Right   Pain Rating Scale (NPRS) 8   Description Aching;Constant   Comfort Goal Comfort with Movement;Sleep Comfortably   Therapist Pain Assessment Post Activity Pain Same as Prior to Activity;During Activity;Nurse Notified   Non Verbal Descriptors   Non Verbal Scale  Calm;Unlabored Breathing   Cognition    Cognition / Consciousness WDL   Level of Consciousness Alert   Comments pleasant and cooperative   Passive ROM Upper Body   Passive ROM Upper Body WDL   Active ROM Upper Body   Active ROM Upper Body  WDL   Dominant Hand Right   Strength Upper Body   Upper Body Strength  WDL   Balance Assessment   Sitting Balance (Static) Good   Sitting Balance (Dynamic) Good   Standing Balance (Static) Fair +   Standing Balance (Dynamic) Fair   Weight Shift Sitting Good   Weight Shift Standing Good   Comments no AD   Bed Mobility    Comments up EOB pre and post OT   ADL Assessment   Grooming Standing;Supervision   Upper Body Dressing Modified Independent   Lower Body Dressing Supervision   How much help  from another person does the patient currently need...   Putting on and taking off regular lower body clothing? 3   Bathing (including washing, rinsing, and drying)? 3   Toileting, which includes using a toilet, bedpan, or urinal? 3   Putting on and taking off regular upper body clothing? 4   Taking care of personal grooming such as brushing teeth? 4   Eating meals? 4   6 Clicks Daily Activity Score 21   Functional Mobility   Sit to Stand Modified Independent   Bed, Chair, Wheelchair Transfer Modified Independent   Toilet Transfers Modified Independent   Transfer Method Stand Step   Mobility in room without AD   Distance (Feet) 10   # of Times Distance was Traveled 2   Activity Tolerance   Sitting in Chair 2 mins toilet   Sitting Edge of Bed 15 mins   Standing 8 mins   Comments limited by knee pain   Education Group   Education Provided Role of Occupational Therapist;Home Safety   Role of Occupational Therapist Patient Response Patient;Family;Acceptance;Explanation;Verbal Demonstration   Home Safety Patient Response Patient;Family;Acceptance;Explanation;Verbal Demonstration   Problem List   Problem List None   Anticipated Discharge Equipment and Recommendations   DC Equipment Recommendations None   Discharge Recommendations Anticipate that the patient will have no further occupational therapy needs after discharge from the hospital   Interdisciplinary Plan of Care Collaboration   IDT Collaboration with  Nursing;Family / Caregiver   Patient Position at End of Therapy Seated;Edge of Bed;Call Light within Reach;Family / Friend in Room   Collaboration Comments RN aware of OT session and dc recs; Carson blair, present at bedside during entire OT eval   Session Information   Date / Session Number  5/18 #1 (DC needs only) LF   Priority 0

## 2021-05-18 NOTE — CARE PLAN
The patient is Stable - Low risk of patient condition declining or worsening    Shift Goals  Clinical Goals: negative cardiac workup  Patient Goals: sleep, find root cause to syncope, decrease knee pain    Progress made toward(s) clinical / shift goals:  Pt is sleeping comfortably at this time. Pt acknowledges knee pain but declines medication at this time.     Patient is not progressing towards the following goals: Cardiac workup pending for AM. ECHO scheduled for AM.    Problem: Fall Risk  Goal: Patient will remain free from falls  Outcome: Progressing  Pt displays LOW fall risk based on RN assessment and fall risk charting tool. Proper fall risk sign placed outside door, treaded socks on, bed alarm OFF, bed locked and in lowest position, and call light within reach. Pt educated on fall risk and proper interventions. Safety measures in place at this time. Pt did come in for syncopal episode, pt educated on standby assist.    Problem: Pain - Standard  Goal: Alleviation of pain or a reduction in pain to the patient’s comfort goal  Outcome: Progressing  Patient pain tolerable at this time. RN educated pt on non-pharmacological and pharmacological measures for pain. Pt declines at this time.

## 2021-05-18 NOTE — PROGRESS NOTES
2 RN Skin Check    2 RN skin check complete with Valentín VILLASEÑOR.   Devices in place: gown, socks, dressing on R arm and R hand.  Skin assessed under devices: yes.  Confirmed pressure ulcers found on: NONE.  New potential pressure ulcers noted on NONE. Wound consult placed No.  The following interventions in place bandage/dressing, pillows.    Pt up to [facility] floor with bilateral upper extremeties that have generalized bruising. Pt has skin tear on R elbow that is documented in LDAs. Pt also has 2 stitches from a wound on the R hand, documented in LDAs. Both dressings are clean, dry, and intact. Dry and calloused bilateral feet. Bruising and swelling on the R knee. Otherwise, skin is WDL.

## 2021-05-18 NOTE — ED NOTES
This RN received report from previous RN. Medication scanned and placed at bedside for MD. Pt reports RIGHT knee pain. MD aware; pt to undress so that MD can assess knee.

## 2021-05-18 NOTE — PROGRESS NOTES
Telemetry Shift Summary     Rhythm: SR  Rate: 77-84  Measurements: 0.20/0.08/0.34  Ectopy (reported by Monitor Tech): O-F PVC, R PVC     Normal Values  Rhythm: Sinus  HR:   Measurements: 0.12-0.20/0.06-0.10/0.30-0.52

## 2021-05-18 NOTE — PROGRESS NOTES
Report given to Ashlee VILLASEÑOR via bedside report. Patient handed off in stable condition. Safety measures in place. Call light within reach.

## 2021-05-18 NOTE — PROGRESS NOTES
Pt received to room 301/1. Ambulated safely from rney to bed with SBA assist. Safety measures in place. Care assumed.

## 2021-05-19 NOTE — DISCHARGE SUMMARY
Discharge Summary    CHIEF COMPLAINT ON ADMISSION  Chief Complaint   Patient presents with   • Syncope     Pt states remembers walking out to deck, found lying supine on deck by daughter   • Hand Injury     Pt c/o bilat hand pain       Reason for Admission  Fall, Head Pain     Admission Date  5/17/2021    CODE STATUS  Prior    HPI & HOSPITAL COURSE  This is a 80 y.o. male here with a syncopal episode patient was brought to the hospital for evaluation.  Patient was not orthostatic but it was felt that SO he was hydrated with IV fluids.  There was no evidence of arrhythmia on telemetry.  His echocardiogram did not show any acute abnormalities.  Patient was continued on his home medications.  He was stable for discharge on 5/18/2021.    Therefore, he is discharged in good and stable condition to home with close outpatient follow-up.    The patient recovered much more quickly than anticipated on admission.    Discharge Date  5/18/2021    FOLLOW UP ITEMS POST DISCHARGE      DISCHARGE DIAGNOSES  Active Problems:    Hypertension, essential POA: Yes    Paroxysmal atrial fibrillation (HCC) POA: Yes    Memory change undergoing evaluation for vascular dementia POA: Yes  Resolved Problems:    Dyslipidemia POA: Yes    Syncope POA: Yes    Acute pain of right knee POA: Yes      FOLLOW UP  Future Appointments   Date Time Provider Department Center   6/2/2021  9:45 AM [facility] None   6/4/2021  1:30 PM Yayo Wills M.D. [facility] None   8/9/2021  9:20 AM Carolyn Mullins M.D. [facility] None     Juan Hutton M.D.  [address]  [facility] [address]  [phone]    Go on 5/25/2021  Please go to your Primary Care Provider for a hospital follow up on 5/25 at 10:15am check in for 10:30am appointment. Thank you.      MEDICATIONS ON DISCHARGE     Medication List      CONTINUE taking these medications      Instructions   amLODIPine 5 MG Tabs  Commonly known as: NORVASC   Take 5 mg by mouth every day.  Dose: 5 mg     apixaban 5mg  Tabs  Commonly known as: ELIQUIS   Take 1 Tab by mouth 2 Times a Day.  Dose: 5 mg     ARTHRITIS PAIN PO   Take 1 tablet by mouth 1 time a day as needed.  Dose: 1 tablet     atorvastatin 10 MG Tabs  Commonly known as: LIPITOR   Take 10 mg by mouth every day.  Dose: 10 mg     B-12 PO   Take 1 tablet by mouth every day.  Dose: 1 tablet     ferrous sulfate 325 (65 Fe) MG tablet   Take 325 mg by mouth every day.  Dose: 325 mg     losartan 25 MG Tabs  Commonly known as: COZAAR   Take 25 mg by mouth every day.  Dose: 25 mg     metoprolol  MG Tb24  Commonly known as: TOPROL XL   Take 1 Tab by mouth every day.  Dose: 100 mg     PreserVision AREDS Tabs   Take 1 Capsule by mouth 2 times a day.  Dose: 1 Capsule             Allergies  Allergies   Allergen Reactions   • Other Environmental Unspecified     Seasonal allergies=rabbit brush       DIET  No orders of the defined types were placed in this encounter.      ACTIVITY  As tolerated.  Weight bearing as tolerated    CONSULTATIONS      PROCEDURES     Narrative & Impression  Transthoracic  Echo Report        Echocardiography Laboratory     CONCLUSIONS  Normal left ventricular systolic function.  Left ventricular ejection fraction is visually estimated to be 70%.  Normal diastolic function.  Normal right ventricular size and systolic function.  No significant valve disease or flow abnormalities.   Compared to the images of the prior study done 2020 -  there has   been no significant change.      ANKITA DAVID  Exam Date:         2021                      07:11  Exam Location:     Inpatient  Priority:          Routine     Ordering Physician:        GÓMEZ GARCIA  Referring Physician:       [id number] Chan  Sonographer:               Radha FERNANDEZ     Age:    80     Gender:    M  MRN:    [id number]  :    1940  BSA:    1.91   Ht (in):    69     Wt (lb):    167  Exam Type:     Complete     Indications:     Syncope  ICD Codes:       780.2     CPT Codes:        22483     BP:   140    /   81     HR:   78  Technical Quality:       Fair     MEASUREMENTS  (Male / Female) Normal Values  2D ECHO  LV Diastolic Diameter PLAX        3.7 cm                4.2 - 5.9 / 3.9 - 5.3   cm  LV Systolic Diameter PLAX         2.2 cm                2.1 - 4.0 cm  IVS Diastolic Thickness           1.2 cm                  LVPW Diastolic Thickness          0.83 cm                 LVOT Diameter                     2.2 cm                  Estimated LV Ejection Fraction    70 %                    LV Ejection Fraction MOD BP       67 %                  >= 55  %  LV Ejection Fraction MOD 4C       70.5 %                  LV Ejection Fraction MOD 2C       65.4 %                  IVC Diameter                      1.3 cm                     DOPPLER  AV Peak Velocity                  1.4 m/s                 AV Peak Gradient                  7.3 mmHg                AV Mean Gradient                  4.7 mmHg                AI Pressure Half Time             858 ms                  LVOT Peak Velocity                0.93 m/s                AV Area Cont Eq vti               2.7 cm2                 Mitral E Point Velocity           0.84 m/s                Mitral E to A Ratio               0.75                    MV Pressure Half Time             72.2 ms                 MV Area PHT                       3 cm2                   MV Deceleration Time              249 ms                  TR Peak Velocity                  255 cm/s                   * Indicates values subject to auto-interpretation  LV EF:  70    %     FINDINGS  Left Ventricle  Normal left ventricular chamber size. Mild concentric left ventricular   hypertrophy. Normal left ventricular systolic function. Left   ventricular ejection fraction is visually estimated to be 70%. Normal   regional wall motion. Normal diastolic function.     Right Ventricle  Normal right ventricular size and systolic function.     Right Atrium  Normal right atrial  size.     Left Atrium  Mildly dilated left atrium.     Mitral Valve  Mitral annular calcification. No mitral stenosis. Trace mitral   regurgitation.     Aortic Valve  Calcified aortic valve leaflets. No aortic stenosis. Trace aortic   insufficiency.     Tricuspid Valve  Structurally normal tricuspid valve. No tricuspid stenosis. Mild   tricuspid regurgitation. Estimated right ventricular systolic pressure    is 30  mmHg.     Pulmonic Valve  Structurally normal pulmonic valve without significant stenosis or   regurgitation.     Pericardium  No pericardial effusion seen.     Aorta  Normal aortic root for body surface area. Ascending aorta is borderline   dilated with a diameter of 3.8 cm.                                Reza Tanner MD  (Electronically Signed)  Final Date:     18 May 2021 14:24  Component 1 d ago   Eject.Frac. MOD BP 67.04    Eject.Frac. MOD 4C 70.53    Eject.Frac. MOD 2C 65.41    Left Ventrical Ejection Fraction 70    Resulting Agency [facility]         Specimen Collected: 05/18/21  7:11 AM Last Resulted: 05/18/21  2:24 PM         Lab Flowsheet      Order Details      View Encounter      Lab and Collection Details      Routing      Result History - Result Edited           Linked Documents     View [employer] Report         EC-ECHOCARDIOGRAM COMPLETE W/O CONT [[id number]]    Electronically signed by: Walter Davis M.D. on 05/17/21 2033 Status: Completed   Ordering user: Walter Davis M.D. 05/17/21 2033 Ordering provider: Walter Davis M.D.   Authorized by: Walter Davis M.D. Ordered during: ED to Hosp-Admission (Discharged) on 05/17/2021    Add Signature Requirement   Frequency: Once 05/17/21 2034 - 1  occurrence   Questionnaire    Question Answer   Reason for exam? Syncope   Release to patient Immediate   Reprint Order Requisition    EC-ECHOCARDIOGRAM COMPLETE W/O CONT (Order #[id number]) on 5/17/21   Linked Documents     View [facility] Report   Last Resulted Time   Tue May 18, 2021  2:24 PM    IR Documentation Timeline (5/19/2021 14:47:59 to 5/19/2021 14:47:59)    Screening Form Questions    No questions have been answered for this form.   Reading Provider Reading Date   No Reading Provider Prelim May 18, 2021   Reza Tanner M.D. May 18, 202         LABORATORY  Lab Results   Component Value Date    SODIUM 139 05/18/2021    POTASSIUM 4.2 05/18/2021    CHLORIDE 105 05/18/2021    CO2 23 05/18/2021    GLUCOSE 106 (H) 05/18/2021    BUN 17 05/18/2021    CREATININE 0.86 05/18/2021        Lab Results   Component Value Date    WBC 6.1 05/18/2021    HEMOGLOBIN 13.5 (L) 05/18/2021    HEMATOCRIT 41.5 (L) 05/18/2021    PLATELETCT 232 05/18/2021        Total time of the discharge process exceeds 36  minutes.

## 2021-06-02 ENCOUNTER — HOSPITAL ENCOUNTER (OUTPATIENT)
Dept: LAB | Facility: MEDICAL CENTER | Age: 81
End: 2021-06-02
Attending: PSYCHIATRY & NEUROLOGY
Payer: MEDICARE

## 2021-06-02 ENCOUNTER — HOSPITAL ENCOUNTER (OUTPATIENT)
Dept: RADIOLOGY | Facility: MEDICAL CENTER | Age: 81
End: 2021-06-02
Attending: FAMILY MEDICINE
Payer: MEDICARE

## 2021-06-02 DIAGNOSIS — R41.3 MEMORY LOSS: ICD-10-CM

## 2021-06-02 DIAGNOSIS — M70.41 PREPATELLAR BURSITIS OF RIGHT KNEE: ICD-10-CM

## 2021-06-02 DIAGNOSIS — M79.661 PAIN OF RIGHT LOWER LEG: ICD-10-CM

## 2021-06-02 PROCEDURE — 84425 ASSAY OF VITAMIN B-1: CPT

## 2021-06-02 PROCEDURE — 93971 EXTREMITY STUDY: CPT | Mod: RT

## 2021-06-02 PROCEDURE — 36415 COLL VENOUS BLD VENIPUNCTURE: CPT

## 2021-06-02 PROCEDURE — 73562 X-RAY EXAM OF KNEE 3: CPT | Mod: RT

## 2021-06-03 DIAGNOSIS — I48.0 PAROXYSMAL ATRIAL FIBRILLATION (HCC): ICD-10-CM

## 2021-06-04 ENCOUNTER — OFFICE VISIT (OUTPATIENT)
Dept: CARDIOLOGY | Facility: MEDICAL CENTER | Age: 81
End: 2021-06-04
Payer: MEDICARE

## 2021-06-04 VITALS
DIASTOLIC BLOOD PRESSURE: 74 MMHG | WEIGHT: 168 LBS | OXYGEN SATURATION: 96 % | BODY MASS INDEX: 24.88 KG/M2 | HEART RATE: 88 BPM | SYSTOLIC BLOOD PRESSURE: 130 MMHG | HEIGHT: 69 IN

## 2021-06-04 DIAGNOSIS — Z79.01 ON CONTINUOUS ORAL ANTICOAGULATION: ICD-10-CM
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
## 2021-06-04 DIAGNOSIS — I48.0 PAROXYSMAL ATRIAL FIBRILLATION (HCC): ICD-10-CM

## 2021-06-04 DIAGNOSIS — I71.21 THORACIC ASCENDING AORTIC ANEURYSM (HCC): ICD-10-CM

## 2021-06-04 DIAGNOSIS — R55 SYNCOPE AND COLLAPSE: ICD-10-CM

## 2021-06-04 DIAGNOSIS — I10 HYPERTENSION, ESSENTIAL: ICD-10-CM

## 2021-06-04 PROCEDURE — 99214 OFFICE O/P EST MOD 30 MIN: CPT | Performed by: INTERNAL MEDICINE

## 2021-06-04 ASSESSMENT — FIBROSIS 4 INDEX: FIB4 SCORE: 1.839080459770114943

## 2021-06-06 LAB — VIT B1 BLD-MCNC: 136 NMOL/L (ref 70–180)

## 2021-06-07 ENCOUNTER — TELEPHONE (OUTPATIENT)
Dept: CARDIOLOGY | Facility: MEDICAL CENTER | Age: 81
End: 2021-06-07

## 2021-06-07 ENCOUNTER — PATIENT OUTREACH (OUTPATIENT)
Dept: HEALTH INFORMATION MANAGEMENT | Facility: OTHER | Age: 81
End: 2021-06-07

## 2021-06-07 RX ORDER — METOPROLOL SUCCINATE 100 MG/1
100 TABLET, EXTENDED RELEASE ORAL DAILY
Qty: 100 TABLET | Refills: 3 | Status: SHIPPED | OUTPATIENT
Start: 2021-06-07 | End: 2022-07-07

## 2021-06-07 NOTE — PROGRESS NOTES
Outcome: Left Message to schedule Comprehensive Health Assessment.    Please transfer to Patient Outreach Team at [phone] when patient returns call.    Attempt # 2

## 2021-06-07 NOTE — TELEPHONE ENCOUNTER
Patient scheduled for loop insert on 6-23-21 with Dr. Dudley. Patient has been instructed to check in at 2:00 for 4:00 case time. Message sent to authorizations. OPAL Randolph with [employer].

## 2021-06-22 ENCOUNTER — PRE-ADMISSION TESTING (OUTPATIENT)
Dept: ADMISSIONS | Facility: MEDICAL CENTER | Age: 81
End: 2021-06-22
Attending: INTERNAL MEDICINE
Payer: MEDICARE

## 2021-06-22 ASSESSMENT — FIBROSIS 4 INDEX: FIB4 SCORE: 1.839080459770114943

## 2021-06-23 ENCOUNTER — APPOINTMENT (OUTPATIENT)
Dept: CARDIOLOGY | Facility: MEDICAL CENTER | Age: 81
End: 2021-06-23
Attending: INTERNAL MEDICINE
Payer: MEDICARE

## 2021-06-23 ENCOUNTER — HOSPITAL ENCOUNTER (OUTPATIENT)
Facility: MEDICAL CENTER | Age: 81
End: 2021-06-23
Attending: INTERNAL MEDICINE | Admitting: INTERNAL MEDICINE
Payer: MEDICARE

## 2021-06-23 VITALS
DIASTOLIC BLOOD PRESSURE: 70 MMHG | HEIGHT: 70 IN | SYSTOLIC BLOOD PRESSURE: 131 MMHG | BODY MASS INDEX: 23.61 KG/M2 | WEIGHT: 164.9 LBS | RESPIRATION RATE: 18 BRPM | OXYGEN SATURATION: 98 % | HEART RATE: 70 BPM | TEMPERATURE: 97.4 F

## 2021-06-23 DIAGNOSIS — R55 SYNCOPE AND COLLAPSE: ICD-10-CM
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
## 2021-06-23 PROCEDURE — C1764 EVENT RECORDER, CARDIAC: HCPCS

## 2021-06-23 PROCEDURE — 700101 HCHG RX REV CODE 250

## 2021-06-23 PROCEDURE — 160002 HCHG RECOVERY MINUTES (STAT)

## 2021-06-23 PROCEDURE — 33285 INSJ SUBQ CAR RHYTHM MNTR: CPT | Performed by: INTERNAL MEDICINE

## 2021-06-23 RX ORDER — LIDOCAINE HYDROCHLORIDE AND EPINEPHRINE 10; 10 MG/ML; UG/ML
INJECTION, SOLUTION INFILTRATION; PERINEURAL
Status: COMPLETED
Start: 2021-06-23 | End: 2021-06-23

## 2021-06-23 RX ADMIN — LIDOCAINE HYDROCHLORIDE,EPINEPHRINE BITARTRATE: 10; .01 INJECTION, SOLUTION INFILTRATION; PERINEURAL at 14:45

## 2021-06-23 NOTE — OR NURSING
1503 Pt back to PPU from cath lab s/p loop recorder. VSS. Site assessed, dressing CDI.     8094 KASI Morales reviewed DC instructions with pt. Pt instructed to follow up with device clinic in 1 week.

## 2021-06-23 NOTE — OP REPORT
Electrophysiology Procedure Note  [facility]      PROCEDURE PERFORMED: Implantable Loop Recorder    : PERLA Dudley M.D.    ASSISTANT: None    ANESTHESIA: Local    EBL: <5 cc    SPECIMENS: None    INDICATION: Recurrent syncope    COMPLICATIONS: None    PRE-PROCEDURE ECG: Sinus rhythm    POST-PROCEDURE ECG: Sinus rhythm    DESCRIPTION OF PROCEDURE:  After informed written consent, the patient was brought to the cath lab pre/post procedure area. The patient was prepped and draped in the usual sterile fashion. The procedure was performed with local anesthetic. Using the supplied incision tool, a <1 cm incision was made in the skin about 2 cm leftward and lateral to the sternal border at 4th intercostal space. Using the supplied insertion tool, a tunnel was made in the subcutaneous tissue at a 45 degree angle to the sternum and the device was inserted with the supplied plunger. Manual compression was used until hemostasis achieved. Dermabond used for closure. Sterile dressing was applied. Sensing checked and adequate.    IMPLANTED DEVICE INFORMATION:  Model: MedOmeros LINQ 22  Serial number: [id number]    IMPRESSIONS:  1. Successful implantable loop recorder implantation    RECOMMENDATIONS:  1. Routine follow-up and device interrogation

## 2021-06-23 NOTE — DISCHARGE INSTRUCTIONS
Implantable Loop Recorder Placement, Care After  This sheet gives you information about how to care for yourself after your procedure. Your health care provider may also give you more specific instructions. If you have problems or questions, contact your health care provider.  What can I expect after the procedure?  After the procedure, it is common to have:  · Soreness or discomfort near the incision.  · Some swelling or bruising near the incision.  Follow these instructions at home:  Incision care    · Follow instructions from your health care provider about how to take care of your incision. Make sure you:  ? Wash your hands with soap and water before you change your bandage (dressing). If soap and water are not available, use hand .  ? Change your dressing as told by your health care provider.  ? Keep your dressing dry.  ? Leave stitches (sutures), skin glue, or adhesive strips in place. These skin closures may need to stay in place for 2 weeks or longer. If adhesive strip edges start to loosen and curl up, you may trim the loose edges. Do not remove adhesive strips completely unless your health care provider tells you to do that.  · Check your incision area every day for signs of infection. Check for:  ? Redness, swelling, or pain.  ? Fluid or blood.  ? Warmth.  ? Pus or a bad smell.  · Do not take baths, swim, or use a hot tub until your health care provider approves. Ask your health care provider if you can take showers.  Activity    · Return to your normal activities as told by your health care provider. Ask your health care provider what activities are safe for you.  · Do not drive for 24 hours if you were given a sedative during your procedure.  General instructions  · Follow instructions from your health care provider about how to manage your implantable loop recorder and transmit the information. Learn how to activate a recording if this is necessary for your type of device.  · Do not go  through a metal detection gate, and do not let someone hold a metal detector over your chest. Show your ID card.  · Do not have an MRI unless you check with your health care provider first.  · Take over-the-counter and prescription medicines only as told by your health care provider.  · Keep all follow-up visits as told by your health care provider. This is important.  Contact a health care provider if:  · You have redness, swelling, or pain around your incision.  · You have a fever.  · You have pain that is not relieved by your pain medicine.  · You have triggered your device because of fainting (syncope) or because of a heartbeat that feels like it is racing, slow, fluttering, or skipping (palpitations).  Get help right away if you have:  · Chest pain.  · Difficulty breathing.  Summary  · After the procedure, it is common to have soreness or discomfort near the incision.  · Change your dressing as told by your health care provider.  · Follow instructions from your health care provider about how to manage your implantable loop recorder and transmit the information.  · Keep all follow-up visits as told by your health care provider. This is important.  This information is not intended to replace advice given to you by your health care provider. Make sure you discuss any questions you have with your health care provider.  Document Released: 11/28/2016 Document Revised: 02/02/2019 Document Reviewed: 02/02/2019  [employer] Patient Education © 2020 [employer]        ACTIVITY: Rest and take it easy for the first 24 hours.  A responsible adult is recommended to remain with you during that time.  It is normal to feel sleepy.  We encourage you to not do anything that requires balance, judgment or coordination.    MILD FLU-LIKE SYMPTOMS ARE NORMAL. YOU MAY EXPERIENCE GENERALIZED MUSCLE ACHES, THROAT IRRITATION, HEADACHE AND/OR SOME NAUSEA.    FOR 24 HOURS DO NOT:  Drive, operate machinery or run household appliances.  Drink  beer or alcoholic beverages.   Make important decisions or sign legal documents.    SPECIAL INSTRUCTIONS: See below      DIET: To avoid nausea, slowly advance diet as tolerated, avoiding spicy or greasy foods for the first day.  Add more substantial food to your diet according to your physician's instructions.  Babies can be fed formula or breast milk as soon as they are hungry.  INCREASE FLUIDS AND FIBER TO AVOID CONSTIPATION.    SURGICAL DRESSING/BATHING:   Keep the dressing clean and dry x 1 week (sponge baths OK).  May remove dressing at that time.  Leave steri strips until they fall off.  OK to shower after dressing removed.     FOLLOW-UP APPOINTMENT:  A follow-up appointment should be arranged with your doctor; call to schedule.    You should CALL YOUR PHYSICIAN if you develop:  Fever greater than 101 degrees F.  Pain not relieved by medication, or persistent nausea or vomiting.  Excessive bleeding (blood soaking through dressing) or unexpected drainage from the wound.  Extreme redness or swelling around the incision site, drainage of pus or foul smelling drainage.  Inability to urinate or empty your bladder within 8 hours.  Problems with breathing or chest pain.    You should call 911 if you develop problems with breathing or chest pain.  If you are unable to contact your doctor or surgical center, you should go to the nearest emergency room or urgent care center.  Physician's telephone #: [phone]      If any questions arise, call your doctor.  If your doctor is not available, please feel free to call the Surgical Center at [phone]. The Contact Center is open Monday through Friday 7AM to 5PM and may speak to a nurse at [phone], or toll free at [phone].     A registered nurse may call you a few days after your surgery to see how you are doing after your procedure.    MEDICATIONS: Resume taking daily medication.  Take prescribed pain medication with food.  If no medication is prescribed,  you may take non-aspirin pain medication if needed.  PAIN MEDICATION CAN BE VERY CONSTIPATING.  Take a stool softener or laxative such as senokot, pericolace, or milk of magnesia if needed.    Prescription given for n/a.  Last pain medication given at n/a.    If your physician has prescribed pain medication that includes Acetaminophen (Tylenol), do not take additional Acetaminophen (Tylenol) while taking the prescribed medication.    Depression / Suicide Risk    As you are discharged from this [facility] facility, it is important to learn how to keep safe from harming yourself.    Recognize the warning signs:  · Abrupt changes in personality, positive or negative- including increase in energy   · Giving away possessions  · Change in eating patterns- significant weight changes-  positive or negative  · Change in sleeping patterns- unable to sleep or sleeping all the time   · Unwillingness or inability to communicate  · Depression  · Unusual sadness, discouragement and loneliness  · Talk of wanting to die  · Neglect of personal appearance   · Rebelliousness- reckless behavior  · Withdrawal from people/activities they love  · Confusion- inability to concentrate     If you or a loved one observes any of these behaviors or has concerns about self-harm, here's what you can do:  · Talk about it- your feelings and reasons for harming yourself  · Remove any means that you might use to hurt yourself (examples: pills, rope, extension cords, firearm)  · Get professional help from the community (Mental Health, Substance Abuse, psychological counseling)  · Do not be alone:Call your Safe Contact- someone whom you trust who will be there for you.  · Call your local CRISIS HOTLINE 132-9135 or 492-273-1654  · Call your local Children's Mobile Crisis Response Team [place] (609) 685-8714 or www.SIZESEEKER  · Call the toll free National Suicide Prevention Hotlines   · National Suicide Prevention Lifeline 592-743-FOXE  (1995)  · [facility] 800-SUICIDE (500-6469)

## 2021-06-30 ENCOUNTER — TELEPHONE (OUTPATIENT)
Dept: CARDIOLOGY | Facility: MEDICAL CENTER | Age: 81
End: 2021-06-30

## 2021-06-30 NOTE — TELEPHONE ENCOUNTER
I received a call from specialties. Patient missed his wound check appt. At the call center level we are not allowed to schedule as a non provider visit per our guide that was approved by Cardio management. We have no open wound checks until July 21. Can you please call patient to reschedule a sooner wound check appt. I did advise to go ahead and schedule the July 21 and have added pt to wait list.    Thank you,    Shadia DUNCAN

## 2021-07-07 ENCOUNTER — NON-PROVIDER VISIT (OUTPATIENT)
Dept: CARDIOLOGY | Facility: MEDICAL CENTER | Age: 81
End: 2021-07-07
Payer: MEDICARE

## 2021-07-07 DIAGNOSIS — R55 SYNCOPE AND COLLAPSE: ICD-10-CM

## 2021-07-07 DIAGNOSIS — Z45.09 ENCOUNTER FOR LOOP RECORDER CHECK: ICD-10-CM

## 2021-07-07 PROCEDURE — 93291 INTERROG DEV EVAL SCRMS IP: CPT | Performed by: INTERNAL MEDICINE

## 2021-07-27 ENCOUNTER — HOSPITAL ENCOUNTER (EMERGENCY)
Facility: MEDICAL CENTER | Age: 81
End: 2021-07-27
Attending: EMERGENCY MEDICINE
Payer: MEDICARE

## 2021-07-27 VITALS
HEIGHT: 69 IN | BODY MASS INDEX: 20.73 KG/M2 | TEMPERATURE: 97.7 F | OXYGEN SATURATION: 94 % | SYSTOLIC BLOOD PRESSURE: 111 MMHG | RESPIRATION RATE: 17 BRPM | HEART RATE: 62 BPM | DIASTOLIC BLOOD PRESSURE: 67 MMHG | WEIGHT: 140 LBS

## 2021-07-27 DIAGNOSIS — F10.920 ALCOHOLIC INTOXICATION WITHOUT COMPLICATION (HCC): ICD-10-CM

## 2021-07-27 LAB
ALBUMIN SERPL BCP-MCNC: 3.8 G/DL (ref 3.2–4.9)
ALBUMIN/GLOB SERPL: 1.7 G/DL
ALP SERPL-CCNC: 95 U/L (ref 30–99)
ALT SERPL-CCNC: 9 U/L (ref 2–50)
ANION GAP SERPL CALC-SCNC: 14 MMOL/L (ref 7–16)
AST SERPL-CCNC: 12 U/L (ref 12–45)
BASOPHILS # BLD AUTO: 1.2 % (ref 0–1.8)
BASOPHILS # BLD: 0.08 K/UL (ref 0–0.12)
BILIRUB SERPL-MCNC: 1.6 MG/DL (ref 0.1–1.5)
BUN SERPL-MCNC: 23 MG/DL (ref 8–22)
CALCIUM SERPL-MCNC: 8.4 MG/DL (ref 8.4–10.2)
CHLORIDE SERPL-SCNC: 107 MMOL/L (ref 96–112)
CO2 SERPL-SCNC: 20 MMOL/L (ref 20–33)
CREAT SERPL-MCNC: 1.12 MG/DL (ref 0.5–1.4)
EKG IMPRESSION: NORMAL
EOSINOPHIL # BLD AUTO: 0.28 K/UL (ref 0–0.51)
EOSINOPHIL NFR BLD: 4.2 % (ref 0–6.9)
ERYTHROCYTE [DISTWIDTH] IN BLOOD BY AUTOMATED COUNT: 50.9 FL (ref 35.9–50)
ETHANOL BLD-MCNC: 152.3 MG/DL (ref 0–10)
GLOBULIN SER CALC-MCNC: 2.3 G/DL (ref 1.9–3.5)
GLUCOSE SERPL-MCNC: 96 MG/DL (ref 65–99)
HCT VFR BLD AUTO: 40.1 % (ref 42–52)
HGB BLD-MCNC: 13.2 G/DL (ref 14–18)
IMM GRANULOCYTES # BLD AUTO: 0.02 K/UL (ref 0–0.11)
IMM GRANULOCYTES NFR BLD AUTO: 0.3 % (ref 0–0.9)
LYMPHOCYTES # BLD AUTO: 1.96 K/UL (ref 1–4.8)
LYMPHOCYTES NFR BLD: 29.7 % (ref 22–41)
MCH RBC QN AUTO: 33.6 PG (ref 27–33)
MCHC RBC AUTO-ENTMCNC: 32.9 G/DL (ref 33.7–35.3)
MCV RBC AUTO: 102 FL (ref 81.4–97.8)
MONOCYTES # BLD AUTO: 0.39 K/UL (ref 0–0.85)
MONOCYTES NFR BLD AUTO: 5.9 % (ref 0–13.4)
NEUTROPHILS # BLD AUTO: 3.88 K/UL (ref 1.82–7.42)
NEUTROPHILS NFR BLD: 58.7 % (ref 44–72)
NRBC # BLD AUTO: 0 K/UL
NRBC BLD-RTO: 0 /100 WBC
PLATELET # BLD AUTO: 183 K/UL (ref 164–446)
PMV BLD AUTO: 9.4 FL (ref 9–12.9)
POTASSIUM SERPL-SCNC: 4.3 MMOL/L (ref 3.6–5.5)
PROT SERPL-MCNC: 6.1 G/DL (ref 6–8.2)
RBC # BLD AUTO: 3.93 M/UL (ref 4.7–6.1)
SODIUM SERPL-SCNC: 141 MMOL/L (ref 135–145)
TROPONIN T SERPL-MCNC: 17 NG/L (ref 6–19)
WBC # BLD AUTO: 6.6 K/UL (ref 4.8–10.8)

## 2021-07-27 PROCEDURE — 93005 ELECTROCARDIOGRAM TRACING: CPT | Performed by: EMERGENCY MEDICINE

## 2021-07-27 PROCEDURE — 85025 COMPLETE CBC W/AUTO DIFF WBC: CPT

## 2021-07-27 PROCEDURE — 80053 COMPREHEN METABOLIC PANEL: CPT

## 2021-07-27 PROCEDURE — 99285 EMERGENCY DEPT VISIT HI MDM: CPT

## 2021-07-27 PROCEDURE — 82077 ASSAY SPEC XCP UR&BREATH IA: CPT

## 2021-07-27 PROCEDURE — 700101 HCHG RX REV CODE 250: Performed by: EMERGENCY MEDICINE

## 2021-07-27 PROCEDURE — 96374 THER/PROPH/DIAG INJ IV PUSH: CPT

## 2021-07-27 PROCEDURE — 700105 HCHG RX REV CODE 258: Performed by: EMERGENCY MEDICINE

## 2021-07-27 PROCEDURE — 84484 ASSAY OF TROPONIN QUANT: CPT

## 2021-07-27 RX ORDER — SODIUM CHLORIDE 9 MG/ML
1000 INJECTION, SOLUTION INTRAVENOUS ONCE
Status: COMPLETED | OUTPATIENT
Start: 2021-07-27 | End: 2021-07-27

## 2021-07-27 RX ADMIN — THIAMINE HYDROCHLORIDE: 100 INJECTION, SOLUTION INTRAMUSCULAR; INTRAVENOUS at 17:48

## 2021-07-27 RX ADMIN — SODIUM CHLORIDE 1000 ML: 9 INJECTION, SOLUTION INTRAVENOUS at 19:06

## 2021-07-27 ASSESSMENT — FIBROSIS 4 INDEX: FIB4 SCORE: 1.839080459770114943

## 2021-07-28 NOTE — ED NOTES
Rn at bedside speaking with daughter. Daughter reports patient is on eliquis and is worried in case he hit his head. Pt does not remember incident and so is unsure if he hit his head or not. Pt denies pain. Daugher would feel more comfortable with potential discharge if pt had some imaging.

## 2021-07-28 NOTE — ED TRIAGE NOTES
"80 yr old male to room  Chief Complaint   Patient presents with   • Syncope     NA[place]ED [place] from home found by his neighbor laying on the deck so neighbor called 911.  Patient states \"I was sitting in my back yard then notice the ambulance at my house.\" Medics found patient outside in the front yard with low blood pressure and (+) orthostatics BP so an IV was established and 500 ml of NS given.   • Dizziness     BP (!) 94/60   Pulse 64   Temp 36.5 °C (97.7 °F)   Resp 18   Ht 1.753 m (5' 9\")   Wt 63.5 kg (140 lb)   SpO2 93%   BMI 20.67 kg/m²     "

## 2021-07-28 NOTE — ED NOTES
Pt provided paper copy of AVS. Rn and patient and pt's family went through discharge instructions. Pt and pt's family offered follow up call. Pt  and pt's family verbalized understanding of discharge teachings, agrees with discharge plan.    Pt dressed appropriately for the weather.    RX: no rx    Pt ambulatory with steady gait at discharge.

## 2021-07-28 NOTE — ED NOTES
ERP at bedside. Pt agrees with plan of care discussed by ERP. AIDET acknowledged with patient. IV established. Blood sent to lab. Medicinal interventions carried out per ERP orders. Rodgerrconchis in low position, side rail up for pt safety. Call light within reach. Will continue to monitor.

## 2021-07-28 NOTE — ED PROVIDER NOTES
"ED Provider Note    CHIEF COMPLAINT  Chief Complaint   Patient presents with   • Syncope     [place]ED [place] from home found by his neighbor laying on the deck so neighbor called 911.  Patient states \"I was sitting in my back yard then notice the ambulance at my house.\" Medics found patient outside in the front yard with low blood pressure and (+) orthostatics BP so an IV was established and 500 ml of NS given.   • Dizziness       HPI  Freeman Frost is a 80 y.o. male who presents found by his neighbor on his deck laying down and slumped over.  He says that he cannot recall the events around the ambulance but remembers that he drank a couple of shots of vodka and drank a couple of beers prior to this event happening.  He says he drinks about 3 times a week on the deck and is more substantial amounts and has had some mild depression symptoms.  Does not have any pets and lives alone denies any suicidal or homicidal ideation.  It was thought that he may have had a syncopal event and he comes in with somewhat low blood pressure receiving 500 cc of normal saline prior to arrival    REVIEW OF SYSTEMS  See HPI for further details. All other systems are negative.     PAST MEDICAL HISTORY  Past Medical History:   Diagnosis Date   • Anemia    • Anesthesia     fam hx, daughter stopped breathing   • Arrhythmia     afib   • Bowel habit changes     constipation   • Cataract     bilat IOL   • Dental disorder     upper   • Hemorrhagic disorder (HCC)    • Hypercholesterolemia    • Hypertension    • Snoring    • Stroke (HCC) 2013    tia no residual def       FAMILY HISTORY  Family History   Problem Relation Age of Onset   • Dementia Mother    • Cancer Mother         Breast   • Cancer Brother         Esophageal       SOCIAL HISTORY   reports that he has never smoked. He has never used smokeless tobacco. He reports current alcohol use. He reports that he does not use drugs.    SURGICAL HISTORY  Past Surgical History:   Procedure " "Laterality Date   • PB ERCP,DIAGNOSTIC N/A 4/20/2019    Procedure: ERCP, DIAGNOSTIC;  Surgeon: Antony Morrison M.D.;  Location: SURGERY [facility];  Service: Gastroenterology   • FRANCIA BY LAPAROSCOPY  4/19/2019    Procedure: LAPARASCOPIC- CONVERTED TO OPEN CHOLECYSTECTOMY;  Surgeon: Anita Merino M.D.;  Location: SURGERY [facility];  Service: General   • CATARACT PHACO WITH IOL Right 8/28/2018    Procedure: CATARACT PHACO WITH IOL;  Surgeon: Adolfo Santana M.D.;  Location: SURGERY SAME DAY [facility];  Service: Ophthalmology   • CATARACT PHACO WITH IOL Left 8/14/2018    Procedure: CATARACT PHACO WITH IOL;  Surgeon: Adolfo Santana M.D.;  Location: SURGERY SAME DAY [facility];  Service: Ophthalmology   • HERNIA REPAIR, INCISIONAL, UNILATERAL Left    • OTHER      tonsils as a child   • TONSILLECTOMY         CURRENT MEDICATIONS  Home Medications     Reviewed by Mague Mcmillan R.N. (Registered Nurse) on 07/27/21 at 1734  Med List Status: Complete   Medication Last Dose Status   amLODIPine (NORVASC) 5 MG Tab 7/27/2021 Active   apixaban (ELIQUIS) 5mg Tab 7/27/2021 Active   atorvastatin (LIPITOR) 10 MG Tab 7/27/2021 Active   Cyanocobalamin (B-12 PO) 7/27/2021 Active   ferrous sulfate 325 (65 Fe) MG tablet 7/27/2021 Active   losartan (COZAAR) 25 MG Tab 7/27/2021 Active   metoprolol SR (TOPROL XL) 100 MG TABLET SR 24 HR 7/27/2021 Active   Multiple Vitamins-Minerals (PRESERVISION AREDS) Tab  Active                ALLERGIES  Allergies   Allergen Reactions   • Other Environmental Unspecified     Seasonal allergies=rabbit brush       PHYSICAL EXAM  VITAL SIGNS: BP (!) 84/57   Pulse 67   Temp 36.5 °C (97.7 °F) (Tympanic)   Resp (!) 25   Ht 1.753 m (5' 9\")   Wt 63.5 kg (140 lb)   SpO2 96%   BMI 20.67 kg/m²    Constitutional: Well developed, Well nourished, No acute distress, Non-toxic appearance.  Has mild smell of alcohol  HENT: Normocephalic, Atraumatic, Bilateral external ears normal, " Oropharynx is clear mucous membranes are dry. No oral exudates or nasal discharge.   Eyes: Pupils are equal round and reactive, EOMI, Conjunctiva normal, No discharge.   Neck: Normal range of motion, No tenderness, Supple, No stridor. No meningismus.  Lymphatic: No lymphadenopathy noted.   Cardiovascular: Regular rate and rhythm without murmur rub or gallop.  Thorax & Lungs: Clear breath sounds bilaterally without wheezes, rhonchi or rales. There is no chest wall tenderness.   Abdomen: Soft non-tender non-distended. There is no rebound or guarding. No organomegaly is appreciated. Bowel sounds are normal.  Skin: Normal without rash.   Back: No CVA or spinal tenderness.   Extremities: Intact distal pulses, No edema, No tenderness, No cyanosis, No clubbing. Capillary refill is less than 2 seconds.  Musculoskeletal: Good range of motion in all major joints. No tenderness to palpation or major deformities noted.   Neurologic: Alert & oriented x 3, no slurred speech, Normal motor function, Normal sensory function, No focal deficits noted. Reflexes are normal.  Psychiatric: Affect normal, Judgment normal, Mood normal. There is no suicidal ideation or patient reported hallucinations.     EKG  Results for orders placed or performed during the hospital encounter of 21   EKG   Result Value Ref Range    Report       [facility] Emergency Dept[facility]    Test Date:  2021  Pt Name:    ANKITA DAVID                 Department: [facility]  MRN:        [id number]                      [facility]  Gender:     Male                         Technician: [id number]  :        [id number]                   Requested By:JORGE ALTAMIRANO  Order #:    [id number]                    Reading MD: JORGE ALTAMIRANO MD    Measurements  Intervals                                Axis  Rate:       65                           P:          -10  MO:         216                          QRS:        -54  QRSD:       92                            T:          39  QT:         420  QTc:        437    Interpretive Statements  SINUS RHYTHM  BORDERLINE AV CONDUCTION DELAY  LEFT ANTERIOR FASCICULAR BLOCK  RSR' IN V1 OR V2, RIGHT VCD OR RVH  Compared to ECG 05/17/2021 16:17:10  Left anterior fascicular block now present  Right ventricular hypertrophy now present  RSR' in V1 or V2 now present  Myocardial infarct finding no longer  present  Electronically Signed On 7- 20:53:26 PDT by JORGE ALTAMIRANO MD           COURSE & MEDICAL DECISION MAKING  Pertinent Labs & Imaging studies reviewed. (See chart for details)  Patient presents with dry mucous membranes, mild hypotension and smells of alcohol and obtain an alcohol level which was 152.  I think his alcohol level prior to arrival and when on the deck after drinking was much higher.  I gave him a rally bag and then subsequently a second liter of normal saline.  He received a total of 2.5 L and his blood pressure improved substantially after being initially hypotensive in the emergency department    Laboratory evaluation reveals no leukocytosis, mildly low hemoglobin 13.2, no evidence of shift or thrombocytopenia.  Serum electrolytes are unremarkable.  Alcohol is 152 and troponins unremarkable at 17.    Family cane and daughter was concerned about the possibility of head trauma although the patient does not have any evidence of head trauma and does not have any complaints of headache or visual symptoms and is walking well at this time.    Family is comfortable taking him home.  He will be assessed by his primary doctor for possible depression but he does not have any suicidal ideation.  I asked him to avoid alcohol and keep busy on his property and consider being assessed for depression and he is amenable    FINAL IMPRESSION  1. Alcoholic intoxication without complication (HCC)             Electronically signed by: Jorge Altamirano M.D., 7/27/2021 7:01 PM

## 2021-08-05 ENCOUNTER — NON-PROVIDER VISIT (OUTPATIENT)
Dept: CARDIOLOGY | Facility: MEDICAL CENTER | Age: 81
End: 2021-08-05
Payer: MEDICARE

## 2021-08-05 DIAGNOSIS — Z95.818 STATUS POST PLACEMENT OF IMPLANTABLE LOOP RECORDER: ICD-10-CM

## 2021-08-05 DIAGNOSIS — R55 SYNCOPE AND COLLAPSE: ICD-10-CM

## 2021-08-05 PROCEDURE — 93298 REM INTERROG DEV EVAL SCRMS: CPT | Performed by: INTERNAL MEDICINE

## 2021-08-09 ENCOUNTER — APPOINTMENT (OUTPATIENT)
Dept: NEUROLOGY | Facility: MEDICAL CENTER | Age: 81
End: 2021-08-09
Attending: PSYCHIATRY & NEUROLOGY
Payer: MEDICARE

## 2021-08-25 ENCOUNTER — TELEPHONE (OUTPATIENT)
Dept: CARDIOLOGY | Facility: MEDICAL CENTER | Age: 81
End: 2021-08-25

## 2021-08-25 NOTE — TELEPHONE ENCOUNTER
FYI:  Remote Transmission 8/24/2021:    1-AFL episode 8/6/2021@6:24am lasting 8 hrs and 20 mins.    Pt on [facility].  Full report scanned into media.

## 2021-09-07 ENCOUNTER — NON-PROVIDER VISIT (OUTPATIENT)
Dept: CARDIOLOGY | Facility: MEDICAL CENTER | Age: 81
End: 2021-09-07
Payer: MEDICARE

## 2021-09-07 DIAGNOSIS — Z95.818 STATUS POST PLACEMENT OF IMPLANTABLE LOOP RECORDER: ICD-10-CM

## 2021-09-07 DIAGNOSIS — R55 SYNCOPE AND COLLAPSE: ICD-10-CM

## 2021-09-07 PROCEDURE — 99999 PR NO CHARGE: CPT | Performed by: INTERNAL MEDICINE

## 2021-10-08 ENCOUNTER — NON-PROVIDER VISIT (OUTPATIENT)
Dept: CARDIOLOGY | Facility: MEDICAL CENTER | Age: 81
End: 2021-10-08
Payer: MEDICARE

## 2021-10-08 DIAGNOSIS — Z95.818 STATUS POST PLACEMENT OF IMPLANTABLE LOOP RECORDER: ICD-10-CM

## 2021-10-08 DIAGNOSIS — R55 SYNCOPE AND COLLAPSE: ICD-10-CM

## 2021-10-08 PROCEDURE — 99999 PR NO CHARGE: CPT | Performed by: INTERNAL MEDICINE

## 2021-11-01 ENCOUNTER — OFFICE VISIT (OUTPATIENT)
Dept: NEUROLOGY | Facility: MEDICAL CENTER | Age: 81
End: 2021-11-01
Attending: PSYCHIATRY & NEUROLOGY
Payer: MEDICARE

## 2021-11-01 VITALS
OXYGEN SATURATION: 99 % | TEMPERATURE: 97.2 F | RESPIRATION RATE: 12 BRPM | HEIGHT: 69 IN | WEIGHT: 168.87 LBS | BODY MASS INDEX: 25.01 KG/M2 | DIASTOLIC BLOOD PRESSURE: 72 MMHG | SYSTOLIC BLOOD PRESSURE: 112 MMHG | HEART RATE: 61 BPM

## 2021-11-01 DIAGNOSIS — F03.90 DEMENTIA WITHOUT BEHAVIORAL DISTURBANCE, UNSPECIFIED DEMENTIA TYPE: ICD-10-CM

## 2021-11-01 PROCEDURE — 99214 OFFICE O/P EST MOD 30 MIN: CPT | Performed by: PSYCHIATRY & NEUROLOGY

## 2021-11-01 PROCEDURE — 99212 OFFICE O/P EST SF 10 MIN: CPT | Performed by: PSYCHIATRY & NEUROLOGY

## 2021-11-01 ASSESSMENT — FIBROSIS 4 INDEX: FIB4 SCORE: 1.77

## 2021-11-01 NOTE — PROGRESS NOTES
"Chief Complaint   Patient presents with   • Follow-Up     Memory loss     History of present illness:  Freeman Frost 80 y.o. male presents today for memory loss f/u.   History is obtained from patient and daughter.  and Patient is accompanied by Daughter Carson.    Duration/timing: ~2017 with gradual progression  Context:   Memory loss: Sometimes he is doing things and doesn't remember that he did it (example doing dishes, what he ate for dinner) - forgetting the next day and can remember eventually. Daughter said he was \"in a different decade\" - he though daughter was his mother and their house was the old house. More recently forgets appointments. Memory was bad when he was caring for wife with cancer but got better after she passed. She passed away in 8/2020. He forgets appointments and why he has them. He will lose instructions daughter places for him.   Baseline: Lives alone - he does all ADLs and IADLs. Wife used to manage finances. Daughter took over finances as a promise to her mom. He was never good at that. Drives without safety concerns but couple of times drove past an office three times. Wife diagnosed in 2019. Retired . Highest education: some college  Location: Memory circuits  Quality: Loss  Severity: Mild/moderate  Modifying factors: Worse at night  Associated signs/symptoms: Insomnia -wakes up worrying about if certain things were completed. Emotionally hs is doing ok with grieving process. Some bowel and bladder urgency. +Macular degeneration - treated by Dr. Santana. +Snoring with possible apnea. Hx of TIA - he was in the shower and he felt faint and he collapsed; chronic alcohol intake  Denies: bladder incontinence, bowel incontinence, headaches, head trauma , weakness, numbness/tingling, swallowing difficulties, speech disturbance, incoordination, depression, anxiety, hearing loss, loss of consciousness, hallucinations, seizures, abnormal movements, falls and HIV or syphilis " risk factors, dietary restrictions     Subjective: Patient was last seen in neurology clinic on 11/2020.     Memory loss: overall there has been improvement since last visit. He is absent minded, forgets where he put things. He will remember later where he put them. He drives to the store only.  Daughter does go with him to the store and they walked down isle together.  If he does need some random items he can go by himself without issue.  Daughter states he forgets things at night like conversations they had. He is living alone and does all his ADLs and IADLs. He has stopped all EtOH use and has improved drastically in the last few months. He is more alert. She talks to him daily. Not getting lost when driving. No near or actual accidents. He does yard work and he reads. He watches the news.He knows G20 summit and Trump.     He had two episodes of syncope since a. Unknown if EtoH was checked. They thought it was dehydration.when he was found he was shaky. He knew who his daughter was. He was apologizing. Nothing looked awry in the home. No incontinence, tongue biting. He has apple watch for life alert system.     No depression.     Past medical history:   Past Medical History:   Diagnosis Date   • Anemia    • Anesthesia     fam hx, daughter stopped breathing   • Arrhythmia     afib   • Bowel habit changes     constipation   • Cataract     bilat IOL   • Dental disorder     upper   • Hemorrhagic disorder (HCC)    • Hypercholesterolemia    • Hypertension    • Snoring    • Stroke (HCC) 2013    tia no residual def       Past surgical history:   Past Surgical History:   Procedure Laterality Date   • PB ERCP,DIAGNOSTIC N/A 4/20/2019    Procedure: ERCP, DIAGNOSTIC;  Surgeon: Antony Morrison M.D.;  Location: SURGERY [facility];  Service: Gastroenterology   • FRANCIA BY LAPAROSCOPY  4/19/2019    Procedure: LAPARASCOPIC- CONVERTED TO OPEN CHOLECYSTECTOMY;  Surgeon: Anita Merino M.D.;  Location: [facility]  "[facility];  Service: General   • CATARACT PHACO WITH IOL Right 8/28/2018    Procedure: CATARACT PHACO WITH IOL;  Surgeon: Adolfo Santana M.D.;  Location: SURGERY SAME DAY [facility];  Service: Ophthalmology   • CATARACT PHACO WITH IOL Left 8/14/2018    Procedure: CATARACT PHACO WITH IOL;  Surgeon: Adolfo Santana M.D.;  Location: SURGERY SAME DAY [facility];  Service: Ophthalmology   • HERNIA REPAIR, INCISIONAL, UNILATERAL Left    • OTHER      tonsils as a child   • TONSILLECTOMY         Family history:   Family History   Problem Relation Age of Onset   • Dementia Mother    • Cancer Mother         Breast   • Cancer Brother         Esophageal    Mom in her 80s when she had memory loss.     Social history:   Tobacco Use   • Smoking status: Never Smoker   • Smokeless tobacco: Never Used   Substance and Sexual Activity   • Alcohol use: no     Comment:prior heavy use, abstinent since about 7/2021   • Drug use: No       Current medications:   Current Outpatient Medications   Medication   • apixaban (ELIQUIS) 5mg Tab   • metoprolol SR (TOPROL XL) 100 MG TABLET SR 24 HR   • Cyanocobalamin (B-12 PO)   • ferrous sulfate 325 (65 Fe) MG tablet   • losartan (COZAAR) 25 MG Tab   • Multiple Vitamins-Minerals (PRESERVISION AREDS) Tab   • amLODIPine (NORVASC) 5 MG Tab   • atorvastatin (LIPITOR) 10 MG Tab     No current facility-administered medications for this visit.       Medication Allergy:  Allergies   Allergen Reactions   • Other Environmental Unspecified     Seasonal allergies=rabbit brush       Review of Systems   Neurological:        As per HPI       Physical examination:   Vitals:    11/01/21 0959   BP: 112/72   BP Location: Left arm   Patient Position: Sitting   BP Cuff Size: Adult   Pulse: 61   Resp: 12   Temp: 36.2 °C (97.2 °F)   TempSrc: Temporal   SpO2: 99%   Weight: 76.6 kg (168 lb 14 oz)   Height: 1.753 m (5' 9\")     General: Patient in well nourished in no apparent distress.  Psychiatric: Pleasant. "     NEUROLOGICAL EXAM:   Mental status: Awake, alert and fully oriented to person, place, time and situation. Normal attention, concentration and fund of knowledge for education level. MOCA (22/30, see scanned document 11/2020); MOCA (20/30, 11/2021)  Speech and language: Speech is fluent without errors and clear.  Cranial nerve exam: Prior  II: Pupils are equally round and reactive to light. Visual fields are intact by confrontation.  III, IV, VI: EOMI, no diplopia, no ptosis.  Nonsustained nystagmus with end gaze, horizontal.  V: Sensation to light touch is normal over V1-3 distributions bilaterally.  .  VII: Facial movements are symmetrical. There is no facial droop. .  VIII: Hearing intact to soft speech and finger rub bilaterally  IX: Palate elevates symmetrically, uvula is midline. Dysarthria is not present.  XI: Shoulder shrug are symmetrical and strong.   XII: Tongue protrudes midline.    Motor exam: Prior  Muscle tone is normal in all 4 limbs. and Fine kinetic tremor R > L on FTN.    Muscle strength:     Right  Left  Deltoid   5/5  5/5      Biceps   5/5  5/5  Triceps  4/5  5/5   Wrist extensors 5/5  5/5  Wrist flexors  5/5  5/5     5/5  5/5  Interossei  5/5  5/5  Thenar (APB)  NT/5  NT/5   Hip flexors  5/5  5/5  Quadriceps  5/5  5/5    Hamstrings  5/5  5/5  Dorsiflexors  5/5  5/5  Plantarflexors  5/5  5/5  Toe extension  NT/5  NT/5  NT = not tested    Sensory exam: Prior  Intact to Light touch and Temperature in bilateral upper and lower extremity and face.    Reflexes:  Prior     Right  Left  Biceps   0/4  0/4  Triceps  0/4  0/4  Brachioradialis 0/4  0/4  Knee jerk  trace/4  trace/4  Ankle jerk  0/4  0/4   bilateral toes are downgoing to plantar stimulation..  Prior: trace gabellar but no other frontal release signs - palmomental/snout.  Today: left palmomental and trace gabellar    Coordination: shows a normal finger-nose-finger bilaterally. HTS slightly more challenging on the L > R. Prior  Gait:  Narrow based      ANCILLARY DATA REVIEWED:   Lab Data Review:  Lab Results   Component Value Date/Time    WBC 6.6 07/27/2021 05:23 PM    RBC 3.93 (L) 07/27/2021 05:23 PM    HEMOGLOBIN 13.2 (L) 07/27/2021 05:23 PM    HEMATOCRIT 40.1 (L) 07/27/2021 05:23 PM    .0 (H) 07/27/2021 05:23 PM    MCH 33.6 (H) 07/27/2021 05:23 PM    MCHC 32.9 (L) 07/27/2021 05:23 PM    MPV 9.4 07/27/2021 05:23 PM    NEUTSPOLYS 58.70 07/27/2021 05:23 PM    LYMPHOCYTES 29.70 07/27/2021 05:23 PM    MONOCYTES 5.90 07/27/2021 05:23 PM    EOSINOPHILS 4.20 07/27/2021 05:23 PM    BASOPHILS 1.20 07/27/2021 05:23 PM    ANISOCYTOSIS 1+ 07/01/2019 11:37 AM      Lab Results   Component Value Date/Time    SODIUM 141 07/27/2021 05:23 PM    POTASSIUM 4.3 07/27/2021 05:23 PM    CHLORIDE 107 07/27/2021 05:23 PM    CO2 20 07/27/2021 05:23 PM    GLUCOSE 96 07/27/2021 05:23 PM    BUN 23 (H) 07/27/2021 05:23 PM    CREATININE 1.12 07/27/2021 05:23 PM     Lab Results   Component Value Date/Time    ASTSGOT 16 07/31/2020 0912    ALTSGPT 15 07/31/2020 0912    ALKPHOSPHAT 94 07/31/2020 0912    ALBUMIN 4.5 07/31/2020 0912     Lab Results   Component Value Date/Time    HBA1C 5.6 07/31/2020 09:12 AM      Workup to date:  B12 1048  Folate 13.6  TSH 2.54   Syphilis nonreactive  Vitamin B1 136    Imaging:   MRI brain without contrast August 2020:  1.  No evidence of acute territorial infarct, intracranial hemorrhage or mass lesion.  2.  Advanced diffuse cerebral substance loss.  3.  Mild microangiopathic ischemic change versus demyelination or gliosis.  4.  Chronic left posterior periventricular infarct.  5.  Right paramedian chronic pontine lacunar infarct.    Records reviewed: Patient was seen in the ED for alcohol intoxication without complication in July 2021.  He was found down.  He was also found down in May 2021.    ASSESSMENT AND PLAN:    1. Dementia, unspecified type (HCC): Patient originally presenting with memory loss while actively in the grieving process  and consuming high amounts of alcohol.  Basic work-up for metabolic causes of memory loss were unremarkable.  MRI brain without contrast in August 2020 with advanced diffuse cerebral substance loss and mild white matter change.  There was also evidence of a right paramedian chronic pontine lacunar infarct and chronic left posterior paraventricular infarct in the setting of known A. fib. No HIV risk factors.  No culprit medications.  No strong evidence of obstructive sleep apnea.  He is no longer grieving, no evidence of mood disorder, cessation of alcohol since July 2021 with significant improvement compared to prior. Still doing ADLs and IADLs though responsibilities are minimal. Despite this I do believe he has some deficits in IADLs, executive function deficits, visual-spatial.  Progressive Crook as well - though he is able to remember with cuing.  Likely early dementia with superimposed effects from chronic alcohol use.   -Extensive discussion with regards to the medical reasoning as above  -Encourage cessation alcohol use, I explained the effects of chronic alcohol use on the brain and nervous system  -Consider initiation of Aricept on follow-up   -Discussed living will and advanced directives  -Monitor for episodes concerning for seizure activity given his episodes of syncope as above  -Monitor for driving concerns, consider OT evaluation on follow up  -Stop driving if there are any safety concerns    2. Paroxysmal atrial fibrillation (HCC): On Eliquis    3. Hypertension, essential: Of note with regards to his tremor.  Currently controlled.  Does have evidence of chronic lacunar infarcts on MRI brain in August 2020.    4. Alcohol use, resolved: Encouragement provided, chronic history likely playing a role in problem #1      FOLLOW-UP: Return in about 6 months (around 5/1/2022).  EDUCATION AND COUNSELING:  -I personally discussed the following with the patient:   Diagnosis, prognosis, and treatment options  discussed with patient at length.      The patient communicates understanding of the above and agrees that due to the complexity of his/her diagnosis, results of any testing and further recommendations will typically be discussed/made during a face to face encounter in my office. The patient and/or family further understands it is their responsibility to keep proper follow up.     Disclaimer  This dictation was created using voice recognition software. I have made every reasonable attempt to avoid dictation errors, but this document may contain an error not identified before finalizing. If the error changes the accuracy of the document, I would appreciate it being brought to my attention. Thank you very much.     Carolyn Mullins MD  Neurology

## 2021-11-09 ENCOUNTER — NON-PROVIDER VISIT (OUTPATIENT)
Dept: CARDIOLOGY | Facility: MEDICAL CENTER | Age: 81
End: 2021-11-09
Payer: MEDICARE

## 2021-11-09 DIAGNOSIS — Z95.818 STATUS POST PLACEMENT OF IMPLANTABLE LOOP RECORDER: ICD-10-CM

## 2021-11-09 DIAGNOSIS — R55 SYNCOPE AND COLLAPSE: ICD-10-CM

## 2021-12-01 ENCOUNTER — OFFICE VISIT (OUTPATIENT)
Dept: CARDIOLOGY | Facility: MEDICAL CENTER | Age: 81
End: 2021-12-01
Payer: MEDICARE

## 2021-12-01 VITALS
OXYGEN SATURATION: 97 % | SYSTOLIC BLOOD PRESSURE: 120 MMHG | DIASTOLIC BLOOD PRESSURE: 70 MMHG | HEART RATE: 72 BPM | HEIGHT: 69 IN | WEIGHT: 171 LBS | BODY MASS INDEX: 25.33 KG/M2

## 2021-12-01 DIAGNOSIS — R55 TRANSIENT LOSS OF CONSCIOUSNESS: ICD-10-CM

## 2021-12-01 DIAGNOSIS — Z95.818 STATUS POST PLACEMENT OF IMPLANTABLE LOOP RECORDER: ICD-10-CM

## 2021-12-01 DIAGNOSIS — Z79.01 ON CONTINUOUS ORAL ANTICOAGULATION: ICD-10-CM

## 2021-12-01 DIAGNOSIS — I48.0 PAROXYSMAL ATRIAL FIBRILLATION (HCC): ICD-10-CM

## 2021-12-01 DIAGNOSIS — G31.84 MILD COGNITIVE IMPAIRMENT: ICD-10-CM

## 2021-12-01 PROCEDURE — 99214 OFFICE O/P EST MOD 30 MIN: CPT | Performed by: INTERNAL MEDICINE

## 2021-12-01 ASSESSMENT — FIBROSIS 4 INDEX: FIB4 SCORE: 1.77

## 2021-12-01 NOTE — PROGRESS NOTES
Chief Complaint   Patient presents with   • Atrial Fibrillation     follow up       Subjective:   Freeman Frost is an 81 y.o. male who presents today for follow-up regarding atrial flutter and transient loss of consciousness.  Has a history of hypertension and TIAs as well as several episodes of transient loss of consciousness culminating in placement of an implantable loop recorder which has disclosed no abnormalities aside from proximal-isms of atrial fibrillation.  His daughter is our  supervisor Mrs. Byrd.    Since last visit he had another syncopal event after placement of his loop recorder without any arrhythmic etiology.  He was evaluated in the emergency room and found to be heavily intoxicated with alcohol.  He is subsequently become abstinent from alcohol and he has been attentive to his hydration status and has not had a recurrent episode.    Past Medical History:   Diagnosis Date   • Anemia    • Anesthesia     fam hx, daughter stopped breathing   • Arrhythmia     afib   • Bowel habit changes     constipation   • Cataract     bilat IOL   • Dental disorder     upper   • Hemorrhagic disorder (HCC)    • Hypercholesterolemia    • Hypertension    • Snoring    • Stroke (HCC) 2013    tia no residual def     Past Surgical History:   Procedure Laterality Date   • PB ERCP,DIAGNOSTIC N/A 4/20/2019    Procedure: ERCP, DIAGNOSTIC;  Surgeon: Antony Morrison M.D.;  Location: SURGERY [facility];  Service: Gastroenterology   • FRANCIA BY LAPAROSCOPY  4/19/2019    Procedure: LAPARASCOPIC- CONVERTED TO OPEN CHOLECYSTECTOMY;  Surgeon: Anita Merino M.D.;  Location: SURGERY [facility];  Service: General   • CATARACT PHACO WITH IOL Right 8/28/2018    Procedure: CATARACT PHACO WITH IOL;  Surgeon: Adolfo Santana M.D.;  Location: SURGERY SAME DAY [facility] [place];  Service: Ophthalmology   • CATARACT PHACO WITH IOL Left 8/14/2018    Procedure: CATARACT PHACO WITH IOL;  Surgeon: Adolfo Santana  M.D.;  Location: SURGERY SAME DAY [facility];  Service: Ophthalmology   • HERNIA REPAIR, INCISIONAL, UNILATERAL Left    • OTHER      tonsils as a child   • TONSILLECTOMY       Family History   Problem Relation Age of Onset   • Dementia Mother    • Cancer Mother         Breast   • Cancer Brother         Esophageal     Social History     Socioeconomic History   • Marital status:      Spouse name: Not on file   • Number of children: Not on file   • Years of education: Not on file   • Highest education level: Not on file   Occupational History   • Not on file   Tobacco Use   • Smoking status: Never Smoker   • Smokeless tobacco: Never Used   Vaping Use   • Vaping Use: Never used   Substance and Sexual Activity   • Alcohol use: Not Currently   • Drug use: No   • Sexual activity: Not on file   Other Topics Concern   • Not on file   Social History Narrative   • Not on file     Social Determinants of Health     Financial Resource Strain: Low Risk    • Difficulty of Paying Living Expenses: Not hard at all   Food Insecurity: No Food Insecurity   • Worried About Running Out of Food in the Last Year: Never true   • Ran Out of Food in the Last Year: Never true   Transportation Needs: No Transportation Needs   • Lack of Transportation (Medical): No   • Lack of Transportation (Non-Medical): No   Physical Activity:    • Days of Exercise per Week: Not on file   • Minutes of Exercise per Session: Not on file   Stress:    • Feeling of Stress : Not on file   Social Connections:    • Frequency of Communication with Friends and Family: Not on file   • Frequency of Social Gatherings with Friends and Family: Not on file   • Attends Congregational Services: Not on file   • Active Member of Clubs or Organizations: Not on file   • Attends Club or Organization Meetings: Not on file   • Marital Status: Not on file   Intimate Partner Violence:    • Fear of Current or Ex-Partner: Not on file   • Emotionally Abused: Not on file   • Physically  "Abused: Not on file   • Sexually Abused: Not on file   Housing Stability:    • Unable to Pay for Housing in the Last Year: Not on file   • Number of Places Lived in the Last Year: Not on file   • Unstable Housing in the Last Year: Not on file     Allergies   Allergen Reactions   • Other Environmental Unspecified     Seasonal allergies=rabbit brush     Outpatient Encounter Medications as of 12/1/2021   Medication Sig Dispense Refill   • apixaban (ELIQUIS) 5mg Tab Take 1 Tablet by mouth 2 times a day. 180 Tablet 2   • metoprolol SR (TOPROL XL) 100 MG TABLET SR 24 HR Take 1 tablet by mouth every day. 100 tablet 3   • Cyanocobalamin (B-12 PO) Take 1 tablet by mouth every day.     • ferrous sulfate 325 (65 Fe) MG tablet Take 325 mg by mouth every day.     • losartan (COZAAR) 25 MG Tab Take 25 mg by mouth every day.  3   • Multiple Vitamins-Minerals (PRESERVISION AREDS) Tab Take 1 Capsule by mouth 2 times a day.     • amLODIPine (NORVASC) 5 MG Tab Take 5 mg by mouth every day.     • atorvastatin (LIPITOR) 10 MG Tab Take 10 mg by mouth every day.       No facility-administered encounter medications on file as of 12/1/2021.     Review of Systems   All other systems reviewed and are negative.       Objective:   /70 (BP Location: Left arm, Patient Position: Sitting)   Pulse 72   Ht 1.753 m (5' 9\")   Wt 77.6 kg (171 lb)   SpO2 97%   BMI 25.25 kg/m²     Physical Exam  Vitals reviewed.   Constitutional:       General: He is not in acute distress.     Appearance: He is well-developed. He is not diaphoretic.   HENT:      Head: Normocephalic and atraumatic.      Right Ear: External ear normal.      Left Ear: External ear normal.   Eyes:      General: No scleral icterus.        Right eye: No discharge.         Left eye: No discharge.      Conjunctiva/sclera: Conjunctivae normal.      Pupils: Pupils are equal, round, and reactive to light.   Neck:      Thyroid: No thyromegaly.      Vascular: No JVD.      Trachea: No " tracheal deviation.   Cardiovascular:      Rate and Rhythm: Normal rate and regular rhythm.      Chest Wall: PMI is not displaced.      Pulses:           Carotid pulses are 2+ on the right side and 2+ on the left side.       Radial pulses are 2+ on the left side.        Popliteal pulses are 2+ on the right side and 2+ on the left side.        Dorsalis pedis pulses are 2+ on the right side and 2+ on the left side.        Posterior tibial pulses are 2+ on the right side and 2+ on the left side.      Heart sounds: No murmur heard.  No friction rub. No gallop.    Pulmonary:      Effort: Pulmonary effort is normal. No respiratory distress.      Breath sounds: Normal breath sounds. No wheezing or rales.   Chest:      Chest wall: No tenderness.   Abdominal:      General: Bowel sounds are normal. There is no distension.      Palpations: Abdomen is soft.      Tenderness: There is no abdominal tenderness.   Musculoskeletal:         General: No tenderness or deformity. Normal range of motion.      Cervical back: Normal range of motion and neck supple.   Skin:     General: Skin is warm and dry.      Coloration: Skin is not pale.      Findings: No erythema or rash.   Neurological:      Mental Status: He is alert and oriented to person, place, and time.      Cranial Nerves: No cranial nerve deficit (cranial nerves II through XII grossly intact).      Coordination: Coordination normal.   Psychiatric:         Behavior: Behavior normal.         Thought Content: Thought content normal.       LABS:  Lab Results   Component Value Date/Time    CHOLSTRLTOT 172 07/31/2020 09:12 AM     (H) 07/31/2020 09:12 AM    HDL 56 07/31/2020 09:12 AM    TRIGLYCERIDE 74 07/31/2020 09:12 AM       Lab Results   Component Value Date/Time    WBC 6.6 07/27/2021 05:23 PM    RBC 3.93 (L) 07/27/2021 05:23 PM    HEMOGLOBIN 13.2 (L) 07/27/2021 05:23 PM    HEMATOCRIT 40.1 (L) 07/27/2021 05:23 PM    .0 (H) 07/27/2021 05:23 PM    NEUTSPOLYS 58.70  07/27/2021 05:23 PM    LYMPHOCYTES 29.70 07/27/2021 05:23 PM    MONOCYTES 5.90 07/27/2021 05:23 PM    EOSINOPHILS 4.20 07/27/2021 05:23 PM    BASOPHILS 1.20 07/27/2021 05:23 PM    ANISOCYTOSIS 1+ 07/01/2019 11:37 AM     Lab Results   Component Value Date/Time    SODIUM 141 07/27/2021 05:23 PM    POTASSIUM 4.3 07/27/2021 05:23 PM    CHLORIDE 107 07/27/2021 05:23 PM    CO2 20 07/27/2021 05:23 PM    GLUCOSE 96 07/27/2021 05:23 PM    BUN 23 (H) 07/27/2021 05:23 PM    CREATININE 1.12 07/27/2021 05:23 PM     Lab Results   Component Value Date    HBA1C 5.6 07/31/2020      Lab Results   Component Value Date/Time    ALKPHOSPHAT 95 07/27/2021 05:23 PM    ASTSGOT 12 07/27/2021 05:23 PM    ALTSGPT 9 07/27/2021 05:23 PM    TBILIRUBIN 1.6 (H) 07/27/2021 05:23 PM      No results found for: BNPBTYPENAT   No results found for: TSH  Lab Results   Component Value Date/Time    PROTHROMBTM 14.9 (H) 05/17/2021 06:10 PM    INR 1.20 (H) 05/17/2021 06:10 PM      EKGs from hospital stay reviewed showing sinus rhythm and alternatively atrial flutter to 153 bpm, atypical.    ECHO CONCLUSIONS (4/23/2019):  Normal left ventricular systolic function.  Left ventricular ejection fraction is visually estimated to be 65%.  Normal right ventricular size and systolic function.  Mild mitral regurgitation.  Mild tricuspid regurgitation.  Estimated right ventricular systolic pressure  is 45 mmHg.  Ascending aorta diameter is 4 cm.  Compared to the images of the prior study done 11/16/10 -  there has   been no significant change. The ascending aorta was not well seen on   the prior exam.        Assessment:     1. Paroxysmal atrial fibrillation (HCC)     2. On continuous oral anticoagulation     3. Mild cognitive impairment     4. Status post placement of implantable loop recorder     5. Transient loss of consciousness         Medical Decision Making:  Today's Assessment / Status / Plan:     Doing well tolerating anticoagulation syncopal events seem to be  related to alcohol intoxication and dehydration.  Loop recorder is present and reveals no related rhythm issues brief PAF is noted incidentally.  Continues to tolerate his other medical therapy.    1.  Continue alcohol abstinence  2.  Compression stockings  3.  Hydration with electrolyte-containing fluids  4.  Consider watchman left atrial occluder device if he has recurrent fall    Follow-up in 6 months

## 2021-12-10 ENCOUNTER — NON-PROVIDER VISIT (OUTPATIENT)
Dept: CARDIOLOGY | Facility: MEDICAL CENTER | Age: 81
End: 2021-12-10
Payer: MEDICARE

## 2021-12-10 DIAGNOSIS — Z95.818 STATUS POST PLACEMENT OF IMPLANTABLE LOOP RECORDER: ICD-10-CM

## 2021-12-10 DIAGNOSIS — R55 SYNCOPE AND COLLAPSE: ICD-10-CM

## 2021-12-16 PROCEDURE — 93298 REM INTERROG DEV EVAL SCRMS: CPT | Performed by: INTERNAL MEDICINE

## 2022-01-01 PROCEDURE — 93298 REM INTERROG DEV EVAL SCRMS: CPT | Performed by: INTERNAL MEDICINE

## 2022-01-11 ENCOUNTER — NON-PROVIDER VISIT (OUTPATIENT)
Dept: CARDIOLOGY | Facility: MEDICAL CENTER | Age: 82
End: 2022-01-11
Payer: MEDICARE

## 2022-01-11 PROCEDURE — 99999 PR NO CHARGE: CPT | Performed by: INTERNAL MEDICINE

## 2022-01-26 ENCOUNTER — NON-PROVIDER VISIT (OUTPATIENT)
Dept: CARDIOLOGY | Facility: MEDICAL CENTER | Age: 82
End: 2022-01-26
Payer: MEDICARE

## 2022-01-26 PROCEDURE — 99999 PR NO CHARGE: CPT | Performed by: INTERNAL MEDICINE

## 2022-02-04 ENCOUNTER — PATIENT MESSAGE (OUTPATIENT)
Dept: HEALTH INFORMATION MANAGEMENT | Facility: OTHER | Age: 82
End: 2022-02-04
Payer: MEDICARE

## 2022-02-26 ENCOUNTER — NON-PROVIDER VISIT (OUTPATIENT)
Dept: CARDIOLOGY | Facility: MEDICAL CENTER | Age: 82
End: 2022-02-26
Payer: MEDICARE

## 2022-02-26 DIAGNOSIS — Z95.818 STATUS POST PLACEMENT OF IMPLANTABLE LOOP RECORDER: ICD-10-CM

## 2022-02-26 DIAGNOSIS — R55 SYNCOPE AND COLLAPSE: ICD-10-CM

## 2022-02-26 PROCEDURE — 93298 REM INTERROG DEV EVAL SCRMS: CPT | Performed by: INTERNAL MEDICINE

## 2022-03-04 ENCOUNTER — TELEPHONE (OUTPATIENT)
Dept: CARDIOLOGY | Facility: MEDICAL CENTER | Age: 82
End: 2022-03-04
Payer: MEDICARE

## 2022-03-05 NOTE — TELEPHONE ENCOUNTER
Remote Transmission 3/3/2022:    1-Tachy ?? AFL episode 3/2/2022@12:42pm lasting 18 secs.    Full report scanned into media.

## 2022-03-21 ENCOUNTER — TELEPHONE (OUTPATIENT)
Dept: CARDIOLOGY | Facility: MEDICAL CENTER | Age: 82
End: 2022-03-21
Payer: MEDICARE

## 2022-03-21 NOTE — TELEPHONE ENCOUNTER
Remote Transmission 3/20/2022:    13-Tachy ?? AFL w/RVR episodes on 3/19/2022 starting at 7:12am lasting roughly 3 hrs.     Full report scanned into media.

## 2022-03-21 NOTE — TELEPHONE ENCOUNTER
Called patient home number and LM to callback.     Called patient mobile number and spoke to patient daughter Carson and gave office number for the patient to callback regarding any symptoms.

## 2022-03-22 NOTE — TELEPHONE ENCOUNTER
Called patient home number and spoke to the patient   Denies any symptoms at that time. Patient was asleep. Still taking Eliquis 5mg BID. Advised we will notify TW and if he has any further recommendations we will let him know. Answered all questions and concerns, appreciative of call.     Routed to TW.

## 2022-03-22 NOTE — TELEPHONE ENCOUNTER
Pt returned call. Tried reaching nurse, but unavailable. Please call Pt back at   Ph# [phone].    Thank you

## 2022-03-29 ENCOUNTER — NON-PROVIDER VISIT (OUTPATIENT)
Dept: CARDIOLOGY | Facility: MEDICAL CENTER | Age: 82
End: 2022-03-29
Payer: MEDICARE

## 2022-03-29 DIAGNOSIS — Z95.818 STATUS POST PLACEMENT OF IMPLANTABLE LOOP RECORDER: ICD-10-CM

## 2022-03-29 DIAGNOSIS — I48.0 PAROXYSMAL ATRIAL FIBRILLATION (HCC): ICD-10-CM

## 2022-03-29 PROCEDURE — 93298 REM INTERROG DEV EVAL SCRMS: CPT | Performed by: INTERNAL MEDICINE

## 2022-04-29 ENCOUNTER — NON-PROVIDER VISIT (OUTPATIENT)
Dept: CARDIOLOGY | Facility: MEDICAL CENTER | Age: 82
End: 2022-04-29
Payer: MEDICARE

## 2022-04-29 DIAGNOSIS — R55 SYNCOPE AND COLLAPSE: ICD-10-CM

## 2022-04-29 DIAGNOSIS — Z95.818 STATUS POST PLACEMENT OF IMPLANTABLE LOOP RECORDER: ICD-10-CM

## 2022-04-29 PROCEDURE — 93298 REM INTERROG DEV EVAL SCRMS: CPT | Performed by: INTERNAL MEDICINE

## 2022-05-02 ENCOUNTER — OFFICE VISIT (OUTPATIENT)
Dept: NEUROLOGY | Facility: MEDICAL CENTER | Age: 82
End: 2022-05-02
Attending: PSYCHIATRY & NEUROLOGY
Payer: MEDICARE

## 2022-05-02 VITALS
HEART RATE: 61 BPM | BODY MASS INDEX: 24.6 KG/M2 | OXYGEN SATURATION: 95 % | WEIGHT: 166.1 LBS | SYSTOLIC BLOOD PRESSURE: 108 MMHG | DIASTOLIC BLOOD PRESSURE: 62 MMHG | TEMPERATURE: 97.1 F | HEIGHT: 69 IN

## 2022-05-02 DIAGNOSIS — F03.90 DEMENTIA WITHOUT BEHAVIORAL DISTURBANCE, UNSPECIFIED DEMENTIA TYPE: ICD-10-CM

## 2022-05-02 PROCEDURE — 99214 OFFICE O/P EST MOD 30 MIN: CPT | Performed by: PSYCHIATRY & NEUROLOGY

## 2022-05-02 PROCEDURE — 99212 OFFICE O/P EST SF 10 MIN: CPT | Performed by: PSYCHIATRY & NEUROLOGY

## 2022-05-02 RX ORDER — DONEPEZIL HYDROCHLORIDE 5 MG/1
TABLET, FILM COATED ORAL
Qty: 60 TABLET | Refills: 3 | Status: SHIPPED | OUTPATIENT
Start: 2022-05-02 | End: 2022-08-22 | Stop reason: SDUPTHER

## 2022-05-02 ASSESSMENT — MONTREAL COGNITIVE ASSESSMENT (MOCA)
9. REPEAT EACH SENTENCE: 2/2
1. ALTERNATING TRAIL MAKING: 1/1
11. FOR EACH PAIR OF WORDS, WHAT CATEGORY DO THEY BELONG TO (OUT OF 2): 2/2
10. [FLUENCY] NAME WORDS STARTING WITH DESIGNATED LETTER: 1/1
2. COPY DRAWING: 1/1
3. DRAW A CLOCK: CONTOUR, NUMBERS, HANDS: 2/3
WHAT IS THE TOTAL SCORE (OUT OF 30): 23
8. SERIAL SUBTRACTION OF 7S: 2 OR 3/5
4. NAME EACH OF THE THREE ANIMALS SHOWN: 3/3
7. [VIGILENCE] TAP WHEN HEARING DESIGNATED LETTER: 1/1
ORIENTATION SUBSCORE: 6/6
WHAT IS THE VERSION OF MOCA ADMINISTERED: 8.2
DELAYED RECALL SUBSCORE: 0/5
6. READ LIST OF DIGITS [FORWARD/BACKWARD]: 2/2

## 2022-05-02 ASSESSMENT — PATIENT HEALTH QUESTIONNAIRE - PHQ9: CLINICAL INTERPRETATION OF PHQ2 SCORE: 0

## 2022-05-02 ASSESSMENT — FIBROSIS 4 INDEX: FIB4 SCORE: 1.77

## 2022-05-02 NOTE — PROGRESS NOTES
"Chief Complaint   Patient presents with   • Follow-Up     Memory Disorder     History of present illness:  Freeman Frost 81 y.o. male presents today for dementia.  History is obtained from patient and daughter.  and Patient is accompanied by Daughter Carson.     Duration/timing: ~2017 with gradual progression  Context:   Memory loss: Sometimes he is doing things and doesn't remember that he did it (example doing dishes, what he ate for dinner) - forgetting the next day and can remember eventually. Daughter said he was \"in a different decade\" - he though daughter was his mother and their house was the old house. More recently forgets appointments. Memory was bad when he was caring for wife with cancer but got better after she passed. She passed away in 8/2020. He forgets appointments and why he has them. He will lose instructions daughter places for him.   Baseline: Lives alone - he does all ADLs and IADLs. Wife used to manage finances. Daughter took over finances as a promise to her mom. He was never good at that. Drives without safety concerns but couple of times drove past an office three times. Wife diagnosed in 2019. Retired . Highest education: some college  Location: Memory circuits  Quality: Loss  Severity: Mild/moderate  Modifying factors: Worse at night  Associated signs/symptoms: Insomnia -wakes up worrying about if certain things were completed. Emotionally hs is doing ok with grieving process. Some bowel and bladder urgency. +Macular degeneration - treated by Dr. Santana. +Snoring with possible apnea. Hx of TIA - he was in the shower and he felt faint and he collapsed; chronic alcohol intake  Denies: bladder incontinence, bowel incontinence, headaches, head trauma , weakness, numbness/tingling, swallowing difficulties, speech disturbance, incoordination, depression, anxiety, hearing loss, loss of consciousness, hallucinations, seizures, abnormal movements, falls and HIV or syphilis " risk factors, dietary restrictions     Subjective: Patient was last seen in neurology clinic on 11/2021.     Dementia: trouble remember where he put things. He has a lot on his mind. Living alone.     ADLs: does them all  IADLs: daughter does almost all the bills, patient does others  Falls: none, no near falls  Hallucinations: none  Sleep: wakes at 3am-5am - he suspects psychological because of wife passed around that time which is improved with time  Appetite: good   Mood: denies depression  Behavior/personality: stable    Syncope: none since last visit     Last time he drove - months was when he last drove. Eyesight problem. Daughter helps out. Still absintent from EtOH. He is getting back to the normal routine.  Daughter feels he would get confused if driving on familiar roads.      Past medical history:   Past Medical History:   Diagnosis Date   • Anemia    • Anesthesia     fam hx, daughter stopped breathing   • Arrhythmia     afib   • Bowel habit changes     constipation   • Cataract     bilat IOL   • Dental disorder     upper   • Hemorrhagic disorder (HCC)    • Hypercholesterolemia    • Hypertension    • Snoring    • Stroke (HCC) 2013    tia no residual def       Past surgical history:   Past Surgical History:   Procedure Laterality Date   • GA ERCP,DIAGNOSTIC N/A 4/20/2019    Procedure: ERCP, DIAGNOSTIC;  Surgeon: Antony Morrison M.D.;  Location: SURGERY [facility];  Service: Gastroenterology   • FRANCIA BY LAPAROSCOPY  4/19/2019    Procedure: LAPARASCOPIC- CONVERTED TO OPEN CHOLECYSTECTOMY;  Surgeon: Anita Merino M.D.;  Location: SURGERY [facility];  Service: General   • CATARACT PHACO WITH IOL Right 8/28/2018    Procedure: CATARACT PHACO WITH IOL;  Surgeon: Adolfo Santana M.D.;  Location: SURGERY SAME DAY [facility];  Service: Ophthalmology   • CATARACT PHACO WITH IOL Left 8/14/2018    Procedure: CATARACT PHACO WITH IOL;  Surgeon: Adolfo Santana M.D.;  Location: SURGERY SAME  "[facility];  Service: Ophthalmology   • HERNIA REPAIR, INCISIONAL, UNILATERAL Left    • OTHER      tonsils as a child   • TONSILLECTOMY         Family history:   Family History   Problem Relation Age of Onset   • Dementia Mother    • Cancer Mother         Breast   • Cancer Brother         Esophageal    Mom in her 80s when she had memory loss.     Social history:   Tobacco Use   • Smoking status: Never Smoker   • Smokeless tobacco: Never Used   Substance and Sexual Activity   • Alcohol use: no     Comment:prior heavy use, abstinent since about 7/2021   • Drug use: No       Current medications:   Current Outpatient Medications   Medication   • donepezil (ARICEPT) 5 MG Tab   • apixaban (ELIQUIS) 5mg Tab   • metoprolol SR (TOPROL XL) 100 MG TABLET SR 24 HR   • Cyanocobalamin (B-12 PO)   • ferrous sulfate 325 (65 Fe) MG tablet   • losartan (COZAAR) 25 MG Tab   • Multiple Vitamins-Minerals (PRESERVISION AREDS) Tab   • amLODIPine (NORVASC) 5 MG Tab   • atorvastatin (LIPITOR) 10 MG Tab     No current facility-administered medications for this visit.       Medication Allergy:  Allergies   Allergen Reactions   • Other Environmental Unspecified     Seasonal allergies=rabbit brush       Review of Systems   Neurological:        As per HPI       Physical examination:   Vitals:    05/02/22 1004   BP: 108/62   BP Location: Right arm   Patient Position: Sitting   BP Cuff Size: Adult   Pulse: 61   Temp: 36.2 °C (97.1 °F)   TempSrc: Temporal   SpO2: 95%   Weight: 75.3 kg (166 lb 1.6 oz)   Height: 1.753 m (5' 9\")     General: Patient in well nourished in no apparent distress.  Psychiatric: Pleasant.     NEUROLOGICAL EXAM:   Mental status: Awake, alert and fully oriented to person, place, time and situation. Normal attention, concentration and fund of knowledge for education level. MOCA (22/30, see scanned document 11/2020); MOCA (20/30, 11/2021), MoCA 23/30 (5/2022).  Speech and language: Speech is fluent without errors and " clear.  Cranial nerve exam: Prior  II: Pupils are equally round and reactive to light. Visual fields are intact by confrontation.  III, IV, VI: EOMI, no diplopia, no ptosis.  Nonsustained nystagmus with end gaze, horizontal.  V: Sensation to light touch is normal over V1-3 distributions bilaterally.  .  VII: Facial movements are symmetrical. There is no facial droop. .  VIII: Hearing intact to soft speech and finger rub bilaterally  IX: Palate elevates symmetrically, uvula is midline. Dysarthria is not present.  XI: Shoulder shrug are symmetrical and strong.   XII: Tongue protrudes midline.    Motor exam: Prior  Muscle tone is normal in all 4 limbs. and Fine kinetic tremor R > L on FTN.    Muscle strength:     Right  Left  Deltoid   5/5  5/5      Biceps   5/5  5/5  Triceps  4/5  5/5   Wrist extensors 5/5  5/5  Wrist flexors  5/5  5/5     5/5  5/5  Interossei  5/5  5/5  Thenar (APB)  NT/5  NT/5   Hip flexors  5/5  5/5  Quadriceps  5/5  5/5    Hamstrings  5/5  5/5  Dorsiflexors  5/5  5/5  Plantarflexors  5/5  5/5  Toe extension  NT/5  NT/5  NT = not tested    Sensory exam: Prior  Intact to Light touch and Temperature in bilateral upper and lower extremity and face.    Reflexes:  Prior     Right  Left  Biceps   0/4  0/4  Triceps  0/4  0/4  Brachioradialis 0/4  0/4  Knee jerk  trace/4  trace/4  Ankle jerk  0/4  0/4   bilateral toes are downgoing to plantar stimulation..  Prior: trace gabellar but no other frontal release signs, left palmomental and trace gabellar    Coordination: shows a normal finger-nose-finger bilaterally. HTS slightly more challenging on the L > R. Prior  Gait: Narrow based      ANCILLARY DATA REVIEWED:   Lab Data Review:  Lab Results   Component Value Date/Time    WBC 6.6 07/27/2021 05:23 PM    RBC 3.93 (L) 07/27/2021 05:23 PM    HEMOGLOBIN 13.2 (L) 07/27/2021 05:23 PM    HEMATOCRIT 40.1 (L) 07/27/2021 05:23 PM    .0 (H) 07/27/2021 05:23 PM    MCH 33.6 (H) 07/27/2021 05:23 PM    MCHC  32.9 (L) 07/27/2021 05:23 PM    MPV 9.4 07/27/2021 05:23 PM    NEUTSPOLYS 58.70 07/27/2021 05:23 PM    LYMPHOCYTES 29.70 07/27/2021 05:23 PM    MONOCYTES 5.90 07/27/2021 05:23 PM    EOSINOPHILS 4.20 07/27/2021 05:23 PM    BASOPHILS 1.20 07/27/2021 05:23 PM    ANISOCYTOSIS 1+ 07/01/2019 11:37 AM      Lab Results   Component Value Date/Time    SODIUM 141 07/27/2021 05:23 PM    POTASSIUM 4.3 07/27/2021 05:23 PM    CHLORIDE 107 07/27/2021 05:23 PM    CO2 20 07/27/2021 05:23 PM    GLUCOSE 96 07/27/2021 05:23 PM    BUN 23 (H) 07/27/2021 05:23 PM    CREATININE 1.12 07/27/2021 05:23 PM     Lab Results   Component Value Date/Time    ASTSGOT 16 07/31/2020 0912    ALTSGPT 15 07/31/2020 0912    ALKPHOSPHAT 94 07/31/2020 0912    ALBUMIN 4.5 07/31/2020 0912     Lab Results   Component Value Date/Time    HBA1C 5.6 07/31/2020 09:12 AM      Workup to date:  B12 1048  Folate 13.6  TSH 2.54   Syphilis nonreactive  Vitamin B1 136    Imaging: None    Records reviewed: None    ASSESSMENT AND PLAN:    1. Dementia, unspecified type (HCC): Patient originally presenting with memory loss while actively in the grieving process and consuming high amounts of alcohol.  Basic work-up for metabolic causes of memory loss were unremarkable.  MRI brain without contrast in August 2020 with advanced diffuse cerebral substance loss and mild white matter change.  There was also evidence of a right paramedian chronic pontine lacunar infarct and chronic left posterior paraventricular infarct in the setting of known A. fib. No HIV risk factors.  No culprit medications.  No strong evidence of obstructive sleep apnea.  He is no longer grieving, no evidence of mood disorder, cessation of alcohol since July 2021 with significant improvement compared to prior. Still doing ADLs and most IADLs in the setting of minimal responsibilities.  Despite this I do believe he has some deficits in IADLs, executive function deficits, visual-spatial. Likely early dementia with  superimposed effects from chronic alcohol use/depression.   -Encourage cessation alcohol use  -Initiate Aricept 5 mg tablet, slow taper to goal dose of 10 mg daily  -Previously discussed living will and advanced directives  -Patient not driving    2. Paroxysmal atrial fibrillation (HCC): On Eliquis    3. Hypertension, essential: Of note with regards to his tremor.  Currently controlled.  Does have evidence of chronic lacunar infarcts on MRI brain in August 2020.        FOLLOW-UP: Return in about 3 months (around 8/2/2022).  Medication management  EDUCATION AND COUNSELING:  -I personally discussed the following with the patient:   Medical reasoning as above    The patient/family communicate understanding of the above and agrees that due to the complexity of his/her diagnosis, results of any testing and further recommendations will typically be discussed/made during a face to face encounter in my office. The patient and/or family further understands it is their responsibility to keep proper follow up.     Disclaimer  This dictation was created using voice recognition software. I have made every reasonable attempt to avoid dictation errors, but this document may contain an error not identified before finalizing. If the error changes the accuracy of the document, I would appreciate it being brought to my attention. Thank you very much.     Carolyn Mullins MD  Neurology

## 2022-05-30 ENCOUNTER — NON-PROVIDER VISIT (OUTPATIENT)
Dept: CARDIOLOGY | Facility: MEDICAL CENTER | Age: 82
End: 2022-05-30
Payer: MEDICARE

## 2022-05-30 PROCEDURE — 93298 REM INTERROG DEV EVAL SCRMS: CPT | Performed by: INTERNAL MEDICINE

## 2022-06-08 NOTE — CARDIAC REMOTE MONITOR - SCAN
Device transmission reviewed. Device demonstrated appropriate function.       Electronically Signed by: Billy Dudley M.D.    6/22/2022  9:38 AM

## 2022-06-30 ENCOUNTER — NON-PROVIDER VISIT (OUTPATIENT)
Dept: CARDIOLOGY | Facility: MEDICAL CENTER | Age: 82
End: 2022-06-30
Payer: MEDICARE

## 2022-06-30 NOTE — CARDIAC REMOTE MONITOR - SCAN
Device transmission reviewed. Device demonstrated appropriate function.       Electronically Signed by: Mary Brown M.D.    8/4/2022  1:14 PM

## 2022-07-01 NOTE — CARDIAC REMOTE MONITOR - SCAN
Device transmission reviewed. Device demonstrated appropriate function.       Electronically Signed by: Mary Brown M.D.    8/3/2022  8:10 AM

## 2022-07-06 DIAGNOSIS — I48.0 PAROXYSMAL ATRIAL FIBRILLATION (HCC): ICD-10-CM

## 2022-07-07 NOTE — TELEPHONE ENCOUNTER
Med due for refill? Yes  Last OV: 12/2021  Next OV: 8/2022  Provider to Refill: TW   Plan requires a 100 day supply. Thank you

## 2022-07-10 RX ORDER — METOPROLOL SUCCINATE 100 MG/1
TABLET, EXTENDED RELEASE ORAL
Qty: 100 TABLET | Refills: 1 | Status: SHIPPED | OUTPATIENT
Start: 2022-07-10 | End: 2023-01-27

## 2022-07-13 ENCOUNTER — TELEPHONE (OUTPATIENT)
Dept: HEALTH INFORMATION MANAGEMENT | Facility: OTHER | Age: 82
End: 2022-07-13
Payer: MEDICARE

## 2022-07-21 PROBLEM — F03.90 DEMENTIA WITHOUT BEHAVIORAL DISTURBANCE (HCC): Status: ACTIVE | Noted: 2022-07-21

## 2022-07-21 PROBLEM — I71.9 AORTA ANEURYSM (HCC): Status: ACTIVE | Noted: 2019-05-28

## 2022-07-21 PROBLEM — G31.84 MILD COGNITIVE IMPAIRMENT: Status: RESOLVED | Noted: 2021-12-01 | Resolved: 2022-07-21

## 2022-07-21 PROBLEM — R41.3 MEMORY CHANGE: Status: RESOLVED | Noted: 2021-05-17 | Resolved: 2022-07-21

## 2022-07-21 PROBLEM — E11.9 DM2 (DIABETES MELLITUS, TYPE 2) (HCC): Status: RESOLVED | Noted: 2018-09-30 | Resolved: 2022-07-21

## 2022-07-21 PROBLEM — F10.90 ALCOHOL USE DISORDER: Status: RESOLVED | Noted: 2018-09-30 | Resolved: 2022-07-21

## 2022-07-21 PROBLEM — E66.3 OVERWEIGHT (BMI 25.0-29.9): Status: ACTIVE | Noted: 2022-07-21

## 2022-07-21 PROBLEM — R55 TRANSIENT LOSS OF CONSCIOUSNESS: Status: RESOLVED | Noted: 2019-05-09 | Resolved: 2022-07-21

## 2022-07-21 PROBLEM — L03.90 CELLULITIS: Status: RESOLVED | Noted: 2018-09-30 | Resolved: 2022-07-21

## 2022-07-21 PROBLEM — G31.9 CEREBRAL ATROPHY (HCC): Status: ACTIVE | Noted: 2022-07-21

## 2022-07-21 PROBLEM — G93.41 ENCEPHALOPATHY IN SEPSIS: Status: RESOLVED | Noted: 2018-09-30 | Resolved: 2022-07-21

## 2022-07-21 PROBLEM — I71.20 THORACIC AORTIC ANEURYSM (HCC): Status: ACTIVE | Noted: 2019-05-28

## 2022-07-21 PROBLEM — F10.29 ALCOHOL DEPENDENCE WITH UNSPECIFIED ALCOHOL-INDUCED DISORDER (HCC): Status: ACTIVE | Noted: 2022-07-21

## 2022-07-21 PROBLEM — I77.819 AORTIC ECTASIA (HCC): Status: ACTIVE | Noted: 2019-05-28

## 2022-07-31 ENCOUNTER — NON-PROVIDER VISIT (OUTPATIENT)
Dept: CARDIOLOGY | Facility: MEDICAL CENTER | Age: 82
End: 2022-07-31
Payer: MEDICARE

## 2022-07-31 PROCEDURE — 93298 REM INTERROG DEV EVAL SCRMS: CPT | Performed by: INTERNAL MEDICINE

## 2022-08-01 NOTE — CARDIAC REMOTE MONITOR - SCAN
Device transmission reviewed. Device demonstrated appropriate function.       Electronically Signed by: Cl Valdez M.D.    8/5/2022  7:57 PM

## 2022-08-22 ENCOUNTER — OFFICE VISIT (OUTPATIENT)
Dept: NEUROLOGY | Facility: MEDICAL CENTER | Age: 82
End: 2022-08-22
Attending: PSYCHIATRY & NEUROLOGY
Payer: MEDICARE

## 2022-08-22 VITALS
WEIGHT: 171.96 LBS | DIASTOLIC BLOOD PRESSURE: 56 MMHG | HEART RATE: 72 BPM | BODY MASS INDEX: 25.47 KG/M2 | OXYGEN SATURATION: 97 % | SYSTOLIC BLOOD PRESSURE: 102 MMHG | HEIGHT: 69 IN | TEMPERATURE: 97.8 F

## 2022-08-22 DIAGNOSIS — F03.90 DEMENTIA WITHOUT BEHAVIORAL DISTURBANCE, UNSPECIFIED DEMENTIA TYPE: ICD-10-CM

## 2022-08-22 PROCEDURE — 99214 OFFICE O/P EST MOD 30 MIN: CPT | Performed by: PSYCHIATRY & NEUROLOGY

## 2022-08-22 PROCEDURE — 99212 OFFICE O/P EST SF 10 MIN: CPT | Performed by: PSYCHIATRY & NEUROLOGY

## 2022-08-22 RX ORDER — DONEPEZIL HYDROCHLORIDE 10 MG/1
TABLET, FILM COATED ORAL
Qty: 90 TABLET | Refills: 3 | Status: ON HOLD
Start: 2022-08-22 | End: 2022-11-09

## 2022-08-22 ASSESSMENT — FIBROSIS 4 INDEX: FIB4 SCORE: 1.77

## 2022-08-22 NOTE — PROGRESS NOTES
"Chief Complaint   Patient presents with    Follow-Up     Memory Disorder     History of present illness:  Freeman Frost 81 y.o. male presents today for dementia.  History is obtained from patient and daughter.  and Patient is accompanied by Daughter Carson .     Duration/timing: ~2017 with gradual progression  Context:   Memory loss: Sometimes he is doing things and doesn't remember that he did it (example doing dishes, what he ate for dinner) - forgetting the next day and can remember eventually. Daughter said he was \"in a different decade\" - he though daughter was his mother and their house was the old house. More recently forgets appointments. Memory was bad when he was caring for wife with cancer but got better after she passed. She passed away in 8/2020. He forgets appointments and why he has them. He will lose instructions daughter places for him.   Baseline: Lives alone - he does all ADLs and IADLs. Wife used to manage finances. Daughter took over finances as a promise to her mom. He was never good at that. Drives without safety concerns but couple of times drove past an office three times. Wife diagnosed in 2019. Retired . Highest education: some college  Location: Memory circuits  Quality: Loss  Severity: Mild/moderate  Modifying factors: Worse at night  Associated signs/symptoms: Insomnia -wakes up worrying about if certain things were completed. Emotionally hs is doing ok with grieving process. Some bowel and bladder urgency. +Macular degeneration - treated by Dr. Santana. +Snoring with possible apnea. Hx of TIA - he was in the shower and he felt faint and he collapsed; chronic alcohol intake  Denies: bladder incontinence, bowel incontinence, headaches, head trauma , weakness, numbness/tingling, swallowing difficulties, speech disturbance, incoordination, depression, anxiety, hearing loss, loss of consciousness, hallucinations, seizures, abnormal movements, falls and HIV or syphilis " risk factors, dietary restrictions     Subjective: Patient was last seen in neurology clinic on 5/2022.    Dementia: He feels like his memory is doing a lot better. Carson says it is not getting worse. No incontinence. No vivid dreams. Lives alone.    ADLs: doing well  IADLs: paying bills on his own now, Carson keeps an eye.  Falls: none  Hallucinations: none  Sleep: fair  Appetite: good  Mood: he can get irritated. Overall happy  Behavior/personality: stable    He does not drive. Abstinent from EtOH.      Past medical history:   Past Medical History:   Diagnosis Date    Anemia     Anesthesia     fam hx, daughter stopped breathing    Arrhythmia     afib    Bowel habit changes     constipation    Cataract     bilat IOL    Dental disorder     upper    Hemorrhagic disorder (HCC)     Hypercholesterolemia     Hypertension     Snoring     Stroke (HCC) 2013    tia no residual def       Past surgical history:   Past Surgical History:   Procedure Laterality Date    MS ERCP,DIAGNOSTIC N/A 4/20/2019    Procedure: ERCP, DIAGNOSTIC;  Surgeon: Antony Morrison M.D.;  Location: SURGERY [facility];  Service: Gastroenterology    FRANCIA BY LAPAROSCOPY  4/19/2019    Procedure: LAPARASCOPIC- CONVERTED TO OPEN CHOLECYSTECTOMY;  Surgeon: Anita Merino M.D.;  Location: SURGERY [facility];  Service: General    CATARACT PHACO WITH IOL Right 8/28/2018    Procedure: CATARACT PHACO WITH IOL;  Surgeon: Adolfo Santana M.D.;  Location: SURGERY SAME DAY [facility];  Service: Ophthalmology    CATARACT PHACO WITH IOL Left 8/14/2018    Procedure: CATARACT PHACO WITH IOL;  Surgeon: Adolfo Santana M.D.;  Location: SURGERY SAME DAY [facility];  Service: Ophthalmology    HERNIA REPAIR, INCISIONAL, UNILATERAL Left     OTHER      tonsils as a child    TONSILLECTOMY         Family history:   Family History   Problem Relation Age of Onset    Dementia Mother     Cancer Mother         Breast    Cancer Brother         Esophageal     "Mom in her 80s when she had memory loss.     Social history:   Tobacco Use    Smoking status: Never Smoker    Smokeless tobacco: Never Used   Substance and Sexual Activity    Alcohol use: no     Comment:prior heavy use, abstinent since about 7/2021    Drug use: No       Current medications:   Current Outpatient Medications   Medication    donepezil (ARICEPT) 10 MG tablet    Multiple Vitamins-Minerals (PRESERVISION AREDS 2 PO)    metoprolol SR (TOPROL XL) 100 MG TABLET SR 24 HR    apixaban (ELIQUIS) 5mg Tab    Cyanocobalamin (B-12 PO)    ferrous sulfate 325 (65 Fe) MG tablet    losartan (COZAAR) 25 MG Tab    amLODIPine (NORVASC) 5 MG Tab    atorvastatin (LIPITOR) 10 MG Tab     No current facility-administered medications for this visit.       Medication Allergy:  Allergies   Allergen Reactions    Other Environmental Unspecified     Seasonal allergies=rabbit brush       Review of Systems   Neurological:         As per HPI     Physical examination:   Vitals:    08/22/22 1452   BP: 102/56   BP Location: Right arm   Patient Position: Sitting   BP Cuff Size: Adult   Pulse: 72   Temp: 36.6 °C (97.8 °F)   TempSrc: Temporal   SpO2: 97%   Weight: 78 kg (171 lb 15.3 oz)   Height: 1.753 m (5' 9\")     General: Patient in well nourished in no apparent distress.  Psychiatric: Pleasant.     NEUROLOGICAL EXAM:   Mental status: Awake, alert and fully oriented to person, place, time and situation. Normal attention, concentration and fund of knowledge for education level. MOCA (22/30, see scanned document 11/2020); MOCA (20/30, 11/2021), MoCA 23/30 (5/2022).  Speech and language: Speech is fluent without errors and clear.  Cranial nerve exam: Prior  II: Pupils are equally round and reactive to light. Visual fields are intact by confrontation.  III, IV, VI: EOMI, no diplopia, no ptosis.  Nonsustained nystagmus with end gaze, horizontal.  V: Sensation to light touch is normal over V1-3 distributions bilaterally.  .  VII: Facial movements " are symmetrical. There is no facial droop. .  VIII: Hearing intact to soft speech and finger rub bilaterally  IX: Palate elevates symmetrically, uvula is midline. Dysarthria is not present.  XI: Shoulder shrug are symmetrical and strong.   XII: Tongue protrudes midline.    Motor exam: Prior  Muscle tone is normal in all 4 limbs. and Fine kinetic tremor R > L on FTN.    Muscle strength:     Right  Left  Deltoid   5/5  5/5      Biceps   5/5  5/5  Triceps  4/5  5/5   Wrist extensors 5/5  5/5  Wrist flexors  5/5  5/5     5/5  5/5  Interossei  5/5  5/5  Thenar (APB)  NT/5  NT/5   Hip flexors  5/5  5/5  Quadriceps  5/5  5/5    Hamstrings  5/5  5/5  Dorsiflexors  5/5  5/5  Plantarflexors  5/5  5/5  Toe extension  NT/5  NT/5  NT = not tested    Sensory exam: Prior  Intact to Light touch and Temperature in bilateral upper and lower extremity and face.    Reflexes:       Right  Left  Biceps   0/4  0/4  Triceps  0/4  0/4  Brachioradialis 0/4  0/4  Knee jerk  trace/4  trace/4  Ankle jerk  0/4  0/4   bilateral toes are downgoing to plantar stimulation..  Left palmomental and trace gabellar - today    Coordination: shows a normal finger-nose-finger bilaterally. HTS slightly more challenging on the L > R. Prior  Gait:  Narrow based      ANCILLARY DATA REVIEWED:   Lab Data Review:  Lab Results   Component Value Date/Time    WBC 6.6 07/27/2021 05:23 PM    RBC 3.93 (L) 07/27/2021 05:23 PM    HEMOGLOBIN 13.2 (L) 07/27/2021 05:23 PM    HEMATOCRIT 40.1 (L) 07/27/2021 05:23 PM    .0 (H) 07/27/2021 05:23 PM    MCH 33.6 (H) 07/27/2021 05:23 PM    MCHC 32.9 (L) 07/27/2021 05:23 PM    MPV 9.4 07/27/2021 05:23 PM    NEUTSPOLYS 58.70 07/27/2021 05:23 PM    LYMPHOCYTES 29.70 07/27/2021 05:23 PM    MONOCYTES 5.90 07/27/2021 05:23 PM    EOSINOPHILS 4.20 07/27/2021 05:23 PM    BASOPHILS 1.20 07/27/2021 05:23 PM    ANISOCYTOSIS 1+ 07/01/2019 11:37 AM      Lab Results   Component Value Date/Time    SODIUM 141 07/27/2021 05:23 PM     POTASSIUM 4.3 07/27/2021 05:23 PM    CHLORIDE 107 07/27/2021 05:23 PM    CO2 20 07/27/2021 05:23 PM    GLUCOSE 96 07/27/2021 05:23 PM    BUN 23 (H) 07/27/2021 05:23 PM    CREATININE 1.12 07/27/2021 05:23 PM     Lab Results   Component Value Date/Time    ASTSGOT 16 07/31/2020 0912    ALTSGPT 15 07/31/2020 0912    ALKPHOSPHAT 94 07/31/2020 0912    ALBUMIN 4.5 07/31/2020 0912     Lab Results   Component Value Date/Time    HBA1C 5.6 07/31/2020 09:12 AM      Workup to date:  B12 1048  Folate 13.6  TSH 2.54   Syphilis nonreactive  Vitamin B1 136    Imaging: None    Records reviewed: None    ASSESSMENT AND PLAN:    1. Dementia, unspecified type (HCC): Patient originally presenting with memory loss while actively in the grieving process and consuming high amounts of alcohol.  Basic work-up for metabolic causes of memory loss were unremarkable.  MRI brain without contrast in August 2020 with advanced diffuse cerebral substance loss and mild white matter change.  There was also evidence of a right paramedian chronic pontine lacunar infarct and chronic left posterior paraventricular infarct in the setting of known A. fib. No HIV risk factors.  No culprit medications.  No strong evidence of obstructive sleep apnea.  He is no longer grieving, no evidence of mood disorder, cessation of alcohol since July 2021 with significant improvement compared to prior. Still doing ADLs and most IADLs in the setting of minimal responsibilities.  Despite this I do believe he has some deficits in IADLs, executive function deficits, visual-spatial. Likely early dementia with superimposed effects from chronic alcohol use/depression.   -Continue Aricept 10mg   -Previously discussed living will and advanced directives  -Patient not driving    2. Paroxysmal atrial fibrillation (HCC): On Eliquis    3. Hypertension, essential: Of note with regards to his tremor.  Currently controlled.  Does have evidence of chronic lacunar infarcts on MRI brain in  August 2020.        FOLLOW-UP: Return in about 6 months (around 2/22/2023). Clinical monitoring  EDUCATION AND COUNSELING:  -I personally discussed the following with the patient:   Medical reasoning as above    The patient/family communicate understanding of the above and agrees that due to the complexity of his/her diagnosis, results of any testing and further recommendations will typically be discussed/made during a face to face encounter in my office. The patient and/or family further understands it is their responsibility to keep proper follow up.     Disclaimer  This dictation was created using voice recognition software. I have made every reasonable attempt to avoid dictation errors, but this document may contain an error not identified before finalizing. If the error changes the accuracy of the document, I would appreciate it being brought to my attention. Thank you very much.     Carolyn Mullins MD  Neurology

## 2022-08-31 ENCOUNTER — NON-PROVIDER VISIT (OUTPATIENT)
Dept: CARDIOLOGY | Facility: MEDICAL CENTER | Age: 82
End: 2022-08-31

## 2022-08-31 ENCOUNTER — OFFICE VISIT (OUTPATIENT)
Dept: CARDIOLOGY | Facility: MEDICAL CENTER | Age: 82
End: 2022-08-31
Payer: MEDICARE

## 2022-08-31 VITALS
WEIGHT: 169 LBS | HEART RATE: 66 BPM | SYSTOLIC BLOOD PRESSURE: 120 MMHG | RESPIRATION RATE: 14 BRPM | OXYGEN SATURATION: 95 % | BODY MASS INDEX: 25.03 KG/M2 | HEIGHT: 69 IN | DIASTOLIC BLOOD PRESSURE: 66 MMHG

## 2022-08-31 DIAGNOSIS — G31.84 MILD COGNITIVE IMPAIRMENT: ICD-10-CM

## 2022-08-31 DIAGNOSIS — R55 SYNCOPE AND COLLAPSE: ICD-10-CM

## 2022-08-31 DIAGNOSIS — Z79.01 ON CONTINUOUS ORAL ANTICOAGULATION: ICD-10-CM

## 2022-08-31 DIAGNOSIS — I48.0 PAROXYSMAL ATRIAL FIBRILLATION (HCC): ICD-10-CM
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
## 2022-08-31 PROCEDURE — 93298 REM INTERROG DEV EVAL SCRMS: CPT | Performed by: INTERNAL MEDICINE

## 2022-08-31 PROCEDURE — 99215 OFFICE O/P EST HI 40 MIN: CPT | Performed by: INTERNAL MEDICINE

## 2022-08-31 ASSESSMENT — FIBROSIS 4 INDEX: FIB4 SCORE: 1.77

## 2022-08-31 NOTE — PROGRESS NOTES
Chief Complaint   Patient presents with    Atrial Fibrillation     Follow up         Subjective:   Freeman Frost is an 81 y.o. male who presents today for follow-up regarding atrial flutter and transient loss of consciousness.  Has a history of hypertension and TIAs as well as several episodes of transient loss of consciousness culminating in placement of an implantable loop recorder which has disclosed no abnormalities aside from proximal-isms of atrial fibrillation.  His daughter is our  supervisor Mrs. Byrd.    Since last visit he had another syncopal event after placement of his loop recorder without any arrhythmic etiology.  He was evaluated in the emergency room and found to be heavily intoxicated with alcohol.  He is subsequently become abstinent from alcohol and he has been attentive to his hydration status and has not had a recurrent episode.    Since last visit he has maintain alcohol abstinence has not had any further syncopal events or falls.  He has had some separate minor bleeding events with his oral anticoagulation.  They are interested in the watchman device given his high bleeding risk and recent falls.  Otherwise he feels well.    Past Medical History:   Diagnosis Date    Anemia     Anesthesia     fam hx, daughter stopped breathing    Arrhythmia     afib    Bowel habit changes     constipation    Cataract     bilat IOL    Dental disorder     upper    Hemorrhagic disorder (HCC)     Hypercholesterolemia     Hypertension     Snoring     Stroke (HCC) 2013    tia no residual def     Past Surgical History:   Procedure Laterality Date    AL ERCP,DIAGNOSTIC N/A 4/20/2019    Procedure: ERCP, DIAGNOSTIC;  Surgeon: Antony Morrison M.D.;  Location: [facility];  Service: Gastroenterology    FRANCIA BY LAPAROSCOPY  4/19/2019    Procedure: LAPARASCOPIC- CONVERTED TO OPEN CHOLECYSTECTOMY;  Surgeon: Anita Merino M.D.;  Location: [facility];  Service: General     CATARACT PHACO WITH IOL Right 8/28/2018    Procedure: CATARACT PHACO WITH IOL;  Surgeon: Adolfo Santana M.D.;  Location: SURGERY SAME DAY [facility];  Service: Ophthalmology    CATARACT PHACO WITH IOL Left 8/14/2018    Procedure: CATARACT PHACO WITH IOL;  Surgeon: Adolfo Santana M.D.;  Location: SURGERY SAME DAY [facility];  Service: Ophthalmology    HERNIA REPAIR, INCISIONAL, UNILATERAL Left     OTHER      tonsils as a child    TONSILLECTOMY       Family History   Problem Relation Age of Onset    Dementia Mother     Cancer Mother         Breast    Cancer Brother         Esophageal     Social History     Socioeconomic History    Marital status:      Spouse name: Not on file    Number of children: Not on file    Years of education: Not on file    Highest education level: Not on file   Occupational History    Not on file   Tobacco Use    Smoking status: Never    Smokeless tobacco: Never   Vaping Use    Vaping Use: Never used   Substance and Sexual Activity    Alcohol use: Not Currently    Drug use: No    Sexual activity: Not on file   Other Topics Concern    Not on file   Social History Narrative    Not on file     Social Determinants of Health     Financial Resource Strain: Not on file   Food Insecurity: Not on file   Transportation Needs: Not on file   Physical Activity: Not on file   Stress: Not on file   Social Connections: Not on file   Intimate Partner Violence: Not on file   Housing Stability: Not on file     Allergies   Allergen Reactions    Other Environmental Unspecified     Seasonal allergies=rabbit brush     Outpatient Encounter Medications as of 8/31/2022   Medication Sig Dispense Refill    donepezil (ARICEPT) 10 MG tablet 10mg daily. 90 Tablet 3    Multiple Vitamins-Minerals (PRESERVISION AREDS 2 PO) Take 1 Tablet by mouth every day.      metoprolol SR (TOPROL XL) 100 MG TABLET SR 24 HR TAKE ONE TABLET BY MOUTH EVERY  Tablet 1    apixaban (ELIQUIS) 5mg Tab Take 1 Tablet by mouth 2  "times a day. 180 Tablet 2    Cyanocobalamin (B-12 PO) Take 1 Tablet by mouth every day. 1000 mcg      ferrous sulfate 325 (65 Fe) MG tablet Take 325 mg by mouth every day.      losartan (COZAAR) 25 MG Tab Take 25 mg by mouth every day.  3    amLODIPine (NORVASC) 5 MG Tab Take 5 mg by mouth every day.      atorvastatin (LIPITOR) 10 MG Tab Take 10 mg by mouth every day.      [DISCONTINUED] donepezil (ARICEPT) 5 MG Tab 5mg daily for 4 weeks and if tolerated increase to 10mg (two tablets) daily until follow up. 60 Tablet 3     No facility-administered encounter medications on file as of 8/31/2022.     Review of Systems   All other systems reviewed and are negative.     Objective:   /66 (BP Location: Left arm, Patient Position: Sitting)   Pulse 66   Resp 14   Ht 1.753 m (5' 9\")   Wt 76.7 kg (169 lb)   SpO2 95%   BMI 24.96 kg/m²     Physical Exam  Vitals reviewed.   Constitutional:       General: He is not in acute distress.     Appearance: He is well-developed. He is not diaphoretic.   HENT:      Head: Normocephalic and atraumatic.      Right Ear: External ear normal.      Left Ear: External ear normal.   Eyes:      General: No scleral icterus.        Right eye: No discharge.         Left eye: No discharge.      Conjunctiva/sclera: Conjunctivae normal.      Pupils: Pupils are equal, round, and reactive to light.   Neck:      Thyroid: No thyromegaly.      Vascular: No JVD.      Trachea: No tracheal deviation.   Cardiovascular:      Rate and Rhythm: Normal rate and regular rhythm.      Chest Wall: PMI is not displaced.      Pulses:           Carotid pulses are 2+ on the right side and 2+ on the left side.       Radial pulses are 2+ on the left side.        Popliteal pulses are 2+ on the right side and 2+ on the left side.        Dorsalis pedis pulses are 2+ on the right side and 2+ on the left side.        Posterior tibial pulses are 2+ on the right side and 2+ on the left side.      Heart sounds: No murmur " heard.    No friction rub. No gallop.   Pulmonary:      Effort: Pulmonary effort is normal. No respiratory distress.      Breath sounds: Normal breath sounds. No wheezing or rales.   Chest:      Chest wall: No tenderness.   Abdominal:      General: Bowel sounds are normal. There is no distension.      Palpations: Abdomen is soft.      Tenderness: There is no abdominal tenderness.   Musculoskeletal:         General: No tenderness or deformity. Normal range of motion.      Cervical back: Normal range of motion and neck supple.   Skin:     General: Skin is warm and dry.      Coloration: Skin is not pale.      Findings: No erythema or rash.   Neurological:      Mental Status: He is alert and oriented to person, place, and time.      Cranial Nerves: No cranial nerve deficit (cranial nerves II through XII grossly intact).      Coordination: Coordination normal.   Psychiatric:         Behavior: Behavior normal.         Thought Content: Thought content normal.     LABS:  Lab Results   Component Value Date/Time    CHOLSTRLTOT 172 07/31/2020 09:12 AM     (H) 07/31/2020 09:12 AM    HDL 56 07/31/2020 09:12 AM    TRIGLYCERIDE 74 07/31/2020 09:12 AM       Lab Results   Component Value Date/Time    WBC 6.6 07/27/2021 05:23 PM    RBC 3.93 (L) 07/27/2021 05:23 PM    HEMOGLOBIN 13.2 (L) 07/27/2021 05:23 PM    HEMATOCRIT 40.1 (L) 07/27/2021 05:23 PM    .0 (H) 07/27/2021 05:23 PM    NEUTSPOLYS 58.70 07/27/2021 05:23 PM    LYMPHOCYTES 29.70 07/27/2021 05:23 PM    MONOCYTES 5.90 07/27/2021 05:23 PM    EOSINOPHILS 4.20 07/27/2021 05:23 PM    BASOPHILS 1.20 07/27/2021 05:23 PM    ANISOCYTOSIS 1+ 07/01/2019 11:37 AM     Lab Results   Component Value Date/Time    SODIUM 141 07/27/2021 05:23 PM    POTASSIUM 4.3 07/27/2021 05:23 PM    CHLORIDE 107 07/27/2021 05:23 PM    CO2 20 07/27/2021 05:23 PM    GLUCOSE 96 07/27/2021 05:23 PM    BUN 23 (H) 07/27/2021 05:23 PM    CREATININE 1.12 07/27/2021 05:23 PM           Lab Results    Component Value Date/Time    ALKPHOSPHAT 95 07/27/2021 05:23 PM    ASTSGOT 12 07/27/2021 05:23 PM    ALTSGPT 9 07/27/2021 05:23 PM    TBILIRUBIN 1.6 (H) 07/27/2021 05:23 PM      No results found for: BNPBTYPENAT   No results found for: TSH  Lab Results   Component Value Date/Time    PROTHROMBTM 14.9 (H) 05/17/2021 06:10 PM    INR 1.20 (H) 05/17/2021 06:10 PM      EKGs from hospital stay reviewed showing sinus rhythm and alternatively atrial flutter to 153 bpm, atypical.    ECHO CONCLUSIONS (4/23/2019):  Normal left ventricular systolic function.  Left ventricular ejection fraction is visually estimated to be 65%.  Normal right ventricular size and systolic function.  Mild mitral regurgitation.  Mild tricuspid regurgitation.  Estimated right ventricular systolic pressure  is 45 mmHg.  Ascending aorta diameter is 4 cm.  Compared to the images of the prior study done 11/16/10 -  there has   been no significant change. The ascending aorta was not well seen on   the prior exam.        Assessment:     1. Paroxysmal atrial fibrillation (HCC)  Basic Metabolic Panel    REFERRAL TO CARDIOLOGY      2. Mild cognitive impairment        3. On continuous oral anticoagulation  Basic Metabolic Panel    REFERRAL TO CARDIOLOGY      4. Falls, sequela  REFERRAL TO CARDIOLOGY      5. Syncope and collapse            Medical Decision Making:  Today's Assessment / Status / Plan:     Doing well tolerating his anticoagulation reasonably well however he is at high bleeding risk and has had several falls.  Hopefully the etiology has fully been addressed with abstinence from alcohol however I feel that he would be a good candidate for a watchman left atrial occluder device in lieu of further oral anticoagulation.  Today I did order laboratory evaluation to monitor his high risk anticoagulation therapy in the meantime.    1.  Continue alcohol abstinence  2.  Dr. Brown for watchman device consideration  3.  [facility] for monitoring of high risk oral  anticoagulant    Follow-up in 6 months

## 2022-09-01 NOTE — CARDIAC REMOTE MONITOR - SCAN
Device transmission reviewed. Device demonstrated appropriate function.       Electronically Signed by: Mary Brown M.D.    9/2/2022  3:39 PM

## 2022-10-01 ENCOUNTER — NON-PROVIDER VISIT (OUTPATIENT)
Dept: CARDIOLOGY | Facility: MEDICAL CENTER | Age: 82
End: 2022-10-01
Payer: MEDICARE

## 2022-10-01 PROCEDURE — 93298 REM INTERROG DEV EVAL SCRMS: CPT | Performed by: INTERNAL MEDICINE

## 2022-10-03 NOTE — CARDIAC REMOTE MONITOR - SCAN
Device transmission reviewed. Device demonstrated appropriate function.       Electronically Signed by: Cl Valdez M.D.    10/3/2022  5:13 PM

## 2022-10-17 ENCOUNTER — HOSPITAL ENCOUNTER (OUTPATIENT)
Dept: LAB | Facility: MEDICAL CENTER | Age: 82
End: 2022-10-17
Attending: INTERNAL MEDICINE
Payer: MEDICARE

## 2022-10-17 DIAGNOSIS — I48.0 PAROXYSMAL ATRIAL FIBRILLATION (HCC): ICD-10-CM

## 2022-10-17 DIAGNOSIS — Z79.01 ON CONTINUOUS ORAL ANTICOAGULATION: ICD-10-CM

## 2022-10-17 LAB
ANION GAP SERPL CALC-SCNC: 10 MMOL/L (ref 7–16)
BUN SERPL-MCNC: 21 MG/DL (ref 8–22)
CALCIUM SERPL-MCNC: 9.5 MG/DL (ref 8.5–10.5)
CHLORIDE SERPL-SCNC: 105 MMOL/L (ref 96–112)
CO2 SERPL-SCNC: 27 MMOL/L (ref 20–33)
CREAT SERPL-MCNC: 1.23 MG/DL (ref 0.5–1.4)
GFR SERPLBLD CREATININE-BSD FMLA CKD-EPI: 59 ML/MIN/1.73 M 2
GLUCOSE SERPL-MCNC: 95 MG/DL (ref 65–99)
POTASSIUM SERPL-SCNC: 4.9 MMOL/L (ref 3.6–5.5)
SODIUM SERPL-SCNC: 142 MMOL/L (ref 135–145)

## 2022-10-17 PROCEDURE — 36415 COLL VENOUS BLD VENIPUNCTURE: CPT

## 2022-10-17 PROCEDURE — 80048 BASIC METABOLIC PNL TOTAL CA: CPT

## 2022-10-24 ENCOUNTER — OFFICE VISIT (OUTPATIENT)
Dept: CARDIOLOGY | Facility: MEDICAL CENTER | Age: 82
End: 2022-10-24
Attending: INTERNAL MEDICINE
Payer: MEDICARE

## 2022-10-24 VITALS
RESPIRATION RATE: 16 BRPM | WEIGHT: 171 LBS | HEIGHT: 69 IN | DIASTOLIC BLOOD PRESSURE: 62 MMHG | HEART RATE: 67 BPM | SYSTOLIC BLOOD PRESSURE: 124 MMHG | BODY MASS INDEX: 25.33 KG/M2 | OXYGEN SATURATION: 96 %

## 2022-10-24 DIAGNOSIS — Z95.818 STATUS POST PLACEMENT OF IMPLANTABLE LOOP RECORDER: ICD-10-CM

## 2022-10-24 DIAGNOSIS — R55 SYNCOPE AND COLLAPSE: ICD-10-CM

## 2022-10-24 DIAGNOSIS — I48.0 PAROXYSMAL ATRIAL FIBRILLATION (HCC): ICD-10-CM

## 2022-10-24 PROCEDURE — 93000 ELECTROCARDIOGRAM COMPLETE: CPT | Mod: 59 | Performed by: INTERNAL MEDICINE

## 2022-10-24 PROCEDURE — 93291 INTERROG DEV EVAL SCRMS IP: CPT | Performed by: INTERNAL MEDICINE

## 2022-10-24 PROCEDURE — 99214 OFFICE O/P EST MOD 30 MIN: CPT | Mod: 25 | Performed by: INTERNAL MEDICINE

## 2022-10-24 ASSESSMENT — FIBROSIS 4 INDEX: FIB4 SCORE: 1.79

## 2022-10-25 ENCOUNTER — TELEPHONE (OUTPATIENT)
Dept: CARDIOLOGY | Facility: MEDICAL CENTER | Age: 82
End: 2022-10-25
Payer: MEDICARE

## 2022-10-25 DIAGNOSIS — I48.0 PAROXYSMAL ATRIAL FIBRILLATION (HCC): ICD-10-CM

## 2022-10-25 NOTE — PROGRESS NOTES
Arrhythmia Clinic Note (New Patient)    DOS: 10/24/2022    Referring physician: Yayo Wills    Chief complaint/Reason for consult: PAF    HPI:  Pt is an 81 yo M. History alcohol use, heavy, ceased, unexplained syncope s/p ILR, PAF and falls. He has prior stroke on chronic anticoagulation. There was concern for ongoing anticoagulation given his falls and advanced age from cardiology. Referred for consideration of JERSEY occlusion. He himself along with his daughter have done some research and had additional questions. No other complaints today.    ROS (+ in BOLD):  Constitutional: Fevers/chills/fatigue/weightloss  HEENT: Blurry vision/eye pain/sore throat/hearing loss  Respiratory: Shortness of breath/cough  Cardiovascular: Chest pain/palpitations/edema/orthopnea/syncope  GI: Nausea/vomitting/diarrhea  MSK: Arthralgias/myagias/muscle weakness  Skin: Rash/sores  Neurological: Numbness/tremors/vertigo  Endocrine: Excessive thirst/polyuria/cold intolerance/heat intolerance  Psych: Depression/anxiety    Past Medical History:   Diagnosis Date    Anemia     Anesthesia     fam hx, daughter stopped breathing    Arrhythmia     afib    Bowel habit changes     constipation    Cataract     bilat IOL    Dental disorder     upper    Hemorrhagic disorder (HCC)     Hypercholesterolemia     Hypertension     Snoring     Stroke (HCC) 2013    tia no residual def       Past Surgical History:   Procedure Laterality Date    MD ERCP,DIAGNOSTIC N/A 4/20/2019    Procedure: ERCP, DIAGNOSTIC;  Surgeon: Antony Morrison M.D.;  Location: [facility];  Service: Gastroenterology    FRANCIA BY LAPAROSCOPY  4/19/2019    Procedure: LAPARASCOPIC- CONVERTED TO OPEN CHOLECYSTECTOMY;  Surgeon: Anita Merino M.D.;  Location: SURGERY [facility];  Service: General    CATARACT PHACO WITH IOL Right 8/28/2018    Procedure: CATARACT PHACO WITH IOL;  Surgeon: Adolfo Santana M.D.;  Location: SURGERY SAME DAY [facility];  Service:  Ophthalmology    CATARACT PHACO WITH IOL Left 8/14/2018    Procedure: CATARACT PHACO WITH IOL;  Surgeon: Adolfo Santana M.D.;  Location: SURGERY SAME DAY [facility];  Service: Ophthalmology    HERNIA REPAIR, INCISIONAL, UNILATERAL Left     OTHER      tonsils as a child    TONSILLECTOMY         Social History     Socioeconomic History    Marital status:      Spouse name: Not on file    Number of children: Not on file    Years of education: Not on file    Highest education level: Not on file   Occupational History    Not on file   Tobacco Use    Smoking status: Never    Smokeless tobacco: Never   Vaping Use    Vaping Use: Never used   Substance and Sexual Activity    Alcohol use: Not Currently    Drug use: No    Sexual activity: Not on file   Other Topics Concern    Not on file   Social History Narrative    Not on file     Social Determinants of Health     Financial Resource Strain: Not on file   Food Insecurity: Not on file   Transportation Needs: Not on file   Physical Activity: Not on file   Stress: Not on file   Social Connections: Not on file   Intimate Partner Violence: Not on file   Housing Stability: Not on file       Family History   Problem Relation Age of Onset    Dementia Mother     Cancer Mother         Breast    Cancer Brother         Esophageal       Allergies   Allergen Reactions    Other Environmental Unspecified     Seasonal allergies=rabbit brush       Current Outpatient Medications   Medication Sig Dispense Refill    donepezil (ARICEPT) 10 MG tablet 10mg daily. 90 Tablet 3    Multiple Vitamins-Minerals (PRESERVISION AREDS 2 PO) Take 1 Tablet by mouth every day.      metoprolol SR (TOPROL XL) 100 MG TABLET SR 24 HR TAKE ONE TABLET BY MOUTH EVERY  Tablet 1    apixaban (ELIQUIS) 5mg Tab Take 1 Tablet by mouth 2 times a day. 180 Tablet 2    Cyanocobalamin (B-12 PO) Take 1 Tablet by mouth every day. 1000 mcg      ferrous sulfate 325 (65 Fe) MG tablet Take 325 mg by mouth every day.    "   losartan (COZAAR) 25 MG Tab Take 25 mg by mouth every day.  3    amLODIPine (NORVASC) 5 MG Tab Take 5 mg by mouth every day.      atorvastatin (LIPITOR) 10 MG Tab Take 10 mg by mouth every day.       No current facility-administered medications for this visit.       Physical Exam:  Vitals:    10/24/22 1544   BP: 124/62   BP Location: Left arm   Patient Position: Sitting   BP Cuff Size: Adult   Pulse: 67   Resp: 16   SpO2: 96%   Weight: 77.6 kg (171 lb)   Height: 1.753 m (5' 9\")     General appearance: NAD, conversant  Eyes: anicteric sclerae, no lid-lag; PERRLA  HENT: Atraumatic; moist mucous membranes, no ulcerations  Neck: Trachea midline; FROM, supple, no thyromegaly  Lungs: CTA, with normal respiratory effort and no intercostal retractions  CV: RRR, no MRGs, no JVD  Abdomen: Soft, non-tender; normal bowel sounds, no HSM  Extremities: No peripheral edema. No clubbing or cyanosis.  Skin: Normal temperature, turgor and texture; no rash or ulcers  Psych: Appropriate affect, alert and oriented to person, place and time      Data:  Labs reviewed    Prior echo/stress reviewed:  LVEF 70%    EKG interpreted by me:  Sinus rhythm    Impression/Plan:  1. Paroxysmal atrial fibrillation (HCC)  EKG      2. Status post placement of implantable loop recorder        3. Syncope and collapse          -With unexplained falls advanced age, I think alternative to OAC not unreasonable  -His CHADSVasc is 6 (aneurysm, HTN, age>75, stroke)  -Details of procedure discussed at length, all questions answered he would like to proceed  -~1 4 second pause on ILR consistent with short vagal episode (sinus rate slowing with AV block), I do not think anything needs to be done for this    Mary Brown MD    "

## 2022-10-25 NOTE — TELEPHONE ENCOUNTER
----- Message from Mary Brown M.D. sent at 10/24/2022  5:15 PM PDT -----  Let's schedule WATCHMAN. No meds to hold.    Mary

## 2022-10-25 NOTE — TELEPHONE ENCOUNTER
LM on Carson's voicemail (patient's daughter) requesting a call back to schedule this procedure.    Carson - [phone]

## 2022-10-25 NOTE — TELEPHONE ENCOUNTER
Patient scheduled for watchman w/ALICE on 11-9-22 with Dr. Brown. Patient has been instructed to check in at 7:30 for 9:30 case time. Message sent to authorizations. Watchman rep is aware of this date.

## 2022-11-01 ENCOUNTER — NON-PROVIDER VISIT (OUTPATIENT)
Dept: CARDIOLOGY | Facility: MEDICAL CENTER | Age: 82
End: 2022-11-01
Payer: MEDICARE

## 2022-11-01 PROCEDURE — 93298 REM INTERROG DEV EVAL SCRMS: CPT | Performed by: INTERNAL MEDICINE

## 2022-11-02 NOTE — CARDIAC REMOTE MONITOR - SCAN
Device transmission reviewed. Device demonstrated appropriate function.       Electronically Signed by: Billy Dudley M.D.    11/2/2022  9:24 AM

## 2022-11-07 ENCOUNTER — PRE-ADMISSION TESTING (OUTPATIENT)
Dept: ADMISSIONS | Facility: MEDICAL CENTER | Age: 82
DRG: 274 | End: 2022-11-07
Attending: INTERNAL MEDICINE
Payer: MEDICARE

## 2022-11-07 DIAGNOSIS — Z01.810 PRE-OPERATIVE CARDIOVASCULAR EXAMINATION: ICD-10-CM

## 2022-11-07 DIAGNOSIS — Z01.812 PRE-OPERATIVE LABORATORY EXAMINATION: ICD-10-CM

## 2022-11-07 LAB
ANION GAP SERPL CALC-SCNC: 11 MMOL/L (ref 7–16)
APTT PPP: 31.2 SEC (ref 24.7–36)
BASOPHILS # BLD AUTO: 0.8 % (ref 0–1.8)
BASOPHILS # BLD: 0.05 K/UL (ref 0–0.12)
BUN SERPL-MCNC: 21 MG/DL (ref 8–22)
CALCIUM SERPL-MCNC: 9.8 MG/DL (ref 8.5–10.5)
CHLORIDE SERPL-SCNC: 103 MMOL/L (ref 96–112)
CO2 SERPL-SCNC: 26 MMOL/L (ref 20–33)
CREAT SERPL-MCNC: 1.15 MG/DL (ref 0.5–1.4)
EKG IMPRESSION: NORMAL
EOSINOPHIL # BLD AUTO: 0.08 K/UL (ref 0–0.51)
EOSINOPHIL NFR BLD: 1.2 % (ref 0–6.9)
ERYTHROCYTE [DISTWIDTH] IN BLOOD BY AUTOMATED COUNT: 52.2 FL (ref 35.9–50)
GFR SERPLBLD CREATININE-BSD FMLA CKD-EPI: 64 ML/MIN/1.73 M 2
GLUCOSE SERPL-MCNC: 99 MG/DL (ref 65–99)
HCT VFR BLD AUTO: 45.2 % (ref 42–52)
HGB BLD-MCNC: 14.4 G/DL (ref 14–18)
IMM GRANULOCYTES # BLD AUTO: 0.13 K/UL (ref 0–0.11)
IMM GRANULOCYTES NFR BLD AUTO: 2 % (ref 0–0.9)
INR PPP: 1.22 (ref 0.87–1.13)
LYMPHOCYTES # BLD AUTO: 1.83 K/UL (ref 1–4.8)
LYMPHOCYTES NFR BLD: 27.8 % (ref 22–41)
MCH RBC QN AUTO: 31.2 PG (ref 27–33)
MCHC RBC AUTO-ENTMCNC: 31.9 G/DL (ref 33.7–35.3)
MCV RBC AUTO: 98 FL (ref 81.4–97.8)
MONOCYTES # BLD AUTO: 0.6 K/UL (ref 0–0.85)
MONOCYTES NFR BLD AUTO: 9.1 % (ref 0–13.4)
NEUTROPHILS # BLD AUTO: 3.89 K/UL (ref 1.82–7.42)
NEUTROPHILS NFR BLD: 59.1 % (ref 44–72)
NRBC # BLD AUTO: 0 K/UL
NRBC BLD-RTO: 0 /100 WBC
PLATELET # BLD AUTO: 146 K/UL (ref 164–446)
PMV BLD AUTO: 10.1 FL (ref 9–12.9)
POTASSIUM SERPL-SCNC: 4.5 MMOL/L (ref 3.6–5.5)
PROTHROMBIN TIME: 15.2 SEC (ref 12–14.6)
RBC # BLD AUTO: 4.61 M/UL (ref 4.7–6.1)
SODIUM SERPL-SCNC: 140 MMOL/L (ref 135–145)
WBC # BLD AUTO: 6.6 K/UL (ref 4.8–10.8)

## 2022-11-07 PROCEDURE — 93005 ELECTROCARDIOGRAM TRACING: CPT

## 2022-11-07 PROCEDURE — 85025 COMPLETE CBC W/AUTO DIFF WBC: CPT

## 2022-11-07 PROCEDURE — 80048 BASIC METABOLIC PNL TOTAL CA: CPT

## 2022-11-07 PROCEDURE — 93010 ELECTROCARDIOGRAM REPORT: CPT | Performed by: INTERNAL MEDICINE

## 2022-11-07 PROCEDURE — 85730 THROMBOPLASTIN TIME PARTIAL: CPT

## 2022-11-07 PROCEDURE — 85610 PROTHROMBIN TIME: CPT

## 2022-11-07 PROCEDURE — 36415 COLL VENOUS BLD VENIPUNCTURE: CPT

## 2022-11-07 RX ORDER — CETIRIZINE HYDROCHLORIDE 10 MG/1
20 TABLET ORAL PRN
Status: ON HOLD | COMMUNITY
End: 2022-12-29

## 2022-11-07 RX ORDER — IBUPROFEN 200 MG
200 TABLET ORAL
Status: ON HOLD | COMMUNITY
End: 2022-11-10

## 2022-11-07 RX ORDER — VITAMIN E 268 MG
400 CAPSULE ORAL DAILY
COMMUNITY
End: 2022-12-15

## 2022-11-07 ASSESSMENT — FIBROSIS 4 INDEX: FIB4 SCORE: 1.79

## 2022-11-08 LAB — EKG IMPRESSION: NORMAL

## 2022-11-09 ENCOUNTER — ANESTHESIA EVENT (OUTPATIENT)
Dept: CARDIOLOGY | Facility: MEDICAL CENTER | Age: 82
DRG: 274 | End: 2022-11-09
Payer: MEDICARE

## 2022-11-09 ENCOUNTER — APPOINTMENT (OUTPATIENT)
Dept: CARDIOLOGY | Facility: MEDICAL CENTER | Age: 82
DRG: 274 | End: 2022-11-09
Attending: INTERNAL MEDICINE
Payer: MEDICARE

## 2022-11-09 ENCOUNTER — ANESTHESIA (OUTPATIENT)
Dept: CARDIOLOGY | Facility: MEDICAL CENTER | Age: 82
DRG: 274 | End: 2022-11-09
Payer: MEDICARE

## 2022-11-09 ENCOUNTER — HOSPITAL ENCOUNTER (INPATIENT)
Facility: MEDICAL CENTER | Age: 82
LOS: 1 days | DRG: 274 | End: 2022-11-10
Attending: INTERNAL MEDICINE | Admitting: INTERNAL MEDICINE
Payer: MEDICARE

## 2022-11-09 DIAGNOSIS — I48.0 PAROXYSMAL ATRIAL FIBRILLATION (HCC): ICD-10-CM

## 2022-11-09 LAB
ACT BLD: 237 SEC (ref 74–137)
EKG IMPRESSION: NORMAL
INR PPP: 1.21 (ref 0.87–1.13)
PROTHROMBIN TIME: 15.1 SEC (ref 12–14.6)

## 2022-11-09 PROCEDURE — 93320 DOPPLER ECHO COMPLETE: CPT | Mod: 26 | Performed by: ANESTHESIOLOGY

## 2022-11-09 PROCEDURE — 700117 HCHG RX CONTRAST REV CODE 255: Performed by: INTERNAL MEDICINE

## 2022-11-09 PROCEDURE — 01926 ANES IVNTL RAD ICR ICAR/AORT: CPT | Performed by: ANESTHESIOLOGY

## 2022-11-09 PROCEDURE — 93312 ECHO TRANSESOPHAGEAL: CPT | Mod: 26,59 | Performed by: ANESTHESIOLOGY

## 2022-11-09 PROCEDURE — 700105 HCHG RX REV CODE 258: Performed by: INTERNAL MEDICINE

## 2022-11-09 PROCEDURE — 99100 ANES PT EXTEME AGE<1 YR&>70: CPT | Performed by: ANESTHESIOLOGY

## 2022-11-09 PROCEDURE — 700111 HCHG RX REV CODE 636 W/ 250 OVERRIDE (IP): Performed by: ANESTHESIOLOGY

## 2022-11-09 PROCEDURE — 02L73DK OCCLUSION OF LEFT ATRIAL APPENDAGE WITH INTRALUMINAL DEVICE, PERCUTANEOUS APPROACH: ICD-10-PCS | Performed by: INTERNAL MEDICINE

## 2022-11-09 PROCEDURE — 160002 HCHG RECOVERY MINUTES (STAT)

## 2022-11-09 PROCEDURE — C1894 INTRO/SHEATH, NON-LASER: HCPCS

## 2022-11-09 PROCEDURE — A9270 NON-COVERED ITEM OR SERVICE: HCPCS | Performed by: NURSE PRACTITIONER

## 2022-11-09 PROCEDURE — 93010 ELECTROCARDIOGRAM REPORT: CPT | Mod: 59 | Performed by: INTERNAL MEDICINE

## 2022-11-09 PROCEDURE — B24BZZ4 ULTRASONOGRAPHY OF HEART WITH AORTA, TRANSESOPHAGEAL: ICD-10-PCS | Performed by: ANESTHESIOLOGY

## 2022-11-09 PROCEDURE — 770020 HCHG ROOM/CARE - TELE (206)

## 2022-11-09 PROCEDURE — 93005 ELECTROCARDIOGRAM TRACING: CPT | Performed by: INTERNAL MEDICINE

## 2022-11-09 PROCEDURE — 160035 HCHG PACU - 1ST 60 MINS PHASE I

## 2022-11-09 PROCEDURE — 93319 3D ECHO IMG CGEN CAR ANOMAL: CPT | Performed by: ANESTHESIOLOGY

## 2022-11-09 PROCEDURE — 33340 PERQ CLSR TCAT L ATR APNDGE: CPT | Mod: Q0 | Performed by: INTERNAL MEDICINE

## 2022-11-09 PROCEDURE — 160036 HCHG PACU - EA ADDL 30 MINS PHASE I

## 2022-11-09 PROCEDURE — 93662 INTRACARDIAC ECG (ICE): CPT | Mod: 26 | Performed by: INTERNAL MEDICINE

## 2022-11-09 PROCEDURE — 85347 COAGULATION TIME ACTIVATED: CPT

## 2022-11-09 PROCEDURE — 700105 HCHG RX REV CODE 258: Performed by: ANESTHESIOLOGY

## 2022-11-09 PROCEDURE — 85610 PROTHROMBIN TIME: CPT

## 2022-11-09 PROCEDURE — 700102 HCHG RX REV CODE 250 W/ 637 OVERRIDE(OP): Performed by: NURSE PRACTITIONER

## 2022-11-09 PROCEDURE — 93355 ECHO TRANSESOPHAGEAL (TEE): CPT

## 2022-11-09 PROCEDURE — 700111 HCHG RX REV CODE 636 W/ 250 OVERRIDE (IP)

## 2022-11-09 PROCEDURE — 700101 HCHG RX REV CODE 250: Performed by: ANESTHESIOLOGY

## 2022-11-09 RX ORDER — HEPARIN SODIUM 200 [USP'U]/100ML
INJECTION, SOLUTION INTRAVENOUS
Status: COMPLETED
Start: 2022-11-09 | End: 2022-11-09

## 2022-11-09 RX ORDER — ONDANSETRON 2 MG/ML
INJECTION INTRAMUSCULAR; INTRAVENOUS PRN
Status: DISCONTINUED | OUTPATIENT
Start: 2022-11-09 | End: 2022-11-09 | Stop reason: SURG

## 2022-11-09 RX ORDER — LIDOCAINE HYDROCHLORIDE 40 MG/ML
SOLUTION TOPICAL
Status: COMPLETED
Start: 2022-11-09 | End: 2022-11-09

## 2022-11-09 RX ORDER — SODIUM CHLORIDE 9 MG/ML
INJECTION, SOLUTION INTRAVENOUS CONTINUOUS
Status: ACTIVE | OUTPATIENT
Start: 2022-11-09 | End: 2022-11-09

## 2022-11-09 RX ORDER — HALOPERIDOL 5 MG/ML
1 INJECTION INTRAMUSCULAR
Status: DISCONTINUED | OUTPATIENT
Start: 2022-11-09 | End: 2022-11-09 | Stop reason: HOSPADM

## 2022-11-09 RX ORDER — DONEPEZIL HYDROCHLORIDE 5 MG/1
10 TABLET, FILM COATED ORAL DAILY
Status: DISCONTINUED | OUTPATIENT
Start: 2022-11-10 | End: 2022-11-10 | Stop reason: HOSPADM

## 2022-11-09 RX ORDER — DIPHENHYDRAMINE HYDROCHLORIDE 50 MG/ML
12.5 INJECTION INTRAMUSCULAR; INTRAVENOUS
Status: DISCONTINUED | OUTPATIENT
Start: 2022-11-09 | End: 2022-11-09 | Stop reason: HOSPADM

## 2022-11-09 RX ORDER — OXYCODONE HYDROCHLORIDE AND ACETAMINOPHEN 5; 325 MG/1; MG/1
2 TABLET ORAL
Status: DISCONTINUED | OUTPATIENT
Start: 2022-11-09 | End: 2022-11-09 | Stop reason: HOSPADM

## 2022-11-09 RX ORDER — SODIUM CHLORIDE, SODIUM LACTATE, POTASSIUM CHLORIDE, CALCIUM CHLORIDE 600; 310; 30; 20 MG/100ML; MG/100ML; MG/100ML; MG/100ML
INJECTION, SOLUTION INTRAVENOUS CONTINUOUS
Status: DISCONTINUED | OUTPATIENT
Start: 2022-11-09 | End: 2022-11-09 | Stop reason: HOSPADM

## 2022-11-09 RX ORDER — LOSARTAN POTASSIUM 25 MG/1
25 TABLET ORAL DAILY
Status: DISCONTINUED | OUTPATIENT
Start: 2022-11-10 | End: 2022-11-10 | Stop reason: HOSPADM

## 2022-11-09 RX ORDER — CEFAZOLIN SODIUM 1 G/3ML
INJECTION, POWDER, FOR SOLUTION INTRAMUSCULAR; INTRAVENOUS PRN
Status: DISCONTINUED | OUTPATIENT
Start: 2022-11-09 | End: 2022-11-09 | Stop reason: SURG

## 2022-11-09 RX ORDER — SODIUM CHLORIDE, SODIUM LACTATE, POTASSIUM CHLORIDE, CALCIUM CHLORIDE 600; 310; 30; 20 MG/100ML; MG/100ML; MG/100ML; MG/100ML
INJECTION, SOLUTION INTRAVENOUS CONTINUOUS
Status: ACTIVE | OUTPATIENT
Start: 2022-11-09 | End: 2022-11-09

## 2022-11-09 RX ORDER — AMLODIPINE BESYLATE 5 MG/1
5 TABLET ORAL DAILY
Status: DISCONTINUED | OUTPATIENT
Start: 2022-11-10 | End: 2022-11-10 | Stop reason: HOSPADM

## 2022-11-09 RX ORDER — METOPROLOL TARTRATE 1 MG/ML
1 INJECTION, SOLUTION INTRAVENOUS
Status: DISCONTINUED | OUTPATIENT
Start: 2022-11-09 | End: 2022-11-09 | Stop reason: HOSPADM

## 2022-11-09 RX ORDER — DONEPEZIL HYDROCHLORIDE 10 MG/1
10 TABLET, FILM COATED ORAL DAILY
COMMUNITY
End: 2022-12-15

## 2022-11-09 RX ORDER — ONDANSETRON 2 MG/ML
4 INJECTION INTRAMUSCULAR; INTRAVENOUS
Status: DISCONTINUED | OUTPATIENT
Start: 2022-11-09 | End: 2022-11-09 | Stop reason: HOSPADM

## 2022-11-09 RX ORDER — OXYCODONE HYDROCHLORIDE AND ACETAMINOPHEN 5; 325 MG/1; MG/1
1 TABLET ORAL
Status: DISCONTINUED | OUTPATIENT
Start: 2022-11-09 | End: 2022-11-09 | Stop reason: HOSPADM

## 2022-11-09 RX ORDER — DEXAMETHASONE SODIUM PHOSPHATE 4 MG/ML
INJECTION, SOLUTION INTRA-ARTICULAR; INTRALESIONAL; INTRAMUSCULAR; INTRAVENOUS; SOFT TISSUE PRN
Status: DISCONTINUED | OUTPATIENT
Start: 2022-11-09 | End: 2022-11-09 | Stop reason: SURG

## 2022-11-09 RX ORDER — LIDOCAINE HYDROCHLORIDE 40 MG/ML
SOLUTION TOPICAL PRN
Status: DISCONTINUED | OUTPATIENT
Start: 2022-11-09 | End: 2022-11-09 | Stop reason: SURG

## 2022-11-09 RX ORDER — LABETALOL HYDROCHLORIDE 5 MG/ML
5 INJECTION, SOLUTION INTRAVENOUS
Status: DISCONTINUED | OUTPATIENT
Start: 2022-11-09 | End: 2022-11-09 | Stop reason: HOSPADM

## 2022-11-09 RX ORDER — HEPARIN SODIUM 1000 [USP'U]/ML
INJECTION, SOLUTION INTRAVENOUS; SUBCUTANEOUS
Status: COMPLETED
Start: 2022-11-09 | End: 2022-11-09

## 2022-11-09 RX ORDER — LIDOCAINE HYDROCHLORIDE 20 MG/ML
INJECTION, SOLUTION EPIDURAL; INFILTRATION; INTRACAUDAL; PERINEURAL PRN
Status: DISCONTINUED | OUTPATIENT
Start: 2022-11-09 | End: 2022-11-09 | Stop reason: SURG

## 2022-11-09 RX ORDER — IPRATROPIUM BROMIDE AND ALBUTEROL SULFATE 2.5; .5 MG/3ML; MG/3ML
3 SOLUTION RESPIRATORY (INHALATION)
Status: DISCONTINUED | OUTPATIENT
Start: 2022-11-09 | End: 2022-11-09 | Stop reason: HOSPADM

## 2022-11-09 RX ORDER — HYDRALAZINE HYDROCHLORIDE 20 MG/ML
5 INJECTION INTRAMUSCULAR; INTRAVENOUS
Status: DISCONTINUED | OUTPATIENT
Start: 2022-11-09 | End: 2022-11-09 | Stop reason: HOSPADM

## 2022-11-09 RX ORDER — METOPROLOL SUCCINATE 100 MG/1
100 TABLET, EXTENDED RELEASE ORAL DAILY
Status: DISCONTINUED | OUTPATIENT
Start: 2022-11-10 | End: 2022-11-10 | Stop reason: HOSPADM

## 2022-11-09 RX ADMIN — SODIUM CHLORIDE: 9 INJECTION, SOLUTION INTRAVENOUS at 14:13

## 2022-11-09 RX ADMIN — SUGAMMADEX 200 MG: 100 INJECTION, SOLUTION INTRAVENOUS at 10:26

## 2022-11-09 RX ADMIN — IOHEXOL 53 ML: 350 INJECTION, SOLUTION INTRAVENOUS at 10:29

## 2022-11-09 RX ADMIN — APIXABAN 5 MG: 5 TABLET, FILM COATED ORAL at 16:59

## 2022-11-09 RX ADMIN — HEPARIN SODIUM 4000 UNITS: 200 INJECTION, SOLUTION INTRAVENOUS at 09:48

## 2022-11-09 RX ADMIN — PROPOFOL 150 MG: 10 INJECTION, EMULSION INTRAVENOUS at 09:38

## 2022-11-09 RX ADMIN — SODIUM CHLORIDE, POTASSIUM CHLORIDE, SODIUM LACTATE AND CALCIUM CHLORIDE: 600; 310; 30; 20 INJECTION, SOLUTION INTRAVENOUS at 08:36

## 2022-11-09 RX ADMIN — ROCURONIUM BROMIDE 50 MG: 10 INJECTION, SOLUTION INTRAVENOUS at 09:38

## 2022-11-09 RX ADMIN — CEFAZOLIN 2 G: 330 INJECTION, POWDER, FOR SOLUTION INTRAMUSCULAR; INTRAVENOUS at 09:38

## 2022-11-09 RX ADMIN — LIDOCAINE HYDROCHLORIDE 4 ML: 40 SOLUTION TOPICAL at 09:39

## 2022-11-09 RX ADMIN — PHENYLEPHRINE HYDROCHLORIDE 50 MCG/MIN: 10 INJECTION INTRAVENOUS at 09:39

## 2022-11-09 RX ADMIN — PROPOFOL 50 MG: 10 INJECTION, EMULSION INTRAVENOUS at 10:26

## 2022-11-09 RX ADMIN — ONDANSETRON 4 MG: 2 INJECTION INTRAMUSCULAR; INTRAVENOUS at 10:26

## 2022-11-09 RX ADMIN — DEXAMETHASONE SODIUM PHOSPHATE 8 MG: 4 INJECTION, SOLUTION INTRA-ARTICULAR; INTRALESIONAL; INTRAMUSCULAR; INTRAVENOUS; SOFT TISSUE at 09:38

## 2022-11-09 RX ADMIN — HEPARIN SODIUM 8000 UNITS: 1000 INJECTION, SOLUTION INTRAVENOUS; SUBCUTANEOUS at 09:55

## 2022-11-09 RX ADMIN — LIDOCAINE HYDROCHLORIDE 100 MG: 20 INJECTION, SOLUTION EPIDURAL; INFILTRATION; INTRACAUDAL at 09:38

## 2022-11-09 ASSESSMENT — COGNITIVE AND FUNCTIONAL STATUS - GENERAL
SUGGESTED CMS G CODE MODIFIER DAILY ACTIVITY: CH
MOBILITY SCORE: 24
DAILY ACTIVITIY SCORE: 24
SUGGESTED CMS G CODE MODIFIER MOBILITY: CH

## 2022-11-09 ASSESSMENT — CHA2DS2 SCORE
AGE 75 OR GREATER: YES
DIABETES: NO
AGE 65 TO 74: NO
SEX: MALE
CHF OR LEFT VENTRICULAR DYSFUNCTION: NO
VASCULAR DISEASE: NO
HYPERTENSION: YES
CHA2DS2 VASC SCORE: 3
PRIOR STROKE OR TIA OR THROMBOEMBOLISM: NO

## 2022-11-09 ASSESSMENT — PAIN DESCRIPTION - PAIN TYPE
TYPE: SURGICAL PAIN
TYPE: SURGICAL PAIN

## 2022-11-09 ASSESSMENT — LIFESTYLE VARIABLES
HAVE PEOPLE ANNOYED YOU BY CRITICIZING YOUR DRINKING: NO
TOTAL SCORE: 0
EVER HAD A DRINK FIRST THING IN THE MORNING TO STEADY YOUR NERVES TO GET RID OF A HANGOVER: NO
EVER FELT BAD OR GUILTY ABOUT YOUR DRINKING: NO
HAVE YOU EVER FELT YOU SHOULD CUT DOWN ON YOUR DRINKING: NO
TOTAL SCORE: 0
DOES PATIENT WANT TO STOP DRINKING: NO
AVERAGE NUMBER OF DAYS PER WEEK YOU HAVE A DRINK CONTAINING ALCOHOL: 0
ON A TYPICAL DAY WHEN YOU DRINK ALCOHOL HOW MANY DRINKS DO YOU HAVE: 0
TOTAL SCORE: 0
HOW MANY TIMES IN THE PAST YEAR HAVE YOU HAD 5 OR MORE DRINKS IN A DAY: 0
ALCOHOL_USE: NO
CONSUMPTION TOTAL: NEGATIVE

## 2022-11-09 ASSESSMENT — PATIENT HEALTH QUESTIONNAIRE - PHQ9
SUM OF ALL RESPONSES TO PHQ9 QUESTIONS 1 AND 2: 0
1. LITTLE INTEREST OR PLEASURE IN DOING THINGS: NOT AT ALL
2. FEELING DOWN, DEPRESSED, IRRITABLE, OR HOPELESS: NOT AT ALL

## 2022-11-09 ASSESSMENT — FIBROSIS 4 INDEX: FIB4 SCORE: 2.25

## 2022-11-09 ASSESSMENT — PAIN SCALES - GENERAL: PAIN_LEVEL: 0

## 2022-11-09 NOTE — CARE PLAN
The patient is Stable - Low risk of patient condition declining or worsening    Shift Goals  Clinical Goals: Monitor Cath site in groin  Patient Goals: Rest  Family Goals: Rest    Progress made toward(s) clinical / shift goals:      Problem: Knowledge Deficit - Standard  Goal: Patient and family/care givers will demonstrate understanding of plan of care, disease process/condition, diagnostic tests and medications  Outcome: Progressing

## 2022-11-09 NOTE — PROGRESS NOTES
Med Rec Complete per patient  Allergies Reviewed with patient  No antibiotics within the last 30 days  Patient's Preferred Pharmacy: [facility] on [address]     Patient takes Eliquis 5mg, once daily.

## 2022-11-09 NOTE — ANESTHESIA PREPROCEDURE EVALUATION
Date/Time: 11/09/22 1000    Scheduled providers: Mary Brown M.D.; Mane Garcia M.D.    Procedure: CL-LEFT ATRIAL APPENDAGE CLOSURE    Diagnosis:       Paroxysmal atrial fibrillation (HCC) [I48.0]      Paroxysmal atrial fibrillation [I48.0]    Indications: See Associated Dx    [facility]          Relevant Problems   CARDIAC   (positive) Essential hypertension   (positive) Paroxysmal atrial fibrillation (HCC)   (positive) Thoracic aortic aneurysm      Other   (positive) Alcohol dependence with unspecified alcohol-induced disorder (HCC)   (positive) Cerebral atrophy (HCC)   (positive) Dementia without behavioral disturbance (HCC)   (positive) Dyslipidemia   (positive) On continuous oral anticoagulation   (positive) Overweight (BMI 25.0-29.9)   (positive) Status post placement of implantable loop recorder   (positive) Syncope and collapse     TTE 5/2021:  CONCLUSIONS  Normal left ventricular systolic function.  Left ventricular ejection fraction is visually estimated to be 70%.  Normal diastolic function.  Normal right ventricular size and systolic function.  No significant valve disease or flow abnormalities.   Compared to the images of the prior study done 4/29/2020 -  there has been no significant change.     Physical Exam    Airway   Mallampati: II  TM distance: >3 FB  Neck ROM: full       Cardiovascular   Rhythm: irregular  Rate: abnormal  (-) murmur     Dental   (+) upper dentures           Pulmonary - normal exam  Breath sounds clear to auscultation     Abdominal    Neurological - normal exam               Anesthesia Plan    ASA 3   ASA physical status 3 criteria: hypertension - poorly controlled    Plan - general       Airway plan will be ETT  ALICE Planned        Induction: intravenous      Pertinent diagnostic labs and testing reviewed    Informed Consent:    Anesthetic plan and risks discussed with patient.

## 2022-11-09 NOTE — OP REPORT
"[facility] Electrophysiology/Structural Heart Procedure Note     Procedure(s) Performed:   1) Watchman JERSEY closure  2) Intra-cardiac echocardiography    Indication(s):  Paroxysmal atrial fibrillation    Physician(s): Mary Brown M.D.     Resident/Assistant(s): None     Anesthesia: General endotracheal anesthetic with Dr. Mane Garcia     Specimen(s) Removed: None     Estimated Blood Loss:  30cc     Complications:  None     Description of Procedure:   After informed written consent, the patient was brought to the cath lab in the fasting, non-sedated state. The patient was prepped and draped in the usual sterile fashion. Femoral venous access was obtained using the modified Seldinger technique.  In the right femoral vein, an 8 Fr sheath were inserted over 0.035” guidewire and exchanged for a 16 F outer sheath. Another 8 Fr R femoral venous access was obtained for ICE imaging. An 8 Fr AccuNav catheter was advanced to the RA and used to identify the interatrial septum during trans-septal access. An 8.5 Fr [place] trans-septal sheath. An 8.5 Fr Torflex trans-septal sheath was inserted for trans-septal access. ICE and ALICE both showed a challenging large hypertrophic superior limbus of the interatrial septum with very small crossing window. A [place] trans-septal needle was inserted to into the trans-septal sheath, and under ultrasound guidance a inferior/posterior trans-septal location was used to cross into the left atrium. Intravenous heparin was given to target an -300. A stiff exchange length 0.035\" wire was inserted into the left atrium/left pulmonary veins, over which we exchanged to the WATCHMAN delivery sheath. The WATCHMAN device was prepped and flushed. A pigtail catheter was advanced into the delivery sheath into the left atrium and then after counter clockwise torque maneuvered into the body of the left atrial appendage. Cine was taken to verify the location of the pigtail in the appendage and to " assess for depth. The sheath was then advanced over the pigtail until sufficient depth was reached.  The pigtail was removed, and under wet to wet connection the WATCHMAN device was advanced through the delivery sheath until markers were aligned. The sheath was retracted slowly to deploy the device. After the device was deployed we assessed again for leak with contrast injected through the sheath as well as a tug test. We verified good position, anchoring, size, and seal with no/minimal leak with ALICE. After all criteria were met we detached the device from the delivery system. At the end of the procedure, heparin was reversed with protamine, the catheter and sheaths were removed, and hemostasis was achieved by manual compression along with a figure of 8 silk suture. Following recovery from anesthesia, the patient was transferred to the PACU in good condition.        Fluoroscopy time:    6.7 minutes    Contrast used:   53 cc     Device implanted:  Size 24 mm  LOT # [id number]  Max appendage pre-measurement 20.6 mm  Max device measurement 19 mm (14.6%) compression     Impressions:    1. Successful JERSEY closure      Recommendations:  1. Continue OAC x 45 days  2. Admit to monitored bedrest.  3. Echo and removal of figure of 8 suture in the AM

## 2022-11-09 NOTE — ANESTHESIA PROCEDURE NOTES
ALICE    Date/Time: 11/9/2022 9:46 AM  Performed by: Mane Garcia M.D.  Authorized by: Mane Garcia M.D.     Start Time:11/9/2022 9:46 AM  Preanesthetic Checklist: patient identified, IV checked, site marked, risks and benefits discussed, surgical consent, monitors and equipment checked, pre-op evaluation and timeout performed    Indication for ALICE: diagnostic   Patient Location: OR  Intubated: Yes  Bite Block: Yes  Heart Visualized: Yes  Insertion: atraumatic    **See FULL ALICE report in patient's chart via CV Synapse**

## 2022-11-09 NOTE — PROGRESS NOTES
4 Eyes Skin Assessment Completed by KASI Peck and KASI Anderson.    Head WDL  Ears Redness and Blanching  Nose WDL  Mouth WDL  Neck WDL  Breast/Chest WDL  Shoulder Blades WDL  Spine WDL  (R) Arm/Elbow/Hand WDL  (L) Arm/Elbow/Hand WDL  Abdomen WDL  Groin Incision Dressing clean, dry, intact   Scrotum/Coccyx/Buttocks WDL  (R) Leg WDL  (L) Leg WDL  (R) Heel/Foot/Toe WDL  (L) Heel/Foot/Toe WDL          Devices In Places Tele Box, Blood Pressure Cuff, Pulse Ox, and Nasal Cannula      Interventions In Place Gray Ear Foams, Pillows, and Pressure Redistribution Mattress    Possible Skin Injury No    Pictures Uploaded Into Epic N/A  Wound Consult Placed N/A  RN Wound Prevention Protocol Ordered No

## 2022-11-09 NOTE — ANESTHESIA PROCEDURE NOTES
Airway    Date/Time: 11/9/2022 9:39 AM  Performed by: Mane Garcia M.D.  Authorized by: Mane Garcia M.D.     Location:  OR  Urgency:  Elective  Indications for Airway Management:  Anesthesia      Spontaneous Ventilation: absent    Sedation Level:  Deep  Preoxygenated: Yes    Patient Position:  Sniffing  Final Airway Type:  Endotracheal airway  Final Endotracheal Airway:  ETT  Cuffed: Yes    Technique Used for Successful ETT Placement:  Direct laryngoscopy    Insertion Site:  Oral  Blade Type:  Keisha  Laryngoscope Blade/Videolaryngoscope Blade Size:  4  ETT Size (mm):  8.0  Measured from:  Gums  ETT to Gums (cm):  22  Placement Verified by: auscultation and capnometry    Cormack-Lehane Classification:  Grade I - full view of glottis  Number of Attempts at Approach:  1

## 2022-11-09 NOTE — ANESTHESIA POSTPROCEDURE EVALUATION
Patient: Freeman Frost    Procedure Summary     Date: 11/09/22 Room / Location: [facility] IMAGING - CATH LAB [facility]    Anesthesia Start: 0932 Anesthesia Stop: 1045    Procedure: CL-LEFT ATRIAL APPENDAGE CLOSURE Diagnosis:       Paroxysmal atrial fibrillation (HCC)      Paroxysmal atrial fibrillation      (See Associated Dx)    Scheduled Providers: Mary Brown M.D.; Mane Garcia M.D. Responsible Provider: Mane Garcia M.D.    Anesthesia Type: general ASA Status: 3          Final Anesthesia Type: general  Last vitals  BP   Blood Pressure : 111/66    Temp   36.5 °C (97.7 °F)    Pulse   60   Resp   16    SpO2   99 %      Anesthesia Post Evaluation    Patient location during evaluation: PACU  Patient participation: complete - patient participated  Level of consciousness: awake  Pain score: 0    Airway patency: patent  Anesthetic complications: no  Cardiovascular status: hemodynamically stable  Respiratory status: acceptable  Hydration status: balanced    PONV: none          There were no known notable events for this encounter.     Nurse Pain Score: 0 (NPRS)

## 2022-11-09 NOTE — OR NURSING
Respirations easy in PACU.  Right groin clean and dry, no bleeding, no hematoma.  Pt aware BR flat with right leg straright for six hrs.  Denies pain and nausea.  Palpable pedal pulses.  Report to floor.  Daughter updated.  12 lead EKG done in PACU.

## 2022-11-10 ENCOUNTER — APPOINTMENT (OUTPATIENT)
Dept: CARDIOLOGY | Facility: MEDICAL CENTER | Age: 82
DRG: 274 | End: 2022-11-10
Attending: NURSE PRACTITIONER
Payer: MEDICARE

## 2022-11-10 VITALS
OXYGEN SATURATION: 98 % | WEIGHT: 172.62 LBS | HEART RATE: 70 BPM | SYSTOLIC BLOOD PRESSURE: 117 MMHG | BODY MASS INDEX: 27.09 KG/M2 | DIASTOLIC BLOOD PRESSURE: 65 MMHG | HEIGHT: 67 IN | RESPIRATION RATE: 17 BRPM | TEMPERATURE: 97.7 F

## 2022-11-10 DIAGNOSIS — I71.40 ABDOMINAL AORTIC ANEURYSM (AAA) WITHOUT RUPTURE, UNSPECIFIED PART (HCC): ICD-10-CM

## 2022-11-10 PROBLEM — Z95.818 PRESENCE OF WATCHMAN LEFT ATRIAL APPENDAGE CLOSURE DEVICE: Status: ACTIVE | Noted: 2022-11-10

## 2022-11-10 LAB — LV EJECT FRACT  99904: 70

## 2022-11-10 PROCEDURE — 93308 TTE F-UP OR LMTD: CPT

## 2022-11-10 PROCEDURE — A9270 NON-COVERED ITEM OR SERVICE: HCPCS | Performed by: NURSE PRACTITIONER

## 2022-11-10 PROCEDURE — 700102 HCHG RX REV CODE 250 W/ 637 OVERRIDE(OP): Performed by: NURSE PRACTITIONER

## 2022-11-10 PROCEDURE — 93308 TTE F-UP OR LMTD: CPT | Mod: 26 | Performed by: INTERNAL MEDICINE

## 2022-11-10 PROCEDURE — 99238 HOSP IP/OBS DSCHRG MGMT 30/<: CPT | Performed by: NURSE PRACTITIONER

## 2022-11-10 PROCEDURE — 93321 DOPPLER ECHO F-UP/LMTD STD: CPT | Mod: 26 | Performed by: INTERNAL MEDICINE

## 2022-11-10 RX ADMIN — AMLODIPINE BESYLATE 5 MG: 5 TABLET ORAL at 05:18

## 2022-11-10 RX ADMIN — LOSARTAN POTASSIUM 25 MG: 25 TABLET, FILM COATED ORAL at 05:18

## 2022-11-10 RX ADMIN — DONEPEZIL HYDROCHLORIDE 10 MG: 5 TABLET, FILM COATED ORAL at 05:18

## 2022-11-10 RX ADMIN — APIXABAN 5 MG: 5 TABLET, FILM COATED ORAL at 05:18

## 2022-11-10 NOTE — DISCHARGE INSTRUCTIONS
[facility] POST WATCHMAN INSTRUCTIONS  No lifting > 10 lbs x 1 week.  No baths or hot tubs x 1 week.  May shower on 11/11/22  and take off groin dressing and leave site uncovered.  Continue to monitor site daily for warmth, redness, discolored drainage    Please do not miss any doses of your blood thinner.  Please keep all follow up appointments scheduled for you.  You will be seen in one week for wound check and have a follow up with Dr. Brown's office in approximately 4 weeks.        Please walk and take deep breaths after discharge.  After discharge, if you experience severe chest pain, shortness of breath, neurological changes, high fever, severe dizziness, trouble with catheter site needs to be seen in the emergency dept.     A referral to vascular surgery has been placed for enlarged aorta.  If you do not hear from scheduling within one week, please contact our office.

## 2022-11-10 NOTE — PROGRESS NOTES
Bedside report received from day shift RN. Assumed care at 1900. Pt is A&Ox4. Pt is in bed. Patient is on room air. Pt was updated on plan of care. Pt has call light, personal belongings, and bedside table within reach. Bed locked and in lowest position. Pt's cath site is dry and intact. Without signs of hematoma or bruising.

## 2022-11-10 NOTE — PROGRESS NOTES
Patient stable for DC per MD. Meets indications for discharge through discharge lounge. Orders placed for transfer to DC lounge. Patient in agreement with plan, all questions invited and answered.

## 2022-11-10 NOTE — PROGRESS NOTES
Rhythm:Sinus Rhythm/Sinus Bradycardia.  Rate:58-71  Ectopy: R PAC  .20/.09/.46  .    12 hour chart check.

## 2022-11-10 NOTE — PROGRESS NOTES
Bedside shift report received from night RN. Updated patient on plan of care; all questions invited and answered. Patient denies pain. Whiteboard updated for shift. Call light within reach. Bed locked and in lowest position. Fall precautions remain in place. Bed alarm refused, patient deemed no risk of falls per fall assessment score.

## 2022-11-10 NOTE — DISCHARGE PLANNING
[facility] TCN chart review completed. Collaborated with TELMA Rodney. The most current review of . medical record, knowledge of pt's PLOF and social support, LACE+ score of 43, 6 clicks scores of 24 ADL and 24 mobility were considered.      Unfortunately, patient discharged from acute hospital prior to TCN visit. Chart review and collaboration with TELMA revealed patient likely to have no post acute needs, with CM noting patient discharge to home with no discharge needs or barriers identified (please see Cm note 11/10/2022 at 10:45 AM). Patient will be referred to [facility] TCM. Thank you.

## 2022-11-10 NOTE — PROGRESS NOTES
CHIEF COMPLAINT ON ADMISSION  Elective admission for WATCHMAN procedure    CODE STATUS  Full Code    HPI & HOSPITAL COURSE  This is a 82 y.o. year old male here for elective JERSEY closure with WATCHMAN implant secondary to  history of paroxysmal atrial fibrillation with indication for long term anticoagulation, history of falls and prior CVA.    The patient has been seen and examined in EP rounds this AM.  His monitored rhythm is stable, sinus rhythm presently.  His EKG, vital signs, lab data, echo have been reviewed and are stable. A repeat LTD echo this AM shows no evidence of pericardial effusion.  He is noted to have enlarged thoracic/abdominal aorta, measuring approximate 5.5 cm.  The patient denies any chest pain, dyspnea, dizziness, paraesthesias, or other complaints.  His physical exam is without acute abnormality; specifically his right femoral access site is clean and dry.  The figure eight suture was removed.  There is no evidence of hematoma or bleeding.  He has been out of bed and ambulated without difficulty.     Therefore, he is discharged in fair and stable condition with close outpatient follow-up.    PROCEDURES  JOSE procedure, Dr. Brown, 11/9/22.  Please see full procedure note for details.     CONSULTATIONS  None    DISCHARGE PROBLEM LIST  Principal Problem:    Presence of Watchman left atrial appendage closure device- implanted 11/9/22 Dr Brown  POA: Unknown  Active Problems:    Essential hypertension POA: Yes    Paroxysmal atrial fibrillation (HCC) POA: Yes    On continuous oral anticoagulation POA: Yes    Thoracic aortic aneurysm POA: Yes  Resolved Problems:    * No resolved hospital problems. *      MEDICATIONS ON DISCHARGE     Medication List        CHANGE how you take these medications        Instructions   metoprolol  MG Tb24  What changed: when to take this  Commonly known as: TOPROL XL   TAKE ONE TABLET BY MOUTH EVERY DAY            CONTINUE taking these medications         Instructions   amLODIPine 5 MG Tabs  Commonly known as: NORVASC   Take 1 Tablet by mouth every day.  Dose: 5 mg     apixaban 5mg Tabs  Commonly known as: ELIQUIS   Take 1 Tablet by mouth every morning.  Dose: 5 mg     Aricept 10 MG tablet  Generic drug: donepezil   Take 1 Tablet by mouth every day.  Dose: 10 mg     B-12 PO   Take 1 Tablet by mouth every day. 1000 mcg  Dose: 1 Tablet     CALCIUM PO   Take 1 Tablet by mouth every day.  Dose: 1 Tablet     cetirizine 10 MG Tabs  Commonly known as: ZYRTEC   Take 2 Tablets by mouth as needed for Allergies. 20 mg = 2 tablets  Dose: 20 mg     ferrous sulfate 325 (65 Fe) MG tablet   Take 1 Tablet by mouth every day.  Dose: 325 mg     losartan 25 MG Tabs  Commonly known as: COZAAR   Take 1 Tablet by mouth every day.  Dose: 25 mg     PRESERVISION AREDS 2 PO   Take 1 Tablet by mouth every day.  Dose: 1 Tablet     vitamin e 400 UNIT Caps  Commonly known as: VITAMIN E   Take 1 Capsule by mouth every day.  Dose: 400 Units            STOP taking these medications      ibuprofen 200 MG Tabs  Commonly known as: MOTRIN                DIET  Orders Placed This Encounter   Procedures    Diet Order Diet: Cardiac     Standing Status:   Standing     Number of Occurrences:   1     Order Specific Question:   Diet:     Answer:   Cardiac [6]       ACTIVITY/POST WATCHMAN INSTRUCTIONS  No lifting > 10 lbs x 1 week.  No baths or hot tubs x 1 week.  May shower on 11/11/22 and take off groin dressing and leave uncovered.  Continue to monitor site daily for warmth, redness, discolored drainage    Please do not miss any doses of your blood thinner.  Please keep all follow up appointments scheduled for you.       Please walk and take deep breaths after discharge.  After discharge, if  experiences severe chest pain, shortness of breath, neurological changes, high fever, severe dizziness, trouble with catheter site needs to be seen in the emergency dept.       LABORATORY  Lab Results   Component Value  Date/Time    SODIUM 140 11/07/2022 02:29 PM    POTASSIUM 4.5 11/07/2022 02:29 PM    CHLORIDE 103 11/07/2022 02:29 PM    CO2 26 11/07/2022 02:29 PM    GLUCOSE 99 11/07/2022 02:29 PM    BUN 21 11/07/2022 02:29 PM    CREATININE 1.15 11/07/2022 02:29 PM        Lab Results   Component Value Date/Time    WBC 6.6 11/07/2022 02:29 PM    HEMOGLOBIN 14.4 11/07/2022 02:29 PM    HEMATOCRIT 45.2 11/07/2022 02:29 PM    PLATELETCT 146 (L) 11/07/2022 02:29 PM        FOLLOW UP  Future Appointments   Date Time Provider Department Center   11/17/2022  2:20 PM NON PROVIDER-CAM B [facility] None   12/15/2022  9:15 AM JHON Ramirez. [facility] None   2/28/2023 11:40 AM Carolyn Mullins M.D. [facility] None       SPECIFIC OUTPATIENT FOLLOW-UP  Patient will be seen in EP clinic in about one week four wound check and in 4 weeks for procedure follow up.      An outpt referral was placed to vascular surgery for consultation/evaluation/management of his enlarged thoracic/abdominal aorta.     The above discharge plan was created in collaboration with Dr. Brown and discussed in detail with the patient and his daughter Carson over the phone.   Mr Frost verbalizes  understanding and are in agreement with the discharge plan.     CLAIR Monet   11/10/2022 10:13 AM

## 2022-11-10 NOTE — DISCHARGE PLANNING
Case Management Discharge Planning    Admission Date: 11/9/2022  GMLOS: 1.3  ALOS: 1    6-Clicks ADL Score: 24  6-Clicks Mobility Score: 24      Anticipated Discharge Dispo:      DME Needed: No    Action(s) Taken: OTHER    Escalations Completed: None    Medically Clear: Yes    Next Steps: Plan for discharge home. No discharge needs or barriers identified.     Barriers to Discharge: None

## 2022-11-10 NOTE — CARE PLAN
Problem: Knowledge Deficit - Standard  Goal: Patient and family/care givers will demonstrate understanding of plan of care, disease process/condition, diagnostic tests and medications  Outcome: Progressing   The patient is Stable - Low risk of patient condition declining or worsening    Shift Goals  Clinical Goals: Monitor Cath site in groin  Patient Goals: Rest  Family Goals: Rest

## 2022-11-14 LAB — LV EJECT FRACT  99904: 65

## 2022-11-17 ENCOUNTER — NON-PROVIDER VISIT (OUTPATIENT)
Dept: CARDIOLOGY | Facility: MEDICAL CENTER | Age: 82
End: 2022-11-17
Payer: MEDICARE

## 2022-11-17 NOTE — NON-PROVIDER
1 WEEK POST WATCHMAN FOLLOW UP WOUND CHECK:    Patient is s/p JERSEY closure with Watchman device by Dr. Brown on (date11/9) for PAF with intolerance to anticoagulation.    Wound check completed in office. Right femoral access site appears CDI/EARNEST. No evidence of active bleeding, edema, erythema, or hematoma present.     Patient to follow up in 3-4 weeks as previously scheduled. Patient instructed to contact the office with any questions or concerns.     Future Appointments   Date Time Provider Department Center   11/17/2022  2:20 PM NON PROVIDER-CAM B [facility] None   12/15/2022  9:15 AM CLAIR Ramirez [facility] None   2/28/2023 11:40 AM BUCK BillyGN None       Thanks,     Amber Elena R.N.   [facility]  [phone]     36.4

## 2022-11-24 DIAGNOSIS — I10 ESSENTIAL HYPERTENSION: ICD-10-CM

## 2022-11-28 NOTE — TELEPHONE ENCOUNTER
Is the patient due for a refill? Yes    Was the patient seen the past year? Yes    Date of last office visit: 10/24/22    Does the patient have an upcoming appointment?  Yes   If yes, When? 12/15/22    Provider to refill:SS    Does the patients insurance require a 100 day supply?  Yes

## 2022-11-30 RX ORDER — APIXABAN 5 MG/1
TABLET, FILM COATED ORAL
Qty: 180 TABLET | Refills: 3 | Status: SHIPPED
Start: 2022-11-30 | End: 2023-05-22

## 2022-12-02 ENCOUNTER — NON-PROVIDER VISIT (OUTPATIENT)
Dept: CARDIOLOGY | Facility: MEDICAL CENTER | Age: 82
End: 2022-12-02
Payer: MEDICARE

## 2022-12-02 PROCEDURE — 93298 REM INTERROG DEV EVAL SCRMS: CPT | Performed by: INTERNAL MEDICINE

## 2022-12-05 NOTE — CARDIAC REMOTE MONITOR - SCAN
Device transmission reviewed. Device demonstrated appropriate function.       Electronically Signed by: Billy Dudley M.D.    12/5/2022  4:04 PM

## 2022-12-06 ENCOUNTER — DOCUMENTATION (OUTPATIENT)
Dept: HEALTH INFORMATION MANAGEMENT | Facility: OTHER | Age: 82
End: 2022-12-06
Payer: MEDICARE

## 2022-12-15 ENCOUNTER — OFFICE VISIT (OUTPATIENT)
Dept: CARDIOLOGY | Facility: MEDICAL CENTER | Age: 82
End: 2022-12-15
Payer: MEDICARE

## 2022-12-15 ENCOUNTER — TELEPHONE (OUTPATIENT)
Dept: CARDIOLOGY | Facility: MEDICAL CENTER | Age: 82
End: 2022-12-15

## 2022-12-15 VITALS
HEIGHT: 69 IN | DIASTOLIC BLOOD PRESSURE: 80 MMHG | WEIGHT: 170 LBS | HEART RATE: 64 BPM | OXYGEN SATURATION: 98 % | BODY MASS INDEX: 25.18 KG/M2 | SYSTOLIC BLOOD PRESSURE: 130 MMHG

## 2022-12-15 DIAGNOSIS — I48.0 PAROXYSMAL ATRIAL FIBRILLATION (HCC): ICD-10-CM

## 2022-12-15 DIAGNOSIS — I10 ESSENTIAL HYPERTENSION: ICD-10-CM

## 2022-12-15 DIAGNOSIS — Z95.818 PRESENCE OF WATCHMAN LEFT ATRIAL APPENDAGE CLOSURE DEVICE: ICD-10-CM

## 2022-12-15 DIAGNOSIS — Z95.818 STATUS POST PLACEMENT OF IMPLANTABLE LOOP RECORDER: ICD-10-CM

## 2022-12-15 PROCEDURE — 99213 OFFICE O/P EST LOW 20 MIN: CPT | Performed by: NURSE PRACTITIONER

## 2022-12-15 RX ORDER — VITAMIN E 268 MG
400 CAPSULE ORAL DAILY
COMMUNITY
End: 2023-06-08

## 2022-12-15 ASSESSMENT — FIBROSIS 4 INDEX: FIB4 SCORE: 2.25

## 2022-12-15 NOTE — TELEPHONE ENCOUNTER
----- Message from CLAIR Ramirez sent at 12/15/2022  9:59 AM PST -----  I ordered ALICE.  Patient had watchman placed on 11/9/2022 with Dr. Brown.  Needs his 45-day ALICE scheduled.  Please call his daughter's phone number that is listed in his chart    Thanks,    Lakshmi

## 2022-12-15 NOTE — PROGRESS NOTES
Cardiology Clinic Follow-up Note    Date of note:    12/15/2022  Primary Care Provider: Juan Hutton M.D.    Name:             Freeman Frost  YOB: 1940  MRN:               [id number]    CC: S/p Leatha     Primary Cardiologist: Dr Brown    Patient HPI:   Freeman Frost is a 82 y.o. male with current medical problems including PAF, unexplained syncope with ILR, history of heavy alcohol use, falls, and CVA on chronic anticoagulation    Interim History:  Mr. Frost underwent watchman device placement on 11/9/2022 without any complications.  Limited echo with no evidence of pericardial effusion.  Noted large thoracic/abdominal aorta 5.5 cm.  He presents with his daughter, no complaints or discomfort at the right femoral groin site, bruising has healed.  He denies palpitations, chest pain, shortness of breath, dyspnea on exertion, dizziness or syncopal episodes, orthopnea, PND, lower extremity swelling, and recent weight gain.  Denies Blood in urine or stool     Patient endorses medication compliance.     Review of systems:  All others systems reviewed and negative except for what is outlined in the above HPI    Past Medical History:   Diagnosis Date    Anemia     Anesthesia     fam hx, daughter stopped breathing    Arrhythmia     afib    Atrial fibrillation (HCC)     Bowel habit changes     constipation    Cataract     bilat IOL    Dental disorder     upper    Hemorrhagic disorder (HCC)     Hypercholesterolemia     Hypertension     Snoring     Stroke (HCC) 2013    tia no residual def     Past Surgical History:   Procedure Laterality Date    NV ERCP,DIAGNOSTIC N/A 04/20/2019    Procedure: ERCP, DIAGNOSTIC;  Surgeon: Antony Morrison M.D.;  Location: [facility];  Service: Gastroenterology    FRANCIA BY LAPAROSCOPY  04/19/2019    Procedure: LAPARASCOPIC- CONVERTED TO OPEN CHOLECYSTECTOMY;  Surgeon: Anita Merino M.D.;  Location: SURGERY [facility];  Service: General     CATARACT PHACO WITH IOL Right 08/28/2018    Procedure: CATARACT PHACO WITH IOL;  Surgeon: Adolfo Santana M.D.;  Location: SURGERY SAME DAY [facility];  Service: Ophthalmology    CATARACT PHACO WITH IOL Left 08/14/2018    Procedure: CATARACT PHACO WITH IOL;  Surgeon: Adolfo Santana M.D.;  Location: SURGERY SAME DAY [facility];  Service: Ophthalmology    HERNIA REPAIR, INCISIONAL, UNILATERAL Left     OTHER      tonsils as a child    TONSILLECTOMY       Family History   Problem Relation Age of Onset    Dementia Mother     Cancer Mother         Breast    Cancer Brother         Esophageal     Social History     Socioeconomic History    Marital status:      Spouse name: Not on file    Number of children: Not on file    Years of education: Not on file    Highest education level: Not on file   Occupational History    Not on file   Tobacco Use    Smoking status: Never    Smokeless tobacco: Never   Vaping Use    Vaping Use: Never used   Substance and Sexual Activity    Alcohol use: Not Currently    Drug use: No    Sexual activity: Not on file   Other Topics Concern    Not on file   Social History Narrative    Not on file     Social Determinants of Health     Financial Resource Strain: Not on file   Food Insecurity: Not on file   Transportation Needs: Not on file   Physical Activity: Not on file   Stress: Not on file   Social Connections: Not on file   Intimate Partner Violence: Not on file   Housing Stability: Not on file     Allergies   Allergen Reactions    Other Environmental Unspecified     Seasonal allergies=rabbit brush     Current Outpatient Medications   Medication Sig Dispense Refill    ELIQUIS 5 MG Tab TAKE ONE TABLET BY MOUTH TWICE A  Tablet 3    vitamin e (VITAMIN E) 400 UNIT Cap Take 1 Capsule by mouth every day.      cetirizine (ZYRTEC) 10 MG Tab Take 2 Tablets by mouth as needed for Allergies. 20 mg = 2 tablets      CALCIUM PO Take 1 Tablet by mouth every day.      Multiple  "Vitamins-Minerals (PRESERVISION AREDS 2 PO) Take 1 Tablet by mouth every day.      metoprolol SR (TOPROL XL) 100 MG TABLET SR 24 HR TAKE ONE TABLET BY MOUTH EVERY DAY (Patient taking differently: Take 1 Tablet by mouth every day.) 100 Tablet 1    Cyanocobalamin (B-12 PO) Take 1 Tablet by mouth every day. 1000 mcg      ferrous sulfate 325 (65 Fe) MG tablet Take 1 Tablet by mouth every day.      losartan (COZAAR) 25 MG Tab Take 1 Tablet by mouth every day.  3    amLODIPine (NORVASC) 5 MG Tab Take 1 Tablet by mouth every day.       No current facility-administered medications for this visit.       Physical Exam:  Ambulatory Vitals  /80 (BP Location: Left arm, Patient Position: Sitting, BP Cuff Size: Adult)   Pulse 64   Ht 1.753 m (5' 9\")   Wt 77.1 kg (170 lb)   SpO2 98%    BP Readings from Last 4 Encounters:   12/15/22 130/80   11/10/22 117/65   10/24/22 124/62   08/31/22 120/66       Weight/BMI: Body mass index is 25.1 kg/m².  Wt Readings from Last 4 Encounters:   12/15/22 77.1 kg (170 lb)   11/09/22 78.3 kg (172 lb 9.9 oz)   10/24/22 77.6 kg (171 lb)   08/31/22 76.7 kg (169 lb)     General: No apparent distress. Well nourished.   Neck: No JVD. No caroid bruits, trachea midline  Lungs: CTAB. Normal effort, without crackles/rhonchi, no wheezing  Heart: RRR. Normal S1/S2, no murmur, no rub. No  lower extremity edema. 2+ radial pulses, 2+ DT pulses  Ext: No clubbing or cyanosis.  Abdomen: soft, non tender, non distended, no kaushal hepatomegaly.  Neurological: No focal deficits, no facial asymmetry.  Normal speech.  Psychiatric: Appropriate affect, alert and oriented x 4.   Skin: Warm and dry, no rash.    Lab Data Review:  Lab Results   Component Value Date/Time    CHOLSTRLTOT 172 07/31/2020 09:12 AM     (H) 07/31/2020 09:12 AM    HDL 56 07/31/2020 09:12 AM    TRIGLYCERIDE 74 07/31/2020 09:12 AM       Lab Results   Component Value Date/Time    SODIUM 140 11/07/2022 02:29 PM    POTASSIUM 4.5 11/07/2022 " 02:29 PM    CHLORIDE 103 2022 02:29 PM    CO2 26 2022 02:29 PM    GLUCOSE 99 2022 02:29 PM    BUN 21 2022 02:29 PM    CREATININE 1.15 2022 02:29 PM     Lab Results   Component Value Date/Time    ALKPHOSPHAT 95 2021 05:23 PM    ASTSGOT 12 2021 05:23 PM    ALTSGPT 9 2021 05:23 PM    TBILIRUBIN 1.6 (H) 2021 05:23 PM      Lab Results   Component Value Date/Time    WBC 6.6 2022 02:29 PM     Cardiac Imaging and Procedures Review:      EKG 22: My Personal interpretation reveals     Echo limited 11/10/22:  Compared to the prior study on 2021 - aortic regurgitation has   increased. The aortic root and the descending aorta at the level of the   diaphragm is increased in size.  The AAA was not evaluated on the prior   exam.  Normal left ventricular systolic function.  The left ventricular ejection fraction is visually estimated to be 70%.  Normal right ventricular size and systolic function.  Mild mitral regurgitation.  Mild aortic insufficiency.  The aortic root is dilated with a diameter of 4.3 cm.  Thoracic aorta is dilated with diameter 3.3 cm.  Abdominal aorta aneurysm measuring 5.5 cm.    ALICE 22  CONCLUSIONS  Intraoperative ALICE performed for Watchman device placement by Dr. Brown.  Normal left atrial appendage. No thrombus. Good placement of Watchman   device.  The left ventricle is normal in size and thickness. Normal systolic   wall motion. Ejection fraction visually estimated at 70%. Grade II   diastolic dysfunction.  Restricted aortic valve leaflet motion of the left coronary cusp. Mild   aortic insufficiency, but no stenosis.    Assessment and Clinical Decision Makin. Essential hypertension        2. Paroxysmal atrial fibrillation (HCC)        3. Presence of Watchman left atrial appendage closure device- implanted 22 Dr Brown   EC-ALICE W/O CONT      4. Status post placement of implantable loop recorder          The following treatment  plan was discussed    PAF  S/p Watchman device  -No complaints postprocedure  -ALICE to follow-up on culture of watchman device 45 days  -Procedure discussed with patient, see below  -Continue apixaban 5 mg bid until told to safely stop per Dr. Brown    HTN  -BP well controlled today in clinic and previously  -Continue amlodipine 5 mg daily, losartan 25 mg daily      The risks, benefits, and alternatives to transesophageal echocardiogram with IV sedation were discussed with the patient in specific detail, including oropharyngeal and esophageal traumas including hoarseness and dysphagia after the procedure. Rare cases demonstrating serious or fatal complications associated with transesophageal echocardiogram have been reported in the adult population, including cardiac, pulmonary and bleeding complications in less than 1% of people. Patients with an identified intracardiac thrombus are at increased risk for embolic events and this appears to be reduced with anticoagulant therapy. The patient verbalized understandings about these  possible complications and wishes to proceed with this procedure        Plan reviewed in detail with the patient, verbalizes understanding and is in agreement.  Pt is to follow up with Dr Brown in 2 months  Encouraged Pt to follow up with us over the phone or electronically using my [employer] as cardiac issues/concerns arise.      PLEASE NOTE: This dictation was created using voice recognition software. I have made every reasonable attempt to correct obvious errors, but I expect that there are errors of grammar and possibly content that I did not discover before finalizing the note.       JHON Ramirez.   [facility]  [phone]    Collaborating Physician: Dr. Bartholomew

## 2022-12-16 NOTE — TELEPHONE ENCOUNTER
Patient scheduled for post watchman ALICE w/anesthesia on 12-29-22 with Dr. Montemayor. Patient has been instructed to check in at 7:00 for 9:00 case time. Message sent to josefa BRICE to DR. Montemayor.

## 2022-12-27 ENCOUNTER — PRE-ADMISSION TESTING (OUTPATIENT)
Dept: ADMISSIONS | Facility: MEDICAL CENTER | Age: 82
End: 2022-12-27
Attending: INTERNAL MEDICINE
Payer: MEDICARE

## 2022-12-27 DIAGNOSIS — Z01.812 PRE-OPERATIVE LABORATORY EXAMINATION: ICD-10-CM

## 2022-12-27 LAB
ANION GAP SERPL CALC-SCNC: 12 MMOL/L (ref 7–16)
BUN SERPL-MCNC: 25 MG/DL (ref 8–22)
CALCIUM SERPL-MCNC: 10.1 MG/DL (ref 8.5–10.5)
CHLORIDE SERPL-SCNC: 105 MMOL/L (ref 96–112)
CO2 SERPL-SCNC: 24 MMOL/L (ref 20–33)
CREAT SERPL-MCNC: 1.12 MG/DL (ref 0.5–1.4)
ERYTHROCYTE [DISTWIDTH] IN BLOOD BY AUTOMATED COUNT: 52 FL (ref 35.9–50)
GFR SERPLBLD CREATININE-BSD FMLA CKD-EPI: 66 ML/MIN/1.73 M 2
GLUCOSE SERPL-MCNC: 105 MG/DL (ref 65–99)
HCT VFR BLD AUTO: 44.8 % (ref 42–52)
HGB BLD-MCNC: 14.7 G/DL (ref 14–18)
MCH RBC QN AUTO: 31.9 PG (ref 27–33)
MCHC RBC AUTO-ENTMCNC: 32.8 G/DL (ref 33.7–35.3)
MCV RBC AUTO: 97.2 FL (ref 81.4–97.8)
PLATELET # BLD AUTO: 137 K/UL (ref 164–446)
PMV BLD AUTO: 10.4 FL (ref 9–12.9)
POTASSIUM SERPL-SCNC: 5 MMOL/L (ref 3.6–5.5)
RBC # BLD AUTO: 4.61 M/UL (ref 4.7–6.1)
SODIUM SERPL-SCNC: 141 MMOL/L (ref 135–145)
WBC # BLD AUTO: 6.4 K/UL (ref 4.8–10.8)

## 2022-12-27 PROCEDURE — 85027 COMPLETE CBC AUTOMATED: CPT

## 2022-12-27 PROCEDURE — 36415 COLL VENOUS BLD VENIPUNCTURE: CPT

## 2022-12-27 PROCEDURE — 80048 BASIC METABOLIC PNL TOTAL CA: CPT

## 2022-12-27 ASSESSMENT — FIBROSIS 4 INDEX: FIB4 SCORE: 2.25

## 2022-12-29 ENCOUNTER — APPOINTMENT (OUTPATIENT)
Dept: CARDIOLOGY | Facility: MEDICAL CENTER | Age: 82
End: 2022-12-29
Attending: NURSE PRACTITIONER
Payer: MEDICARE

## 2022-12-29 ENCOUNTER — ANESTHESIA EVENT (OUTPATIENT)
Dept: CARDIOLOGY | Facility: MEDICAL CENTER | Age: 82
End: 2022-12-29
Payer: MEDICARE

## 2022-12-29 ENCOUNTER — ANESTHESIA (OUTPATIENT)
Dept: CARDIOLOGY | Facility: MEDICAL CENTER | Age: 82
End: 2022-12-29
Payer: MEDICARE

## 2022-12-29 ENCOUNTER — HOSPITAL ENCOUNTER (OUTPATIENT)
Facility: MEDICAL CENTER | Age: 82
End: 2022-12-29
Attending: INTERNAL MEDICINE | Admitting: INTERNAL MEDICINE
Payer: MEDICARE

## 2022-12-29 VITALS
OXYGEN SATURATION: 97 % | RESPIRATION RATE: 17 BRPM | HEIGHT: 69 IN | SYSTOLIC BLOOD PRESSURE: 117 MMHG | WEIGHT: 175.71 LBS | HEART RATE: 67 BPM | TEMPERATURE: 97.1 F | DIASTOLIC BLOOD PRESSURE: 61 MMHG | BODY MASS INDEX: 26.02 KG/M2

## 2022-12-29 PROCEDURE — 01922 ANES N-INVAS IMG/RADJ THER: CPT | Performed by: ANESTHESIOLOGY

## 2022-12-29 PROCEDURE — 99100 ANES PT EXTEME AGE<1 YR&>70: CPT | Performed by: ANESTHESIOLOGY

## 2022-12-29 PROCEDURE — 700111 HCHG RX REV CODE 636 W/ 250 OVERRIDE (IP): Performed by: ANESTHESIOLOGY

## 2022-12-29 PROCEDURE — 93325 DOPPLER ECHO COLOR FLOW MAPG: CPT

## 2022-12-29 PROCEDURE — 93312 ECHO TRANSESOPHAGEAL: CPT | Mod: 26 | Performed by: INTERNAL MEDICINE

## 2022-12-29 PROCEDURE — 700105 HCHG RX REV CODE 258: Performed by: INTERNAL MEDICINE

## 2022-12-29 PROCEDURE — 700101 HCHG RX REV CODE 250: Performed by: ANESTHESIOLOGY

## 2022-12-29 PROCEDURE — 160046 HCHG PACU - 1ST 60 MINS PHASE II

## 2022-12-29 PROCEDURE — 160035 HCHG PACU - 1ST 60 MINS PHASE I

## 2022-12-29 PROCEDURE — 160002 HCHG RECOVERY MINUTES (STAT)

## 2022-12-29 RX ORDER — HYDROMORPHONE HYDROCHLORIDE 1 MG/ML
0.4 INJECTION, SOLUTION INTRAMUSCULAR; INTRAVENOUS; SUBCUTANEOUS
Status: DISCONTINUED | OUTPATIENT
Start: 2022-12-29 | End: 2022-12-29 | Stop reason: HOSPADM

## 2022-12-29 RX ORDER — MIDAZOLAM HYDROCHLORIDE 1 MG/ML
1 INJECTION INTRAMUSCULAR; INTRAVENOUS
Status: DISCONTINUED | OUTPATIENT
Start: 2022-12-29 | End: 2022-12-29 | Stop reason: HOSPADM

## 2022-12-29 RX ORDER — HYDROMORPHONE HYDROCHLORIDE 1 MG/ML
1 INJECTION, SOLUTION INTRAMUSCULAR; INTRAVENOUS; SUBCUTANEOUS
Status: DISCONTINUED | OUTPATIENT
Start: 2022-12-29 | End: 2022-12-29 | Stop reason: HOSPADM

## 2022-12-29 RX ORDER — MEPERIDINE HYDROCHLORIDE 25 MG/ML
12.5 INJECTION INTRAMUSCULAR; INTRAVENOUS; SUBCUTANEOUS
Status: DISCONTINUED | OUTPATIENT
Start: 2022-12-29 | End: 2022-12-29 | Stop reason: HOSPADM

## 2022-12-29 RX ORDER — LIDOCAINE HYDROCHLORIDE 20 MG/ML
INJECTION, SOLUTION EPIDURAL; INFILTRATION; INTRACAUDAL; PERINEURAL PRN
Status: DISCONTINUED | OUTPATIENT
Start: 2022-12-29 | End: 2022-12-29 | Stop reason: SURG

## 2022-12-29 RX ORDER — HALOPERIDOL 5 MG/ML
1 INJECTION INTRAMUSCULAR
Status: DISCONTINUED | OUTPATIENT
Start: 2022-12-29 | End: 2022-12-29 | Stop reason: HOSPADM

## 2022-12-29 RX ORDER — ONDANSETRON 2 MG/ML
4 INJECTION INTRAMUSCULAR; INTRAVENOUS
Status: DISCONTINUED | OUTPATIENT
Start: 2022-12-29 | End: 2022-12-29 | Stop reason: HOSPADM

## 2022-12-29 RX ORDER — OXYCODONE HCL 5 MG/5 ML
10 SOLUTION, ORAL ORAL
Status: DISCONTINUED | OUTPATIENT
Start: 2022-12-29 | End: 2022-12-29 | Stop reason: HOSPADM

## 2022-12-29 RX ORDER — SODIUM CHLORIDE, SODIUM LACTATE, POTASSIUM CHLORIDE, CALCIUM CHLORIDE 600; 310; 30; 20 MG/100ML; MG/100ML; MG/100ML; MG/100ML
INJECTION, SOLUTION INTRAVENOUS CONTINUOUS
Status: DISCONTINUED | OUTPATIENT
Start: 2022-12-29 | End: 2022-12-29 | Stop reason: HOSPADM

## 2022-12-29 RX ORDER — DIPHENHYDRAMINE HYDROCHLORIDE 50 MG/ML
12.5 INJECTION INTRAMUSCULAR; INTRAVENOUS
Status: DISCONTINUED | OUTPATIENT
Start: 2022-12-29 | End: 2022-12-29 | Stop reason: HOSPADM

## 2022-12-29 RX ORDER — OXYCODONE HCL 5 MG/5 ML
5 SOLUTION, ORAL ORAL
Status: DISCONTINUED | OUTPATIENT
Start: 2022-12-29 | End: 2022-12-29 | Stop reason: HOSPADM

## 2022-12-29 RX ORDER — HYDROMORPHONE HYDROCHLORIDE 1 MG/ML
0.2 INJECTION, SOLUTION INTRAMUSCULAR; INTRAVENOUS; SUBCUTANEOUS
Status: DISCONTINUED | OUTPATIENT
Start: 2022-12-29 | End: 2022-12-29 | Stop reason: HOSPADM

## 2022-12-29 RX ORDER — IPRATROPIUM BROMIDE AND ALBUTEROL SULFATE 2.5; .5 MG/3ML; MG/3ML
3 SOLUTION RESPIRATORY (INHALATION)
Status: DISCONTINUED | OUTPATIENT
Start: 2022-12-29 | End: 2022-12-29 | Stop reason: HOSPADM

## 2022-12-29 RX ORDER — SODIUM CHLORIDE, SODIUM GLUCONATE, SODIUM ACETATE, POTASSIUM CHLORIDE AND MAGNESIUM CHLORIDE 526; 502; 368; 37; 30 MG/100ML; MG/100ML; MG/100ML; MG/100ML; MG/100ML
500 INJECTION, SOLUTION INTRAVENOUS CONTINUOUS
Status: DISCONTINUED | OUTPATIENT
Start: 2022-12-29 | End: 2022-12-29 | Stop reason: HOSPADM

## 2022-12-29 RX ADMIN — SODIUM CHLORIDE, POTASSIUM CHLORIDE, SODIUM LACTATE AND CALCIUM CHLORIDE: 600; 310; 30; 20 INJECTION, SOLUTION INTRAVENOUS at 08:11

## 2022-12-29 RX ADMIN — LIDOCAINE HYDROCHLORIDE 50 MG: 20 INJECTION, SOLUTION EPIDURAL; INFILTRATION; INTRACAUDAL at 09:14

## 2022-12-29 RX ADMIN — EPHEDRINE SULFATE 10 MG: 50 INJECTION INTRAMUSCULAR; INTRAVENOUS; SUBCUTANEOUS at 09:14

## 2022-12-29 RX ADMIN — PROPOFOL 70 MG: 10 INJECTION, EMULSION INTRAVENOUS at 09:14

## 2022-12-29 ASSESSMENT — ENCOUNTER SYMPTOMS
BRUISES/BLEEDS EASILY: 0
DIZZINESS: 0
CHILLS: 0
SHORTNESS OF BREATH: 0
RESPIRATORY NEGATIVE: 1
MUSCULOSKELETAL NEGATIVE: 1
STRIDOR: 0
PALPITATIONS: 0
SPUTUM PRODUCTION: 0
FEVER: 0
PND: 0
ORTHOPNEA: 0
WEAKNESS: 0
SORE THROAT: 0
EYES NEGATIVE: 1
CARDIOVASCULAR NEGATIVE: 1
WHEEZING: 0
CLAUDICATION: 0
GASTROINTESTINAL NEGATIVE: 1
CONSTITUTIONAL NEGATIVE: 1
HEMOPTYSIS: 0
COUGH: 0
NEUROLOGICAL NEGATIVE: 1
LOSS OF CONSCIOUSNESS: 0

## 2022-12-29 ASSESSMENT — PAIN SCALES - GENERAL: PAIN_LEVEL: 0

## 2022-12-29 ASSESSMENT — FIBROSIS 4 INDEX: FIB4 SCORE: 2.39

## 2022-12-29 NOTE — DISCHARGE INSTRUCTIONS
HOME CARE INSTRUCTIONS    ACTIVITY: Rest and take it easy for the first 24 hours.  A responsible adult is recommended to remain with you during that time.  It is normal to feel sleepy.  We encourage you to not do anything that requires balance, judgment or coordination.    FOR 24 HOURS DO NOT:  Drive, operate machinery or run household appliances.  Drink beer or alcoholic beverages.  Make important decisions or sign legal documents.    SPECIAL INSTRUCTIONS:    ALICE - TRANSESOPHAGEAL ECHOCARDIOGRAM  1. Don't drive or drink alcohol for 24 hours. The medication you received will make you too drowsy.  2. If you begin to vomit bloody material, or develop black or bloody stools, call your doctor as soon as possible.  3. If you have any neck, chest, abdominal pain or temp of 100 degrees, call your doctor.    You should call [phone] if you develop problems with breathing or chest pain.  If any questions arise, call your doctor. If your doctor is not available, please feel free to call [phone]. You can also call the [facility] HOTLINE open 24 hours/day, 7 days/week and speak to a nurse at [phone], or toll free [phone].    I acknowledge receipt and understanding of these Home Care Instructions.      DIET: To avoid nausea, slowly advance diet as tolerated, avoiding spicy or greasy foods for the first day.  Add more substantial food to your diet according to your physician's instructions.  Babies can be fed formula or breast milk as soon as they are hungry.  INCREASE FLUIDS AND FIBER TO AVOID CONSTIPATION.    SURGICAL DRESSING/BATHING: No dressing to manage    MEDICATIONS: Resume taking daily medication.  Take prescribed pain medication with food.  If no medication is prescribed, you may take non-aspirin pain medication if needed.  PAIN MEDICATION CAN BE VERY CONSTIPATING.  Take a stool softener or laxative such as senokot, pericolace, or milk of magnesia if needed.    Prescription given for N/A.  Last pain  medication given at N/A.    A follow-up appointment should be arranged with your doctor in 1-2 weeks; call to schedule.    You should CALL YOUR PHYSICIAN if you develop:  Fever greater than 101 degrees F.  Pain not relieved by medication, or persistent nausea or vomiting.  Excessive bleeding (blood soaking through dressing) or unexpected drainage from the wound.  Extreme redness or swelling around the incision site, drainage of pus or foul smelling drainage.  Inability to urinate or empty your bladder within 8 hours.  Problems with breathing or chest pain.    You should call 911 if you develop problems with breathing or chest pain.  If you are unable to contact your doctor or surgical center, you should go to the nearest emergency room or urgent care center.  Physician's telephone #: [facility] [phone]    MILD FLU-LIKE SYMPTOMS ARE NORMAL.  YOU MAY EXPERIENCE GENERALIZED MUSCLE ACHES, THROAT IRRITATION, HEADACHE AND/OR SOME NAUSEA.    If any questions arise, call your doctor.  If your doctor is not available, please feel free to call the Surgical Center at [phone].  The Center is open Monday through Friday from 7AM to 7PM.      A registered nurse may call you a few days after your surgery to see how you are doing after your procedure.    You may also receive a survey in the mail within the next two weeks and we ask that you take a few moments to complete the survey and return it to us.  Our goal is to provide you with very good care and we value your comments.     Depression / Suicide Risk    As you are discharged from this [facility], it is important to learn how to keep safe from harming yourself.    Recognize the warning signs:  Abrupt changes in personality, positive or negative- including increase in energy   Giving away possessions  Change in eating patterns- significant weight changes-  positive or negative  Change in sleeping patterns- unable to sleep or sleeping all the  time   Unwillingness or inability to communicate  Depression  Unusual sadness, discouragement and loneliness  Talk of wanting to die  Neglect of personal appearance   Rebelliousness- reckless behavior  Withdrawal from people/activities they love  Confusion- inability to concentrate     If you or a loved one observes any of these behaviors or has concerns about self-harm, here's what you can do:  Talk about it- your feelings and reasons for harming yourself  Remove any means that you might use to hurt yourself (examples: pills, rope, extension cords, firearm)  Get professional help from the community (Mental Health, Substance Abuse, psychological counseling)  Do not be alone:Call your Safe Contact- someone whom you trust who will be there for you.  Call your local CRISIS HOTLINE 022-8726 or 579-673-2984  Call your local Children's Mobile Crisis Response Team [place] (949) 555-4247 or www.[employer]  Call the toll free National Suicide Prevention Hotlines   National Suicide Prevention Lifeline 796-570-APUC (5458)  [place] [facility] Line Network 800-SUICIDE (699-1302)    I acknowledge receipt and understanding of these Home Care instructions.

## 2022-12-29 NOTE — ANESTHESIA PREPROCEDURE EVALUATION
Date/Time: 12/29/22 0900    Scheduled providers: Reza Montemayor M.D.; Seven Rubio III, M.D.    Procedure: EC-ALICE W/O CONT    Diagnosis:       Presence of Watchman left atrial appendage closure device [Z95.818]      Presence of other cardiac implants and grafts [Z95.818]    Location: [facility] - [facility]          Relevant Problems   CARDIAC   (positive) Essential hypertension   (positive) Paroxysmal atrial fibrillation (HCC)   (positive) Thoracic aortic aneurysm       Physical Exam    Airway   Mallampati: II  TM distance: >3 FB  Neck ROM: limited       Cardiovascular - normal exam  Rhythm: regular  Rate: normal  (-) murmur     Dental - normal exam           Pulmonary - normal exam  Breath sounds clear to auscultation     Abdominal    Neurological - normal exam                 Anesthesia Plan    ASA 3       Plan - general       Airway plan will be natural airway          Induction: intravenous    Postoperative Plan: Postoperative administration of opioids is intended.    Pertinent diagnostic labs and testing reviewed    Informed Consent:    Anesthetic plan and risks discussed with patient.    Use of blood products discussed with: patient whom consented to blood products.

## 2022-12-29 NOTE — OR NURSING
1005 - Arrived to Phase II, VSS, denies pain, tolerating sips. States he is ready for home, getting dressed    1015 - Daughter to bedside    1025 - Discharge instructions reviewed with pt and daughter. Dressed.    1030 - VSS, IV removed with tip intact, discharged via wheelchair with all personal belongings and CNA escort

## 2022-12-29 NOTE — OR NURSING
Awake, alert and tolerating PO fluids.  Denies pain/nausea. Vitals stable.  On monitors with alarms audible.    Called daughter with update and approximate discharge timeline.

## 2022-12-29 NOTE — ANESTHESIA POSTPROCEDURE EVALUATION
Patient: Freeman Frost    Procedure Summary     Date: 12/29/22 Room / Location: [facility] - ECHOCARDIOLOGY [facility]    Anesthesia Start: 0911 Anesthesia Stop: 0928    Procedure: EC-ALICE W/O CONT Diagnosis:       Presence of Watchman left atrial appendage closure device      Presence of other cardiac implants and grafts    Scheduled Providers: Reza Montemayor M.D.; Seven Rubio III, M.D. Responsible Provider: Seven Rubio III, M.D.    Anesthesia Type: general ASA Status: 3          Final Anesthesia Type: general  Last vitals  BP   Blood Pressure : 99/56    Temp   36.3 °C (97.3 °F)    Pulse   67   Resp   14    SpO2   99 %      Anesthesia Post Evaluation    Patient location during evaluation: PACU  Patient participation: complete - patient participated  Level of consciousness: awake and alert  Pain score: 0    Airway patency: patent  Anesthetic complications: no  Cardiovascular status: hemodynamically stable  Respiratory status: acceptable  Hydration status: euvolemic    PONV: none          No notable events documented.     Nurse Pain Score: 0 (NPRS)

## 2022-12-29 NOTE — H&P
Physician H&P    Patient ID:  Freeman Frost  [id number]  82 y.o. male  1940    History:  Primary Diagnosis: atrial fibrillation s/p watchman     HPI:  Watchman placed here for post procedure ALICE    Past Medical History:  has a past medical history of Anemia, Anesthesia, Arrhythmia, Atrial fibrillation (HCC), Bowel habit changes, Cataract, Dental disorder, Hemorrhagic disorder (HCC), Hypercholesterolemia, Hypertension, Snoring, and Stroke (HCC) (2013).    He has no past medical history of Acute nasopharyngitis, Anginal syndrome (HCC), Arthritis, Asthma, Blood clotting disorder (HCC), Breath shortness, Bronchitis, Cancer (HCC), Carcinoma in situ of respiratory system, Congestive heart failure (HCC), Continuous ambulatory peritoneal dialysis status (HCC), Coughing blood, Diabetes (HCC), Dialysis patient (HCC), Disorder of thyroid, Emphysema of lung (HCC), Glaucoma, Gynecological disorder, Heart burn, Heart murmur, Heart valve disease, Hepatitis A, Hepatitis B, Hepatitis C, Hiatus hernia syndrome, High cholesterol, Indigestion, Infectious disease, Jaundice, Myocardial infarct (HCC), Pacemaker, Pain, Pneumonia, Pregnant, Psychiatric problem, Renal disorder, Rheumatic fever, Seizure (HCC), Sleep apnea, Tuberculosis, Urinary bladder disorder, or Urinary incontinence.  Past Surgical History:  has a past surgical history that includes other; cataract phaco with iol (Left, 08/14/2018); cataract phaco with iol (Right, 08/28/2018); hernia repair, incisional, unilateral (Left); tonsillectomy; sheryl by laparoscopy (04/19/2019); pr ercp,diagnostic (N/A, 04/20/2019); and other cardiac surgery (11-9-22).  Past Social History:  reports that he has never smoked. He has never used smokeless tobacco. He reports that he does not currently use alcohol. He reports that he does not use drugs.  Past Family History:   Family History   Problem Relation Age of Onset    Dementia Mother     Cancer Mother         Breast    Cancer Brother          Esophageal     Allergies: Other environmental    Current Medications:  Prior to Admission medications    Medication Sig Start Date End Date Taking? Authorizing Provider   vitamin e (VITAMIN E) 400 UNIT Cap Take 400 Units by mouth every day.    Physician Outpatient   CALCIUM PO Take 1 Tablet by mouth every day.    Physician Outpatient   ELIQUIS 5 MG Tab TAKE ONE TABLET BY MOUTH TWICE A DAY  Patient taking differently: Take 5 mg by mouth 2 times a day. 11/30/22   Mary Brown M.D.   Multiple Vitamins-Minerals (PRESERVISION AREDS 2 PO) Take 1 Tablet by mouth every day.    Physician Outpatient   metoprolol SR (TOPROL XL) 100 MG TABLET SR 24 HR TAKE ONE TABLET BY MOUTH EVERY DAY  Patient taking differently: Take 100 mg by mouth every day. 7/10/22   Yayo Wills M.D.   Cyanocobalamin (B-12 PO) Take 1 Tablet by mouth every day. 1000 mcg    Physician Outpatient   ferrous sulfate 325 (65 Fe) MG tablet Take 1 Tablet by mouth every day.    Physician Outpatient   losartan (COZAAR) 25 MG Tab Take 1 Tablet by mouth every day. 5/7/19   Physician Outpatient   amLODIPine (NORVASC) 5 MG Tab Take 1 Tablet by mouth every day.    Physician Outpatient       Review of Systems:  Review of Systems   Constitutional: Negative.  Negative for chills, fever and malaise/fatigue.   HENT: Negative.  Negative for sore throat.    Eyes: Negative.    Respiratory: Negative.  Negative for cough, hemoptysis, sputum production, shortness of breath, wheezing and stridor.    Cardiovascular: Negative.  Negative for chest pain, palpitations, orthopnea, claudication, leg swelling and PND.   Gastrointestinal: Negative.    Genitourinary: Negative.    Musculoskeletal: Negative.    Skin: Negative.    Neurological: Negative.  Negative for dizziness, loss of consciousness and weakness.   Endo/Heme/Allergies: Negative.  Does not bruise/bleed easily.   All other systems reviewed and are negative.  /64   Pulse 62   Temp 36.2 °C (97.2 °F) (Temporal)   " Resp 18   Ht 1.753 m (5' 9\")   Wt 79.7 kg (175 lb 11.3 oz)   SpO2 96%     Physical Examination:  Physical Exam  Vitals and nursing note reviewed.   Constitutional:       General: He is not in acute distress.     Appearance: Normal appearance. He is normal weight. He is not ill-appearing, toxic-appearing or diaphoretic.   HENT:      Head: Normocephalic and atraumatic.      Right Ear: Ear canal and external ear normal.      Left Ear: Ear canal and external ear normal.      Nose: Nose normal. No congestion or rhinorrhea.      Mouth/Throat:      Mouth: Mucous membranes are moist.      Pharynx: Oropharynx is clear. No oropharyngeal exudate or posterior oropharyngeal erythema.   Eyes:      General: No scleral icterus.        Right eye: No discharge.         Left eye: No discharge.      Extraocular Movements: Extraocular movements intact.      Conjunctiva/sclera: Conjunctivae normal.      Pupils: Pupils are equal, round, and reactive to light.   Neck:      Vascular: No carotid bruit.   Cardiovascular:      Rate and Rhythm: Normal rate and regular rhythm.      Pulses: Normal pulses.      Heart sounds: Normal heart sounds. No murmur heard.    No gallop.   Pulmonary:      Effort: Pulmonary effort is normal. No respiratory distress.      Breath sounds: Normal breath sounds. No stridor. No wheezing, rhonchi or rales.   Abdominal:      General: Abdomen is flat. Bowel sounds are normal. There is no distension.      Palpations: Abdomen is soft. There is no mass.      Tenderness: There is no abdominal tenderness. There is no guarding or rebound.      Hernia: No hernia is present.   Musculoskeletal:         General: No swelling or tenderness. Normal range of motion.      Cervical back: Normal range of motion and neck supple. No rigidity. No muscular tenderness.      Right lower leg: No edema.      Left lower leg: No edema.   Lymphadenopathy:      Cervical: No cervical adenopathy.   Skin:     General: Skin is warm and dry.      " Capillary Refill: Capillary refill takes 2 to 3 seconds.      Coloration: Skin is not jaundiced or pale.      Findings: No bruising or lesion.   Neurological:      General: No focal deficit present.      Mental Status: He is alert and oriented to person, place, and time. Mental status is at baseline.      Cranial Nerves: No cranial nerve deficit.      Motor: No weakness.   Psychiatric:         Mood and Affect: Mood normal.         Behavior: Behavior normal.         Thought Content: Thought content normal.         Judgment: Judgment normal.       Impression:  81 yo M with post watchman    Plan:  ALICE    Pre Procedure Assessment:  Pre-Procedure Assessment - Applicable for patients receiving sedation by non-anesthesia practitioner.  Previous drug history, other anesthetic experiences, potential anesthetic problems and choice of anesthesia assessed, discussed with patient.     No prior complications.  Results of relevant diagnostic studies reviewed.        ASA 3 - Patient with severe systemic disease      Pre Procedure update:   No changes.    Reza Montemayor M.D.  12/29/2022

## 2023-01-02 ENCOUNTER — NON-PROVIDER VISIT (OUTPATIENT)
Dept: CARDIOLOGY | Facility: MEDICAL CENTER | Age: 83
End: 2023-01-02
Payer: MEDICARE

## 2023-01-02 PROCEDURE — 93298 REM INTERROG DEV EVAL SCRMS: CPT | Performed by: INTERNAL MEDICINE

## 2023-01-03 NOTE — CARDIAC REMOTE MONITOR - SCAN
Device transmission reviewed. Device demonstrated appropriate function.       Electronically Signed by: Billy Dudley M.D.    1/3/2023  4:47 PM

## 2023-01-10 ENCOUNTER — APPOINTMENT (OUTPATIENT)
Dept: VASCULAR SURGERY | Facility: MEDICAL CENTER | Age: 83
End: 2023-01-10
Payer: MEDICARE

## 2023-01-24 DIAGNOSIS — I48.0 PAROXYSMAL ATRIAL FIBRILLATION (HCC): ICD-10-CM

## 2023-01-25 NOTE — TELEPHONE ENCOUNTER
Is the patient due for a refill? Yes    Was the patient seen the past year? Yes    Date of last office visit: 12/15/222    Does the patient have an upcoming appointment?  Yes   If yes, When? 2/27/23    Provider to refill:MR    Does the patients insurance require a 100 day supply?  Yes

## 2023-01-27 RX ORDER — METOPROLOL SUCCINATE 100 MG/1
TABLET, EXTENDED RELEASE ORAL
Qty: 100 TABLET | Refills: 3 | Status: SHIPPED
Start: 2023-01-27 | End: 2023-05-22

## 2023-02-02 ENCOUNTER — NON-PROVIDER VISIT (OUTPATIENT)
Dept: CARDIOLOGY | Facility: MEDICAL CENTER | Age: 83
End: 2023-02-02
Payer: MEDICARE

## 2023-02-02 PROCEDURE — 93298 REM INTERROG DEV EVAL SCRMS: CPT | Performed by: INTERNAL MEDICINE

## 2023-02-03 NOTE — CARDIAC REMOTE MONITOR - SCAN
Device transmission reviewed. Device demonstrated appropriate function.       Electronically Signed by: Cl Valdez M.D.    2/3/2023  2:44 PM

## 2023-02-07 ENCOUNTER — OFFICE VISIT (OUTPATIENT)
Dept: VASCULAR SURGERY | Facility: MEDICAL CENTER | Age: 83
End: 2023-02-07
Payer: MEDICARE

## 2023-02-07 VITALS
OXYGEN SATURATION: 96 % | DIASTOLIC BLOOD PRESSURE: 68 MMHG | TEMPERATURE: 97.2 F | HEART RATE: 68 BPM | SYSTOLIC BLOOD PRESSURE: 122 MMHG | BODY MASS INDEX: 28.12 KG/M2 | WEIGHT: 175 LBS | HEIGHT: 66 IN

## 2023-02-07 DIAGNOSIS — I71.43 INFRARENAL ABDOMINAL AORTIC ANEURYSM (AAA) WITHOUT RUPTURE (HCC): ICD-10-CM

## 2023-02-07 DIAGNOSIS — I71.23 ANEURYSM OF DESCENDING THORACIC AORTA WITHOUT RUPTURE (HCC): ICD-10-CM

## 2023-02-07 PROCEDURE — 99204 OFFICE O/P NEW MOD 45 MIN: CPT | Performed by: SURGERY

## 2023-02-07 ASSESSMENT — FIBROSIS 4 INDEX: FIB4 SCORE: 2.39

## 2023-02-07 NOTE — H&P
Vascular Surgery            New Patient Consultation    Patient:Freeman Frost  MRN:[id number]  Primary care physician:Juan Hutton M.D.  Referring Provider: Juan Hutton M.D.    Vascular Consultant: Wilber Ambrose MD    Date: 2/7/2023  _____________________________________________________    Chief Complaint:     AAA    History of Present Illness:   Freeman Frost  is a 82 y.o. year old male who was referred for evaluation of a 5.5cm AAA. This was discovered on an ECHO. He is asymptomatic. No previous vascular procedures.    Past Medical History:     Past Medical History:   Diagnosis Date    Anemia     Anesthesia     fam hx, daughter stopped breathing    Arrhythmia     afib    Atrial fibrillation (HCC)     Bowel habit changes     constipation    Cataract     bilat IOL    Dental disorder     upper    Hemorrhagic disorder (HCC)     Hypercholesterolemia     Hypertension     Snoring     Stroke (HCC) 2013    tia no residual def     Past Surgical History:     Past Surgical History:   Procedure Laterality Date    MI ERCP,DIAGNOSTIC N/A 04/20/2019    Procedure: ERCP, DIAGNOSTIC;  Surgeon: Antony Morrison M.D.;  Location: [facility];  Service: Gastroenterology    FRANCIA BY LAPAROSCOPY  04/19/2019    Procedure: LAPARASCOPIC- CONVERTED TO OPEN CHOLECYSTECTOMY;  Surgeon: Anita Merino M.D.;  Location: [facility];  Service: General    CATARACT PHACO WITH IOL Right 08/28/2018    Procedure: CATARACT PHACO WITH IOL;  Surgeon: Adolfo Santana M.D.;  Location: SURGERY SAME DAY [facility];  Service: Ophthalmology    CATARACT PHACO WITH IOL Left 08/14/2018    Procedure: CATARACT PHACO WITH IOL;  Surgeon: Adolfo Santana M.D.;  Location: SURGERY SAME DAY [facility];  Service: Ophthalmology    HERNIA REPAIR, INCISIONAL, UNILATERAL Left     OTHER      tonsils as a child    OTHER CARDIAC SURGERY  11-9-22    Watchman procedure    TONSILLECTOMY       Allergies:      Allergies   Allergen Reactions    Other Environmental Unspecified     Seasonal allergies=rabbit brush     Medications:     Outpatient Encounter Medications as of 2/7/2023   Medication Sig Dispense Refill    metoprolol SR (TOPROL XL) 100 MG TABLET SR 24 HR TAKE ONE TABLET BY MOUTH EVERY  Tablet 3    vitamin e (VITAMIN E) 400 UNIT Cap Take 400 Units by mouth every day.      CALCIUM PO Take 1 Tablet by mouth every day.      ELIQUIS 5 MG Tab TAKE ONE TABLET BY MOUTH TWICE A DAY (Patient taking differently: Take 5 mg by mouth 2 times a day.) 180 Tablet 3    Multiple Vitamins-Minerals (PRESERVISION AREDS 2 PO) Take 1 Tablet by mouth every day.      Cyanocobalamin (B-12 PO) Take 1 Tablet by mouth every day. 1000 mcg      ferrous sulfate 325 (65 Fe) MG tablet Take 1 Tablet by mouth every day.      losartan (COZAAR) 25 MG Tab Take 1 Tablet by mouth every day.  3    amLODIPine (NORVASC) 5 MG Tab Take 1 Tablet by mouth every day.       No facility-administered encounter medications on file as of 2/7/2023.     Social History:     Social History     Socioeconomic History    Marital status:      Spouse name: Not on file    Number of children: Not on file    Years of education: Not on file    Highest education level: Not on file   Occupational History    Not on file   Tobacco Use    Smoking status: Never    Smokeless tobacco: Never   Vaping Use    Vaping Use: Never used   Substance and Sexual Activity    Alcohol use: Not Currently    Drug use: Never    Sexual activity: Not on file   Other Topics Concern    Not on file   Social History Narrative    Not on file     Social Determinants of Health     Financial Resource Strain: Not on file   Food Insecurity: Not on file   Transportation Needs: Not on file   Physical Activity: Not on file   Stress: Not on file   Social Connections: Not on file   Intimate Partner Violence: Not on file   Housing Stability: Not on file      Social History     Tobacco Use   Smoking Status  Never   Smokeless Tobacco Never     Social History     Substance and Sexual Activity   Alcohol Use Not Currently     Social History     Substance and Sexual Activity   Drug Use Never      Family History:     Family History   Problem Relation Age of Onset    Dementia Mother     Cancer Mother         Breast    Cancer Brother         Esophageal       Review of Systems:   Constitutional: Negative for fever or chills  HENT:   Negative for hearing loss or tinnitus    Eyes:    Negative for blurred vision or loss of vision  Respiratory:  Negative for cough or hemoptysis  Cardiac:  Negative for chest pain or palpitations  Vascular:  Negative for claudication, Negative for rest pain  Gastrointestinal: Negative for vomiting or abdominal pain     Negative for hematochezia or melena   Genitourinary: Negative for dysuria or hematuria   Musculoskeletal: Negative for myalgias or acute joint pain  Skin:   Negative for itching or rash  Neurological:  Negative for dizziness or headaches     Negative for speech disturbance     Negative for extremity weakness or paresthesias  Endo/Heme:  Negative for easy bruising or bleeding       Exam:   There were no vitals taken for this visit.    Constitutional: Alert, oriented, no acute distress  HEENT:  Normocephalic and atraumatic, EOMI  Neck:   Supple, no JVD,   Cardiovascular: Regular rate and rhythm,   Pulmonary:  Good air entry bilaterally,    Abdominal:  Soft, non-tender, non-distended     Aortic impulse prominent on exam  Musculoskeletal: No tenderness, no deformity  Neurological:  CN II-XII grossly intact, no focal deficits  Skin:   Skin is warm and dry. No rash noted.  Vascular exam:    RUE: warm, cap refill<3 seconds, no edema, no tissue loss,   LUE: warm, cap refill<3 seconds, no edema, no tissue loss,   RLE: warm, cap refill<3 seconds, no edema, no tissue loss,   LLE: warm, cap refill<3 seconds, no edema, no tissue loss,       Imaging:   ECHO: 5.5cm abdominal aortic aneurysm.  Also  4.3cm aneurysm of the thoracic aorta      Assessment and Plan:   - 5.5cm asymptomatic AAA  - 4.3cm thoracic aortic aneurysm    Recommend CTA thoracoabdominal aorta for surgical planning, patient can follow-up with me thereafter to discuss results    _____________________________________________________  Wilber Ambrose MD  [facility]  [phone]  [address], [place] [address]

## 2023-02-14 ENCOUNTER — HOSPITAL ENCOUNTER (OUTPATIENT)
Dept: RADIOLOGY | Facility: MEDICAL CENTER | Age: 83
End: 2023-02-14
Attending: SURGERY
Payer: MEDICARE

## 2023-02-14 DIAGNOSIS — I71.43 INFRARENAL ABDOMINAL AORTIC ANEURYSM (AAA) WITHOUT RUPTURE (HCC): ICD-10-CM

## 2023-02-14 DIAGNOSIS — I71.23 ANEURYSM OF DESCENDING THORACIC AORTA WITHOUT RUPTURE (HCC): ICD-10-CM

## 2023-02-14 PROCEDURE — 71275 CT ANGIOGRAPHY CHEST: CPT

## 2023-02-14 PROCEDURE — 700117 HCHG RX CONTRAST REV CODE 255: Performed by: SURGERY

## 2023-02-14 RX ADMIN — IOHEXOL 100 ML: 350 INJECTION, SOLUTION INTRAVENOUS at 16:19

## 2023-03-05 ENCOUNTER — NON-PROVIDER VISIT (OUTPATIENT)
Dept: CARDIOLOGY | Facility: MEDICAL CENTER | Age: 83
End: 2023-03-05
Payer: MEDICARE

## 2023-03-05 PROCEDURE — 93298 REM INTERROG DEV EVAL SCRMS: CPT | Performed by: INTERNAL MEDICINE

## 2023-03-06 ENCOUNTER — TELEPHONE (OUTPATIENT)
Dept: CARDIOLOGY | Facility: MEDICAL CENTER | Age: 83
End: 2023-03-06
Payer: MEDICARE

## 2023-03-06 NOTE — CARDIAC REMOTE MONITOR - SCAN
Device transmission reviewed. Device demonstrated appropriate function.       Electronically Signed by: Billy Dudley M.D.    3/26/2023  10:12 AM

## 2023-04-05 ENCOUNTER — NON-PROVIDER VISIT (OUTPATIENT)
Dept: CARDIOLOGY | Facility: MEDICAL CENTER | Age: 83
End: 2023-04-05
Payer: MEDICARE

## 2023-04-05 ENCOUNTER — TELEPHONE (OUTPATIENT)
Dept: CARDIOLOGY | Facility: MEDICAL CENTER | Age: 83
End: 2023-04-05
Payer: MEDICARE

## 2023-04-05 PROCEDURE — 93298 REM INTERROG DEV EVAL SCRMS: CPT | Performed by: INTERNAL MEDICINE

## 2023-04-05 NOTE — TELEPHONE ENCOUNTER
Called pt, [phone] to review findings.  unable to reach.  Left voicemail to return this call at their earliest convenience.

## 2023-04-05 NOTE — TELEPHONE ENCOUNTER
Remote Transmission 4/5/2023:    1-Pause episode appears complete AV block on 4/5/2023@12:23 AM lasting 3 seconds.    Full report scanned into media.

## 2023-04-06 NOTE — CARDIAC REMOTE MONITOR - SCAN
Device transmission reviewed. Device demonstrated appropriate function. Episode of CHB, PAF      Electronically Signed by: Billy Dudley M.D.    4/8/2023  12:20 PM

## 2023-04-07 NOTE — TELEPHONE ENCOUNTER
Returned pt call [phone], and reviewed concerns with daughter Jacob.  She states pt was sleeping and typically will go to be around 10pm at night.  She states she was with pt all day yesterday and he did not experience any symptoms.  Noted findings.    Awaiting MR benjie.

## 2023-04-08 ENCOUNTER — TELEPHONE (OUTPATIENT)
Dept: CARDIOLOGY | Facility: MEDICAL CENTER | Age: 83
End: 2023-04-08
Payer: MEDICARE

## 2023-04-10 NOTE — TELEPHONE ENCOUNTER
To MR, please see telephone encounter on 4/5/23 (it was sent to you as pt call on 4/5/2023) and advise, thank you!

## 2023-04-11 NOTE — TELEPHONE ENCOUNTER
To SS, pt of yours.  please review transmission received and uploaded 4/6/2023 and advise on pt plan of care regarding PPM.  Per pt daughter pt was sleeping at that time of episode and pt has not experienced any symptoms.  Thank you!    =====================    Patient Call  CLAIR Ramirez  You; Elizabeth Yañez, Med Ass't 9 minutes ago (5:21 PM)     Ok, nevermind, disregard my message alst message. I didn't see Dr Dudley's note. Can we forward to Dr Brown and see if he thinks patient also needs a PPM.     Thanks,     Lakshmi

## 2023-04-12 ENCOUNTER — TELEPHONE (OUTPATIENT)
Dept: CARDIOLOGY | Facility: MEDICAL CENTER | Age: 83
End: 2023-04-12
Payer: MEDICARE

## 2023-04-12 NOTE — TELEPHONE ENCOUNTER
MR    Caller: Carson Frost  (Daughter)    Topic/issue: Carson returned Amira's phone call in regards to encounter sent earlier today, date starting (04-) I wasn't sure I could addend because of the department name, please advise.     Callback Number: [phone]    Thank you,    Leonardo HUGGINS

## 2023-04-12 NOTE — TELEPHONE ENCOUNTER
Patient Call  Mary Brown M.D.  You 56 minutes ago (8:31 AM)     Yeah if sleeping at time, not urgent. Can schedule f/u with me next available to discuss.      Called Jacob [phone], to review MD recommendations.  unable to reach.  Left voicemail to return this call at their earliest convenience.

## 2023-04-12 NOTE — TELEPHONE ENCOUNTER
To Schedulers, please schedule pt for FV with SS.  Thank you!    ===================    Returned pt call,[phone], and reviewed MD recommendations.  She verbalizes understanding and states no other concerns or questions at this time.  Pt is appreciative of information given.

## 2023-05-03 ENCOUNTER — TELEPHONE (OUTPATIENT)
Dept: HEALTH INFORMATION MANAGEMENT | Facility: OTHER | Age: 83
End: 2023-05-03

## 2023-05-06 ENCOUNTER — NON-PROVIDER VISIT (OUTPATIENT)
Dept: CARDIOLOGY | Facility: MEDICAL CENTER | Age: 83
End: 2023-05-06
Payer: MEDICARE

## 2023-05-06 PROCEDURE — 93298 REM INTERROG DEV EVAL SCRMS: CPT | Performed by: INTERNAL MEDICINE

## 2023-05-08 NOTE — CARDIAC REMOTE MONITOR - SCAN
Device transmission reviewed. Device demonstrated appropriate function.       Electronically Signed by: Cl Valdez M.D.    5/10/2023  7:57 AM

## 2023-05-16 ENCOUNTER — TELEPHONE (OUTPATIENT)
Dept: CARDIOLOGY | Facility: MEDICAL CENTER | Age: 83
End: 2023-05-16
Payer: MEDICARE

## 2023-05-16 ENCOUNTER — PATIENT MESSAGE (OUTPATIENT)
Dept: CARDIOLOGY | Facility: MEDICAL CENTER | Age: 83
End: 2023-05-16
Payer: MEDICARE

## 2023-05-16 NOTE — TELEPHONE ENCOUNTER
FYI--patients loop recorder transmitted via home monitor.  Patient had AF with RVR 5/14 lasting approximately 2 hours with onset 8:30 am.    Scanned for review.

## 2023-05-17 NOTE — TELEPHONE ENCOUNTER
Called pt,[phone], to review MD recommendations.  Attempted to call pt, unable to reach.  Left voicemail to return this call at their earliest convenience.    MyChart sent regarding findings.  Awaiting pt response.

## 2023-05-18 NOTE — PATIENT COMMUNICATION
To MR , please review response regarding transmission received and reply directly to pt regarding advise, thank you!

## 2023-05-22 ENCOUNTER — OFFICE VISIT (OUTPATIENT)
Dept: CARDIOLOGY | Facility: MEDICAL CENTER | Age: 83
End: 2023-05-22
Attending: INTERNAL MEDICINE
Payer: MEDICARE

## 2023-05-22 VITALS
RESPIRATION RATE: 14 BRPM | HEART RATE: 71 BPM | HEIGHT: 66 IN | BODY MASS INDEX: 28.12 KG/M2 | OXYGEN SATURATION: 98 % | SYSTOLIC BLOOD PRESSURE: 110 MMHG | WEIGHT: 175 LBS | DIASTOLIC BLOOD PRESSURE: 60 MMHG

## 2023-05-22 DIAGNOSIS — I48.0 PAROXYSMAL ATRIAL FIBRILLATION (HCC): ICD-10-CM

## 2023-05-22 PROCEDURE — 93291 INTERROG DEV EVAL SCRMS IP: CPT | Mod: 26 | Performed by: INTERNAL MEDICINE

## 2023-05-22 PROCEDURE — 99212 OFFICE O/P EST SF 10 MIN: CPT | Performed by: INTERNAL MEDICINE

## 2023-05-22 PROCEDURE — 3078F DIAST BP <80 MM HG: CPT | Performed by: INTERNAL MEDICINE

## 2023-05-22 PROCEDURE — 3074F SYST BP LT 130 MM HG: CPT | Performed by: INTERNAL MEDICINE

## 2023-05-22 PROCEDURE — 99214 OFFICE O/P EST MOD 30 MIN: CPT | Mod: 25 | Performed by: INTERNAL MEDICINE

## 2023-05-22 PROCEDURE — 93005 ELECTROCARDIOGRAM TRACING: CPT | Performed by: INTERNAL MEDICINE

## 2023-05-22 PROCEDURE — 93291 INTERROG DEV EVAL SCRMS IP: CPT | Performed by: INTERNAL MEDICINE

## 2023-05-22 RX ORDER — METOPROLOL SUCCINATE 50 MG/1
50 TABLET, EXTENDED RELEASE ORAL DAILY
Qty: 30 TABLET | Refills: 5 | Status: SHIPPED | OUTPATIENT
Start: 2023-05-22 | End: 2024-01-26 | Stop reason: SDUPTHER

## 2023-05-22 RX ORDER — DRONEDARONE 400 MG/1
400 TABLET, FILM COATED ORAL 2 TIMES DAILY WITH MEALS
Qty: 60 TABLET | Refills: 5 | Status: SHIPPED
Start: 2023-05-22 | End: 2023-06-13

## 2023-05-22 RX ORDER — ASPIRIN 81 MG/1
81 TABLET ORAL DAILY
Qty: 100 TABLET | Refills: 5 | Status: SHIPPED | OUTPATIENT
Start: 2023-05-22

## 2023-05-22 ASSESSMENT — PATIENT HEALTH QUESTIONNAIRE - PHQ9: CLINICAL INTERPRETATION OF PHQ2 SCORE: 0

## 2023-05-22 ASSESSMENT — FIBROSIS 4 INDEX: FIB4 SCORE: 2.39

## 2023-05-22 NOTE — PROGRESS NOTES
Arrhythmia Clinic Note (Established patient)    DOS: 5/22/2023    Chief complaint/Reason for consult: F/u WATCHMAN/PAF    Interval History:  Pt is an 81 yo M. He has a history of mild dementia, falls, gait instability, and PAF underwent successful WATCHMAN > 6 mo ago. ALICE back in 12/2022 showed stable device position, unfortunately lost to follow-up with me and has stayed on OAC. He has had some intermittent pauses during nocturnal hours along with increasing PAF burden on ILR lately and referred back. He is not really symptomatic to this, though unclear if he remembers the episodes.    ROS (+ highlighted in red):  General--Negative for fatigue, weight loss or weight gain  Cardiovascular--Negative for CP, orthopnea, PND    Past Medical History:   Diagnosis Date    Anemia     Anesthesia     fam hx, daughter stopped breathing    Arrhythmia     afib    Atrial fibrillation (HCC)     Bowel habit changes     constipation    Cataract     bilat IOL    Dental disorder     upper    Hemorrhagic disorder (HCC)     Hypercholesterolemia     Hypertension     Snoring     Stroke (HCC) 2013    tia no residual def       Past Surgical History:   Procedure Laterality Date    [place] ERCP,DIAGNOSTIC N/A 04/20/2019    Procedure: ERCP, DIAGNOSTIC;  Surgeon: Antony Morrison M.D.;  Location: [facility];  Service: Gastroenterology    FRANCIA BY LAPAROSCOPY  04/19/2019    Procedure: LAPARASCOPIC- CONVERTED TO OPEN CHOLECYSTECTOMY;  Surgeon: Anita Merino M.D.;  Location: [facility];  Service: General    CATARACT PHACO WITH IOL Right 08/28/2018    Procedure: CATARACT PHACO WITH IOL;  Surgeon: Adolfo Santana M.D.;  Location: SURGERY SAME DAY [facility];  Service: Ophthalmology    CATARACT PHACO WITH IOL Left 08/14/2018    Procedure: CATARACT PHACO WITH IOL;  Surgeon: Adolfo Santana M.D.;  Location: SURGERY SAME DAY [facility];  Service: Ophthalmology    HERNIA REPAIR, INCISIONAL, UNILATERAL Left     OTHER       tonsils as a child    OTHER CARDIAC SURGERY  11-9-22    Watchman procedure    TONSILLECTOMY         Social History     Socioeconomic History    Marital status:      Spouse name: Not on file    Number of children: Not on file    Years of education: Not on file    Highest education level: Not on file   Occupational History    Not on file   Tobacco Use    Smoking status: Never    Smokeless tobacco: Never   Vaping Use    Vaping Use: Never used   Substance and Sexual Activity    Alcohol use: Not Currently    Drug use: Never    Sexual activity: Not on file   Other Topics Concern    Not on file   Social History Narrative    Not on file     Social Determinants of Health     Financial Resource Strain: Low Risk  (5/18/2021)    Overall Financial Resource Strain (CARDIA)     Difficulty of Paying Living Expenses: Not hard at all   Food Insecurity: No Food Insecurity (5/18/2021)    Hunger Vital Sign     Worried About Running Out of Food in the Last Year: Never true     Ran Out of Food in the Last Year: Never true   Transportation Needs: No Transportation Needs (5/18/2021)    PRAPARE - Transportation     Lack of Transportation (Medical): No     Lack of Transportation (Non-Medical): No   Physical Activity: Not on file   Stress: Not on file   Social Connections: Not on file   Intimate Partner Violence: Not on file   Housing Stability: Not on file       Family History   Problem Relation Age of Onset    Dementia Mother     Cancer Mother         Breast    Cancer Brother         Esophageal       Allergies   Allergen Reactions    Other Environmental Unspecified     Seasonal allergies=rabbit brush       Current Outpatient Medications   Medication Sig Dispense Refill    dronedarone (MULTAQ) 400 MG Tab Take 1 Tablet by mouth 2 times a day with meals. 60 Tablet 5    aspirin 81 MG EC tablet Take 1 Tablet by mouth every day. 100 Tablet 5    metoprolol SR (TOPROL XL) 100 MG TABLET SR 24 HR TAKE ONE TABLET BY MOUTH EVERY   "Tablet 3    vitamin e (VITAMIN E) 400 UNIT Cap Take 400 Units by mouth every day.      CALCIUM PO Take 1 Tablet by mouth every day.      Multiple Vitamins-Minerals (PRESERVISION AREDS 2 PO) Take 1 Tablet by mouth every day.      Cyanocobalamin (B-12 PO) Take 1 Tablet by mouth every day. 1000 mcg      ferrous sulfate 325 (65 Fe) MG tablet Take 1 Tablet by mouth every day.      losartan (COZAAR) 25 MG Tab Take 1 Tablet by mouth every day.  3    amLODIPine (NORVASC) 5 MG Tab Take 1 Tablet by mouth every day.       No current facility-administered medications for this visit.       Physical Exam:  Vitals:    05/22/23 0936   BP: 110/60   BP Location: Left arm   Patient Position: Sitting   BP Cuff Size: Adult   Pulse: 71   Resp: 14   SpO2: 98%   Weight: 79.4 kg (175 lb)   Height: 1.676 m (5' 6\")     General appearance: NAD, conversant  HEENT: PERRL, neck is supple with FROM  Lungs: Clear to auscultation, normal respiratory effort  CV: RRR, no murmurs/rubs/gallops, no JVD  Abdomen: Soft, non-tender with normal bowel sounds  Extremities: No peripheral edema, no clubbing or cyanosis  Skin: No rash, lesions, or ulcers  Psych: Alert and oriented to person, place and time    Data:  Labs reviewed    Prior echo/stress reviewed:  Stable WATCHMAN position    EKG interpreted by me:  Sinus, mild first degree AV block    Impression/Plan:  1. Paroxysmal atrial fibrillation (HCC)  EKG    dronedarone (MULTAQ) 400 MG Tab    aspirin 81 MG EC tablet        -Come off OAC, switch to ASA 81  -Trial of Multaq  -Pauses are short, asymptomatic, reduce Toprol to 50    Mary Brown MD    "

## 2023-06-06 ENCOUNTER — NON-PROVIDER VISIT (OUTPATIENT)
Dept: CARDIOLOGY | Facility: MEDICAL CENTER | Age: 83
End: 2023-06-06
Payer: MEDICARE

## 2023-06-06 PROCEDURE — 93298 REM INTERROG DEV EVAL SCRMS: CPT | Performed by: INTERNAL MEDICINE

## 2023-06-07 NOTE — CARDIAC REMOTE MONITOR - SCAN
Device transmission reviewed. Device demonstrated appropriate function.       Electronically Signed by: Cl Valdez M.D.    6/10/2023  10:36 AM

## 2023-06-08 ENCOUNTER — APPOINTMENT (OUTPATIENT)
Dept: RADIOLOGY | Facility: MEDICAL CENTER | Age: 83
DRG: 682 | End: 2023-06-08
Attending: EMERGENCY MEDICINE
Payer: MEDICARE

## 2023-06-08 ENCOUNTER — HOSPITAL ENCOUNTER (INPATIENT)
Facility: MEDICAL CENTER | Age: 83
LOS: 1 days | DRG: 682 | End: 2023-06-10
Attending: EMERGENCY MEDICINE | Admitting: HOSPITALIST
Payer: MEDICARE

## 2023-06-08 ENCOUNTER — TELEPHONE (OUTPATIENT)
Dept: CARDIOLOGY | Facility: MEDICAL CENTER | Age: 83
End: 2023-06-08

## 2023-06-08 ENCOUNTER — APPOINTMENT (OUTPATIENT)
Dept: RADIOLOGY | Facility: MEDICAL CENTER | Age: 83
DRG: 682 | End: 2023-06-08
Payer: MEDICARE

## 2023-06-08 DIAGNOSIS — I71.40 ABDOMINAL AORTIC ANEURYSM (AAA) WITHOUT RUPTURE, UNSPECIFIED PART (HCC): ICD-10-CM

## 2023-06-08 DIAGNOSIS — F03.90 DEMENTIA WITHOUT BEHAVIORAL DISTURBANCE (HCC): ICD-10-CM

## 2023-06-08 DIAGNOSIS — I48.0 PAROXYSMAL ATRIAL FIBRILLATION (HCC): ICD-10-CM

## 2023-06-08 DIAGNOSIS — R55 SYNCOPE, UNSPECIFIED SYNCOPE TYPE: ICD-10-CM

## 2023-06-08 DIAGNOSIS — N17.9 AKI (ACUTE KIDNEY INJURY) (HCC): ICD-10-CM

## 2023-06-08 DIAGNOSIS — R79.89 ELEVATED TROPONIN: ICD-10-CM

## 2023-06-08 LAB
ALBUMIN SERPL BCP-MCNC: 4.7 G/DL (ref 3.2–4.9)
ALBUMIN/GLOB SERPL: 1.6 G/DL
ALP SERPL-CCNC: 134 U/L (ref 30–99)
ALT SERPL-CCNC: 10 U/L (ref 2–50)
ANION GAP SERPL CALC-SCNC: 13 MMOL/L (ref 7–16)
ANISOCYTOSIS BLD QL SMEAR: ABNORMAL
AST SERPL-CCNC: 16 U/L (ref 12–45)
BASOPHILS # BLD AUTO: 0 % (ref 0–1.8)
BASOPHILS # BLD: 0 K/UL (ref 0–0.12)
BILIRUB SERPL-MCNC: 1.3 MG/DL (ref 0.1–1.5)
BUN SERPL-MCNC: 28 MG/DL (ref 8–22)
CALCIUM ALBUM COR SERPL-MCNC: 9.3 MG/DL (ref 8.5–10.5)
CALCIUM SERPL-MCNC: 9.9 MG/DL (ref 8.4–10.2)
CHLORIDE SERPL-SCNC: 108 MMOL/L (ref 96–112)
CO2 SERPL-SCNC: 20 MMOL/L (ref 20–33)
CREAT SERPL-MCNC: 1.67 MG/DL (ref 0.5–1.4)
EKG IMPRESSION: NORMAL
EOSINOPHIL # BLD AUTO: 0.11 K/UL (ref 0–0.51)
EOSINOPHIL NFR BLD: 1 % (ref 0–6.9)
ERYTHROCYTE [DISTWIDTH] IN BLOOD BY AUTOMATED COUNT: 56.7 FL (ref 35.9–50)
GFR SERPLBLD CREATININE-BSD FMLA CKD-EPI: 40 ML/MIN/1.73 M 2
GLOBULIN SER CALC-MCNC: 3 G/DL (ref 1.9–3.5)
GLUCOSE SERPL-MCNC: 128 MG/DL (ref 65–99)
HCT VFR BLD AUTO: 46.9 % (ref 42–52)
HGB BLD-MCNC: 14.9 G/DL (ref 14–18)
LYMPHOCYTES # BLD AUTO: 1.82 K/UL (ref 1–4.8)
LYMPHOCYTES NFR BLD: 16 % (ref 22–41)
MACROCYTES BLD QL SMEAR: ABNORMAL
MANUAL DIFF BLD: NORMAL
MCH RBC QN AUTO: 30.4 PG (ref 27–33)
MCHC RBC AUTO-ENTMCNC: 31.8 G/DL (ref 32.3–36.5)
MCV RBC AUTO: 95.7 FL (ref 81.4–97.8)
MONOCYTES # BLD AUTO: 0.57 K/UL (ref 0–0.85)
MONOCYTES NFR BLD AUTO: 5 % (ref 0–13.4)
NEUTROPHILS # BLD AUTO: 8.89 K/UL (ref 1.82–7.42)
NEUTROPHILS NFR BLD: 73 % (ref 44–72)
NEUTS BAND NFR BLD MANUAL: 5 % (ref 0–10)
NRBC # BLD AUTO: 0 K/UL
NRBC BLD-RTO: 0 /100 WBC (ref 0–0.2)
PLATELET # BLD AUTO: 138 K/UL (ref 164–446)
PLATELET BLD QL SMEAR: NORMAL
PMV BLD AUTO: 10.4 FL (ref 9–12.9)
POLYCHROMASIA BLD QL SMEAR: NORMAL
POTASSIUM SERPL-SCNC: 4.6 MMOL/L (ref 3.6–5.5)
PROT SERPL-MCNC: 7.7 G/DL (ref 6–8.2)
RBC # BLD AUTO: 4.9 M/UL (ref 4.7–6.1)
RBC BLD AUTO: PRESENT
SODIUM SERPL-SCNC: 141 MMOL/L (ref 135–145)
TROPONIN T SERPL-MCNC: 20 NG/L (ref 6–19)
TROPONIN T SERPL-MCNC: 22 NG/L (ref 6–19)
TROPONIN T SERPL-MCNC: 28 NG/L (ref 6–19)
VARIANT LYMPHS BLD QL SMEAR: NORMAL
WBC # BLD AUTO: 11.4 K/UL (ref 4.8–10.8)

## 2023-06-08 PROCEDURE — 93005 ELECTROCARDIOGRAM TRACING: CPT | Performed by: EMERGENCY MEDICINE

## 2023-06-08 PROCEDURE — G0378 HOSPITAL OBSERVATION PER HR: HCPCS

## 2023-06-08 PROCEDURE — 85025 COMPLETE CBC W/AUTO DIFF WBC: CPT

## 2023-06-08 PROCEDURE — 36415 COLL VENOUS BLD VENIPUNCTURE: CPT

## 2023-06-08 PROCEDURE — A9270 NON-COVERED ITEM OR SERVICE: HCPCS | Performed by: HOSPITALIST

## 2023-06-08 PROCEDURE — 80053 COMPREHEN METABOLIC PANEL: CPT

## 2023-06-08 PROCEDURE — 94760 N-INVAS EAR/PLS OXIMETRY 1: CPT

## 2023-06-08 PROCEDURE — 93005 ELECTROCARDIOGRAM TRACING: CPT

## 2023-06-08 PROCEDURE — 85007 BL SMEAR W/DIFF WBC COUNT: CPT

## 2023-06-08 PROCEDURE — 700105 HCHG RX REV CODE 258: Performed by: HOSPITALIST

## 2023-06-08 PROCEDURE — 99285 EMERGENCY DEPT VISIT HI MDM: CPT

## 2023-06-08 PROCEDURE — 700102 HCHG RX REV CODE 250 W/ 637 OVERRIDE(OP): Performed by: HOSPITALIST

## 2023-06-08 PROCEDURE — 84484 ASSAY OF TROPONIN QUANT: CPT

## 2023-06-08 PROCEDURE — 700105 HCHG RX REV CODE 258

## 2023-06-08 PROCEDURE — 99223 1ST HOSP IP/OBS HIGH 75: CPT | Mod: AI | Performed by: HOSPITALIST

## 2023-06-08 PROCEDURE — 71045 X-RAY EXAM CHEST 1 VIEW: CPT

## 2023-06-08 RX ORDER — FERROUS SULFATE 325(65) MG
325 TABLET ORAL EVERY EVENING
Status: DISCONTINUED | OUTPATIENT
Start: 2023-06-08 | End: 2023-06-10 | Stop reason: HOSPADM

## 2023-06-08 RX ORDER — AMLODIPINE BESYLATE 5 MG/1
5 TABLET ORAL DAILY
Status: DISCONTINUED | OUTPATIENT
Start: 2023-06-09 | End: 2023-06-10 | Stop reason: HOSPADM

## 2023-06-08 RX ORDER — BISACODYL 10 MG
10 SUPPOSITORY, RECTAL RECTAL
Status: DISCONTINUED | OUTPATIENT
Start: 2023-06-08 | End: 2023-06-10 | Stop reason: HOSPADM

## 2023-06-08 RX ORDER — SODIUM CHLORIDE 9 MG/ML
1000 INJECTION, SOLUTION INTRAVENOUS ONCE
Status: COMPLETED | OUTPATIENT
Start: 2023-06-08 | End: 2023-06-08

## 2023-06-08 RX ORDER — SODIUM CHLORIDE 9 MG/ML
INJECTION, SOLUTION INTRAVENOUS CONTINUOUS
Status: DISCONTINUED | OUTPATIENT
Start: 2023-06-08 | End: 2023-06-10

## 2023-06-08 RX ORDER — ACETAMINOPHEN 325 MG/1
650 TABLET ORAL EVERY 6 HOURS PRN
Status: DISCONTINUED | OUTPATIENT
Start: 2023-06-08 | End: 2023-06-10 | Stop reason: HOSPADM

## 2023-06-08 RX ORDER — ONDANSETRON 4 MG/1
4 TABLET, ORALLY DISINTEGRATING ORAL EVERY 4 HOURS PRN
Status: DISCONTINUED | OUTPATIENT
Start: 2023-06-08 | End: 2023-06-10 | Stop reason: HOSPADM

## 2023-06-08 RX ORDER — METOPROLOL SUCCINATE 25 MG/1
50 TABLET, EXTENDED RELEASE ORAL DAILY
Status: DISCONTINUED | OUTPATIENT
Start: 2023-06-09 | End: 2023-06-10 | Stop reason: HOSPADM

## 2023-06-08 RX ORDER — AMOXICILLIN 250 MG
2 CAPSULE ORAL 2 TIMES DAILY
Status: DISCONTINUED | OUTPATIENT
Start: 2023-06-08 | End: 2023-06-10 | Stop reason: HOSPADM

## 2023-06-08 RX ORDER — LORATADINE 10 MG/1
10 TABLET ORAL
COMMUNITY

## 2023-06-08 RX ORDER — ONDANSETRON 2 MG/ML
4 INJECTION INTRAMUSCULAR; INTRAVENOUS EVERY 4 HOURS PRN
Status: DISCONTINUED | OUTPATIENT
Start: 2023-06-08 | End: 2023-06-10 | Stop reason: HOSPADM

## 2023-06-08 RX ORDER — POLYETHYLENE GLYCOL 3350 17 G/17G
1 POWDER, FOR SOLUTION ORAL
Status: DISCONTINUED | OUTPATIENT
Start: 2023-06-08 | End: 2023-06-10 | Stop reason: HOSPADM

## 2023-06-08 RX ORDER — ASPIRIN 81 MG/1
81 TABLET ORAL DAILY
Status: DISCONTINUED | OUTPATIENT
Start: 2023-06-09 | End: 2023-06-10 | Stop reason: HOSPADM

## 2023-06-08 RX ADMIN — SODIUM CHLORIDE: 9 INJECTION, SOLUTION INTRAVENOUS at 15:15

## 2023-06-08 RX ADMIN — DRONEDARONE 400 MG: 400 TABLET, FILM COATED ORAL at 17:46

## 2023-06-08 RX ADMIN — FERROUS SULFATE TAB 325 MG (65 MG ELEMENTAL FE) 325 MG: 325 (65 FE) TAB at 17:46

## 2023-06-08 RX ADMIN — SODIUM CHLORIDE 1000 ML: 9 INJECTION, SOLUTION INTRAVENOUS at 12:28

## 2023-06-08 ASSESSMENT — ENCOUNTER SYMPTOMS
EYE DISCHARGE: 0
MYALGIAS: 0
DIZZINESS: 1
BRUISES/BLEEDS EASILY: 0
NERVOUS/ANXIOUS: 0
COUGH: 0
FLANK PAIN: 0
FOCAL WEAKNESS: 0
EYE REDNESS: 0
SHORTNESS OF BREATH: 0
VOMITING: 0
ABDOMINAL PAIN: 0
STRIDOR: 0
CHILLS: 0
FEVER: 0

## 2023-06-08 ASSESSMENT — COGNITIVE AND FUNCTIONAL STATUS - GENERAL
SUGGESTED CMS G CODE MODIFIER MOBILITY: CH
MOBILITY SCORE: 24
DAILY ACTIVITIY SCORE: 24
SUGGESTED CMS G CODE MODIFIER DAILY ACTIVITY: CH

## 2023-06-08 ASSESSMENT — LIFESTYLE VARIABLES
TOTAL SCORE: 0
EVER FELT BAD OR GUILTY ABOUT YOUR DRINKING: NO
HAVE YOU EVER FELT YOU SHOULD CUT DOWN ON YOUR DRINKING: NO
TOTAL SCORE: 0
TOTAL SCORE: 0
CONSUMPTION TOTAL: NEGATIVE
HAVE PEOPLE ANNOYED YOU BY CRITICIZING YOUR DRINKING: NO
ON A TYPICAL DAY WHEN YOU DRINK ALCOHOL HOW MANY DRINKS DO YOU HAVE: 0
EVER HAD A DRINK FIRST THING IN THE MORNING TO STEADY YOUR NERVES TO GET RID OF A HANGOVER: NO
HOW MANY TIMES IN THE PAST YEAR HAVE YOU HAD 5 OR MORE DRINKS IN A DAY: 0
AVERAGE NUMBER OF DAYS PER WEEK YOU HAVE A DRINK CONTAINING ALCOHOL: 1
ALCOHOL_USE: NO

## 2023-06-08 ASSESSMENT — PAIN DESCRIPTION - PAIN TYPE: TYPE: ACUTE PAIN

## 2023-06-08 ASSESSMENT — FIBROSIS 4 INDEX
FIB4 SCORE: 2.39
FIB4 SCORE: 3.01

## 2023-06-08 NOTE — ED NOTES
Loop recorder interrogation completed. Jasmina from [facility] called for analysis, will call back once reviewed.

## 2023-06-08 NOTE — ED PROVIDER NOTES
ER Provider Note    Scribed for Niko Smith M.D. by Jenifer Altman. 6/8/2023   12:18 PM    Primary Care Provider: Juan Hutton M.D.    CHIEF COMPLAINT  Chief Complaint   Patient presents with    ALOC     He was outside today talking to a , he suddenly got really hot and almost passed out, caught by daughter. He did not fall to the ground.  Daughter also states he has a momentary blank stare and could not respond, now he is weak but normal. Paramedics did come but they released him.     EXTERNAL RECORDS REVIEWED  Inpatient Notes   and Outpatient Notes      HPI/ROS  LIMITATION TO HISTORY   Select: : None  OUTSIDE HISTORIAN(S):  Family      Freeman Frost is a 82 y.o. male with a history of arrhythmias and atrial fibrillations presents to the ED for evaluation of altered level of consciousness onset prior to arrival. He notes feeling normal yesterday. However, as he was outside today talking to a , when he suddenly got really hot and almost passed out. His daughter notes he lost control of his bowels during his episode of near loss of consciousness. EMS was called and evaluated the patient. Patient was then presented here to the ED today. Patient is currently laying in gurney with a normal sinus rhythm. Patient has associated weakness. He notes he was dizzy earlier but has since alleviated. Patient denies any falls. No alleviating or exacerbating factors were noted. Patient notes he was recently taken off Eliquis and has been taking [place] for the last 2 weeks due to his current a-fib changes. Daughter notes that this could be contributing to his symptoms. His daughter states that his heart typically stops in the night time, which his Cardiologist said was okay, but has worsened to the daytime. He notes he has not been checking blood pressure lately although he is instructed to. Patient has medical history of anemia, hypertension and stroke.    PAST MEDICAL HISTORY  Past Medical History:    Diagnosis Date    Anemia     Anesthesia     fam hx, daughter stopped breathing    Arrhythmia     afib    Atrial fibrillation (HCC)     Bowel habit changes     constipation    Cataract     bilat IOL    Dental disorder     upper    Hemorrhagic disorder (HCC)     Hypercholesterolemia     Hypertension     Snoring     Stroke (HCC) 2013    tia no residual def       SURGICAL HISTORY  Past Surgical History:   Procedure Laterality Date    [place] ERCP,DIAGNOST[place] N/A 04/20/2019    Procedure: ERCP, DIAGNOSTIC;  Surgeon: Antony Morrison M.D.;  Location: SURGERY [facility];  Service: Gastroenterology    FRANCIA BY LAPAROSCOPY  04/19/2019    Procedure: LAPARASCOPIC- CONVERTED TO OPEN CHOLECYSTECTOMY;  Surgeon: Anita Merino M.D.;  Location: SURGERY [facility];  Service: General    CATARACT PHACO WITH IOL Right 08/28/2018    Procedure: CATARACT PHACO WITH IOL;  Surgeon: Adolfo Santana M.D.;  Location: SURGERY SAME DAY [facility];  Service: Ophthalmology    CATARACT PHACO WITH IOL Left 08/14/2018    Procedure: CATARACT PHACO WITH IOL;  Surgeon: Adolfo Santana M.D.;  Location: SURGERY SAME DAY [facility];  Service: Ophthalmology    HERNIA REPAIR, INCISIONAL, UNILATERAL Left     OTHER      tonsils as a child    OTHER CARDIAC SURGERY  11-9-22    Watchman procedure    TONSILLECTOMY         FAMILY HISTORY  Family History   Problem Relation Age of Onset    Dementia Mother     Cancer Mother         Breast    Cancer Brother         Esophageal       SOCIAL HISTORY   reports that he has never smoked. He has never used smokeless tobacco. He reports that he does not currently use alcohol. He reports that he does not use drugs.    CURRENT MEDICATIONS  Previous Medications    AMLODIPINE (NORVASC) 5 MG TAB    Take 1 Tablet by mouth every day.    ASPIRIN 81 MG EC TABLET    Take 1 Tablet by mouth every day.    CALCIUM PO    Take 1 Tablet by mouth every day.    CYANOCOBALAMIN (B-12 PO)    Take 1 Tablet by mouth every day.  "1000 mcg    DRONEDARONE (MULTAQ) 400 MG TAB    Take 1 Tablet by mouth 2 times a day with meals.    FERROUS SULFATE 325 (65 FE) MG TABLET    Take 1 Tablet by mouth every day.    LOSARTAN (COZAAR) 25 MG TAB    Take 1 Tablet by mouth every day.    METOPROLOL SR (TOPROL XL) 50 MG TABLET SR 24 HR    Take 1 Tablet by mouth every day.    MULTIPLE VITAMINS-MINERALS (PRESERVISION AREDS 2 PO)    Take 1 Tablet by mouth every day.    VITAMIN E (VITAMIN E) 400 UNIT CAP    Take 400 Units by mouth every day.       ALLERGIES  Allergies   Allergen Reactions    Other Environmental Unspecified     Seasonal allergies=rabbit brush        PHYSICAL EXAM  BP 93/63   Pulse 85   Temp 36.7 °C (98 °F) (Temporal)   Resp 16   Ht 1.753 m (5' 9\")   Wt 77.1 kg (170 lb)   SpO2 95%   BMI 25.10 kg/m²    Nursing note and vitals reviewed.  Constitutional: Well-developed and well-nourished. No distress.   HENT: Head is normocephalic and atraumatic. Oropharynx is clear and moist without exudate or erythema.   Eyes: Pupils are equal, round, and reactive to light. Conjunctiva are normal.   Cardiovascular: Normal rate and regular rhythm. No murmur heard. Normal radial pulses.   Pulmonary/Chest: Breath sounds normal. No wheezes or rales. No chest wall tenderness.   Abdominal: Soft and non-tender. No distention   Musculoskeletal: Extremities exhibit normal range of motion without edema or tenderness. No calf tenderness or palpable cords.   Neurological: Awake, alert and oriented to person, place, and time. No focal deficits noted.  Skin: Skin is warm and dry. No rash.   Psychiatric: Normal mood and affect. Appropriate for clinical situation     DIAGNOSTIC STUDIES    Labs:   Results for orders placed or performed during the hospital encounter of 06/08/23   CBC with Differential   Result Value Ref Range    WBC 11.4 (H) 4.8 - 10.8 K/uL    RBC 4.90 4.70 - 6.10 M/uL    Hemoglobin 14.9 14.0 - 18.0 g/dL    Hematocrit 46.9 42.0 - 52.0 %    MCV 95.7 81.4 - 97.8 " fL    MCH 30.4 27.0 - 33.0 pg    MCHC 31.8 (L) 32.3 - 36.5 g/dL    RDW 56.7 (H) 35.9 - 50.0 fL    Platelet Count 138 (L) 164 - 446 K/uL    MPV 10.4 9.0 - 12.9 fL    Neutrophils-Polys 73.00 (H) 44.00 - 72.00 %    Lymphocytes 16.00 (L) 22.00 - 41.00 %    Monocytes 5.00 0.00 - 13.40 %    Eosinophils 1.00 0.00 - 6.90 %    Basophils 0.00 0.00 - 1.80 %    Nucleated RBC 0.00 0.00 - 0.20 /100 WBC    Neutrophils (Absolute) 8.89 (H) 1.82 - 7.42 K/uL    Lymphs (Absolute) 1.82 1.00 - 4.80 K/uL    Monos (Absolute) 0.57 0.00 - 0.85 K/uL    Eos (Absolute) 0.11 0.00 - 0.51 K/uL    Baso (Absolute) 0.00 0.00 - 0.12 K/uL    NRBC (Absolute) 0.00 K/uL    Anisocytosis 1+     Macrocytosis 1+    Complete Metabolic Panel (CMP)   Result Value Ref Range    Sodium 141 135 - 145 mmol/L    Potassium 4.6 3.6 - 5.5 mmol/L    Chloride 108 96 - 112 mmol/L    Co2 20 20 - 33 mmol/L    Anion Gap 13.0 7.0 - 16.0    Glucose 128 (H) 65 - 99 mg/dL    Bun 28 (H) 8 - 22 mg/dL    Creatinine 1.67 (H) 0.50 - 1.40 mg/dL    Calcium 9.9 8.4 - 10.2 mg/dL    AST(SGOT) 16 12 - 45 U/L    ALT(SGPT) 10 2 - 50 U/L    Alkaline Phosphatase 134 (H) 30 - 99 U/L    Total Bilirubin 1.3 0.1 - 1.5 mg/dL    Albumin 4.7 3.2 - 4.9 g/dL    Total Protein 7.7 6.0 - 8.2 g/dL    Globulin 3.0 1.9 - 3.5 g/dL    A-G Ratio 1.6 g/dL   Troponins NOW   Result Value Ref Range    Troponin T 28 (H) 6 - 19 ng/L   CORRECTED CALCIUM   Result Value Ref Range    Correct Calcium 9.3 8.5 - 10.5 mg/dL   ESTIMATED GFR   Result Value Ref Range    GFR (CKD-EPI) 40 (A) >60 mL/min/1.73 m 2   DIFFERENTIAL MANUAL   Result Value Ref Range    Bands-Stabs 5.00 0.00 - 10.00 %    Manual Diff Status PERFORMED    PLATELET ESTIMATE   Result Value Ref Range    Plt Estimation Decreased    MORPHOLOGY   Result Value Ref Range    RBC Morphology Present     Polychromia 1+     Reactive Lymphocytes Few    EKG   Result Value Ref Range    Report       [facility] Emergency Dept.    Test Date:   2023  Pt Name:    ANKITA DAVID                 Department: [facility]  MRN:        [id number]                      Room:  Gender:     Male                         Technician: [id number] jai  :        [id number]                   Requested By:ER TRIAGE PROTOCOL  Order #:    [id number]                    Reading MD: ORI HUNG MD    Measurements  Intervals                                Axis  Rate:       83                           P:          -14  MA:         210                          QRS:        -49  QRSD:       86                           T:          19  QT:         357  QTc:        420    Interpretive Statements  Sinus rhythm  RSR' in V1 or V2, probably normal variant  Inferior infarct, old  Compared to ECG 2023 09:40:25  RSR' in V1 or V2 now present  Myocardial infarct finding now present  First degree AV block no longer present  Electronically Signed On 2023 12:14:42 PDT by ORI HUNG MD         EKG:   I have independently interpreted this EKG as detailed above.      Radiology:   This attending emergency physician has independently interpreted the diagnostic imaging associated with this visit and is awaiting the final reading from the radiologist.   Preliminary interpretation is a follows: cardiomegaly    Radiologist interpretation:   DX-CHEST-PORTABLE (1 VIEW)   Final Result      Cardiomegaly.           INITIAL ASSESSMENT AND PLAN    12:18 PM - Patient was evaluated at bedside. Patient with a history of arrhythmia and a-fib presents to the ED for altered level of consciousness prior to arrival. Patient is currently laying in gurney with a normal sinus rhythm. After my exam, I discussed with the patient the plan of care, which includes treating the patient with medication for their symptoms, as well as obtaining lab work and imaging for further evaluation. Patient understands and verbalizes agreement to plan of care.  Ordered for DX-chest, EKG, corrected calcium, estimated GFR, CBC w/ diff,  CMP, and troponins NOW to evaluate. The patient will be medicated with NS 1,000 mL for his symptoms. Patient verbalizes understanding and support with my plan of care.  Differential diagnoses include but not limited to: Arrhythmia vs Vasovagal syncvome vs anemia vs electrolyte abnormality     ED Observation Status? Yes; I am placing the patient in to an observation status due to a diagnostic uncertainty as well as therapeutic intensity. Patient placed in observation status at 12:30 PM, 6/8/2023.     Observation plan is as follows: Diagnostic testing for further evaluation     COURSE AND MEDICAL DECISION MAKING    Patient presents today after an episode of syncope.  Has a history of similar.  Has a loop recorder.  I spoke with the cardiologist.  They are arranging for the loop recorder to be interrogated regarding this episode today.    1:46 PM as of this time the results of the loop recorder interrogation remain pending.     1:48 PM I spoke with the [employer] tech.  Review of the recorder from today does not demonstrate any significant arrhythmia around the time that the patient had this episode.    HYDRATION: Based on the patient's presentation of A[place] MAY the patient was given IV fluids. IV Hydration was used because oral hydration was not adequate alone. Upon recheck following hydration, the patient was improved.  HTN/IDDM FOLLOW UP:  The patient has known hypertension and is being followed by their primary care doctor    DISPOSITION AND DISCUSSIONS    I have discussed management of the patient with the following physicians and NEWTON's: Dr. Davis (hospitalist), Dr. Dave (cardiology)    Discussion of management with other [facility] [place] or appropriate source(s): None         FINAL DIAGNOSIS  1. Syncope, unspecified syncope type    2. DAKOTAH (acute kidney injury) (HCC)    3. Elevated troponin    4. Paroxysmal atrial fibrillation (HCC)         I, Jenifer Altman (Scribe), am scribing for, and in the presence of, Niko Smith,  M.D..    Electronically signed by: Jenifer Altman (Scribe), 6/8/2023    INiko M.D. personally performed the services described in this documentation, as scribed by Jenifer Altman in my presence, and it is both accurate and complete.      The note accurately reflects work and decisions made by me.  Niko Smith M.D.  6/8/2023  1:51 PM

## 2023-06-08 NOTE — TELEPHONE ENCOUNTER
SS    Caller: Jacob wheatley  Topic/issue: Wanting to inform provider that she will be taking Eduin Frost to the ED due to nearly passing out and having low BP. Jacob ayden's Eduin's my-chart, mentioned ok to respond via my- chart if easier.    Callback Number:[phone]    Thank You  Yuly BRISENO

## 2023-06-08 NOTE — PROGRESS NOTES
Brief Cardiology Note:    I was called to discuss this patients care with Dr. Smith. We discussed Mr. Frost presentation to the emergency room with altered level of consciousness.  Per his note, he was outside talking to a  when he suddenly got really hot and almost passed out.  His daughter noticed loss of consciousness.  The patient is an 82-year-old man with history of paroxysmal atrial fibrillation, hypertension, who has a current [employer] Linq loop recorder placed.  He saw Dr. Brown, of electrophysiology in clinic on 5/22/2023.  He also has had a watchman placed.  Per his note, he has had some intermittent pauses but mostly during nocturnal hours with increasing paroxysmal atrial fibrillation burden on the loop recorder.  Per his note, not really symptomatic.  Multaq was started at that time, came off of oral anticoagulation since patient has a Watchman device and is currently on aspirin.  Metoprolol was also decreased to 50 mg p.o. daily.    Loop recorder was interrogated today.  Reviewed with [employer] rep and there has been no events today of any type of bradycardia arrhythmias.  There for symptoms today is not due to a bradycardia arrhythmic event nor tachyarrhythmic event.  Potentially could be due to low blood pressure.    Borderline elevated troponin, 28 initially then 22.  ECG has no acute changes.  Be due to his acute renal insufficiency, creatinine 1.67 up from 1.12.  The patient has had an echocardiogram dated 1/10/2022 where aortic root is mildly dilated at 4.2 cm.  Normal left and right ventricular systolic function.  Mild aortic and mitral regurgitation.    Patient has had a CT scan dated 2/14/2023, mildly enlarged aortic root but measures only 3.8 cm.  Watchman device in the left atrial appendage.  Patient does have extensive coronary calcification.  Patient also had abdominal aortic aneurysm measuring 5.7 cm x 5.6 cm.    Cardiac wise, I think things appear to be stable.  Troponin  likely is more from acute renal insufficiency.  Unless patient is doing endorsing any symptoms, I do not necessarily think any further work-up needs to be done.    Patient does have an abdominal aortic aneurysm based on last CT scan.  Defer to inpatient team for the work-up.    At this time it was deemed no formal in person cardiology consultation was necessary, however if this changes due to changes in patient condition or abnormal test results, please re-consult cardiology.     Electronically Signed by:  Garima Dave    6/8/2023  2:07 PM

## 2023-06-08 NOTE — ASSESSMENT & PLAN NOTE
In the indeterminate range in the setting of acute kidney injury  Denies chest pain.  EKG shows  sinus rhythm with a rate of 83, there is no ST elevation, no significant ST depression, QTc is 420.  Continuous cardiac monitoring, will continue to trend troponin  Stat EKG, troponin for chest pain or worsening shortness of breath   Cardiology, Dr. Dave consulted, appreciate recommendations

## 2023-06-08 NOTE — PROGRESS NOTES
4 Eyes Skin Assessment Completed by Zuleyka Barahona, KASI and KASI Lara.    Head WDL  Ears WDL  Nose WDL  Mouth WDL  Neck WDL  Breast/Chest WDL  Shoulder Blades WDL  Spine WDL  (R) Arm/Elbow/Hand Bruising, hematoma on elbow  (L) Arm/Elbow/Hand Bruising and Scab  Abdomen WDL  Groin WDL  Scrotum/Coccyx/Buttocks WDL  (R) Leg WDL  (L) Leg WDL  (R) Heel/Foot/Toe WDL  (L) Heel/Foot/Toe WDL          Devices In Places Tele Box and Blood Pressure Cuff      Interventions In Place N/A    Possible Skin Injury No    Pictures Uploaded Into Epic N/A  Wound Consult Placed N/A  RN Wound Prevention Protocol Ordered No

## 2023-06-08 NOTE — ED NOTES
Pt had a loop recorder not a pace maker, ERP aware and confirmed through looking at pt charted history.

## 2023-06-08 NOTE — ED NOTES
Pharmacy Medication Reconciliation      ~Medication reconciliation updated and complete per patient & patient family at bedside   ~Allergies have been verified and updated   ~No oral ABX within the last 30 days  ~[facility] home [facility] :  [facility]

## 2023-06-08 NOTE — ED TRIAGE NOTES
Chief Complaint   Patient presents with    ALOC     He was outside today talking to a , he suddenly got really hot and almost passed out, caught by daughter. He did not fall to the ground.  Daughter also states he has a momentary blank stare and could not respond, now he is weak but normal. Paramedics did come but they released him.    EKG called in triage. This pt does have a cardiac history. Per daughter the  stated he did grab his chest during this episode.

## 2023-06-08 NOTE — ED NOTES
Pt states that he began to get hot and dizzy while outside so he went inside where his visitor then assisted him to the ground. Per witness pt started off blankly and was not following commands or answering questions. The pt during the episode also lost control of his bowels. Pt states that he feels back to his baseline at this time but educated not to get up without assistance. Pt call light in reach. .

## 2023-06-08 NOTE — H&P
Hospital Medicine History & Physical Note    Date of Service  6/8/2023    Primary Care Physician  Juan Hutton M.D.    Consultants  Cards, Dr Dave     Code Status  Full Code    Chief Complaint  Chief Complaint   Patient presents with    ALOC     He was outside today talking to a , he suddenly got really hot and almost passed out, caught by daughter. He did not fall to the ground.  Daughter also states he has a momentary blank stare and could not respond, now he is weak but normal. Paramedics did come but they released him.     History of Presenting Illness  Freeman Frost is a 82 y.o. male with a past medical history of primary hypertension and atrial fibrillation s/p Watchman device placement who presented 6/8/2023 with sudden loss of consciousness.  Patient was outdoors talking to a  when he suddenly passed out.  The patient did not hit the floor he was called by the daughter.  Patient did have generalized weakness and was feeling dizzy prior to the event.  The patient follows with cardiology and was recently started on dronedarone about 2 weeks ago.     I discussed the plan of care with emergency department physician, the patient and patient family present at bedside in the emergency room.     Review of Systems  Review of Systems   Constitutional:  Positive for malaise/fatigue. Negative for chills and fever.   Eyes:  Negative for discharge and redness.   Respiratory:  Negative for cough, shortness of breath and stridor.    Cardiovascular:  Negative for chest pain and leg swelling.        Syncope   Gastrointestinal:  Negative for abdominal pain and vomiting.   Genitourinary:  Negative for flank pain.   Musculoskeletal:  Negative for myalgias.   Skin: Negative.    Neurological:  Positive for dizziness. Negative for focal weakness.   Endo/Heme/Allergies:  Does not bruise/bleed easily.   Psychiatric/Behavioral:  The patient is not nervous/anxious.      Past Medical History   has a past medical  history of Anemia, Anesthesia, Arrhythmia, Atrial fibrillation (HCC), Bowel habit changes, Cataract, Dental disorder, Hemorrhagic disorder (HCC), Hypercholesterolemia, Hypertension, Snoring, and Stroke (HCC) (2013).    Surgical History   has a past surgical history that includes other; cataract phaco with iol (Left, 08/14/2018); cataract phaco with iol (Right, 08/28/2018); hernia repair, incisional, unilateral (Left); tonsillectomy; sheryl by laparoscopy (04/19/2019); pr ercp,diagnostic (N/A, 04/20/2019); and other cardiac surgery (11-9-22).     Family History  family history includes Cancer in his brother and mother; Dementia in his mother.      Social History   reports that he has never smoked. He has never used smokeless tobacco. He reports current alcohol use of about 0.6 oz of alcohol per week. He reports that he does not use drugs.    Allergies  Allergies   Allergen Reactions    Other Environmental Unspecified     Seasonal allergies=rabbit brush     Medications  Prior to Admission Medications   Prescriptions Last Dose Informant Patient Reported? Taking?   Ascorbic Acid (VITAMIN C PO) 6/7/2023 at PM Family Member Yes Yes   Sig: Take 1 Tablet by mouth every evening.   Cyanocobalamin (B-12 PO) 6/7/2023 at PM Family Member Yes No   Sig: Take 1 Tablet by mouth every evening.   VITAMIN E PO 6/7/2023 at PM Family Member Yes Yes   Sig: Take 1 Capsule by mouth every evening.   amLODIPine (NORVASC) 5 MG Tab 6/8/2023 at 0800 Family Member Yes No   Sig: Take 1 Tablet by mouth every day.   aspirin 81 MG EC tablet 6/8/2023 at 0800 Family Member No No   Sig: Take 1 Tablet by mouth every day.   dronedarone (MULTAQ) 400 MG Tab 6/8/2023 at 0800 Family Member No No   Sig: Take 1 Tablet by mouth 2 times a day with meals.   ferrous sulfate 325 (65 Fe) MG tablet 6/7/2023 at PM Family Member Yes No   Sig: Take 325 mg by mouth every evening.   loratadine (CLARITIN) 10 MG Tab FEW DAYS AGO at PRN Patient Yes Yes   Sig: Take 10 mg by  mouth 1 time a day as needed (Allergy).   losartan (COZAAR) 25 MG Tab 6/8/2023 at 0800 Family Member Yes No   Sig: Take 25 mg by mouth every morning.   metoprolol SR (TOPROL XL) 50 MG TABLET SR 24 HR 6/8/2023 at 0800 Family Member No No   Sig: Take 1 Tablet by mouth every day.      Facility-Administered Medications: None     Physical Exam  Temp:  [36.3 °C (97.3 °F)-36.7 °C (98 °F)] 36.3 °C (97.3 °F)  Pulse:  [70-85] 70  Resp:  [14-19] 16  BP: ()/(63-85) 140/85  SpO2:  [95 %-99 %] 95 %  Blood Pressure : 106/70   Temperature: 36.7 °C (98 °F)   Pulse: 72   Respiration: 19   Pulse Oximetry: 96 %     Physical Exam  Constitutional:       General: He is not in acute distress.     Appearance: He is not ill-appearing or diaphoretic.   HENT:      Head: Atraumatic.      Right Ear: External ear normal.      Left Ear: External ear normal.      Nose: No congestion or rhinorrhea.      Mouth/Throat:      Mouth: Mucous membranes are dry.   Eyes:      General: No scleral icterus.        Right eye: No discharge.         Left eye: No discharge.      Pupils: Pupils are equal, round, and reactive to light.   Cardiovascular:      Rate and Rhythm: Normal rate and regular rhythm.   Pulmonary:      Effort: Pulmonary effort is normal.   Abdominal:      General: There is no distension.   Musculoskeletal:      Cervical back: Neck supple. No rigidity. No muscular tenderness.      Right lower leg: No edema.      Left lower leg: No edema.   Skin:     General: Skin is dry.      Coloration: Skin is not jaundiced or pale.   Neurological:      Mental Status: He is alert and oriented to person, place, and time.      Coordination: Coordination normal.   Psychiatric:         Mood and Affect: Mood normal.         Behavior: Behavior normal.       Laboratory:  Recent Labs     06/08/23  1148   WBC 11.4*   RBC 4.90   HEMOGLOBIN 14.9   HEMATOCRIT 46.9   MCV 95.7   MCH 30.4   MCHC 31.8*   RDW 56.7*   PLATELETCT 138*   MPV 10.4     Recent Labs      06/08/23  1148   SODIUM 141   POTASSIUM 4.6   CHLORIDE 108   CO2 20   GLUCOSE 128*   BUN 28*   CREATININE 1.67*   CALCIUM 9.9     Recent Labs     06/08/23  1148   ALTSGPT 10   ASTSGOT 16   ALKPHOSPHAT 134*   TBILIRUBIN 1.3   GLUCOSE 128*         No results for input(s): NTPROBNP in the last 72 hours.      Recent Labs     06/08/23  1148 06/08/23  1344   TROPONINT 28* 22*     Imaging:  DX-CHEST-PORTABLE (1 VIEW)   Final Result      Cardiomegaly.        I personally reviewed patient EKG which shows sinus rhythm with a rate of 83, there is no ST elevation, no significant ST depression, QTc is 420.    Assessment/Plan:  Justification for Admission Status  I anticipate this patient is appropriate for observation status at this time because likely discharge after 1 midnight    * Syncope and collapse- (present on admission)  Assessment & Plan  EKG shows sinus rhythm with a rate of 83, there is no ST elevation, no significant ST depression, QTc is 420.  Orthostatic vital  Telemetry monitor  Echocardiography   Cardiology, Dr. Dave consulted, appreciate recommendations    DAKOTAH (acute kidney injury) (HCC)- (present on admission)  Assessment & Plan  Mostly due to dehydration   Intravenous fluids  Avoid / minimize nephrotoxins as much as possible. [I will hold home losartan for now]  Monitor inputs and outputs     Elevated troponin- (present on admission)  Assessment & Plan  In the indeterminate range in the setting of acute kidney injury  Denies chest pain.  EKG shows  sinus rhythm with a rate of 83, there is no ST elevation, no significant ST depression, QTc is 420.  Continuous cardiac monitoring, will continue to trend troponin  Stat EKG, troponin for chest pain or worsening shortness of breath   CardiologyDr. Dave consulted, appreciate recommendations     Paroxysmal atrial fibrillation (HCC)- (present on admission)  Assessment & Plan  Resume home metoprolol with hold parameters   Resume home dronedarone   Dr. Jolie Fritz  consulted, appreciate recommendations    Essential hypertension- (present on admission)  Assessment & Plan  Resume home amlodipine and metoprolol with hold parameters   I will hold home losartan for now given his acute kidney injury, consider restarting according to clinical course      VTE prophylaxis: SCDs/TEDs and Xarelto 10 mg daily as prophylaxis

## 2023-06-08 NOTE — ASSESSMENT & PLAN NOTE
Resume home amlodipine and metoprolol with hold parameters   I will hold home losartan for now given his acute kidney injury, consider restarting according to clinical course

## 2023-06-08 NOTE — ASSESSMENT & PLAN NOTE
Resume home metoprolol with hold parameters   Resume home dronedarone   Cardiology, Dr. Dave consulted, appreciate recommendations

## 2023-06-08 NOTE — ASSESSMENT & PLAN NOTE
Per the daughter, patient lost bowel control during the event  History of stroke 13 years ago.  Denies history of seizure or heart attack    EKG shows sinus rhythm with a rate of 83, there is no ST elevation, no significant ST depression, QTc is 420.  Orthostatic vital  Telemetry monitor  Echocardiography   Cardiology, Dr. Dave consulted, appreciate recommendations    I ordered MRI brain with and without contrast  Continue telemetry monitor for possible arrhythmia    I ordered repeated echo  LDL 72  Troponin 20, trending down, no chest pain    Echo 11/2022: aortic root is dilated with a diameter of 4.3 cm.  Thoracic aorta is dilated with diameter 3.3 cm.  Normal EF   never used

## 2023-06-08 NOTE — ASSESSMENT & PLAN NOTE
Mostly due to dehydration   Intravenous fluids  Avoid / minimize nephrotoxins as much as possible. [I will hold home losartan for now]  Monitor inputs and outputs

## 2023-06-09 PROBLEM — Z71.89 ACP (ADVANCE CARE PLANNING): Status: ACTIVE | Noted: 2023-06-09

## 2023-06-09 LAB
ALBUMIN SERPL BCP-MCNC: 3.8 G/DL (ref 3.2–4.9)
ALBUMIN/GLOB SERPL: 1.6 G/DL
ALP SERPL-CCNC: 104 U/L (ref 30–99)
ALT SERPL-CCNC: 9 U/L (ref 2–50)
ANION GAP SERPL CALC-SCNC: 14 MMOL/L (ref 7–16)
AST SERPL-CCNC: 16 U/L (ref 12–45)
BILIRUB SERPL-MCNC: 1.2 MG/DL (ref 0.1–1.5)
BUN SERPL-MCNC: 26 MG/DL (ref 8–22)
CALCIUM ALBUM COR SERPL-MCNC: 8.8 MG/DL (ref 8.5–10.5)
CALCIUM SERPL-MCNC: 8.6 MG/DL (ref 8.4–10.2)
CHLORIDE SERPL-SCNC: 112 MMOL/L (ref 96–112)
CHOLEST SERPL-MCNC: 122 MG/DL (ref 100–199)
CO2 SERPL-SCNC: 17 MMOL/L (ref 20–33)
CREAT SERPL-MCNC: 1.13 MG/DL (ref 0.5–1.4)
ERYTHROCYTE [DISTWIDTH] IN BLOOD BY AUTOMATED COUNT: 56.7 FL (ref 35.9–50)
GFR SERPLBLD CREATININE-BSD FMLA CKD-EPI: 65 ML/MIN/1.73 M 2
GLOBULIN SER CALC-MCNC: 2.4 G/DL (ref 1.9–3.5)
GLUCOSE SERPL-MCNC: 107 MG/DL (ref 65–99)
HCT VFR BLD AUTO: 40.7 % (ref 42–52)
HDLC SERPL-MCNC: 23 MG/DL
HGB BLD-MCNC: 13.1 G/DL (ref 14–18)
LDLC SERPL CALC-MCNC: 72 MG/DL
MAGNESIUM SERPL-MCNC: 2 MG/DL (ref 1.5–2.5)
MCH RBC QN AUTO: 30.5 PG (ref 27–33)
MCHC RBC AUTO-ENTMCNC: 32.2 G/DL (ref 32.3–36.5)
MCV RBC AUTO: 94.9 FL (ref 81.4–97.8)
PLATELET # BLD AUTO: 107 K/UL (ref 164–446)
PMV BLD AUTO: 11.3 FL (ref 9–12.9)
POTASSIUM SERPL-SCNC: 3.8 MMOL/L (ref 3.6–5.5)
PROT SERPL-MCNC: 6.2 G/DL (ref 6–8.2)
RBC # BLD AUTO: 4.29 M/UL (ref 4.7–6.1)
SODIUM SERPL-SCNC: 143 MMOL/L (ref 135–145)
TRIGL SERPL-MCNC: 137 MG/DL (ref 0–149)
WBC # BLD AUTO: 6.9 K/UL (ref 4.8–10.8)

## 2023-06-09 PROCEDURE — 36415 COLL VENOUS BLD VENIPUNCTURE: CPT

## 2023-06-09 PROCEDURE — 770020 HCHG ROOM/CARE - TELE (206)

## 2023-06-09 PROCEDURE — 99497 ADVNCD CARE PLAN 30 MIN: CPT | Performed by: STUDENT IN AN ORGANIZED HEALTH CARE EDUCATION/TRAINING PROGRAM

## 2023-06-09 PROCEDURE — 80061 LIPID PANEL: CPT

## 2023-06-09 PROCEDURE — 700105 HCHG RX REV CODE 258: Performed by: HOSPITALIST

## 2023-06-09 PROCEDURE — 85027 COMPLETE CBC AUTOMATED: CPT

## 2023-06-09 PROCEDURE — 80053 COMPREHEN METABOLIC PANEL: CPT

## 2023-06-09 PROCEDURE — 700102 HCHG RX REV CODE 250 W/ 637 OVERRIDE(OP): Performed by: HOSPITALIST

## 2023-06-09 PROCEDURE — 83735 ASSAY OF MAGNESIUM: CPT

## 2023-06-09 PROCEDURE — 99233 SBSQ HOSP IP/OBS HIGH 50: CPT | Mod: 25 | Performed by: STUDENT IN AN ORGANIZED HEALTH CARE EDUCATION/TRAINING PROGRAM

## 2023-06-09 PROCEDURE — 94760 N-INVAS EAR/PLS OXIMETRY 1: CPT

## 2023-06-09 PROCEDURE — A9270 NON-COVERED ITEM OR SERVICE: HCPCS | Performed by: HOSPITALIST

## 2023-06-09 RX ADMIN — DRONEDARONE 400 MG: 400 TABLET, FILM COATED ORAL at 16:57

## 2023-06-09 RX ADMIN — ASPIRIN 81 MG: 81 TABLET, COATED ORAL at 05:31

## 2023-06-09 RX ADMIN — SENNOSIDES AND DOCUSATE SODIUM 2 TABLET: 50; 8.6 TABLET ORAL at 16:57

## 2023-06-09 RX ADMIN — DRONEDARONE 400 MG: 400 TABLET, FILM COATED ORAL at 08:51

## 2023-06-09 RX ADMIN — AMLODIPINE BESYLATE 5 MG: 5 TABLET ORAL at 05:32

## 2023-06-09 RX ADMIN — METOPROLOL SUCCINATE 50 MG: 25 TABLET, EXTENDED RELEASE ORAL at 05:31

## 2023-06-09 RX ADMIN — SODIUM CHLORIDE: 9 INJECTION, SOLUTION INTRAVENOUS at 03:12

## 2023-06-09 RX ADMIN — RIVAROXABAN 10 MG: 10 TABLET, FILM COATED ORAL at 17:13

## 2023-06-09 RX ADMIN — SODIUM CHLORIDE: 9 INJECTION, SOLUTION INTRAVENOUS at 15:56

## 2023-06-09 RX ADMIN — FERROUS SULFATE TAB 325 MG (65 MG ELEMENTAL FE) 325 MG: 325 (65 FE) TAB at 16:57

## 2023-06-09 NOTE — PROGRESS NOTES
Telemetry shift summary:  Rhythm: SR, SB, 1st degree block     HR Range: 70's up to 140      Measurements from strip printed at 12:09:48   SC: 0.22   QRS 0.08   QT 0.40     Ectopies (As per Telemetry Tech report) Rare PAC and PVC.

## 2023-06-09 NOTE — PROGRESS NOTES
Hospital Medicine Daily Progress Note    Date of Service  6/9/2023    Chief Complaint  Freeman Frost is a 82 y.o. male admitted 6/8/2023 with syncope    Hospital Course  Freeman Frost is a 82 y.o. male with a past medical history of primary hypertension and atrial fibrillation s/p Watchman device placement who presented 6/8/2023 with sudden loss of consciousness.  Patient was outdoors talking to a  when he suddenly passed out.  The patient did not hit the floor he was called by the daughter.  Patient did have generalized weakness and was feeling dizzy prior to the event.  The patient follows with cardiology and was recently started on dronedarone about 2 weeks ago.     Per the daughter, patient lost bowel control during the event  History of stroke 13 years ago.  Denies history of seizure or heart attack    Interval Problem Update  Patient was seen and examined at bedside  Denies nausea vomiting or pain.  Denies focal weakness or numbness    I ordered MRI brain with and without contrast  Continue telemetry monitor for possible arrhythmia    I ordered repeated echo  LDL 72  Troponin 20, trending down, no chest pain    Echo 11/2022: aortic root is dilated with a diameter of 4.3 cm.  Thoracic aorta is dilated with diameter 3.3 cm.  Normal EF    DAKOTAH resolved  Continue IV fluid    Cardiology evaluated the patient      I have discussed this patient's plan of care and discharge plan at IDT rounds today with Case Management, Nursing, Nursing leadership, and other members of the IDT team.    Consultants/Specialty  cardiology    Code Status  Full Code    Disposition  The patient is not medically cleared for discharge to home or a post-acute facility.      I have placed the appropriate orders for post-discharge needs.    Review of Systems  All 12 systems were reviewed and negative except as mentioned above      Physical Exam  Temp:  [36.4 °C (97.5 °F)-37.1 °C (98.8 °F)] 36.5 °C (97.7 °F)  Pulse:  [65-73]  73  Resp:  [16-18] 18  BP: (133-144)/(71-89) 136/85  SpO2:  [91 %-98 %] 94 %    Physical Exam  Constitutional:       Appearance: He is ill-appearing.   HENT:      Head: Normocephalic.      Nose: Nose normal.   Eyes:      Extraocular Movements: Extraocular movements intact.      Conjunctiva/sclera: Conjunctivae normal.   Cardiovascular:      Rate and Rhythm: Normal rate and regular rhythm.      Pulses: Normal pulses.      Heart sounds: Normal heart sounds.   Pulmonary:      Effort: Pulmonary effort is normal.      Breath sounds: Normal breath sounds.   Abdominal:      General: Bowel sounds are normal. There is no distension.      Palpations: Abdomen is soft.      Tenderness: There is no abdominal tenderness.   Musculoskeletal:         General: No swelling or tenderness. Normal range of motion.      Cervical back: Normal range of motion.   Skin:     General: Skin is warm.   Neurological:      Mental Status: He is alert and oriented to person, place, and time.      Motor: No weakness.   Psychiatric:         Mood and Affect: Mood normal.         Fluids    Intake/Output Summary (Last 24 hours) at 6/9/2023 1636  Last data filed at 6/9/2023 1000  Gross per 24 hour   Intake 210 ml   Output --   Net 210 ml       Laboratory  Recent Labs     06/08/23  1148 06/09/23  0129   WBC 11.4* 6.9   RBC 4.90 4.29*   HEMOGLOBIN 14.9 13.1*   HEMATOCRIT 46.9 40.7*   MCV 95.7 94.9   MCH 30.4 30.5   MCHC 31.8* 32.2*   RDW 56.7* 56.7*   PLATELETCT 138* 107*   MPV 10.4 11.3     Recent Labs     06/08/23  1148 06/09/23  0129   SODIUM 141 143   POTASSIUM 4.6 3.8   CHLORIDE 108 112   CO2 20 17*   GLUCOSE 128* 107*   BUN 28* 26*   CREATININE 1.67* 1.13   CALCIUM 9.9 8.6             Recent Labs     06/09/23  0129   TRIGLYCERIDE 137   HDL 23*   LDL 72       Imaging  DX-CHEST-PORTABLE (1 VIEW)   Final Result      Cardiomegaly.      MR-BRAIN-WITH & W/O    (Results Pending)   EC-ECHOCARDIOGRAM COMPLETE W/O CONT    (Results Pending)         Assessment/Plan  * Syncope and collapse- (present on admission)  Assessment & Plan  Per the daughter, patient lost bowel control during the event  History of stroke 13 years ago.  Denies history of seizure or heart attack    EKG shows sinus rhythm with a rate of 83, there is no ST elevation, no significant ST depression, QTc is 420.  Orthostatic vital  Telemetry monitor  Echocardiography   Cardiology, Dr. Dave consulted, appreciate recommendations    I ordered MRI brain with and without contrast  Continue telemetry monitor for possible arrhythmia    I ordered repeated echo  LDL 72  Troponin 20, trending down, no chest pain    Echo 11/2022: aortic root is dilated with a diameter of 4.3 cm.  Thoracic aorta is dilated with diameter 3.3 cm.  Normal EF    ACP (advance care planning)  Assessment & Plan  CODE STATUS and goals of care were discussed with the patient and the daughter at bedside.  The daughter stated that patient was DNR/DNI and had polst at home.  However, patient wants to stay full code for now. ACP 16MIN    Elevated troponin- (present on admission)  Assessment & Plan  In the indeterminate range in the setting of acute kidney injury  Denies chest pain.  EKG shows  sinus rhythm with a rate of 83, there is no ST elevation, no significant ST depression, QTc is 420.  Continuous cardiac monitoring, will continue to trend troponin  Stat EKG, troponin for chest pain or worsening shortness of breath   Cardiology, Dr. Dave consulted, appreciate recommendations     Paroxysmal atrial fibrillation (HCC)- (present on admission)  Assessment & Plan  Resume home metoprolol with hold parameters   Resume home dronedarone   Cardiology, Dr. Dave consulted, appreciate recommendations    DAKOTAH (acute kidney injury) (HCC)- (present on admission)  Assessment & Plan  Mostly due to dehydration   Intravenous fluids  Avoid / minimize nephrotoxins as much as possible. [I will hold home losartan for now]  Monitor inputs and outputs      Essential hypertension- (present on admission)  Assessment & Plan  Resume home amlodipine and metoprolol with hold parameters   I will hold home losartan for now given his acute kidney injury, consider restarting according to clinical course         VTE prophylaxis: Xarelto 10 mg daily as prophylaxis    I have performed a physical exam and reviewed and updated ROS and Plan today (6/9/2023). In review of yesterday's note (6/8/2023), there are no changes except as documented above.    Total time:  52 minutes.  I spent greater than 50% of the time for patient care, counseling, and coordination on this date, including unit/floor time, and face-to-face time with the patient as per interval events and assessment and plan above

## 2023-06-09 NOTE — ASSESSMENT & PLAN NOTE
CODE STATUS and goals of care were discussed with the patient and the daughter at bedside.  The daughter stated that patient was DNR/DNI and had polst at home.  However, patient wants to stay full code for now. ACP 16MIN

## 2023-06-09 NOTE — CARE PLAN
The patient is Watcher - Medium risk of patient condition declining or worsening    Shift Goals  Clinical Goals: monitor for hydration, syncope  Patient Goals: rest, independence  Family Goals: josette    Progress made toward(s) clinical / shift goals:  Progressing    Patient is not progressing towards the following goals:

## 2023-06-09 NOTE — DISCHARGE PLANNING
[facility]P TCN chart review completed. Collaborated with JESSICA Bone prior to meeting with the pt. The most current review of medical record, knowledge of pt's PLOF and social support, LACE+ score of 59, 6 clicks scores of 24 mobility were considered.      Pt/daughter seen at bedside. Introduced TCN program. Provided education regarding post acute levels of care. Discussed [facility] plan benefits (Meds to Beds, medical uber and [facility] transitional care). Pt verbalizes understanding.     Patient reports that he lives alone, but does have support from neighbors and daughter does not live close, but within driving distance.  Patient and daughter report no concern about discharge home, as patient owns WC, FWW, cane and has ambulated to the bathroom without any issue.  Per Kardex, patient ambulated 2 x 10 feet with no AD on RA.  Daughter supportive, and patient agreeable to [facility] referral.     No choice proactively obtained given no needs. TCN will continue to follow and collaborate with discharge planning team as additional post acute needs arise. Thank you.     Completed today:  [facility] referral sent

## 2023-06-10 ENCOUNTER — APPOINTMENT (OUTPATIENT)
Dept: RADIOLOGY | Facility: MEDICAL CENTER | Age: 83
DRG: 682 | End: 2023-06-10
Attending: STUDENT IN AN ORGANIZED HEALTH CARE EDUCATION/TRAINING PROGRAM
Payer: MEDICARE

## 2023-06-10 ENCOUNTER — APPOINTMENT (OUTPATIENT)
Dept: CARDIOLOGY | Facility: MEDICAL CENTER | Age: 83
DRG: 682 | End: 2023-06-10
Attending: STUDENT IN AN ORGANIZED HEALTH CARE EDUCATION/TRAINING PROGRAM
Payer: MEDICARE

## 2023-06-10 VITALS
OXYGEN SATURATION: 95 % | TEMPERATURE: 97.7 F | DIASTOLIC BLOOD PRESSURE: 65 MMHG | WEIGHT: 160.94 LBS | HEIGHT: 66 IN | RESPIRATION RATE: 16 BRPM | SYSTOLIC BLOOD PRESSURE: 105 MMHG | HEART RATE: 72 BPM | BODY MASS INDEX: 25.86 KG/M2

## 2023-06-10 LAB — LV EJECT FRACT MOD 4C 99902: 67.73

## 2023-06-10 PROCEDURE — A9579 GAD-BASE MR CONTRAST NOS,1ML: HCPCS | Performed by: STUDENT IN AN ORGANIZED HEALTH CARE EDUCATION/TRAINING PROGRAM

## 2023-06-10 PROCEDURE — 99239 HOSP IP/OBS DSCHRG MGMT >30: CPT | Performed by: STUDENT IN AN ORGANIZED HEALTH CARE EDUCATION/TRAINING PROGRAM

## 2023-06-10 PROCEDURE — 93306 TTE W/DOPPLER COMPLETE: CPT

## 2023-06-10 PROCEDURE — 94760 N-INVAS EAR/PLS OXIMETRY 1: CPT

## 2023-06-10 PROCEDURE — 700117 HCHG RX CONTRAST REV CODE 255: Performed by: STUDENT IN AN ORGANIZED HEALTH CARE EDUCATION/TRAINING PROGRAM

## 2023-06-10 PROCEDURE — 700102 HCHG RX REV CODE 250 W/ 637 OVERRIDE(OP): Performed by: HOSPITALIST

## 2023-06-10 PROCEDURE — 70553 MRI BRAIN STEM W/O & W/DYE: CPT

## 2023-06-10 PROCEDURE — 93306 TTE W/DOPPLER COMPLETE: CPT | Mod: 26 | Performed by: STUDENT IN AN ORGANIZED HEALTH CARE EDUCATION/TRAINING PROGRAM

## 2023-06-10 PROCEDURE — A9270 NON-COVERED ITEM OR SERVICE: HCPCS | Performed by: HOSPITALIST

## 2023-06-10 RX ADMIN — RIVAROXABAN 10 MG: 10 TABLET, FILM COATED ORAL at 18:06

## 2023-06-10 RX ADMIN — ASPIRIN 81 MG: 81 TABLET, COATED ORAL at 05:44

## 2023-06-10 RX ADMIN — SENNOSIDES AND DOCUSATE SODIUM 2 TABLET: 50; 8.6 TABLET ORAL at 18:06

## 2023-06-10 RX ADMIN — SENNOSIDES AND DOCUSATE SODIUM 2 TABLET: 50; 8.6 TABLET ORAL at 05:44

## 2023-06-10 RX ADMIN — DRONEDARONE 400 MG: 400 TABLET, FILM COATED ORAL at 18:06

## 2023-06-10 RX ADMIN — DRONEDARONE 400 MG: 400 TABLET, FILM COATED ORAL at 08:00

## 2023-06-10 RX ADMIN — FERROUS SULFATE TAB 325 MG (65 MG ELEMENTAL FE) 325 MG: 325 (65 FE) TAB at 18:06

## 2023-06-10 RX ADMIN — AMLODIPINE BESYLATE 5 MG: 5 TABLET ORAL at 05:44

## 2023-06-10 RX ADMIN — METOPROLOL SUCCINATE 50 MG: 25 TABLET, EXTENDED RELEASE ORAL at 05:44

## 2023-06-10 RX ADMIN — GADOTERIDOL 15 ML: 279.3 INJECTION, SOLUTION INTRAVENOUS at 14:56

## 2023-06-10 ASSESSMENT — PAIN DESCRIPTION - PAIN TYPE: TYPE: ACUTE PAIN

## 2023-06-10 ASSESSMENT — FIBROSIS 4 INDEX: FIB4 SCORE: 4.09

## 2023-06-10 NOTE — PROGRESS NOTES
Discharge instructions given and discussed, signed copy in chart. Pt verbalized understanding and all questions answered. New prescriptions discussed. Pt discharged home in stable condition escorted by patient transport in wheelchair. Personal belongings with patient. IV removed and tolerated well. Tele box removed, monitor tech notified.

## 2023-06-10 NOTE — PROGRESS NOTES
Received report from KASI Macdonald. Safety precautions in place. Bed locked and low. Offered assist. Call light and belongings within reach.

## 2023-06-10 NOTE — PROGRESS NOTES
Telemetry Shift Summary    Rhythm SR  HR Range 69-84  Ectopy R-PAC  Measurements 0.20/0.08/0.38        Normal Values  Rhythm SR  HR Range    Measurements 0.12-0.20 / 0.06-0.10  / 0.30-0.52

## 2023-06-10 NOTE — PROGRESS NOTES
Pt has been disoriented to place and time at moments. Easily reoriented. Dr Ramos notified earlier this morning.

## 2023-06-10 NOTE — PROGRESS NOTES
Pt has been very impulsive and forgetful throughout the night. Pt however is easily redirectable.

## 2023-06-10 NOTE — CARE PLAN
The patient is Stable - Low risk of patient condition declining or worsening    Shift Goals  Clinical Goals: Echo, MRI brain.  Patient Goals: Go to appointments  Family Goals: Safety    Progress made toward(s) clinical / shift goals:    Problem: Knowledge Deficit - Standard  Goal: Patient and family/care givers will demonstrate understanding of plan of care, disease process/condition, diagnostic tests and medications  Outcome: Progressing  Note: Needs reinforcement due to forgetfulness.      Problem: Fall Risk  Goal: Patient will remain free from falls  Outcome: Progressing  Note: Easily redirectable and daughter will be present all day to assist.     Problem: Hemodynamics  Goal: Patient's hemodynamics, fluid balance and neurologic status will be stable or improve  Outcome: Progressing  Note: WDL       Patient is not progressing towards the following goals:

## 2023-06-10 NOTE — PROGRESS NOTES
Pt at beginning of shift complaining that IV keeps beeping, so this RN offered to have another IV inserted in a more distal portion of left forearm so that the IV wouldn't cause a distal occlusion from bending the arm, pt agreed. New PIV placed in left forearm.    A few hours later, pt was found in room attempting to turn off bed alarm and IV pump, while covered in own blood with bed linen saturated as well. In addition, previously inserted IV found covered in blood and lying on pt bed.  Pt cleaned up and linen changed. MD notified. Order received to D/C IVF.

## 2023-06-10 NOTE — CARE PLAN
The patient is Stable - Low risk of patient condition declining or worsening    Shift Goals  Clinical Goals: Obtain ECHO and MRI  Patient Goals: sleep  Family Goals: No family present    Progress made toward(s) clinical / shift goals:    Problem: Knowledge Deficit - Standard  Goal: Patient and family/care givers will demonstrate understanding of plan of care, disease process/condition, diagnostic tests and medications  Outcome: Progressing     Problem: Fall Risk  Goal: Patient will remain free from falls  Outcome: Progressing     Problem: Communication  Goal: The ability to communicate needs accurately and effectively will improve  Outcome: Progressing     Problem: Hemodynamics  Goal: Patient's hemodynamics, fluid balance and neurologic status will be stable or improve  Outcome: Progressing       Patient is not progressing towards the following goals:

## 2023-06-10 NOTE — DISCHARGE SUMMARY
"Discharge Summary    CHIEF COMPLAINT ON ADMISSION  Chief Complaint   Patient presents with    ALOC     He was outside today talking to a , he suddenly got really hot and almost passed out, caught by daughter. He did not fall to the ground.  Daughter also states he has a momentary blank stare and could not respond, now he is weak but normal. Paramedics did come but they released him.       Reason for Admission  Weakness, Low Blood Pressure     Admission Date  6/8/2023    CODE STATUS  Full Code    HPI & HOSPITAL COURSE  Freeman Frost is a 82 y.o. male with a past medical history of primary hypertension and atrial fibrillation s/p Watchman device placement who presented 6/8/2023 with sudden loss of consciousness.  Patient was outdoors talking to a  when he suddenly passed out.  The patient did not hit the floor he was called by the daughter.  Patient did have generalized weakness and was feeling dizzy prior to the event.  The patient follows with cardiology and was recently started on dronedarone about 2 weeks ago.    Per the daughter, patient lost bowel control during the event, no tongue biting or seizure activities. History of stroke 13 years ago.  Denies history of seizure or heart attack. Denies focal weakness or numbness.     Per cardio notes\"  history of paroxysmal atrial fibrillation and has a current [employer] Linq loop recorder placed.  He saw Dr. Brown, of electrophysiology in clinic on 5/22/2023.  He also has had a watchman placed.  Per his note, he has had some intermittent pauses but mostly during nocturnal hours with increasing paroxysmal atrial fibrillation burden on the loop recorder.  Per his note, not really symptomatic.  Multaq was started at that time, came off of oral anticoagulation since patient has a Watchman device and is currently on aspirin.  Metoprolol was also decreased to 50 mg p.o. daily.\"    Loop recorder was interrogated by cardiology and [employer] rep and there was " no events of any type of bradycardia arrhythmias that was related to patient's syncope.   Potentially could be due to low blood pressure    Echo was unremarkable with EF 70%. Overnight tele monitor showed SR, HR range 69-84. MRI brain showed chronic Age-related cerebral atrophy, chronic microvascular ischemic gliosis and Small chronic areas of cortical infarction. No other acute patjology    Echo showed stable aorta root dilation of 3.7cm. stable compared with previous echo.   Patient was also found abdominal aortic aneurysm 5.7 x5.6 cm on ct 2/2023. Currently asymptomatic.  Patient needs close monitor and follow up with vascular surgery for possible repair given the size is > 5.5cm. I referred him to vascular surgery.     DAKOTAH resolved with IVF.    The daughter concerned his dementia and memory issues. I referred him to neurology     I ordered MRI brain with and     Therefore, he is discharged in good and stable condition to home with close outpatient follow-up.    The patient met 2-midnight criteria for an inpatient stay at the time of discharge.    Discharge Date  6/10/2023    FOLLOW UP ITEMS POST DISCHARGE  - Follow up with primary care physician in 1 week.     - Follow up with your cardiology    - you have an abdominal aortic aneurysm 5.7 x5.6 cm which was found on ct 2/2023. It's relatively big and with risks of rapture. Please follow up with your PCP or cardiology to monitor the size.  I referred you to vascular surgery for evaluation and the needs for repair. Please go to ER if you develop sudden severe abdominal or back pain/sob/tachycardia or other concerned symptoms.    - f/u with neurology/memory clinic for dementia    - Please take the medications as instructed    - Go to the local Emergency Department if you have any worsening condition.       DISCHARGE DIAGNOSES  Principal Problem:    Syncope and collapse (POA: Yes)  Active Problems:    Essential hypertension (POA: Yes)    DAKOTAH (acute kidney injury)  (HCC) (POA: Yes)    Paroxysmal atrial fibrillation (HCC) (POA: Yes)    Elevated troponin (POA: Yes)    ACP (advance care planning) (POA: Unknown)  Resolved Problems:    * No resolved hospital problems. *      FOLLOW UP  Future Appointments   Date Time Provider Department Center   8/22/2023  2:40 PM Mary Brown M.D. [facility] None     Juan Hutton M.D.  [address]  [facility] [address]  [phone]    Follow up in 1 week(s)  Please call your primary care provider to schedule, recent hospitalization follow up      MEDICATIONS ON DISCHARGE     Medication List        CONTINUE taking these medications        Instructions   amLODIPine 5 MG Tabs  Commonly known as: NORVASC   Take 1 Tablet by mouth every day.  Dose: 5 mg     aspirin 81 MG EC tablet   Take 1 Tablet by mouth every day.  Dose: 81 mg     B-12 PO   Take 1 Tablet by mouth every evening.  Dose: 1 Tablet     ferrous sulfate 325 (65 Fe) MG tablet   Take 325 mg by mouth every evening.  Dose: 325 mg     loratadine 10 MG Tabs  Commonly known as: CLARITIN   Take 10 mg by mouth 1 time a day as needed (Allergy).  Dose: 10 mg     losartan 25 MG Tabs  Commonly known as: COZAAR   Take 25 mg by mouth every morning.  Dose: 25 mg     metoprolol SR 50 MG Tb24  Commonly known as: TOPROL XL   Take 1 Tablet by mouth every day.  Dose: 50 mg     Multaq 400 MG Tabs  Generic drug: dronedarone   Take 1 Tablet by mouth 2 times a day with meals.  Dose: 400 mg     VITAMIN C PO   Take 1 Tablet by mouth every evening.  Dose: 1 Tablet     VITAMIN E PO   Take 1 Capsule by mouth every evening.  Dose: 1 Capsule              Allergies  Allergies   Allergen Reactions    Other Environmental Unspecified     Seasonal allergies=rabbit brush       DIET  Orders Placed This Encounter   Procedures    Diet Order Diet: Cardiac     Standing Status:   Standing     Number of Occurrences:   1     Order Specific Question:   Diet:     Answer:   Cardiac [6]       ACTIVITY  As tolerated.  Weight bearing  as tolerated    CONSULTATIONS  cardio    PROCEDURES      LABORATORY  Lab Results   Component Value Date    SODIUM 143 06/09/2023    POTASSIUM 3.8 06/09/2023    CHLORIDE 112 06/09/2023    CO2 17 (L) 06/09/2023    GLUCOSE 107 (H) 06/09/2023    BUN 26 (H) 06/09/2023    CREATININE 1.13 06/09/2023        Lab Results   Component Value Date    WBC 6.9 06/09/2023    HEMOGLOBIN 13.1 (L) 06/09/2023    HEMATOCRIT 40.7 (L) 06/09/2023    PLATELETCT 107 (L) 06/09/2023        Total time of the discharge process exceeds 35 minutes.

## 2023-06-10 NOTE — PROGRESS NOTES
Telemetry Shift Summary     Rhythm SR with 1st degree   HR Range 69-81  Ectopy rPVC/PAC  Measurements 0.24/0.08/0.34           Normal Values  Rhythm SR  HR Range    Measurements 0.12-0.20 / 0.06-0.10  / 0.30-0.52

## 2023-06-11 NOTE — DISCHARGE INSTRUCTIONS
- Follow up with primary care physician in 1 week.     - Follow up with your cardiology    - you have an abdominal aortic aneurysm 5.7 x5.6 cm which was found on ct 2/2023. It's relatively big and with risks of rapture. Please follow up with your PCP or cardiology to monitor the size.  I referred you to vascular surgery for evaluation and the needs for repair. Please go to ER if you develop sudden severe abdominal or back pain/sob/tachycardia or other concerned symptoms.    - f/u with neurology/memory clinic for dementia    - Please take the medications as instructed    - Go to the local Emergency Department if you have any worsening condition.

## 2023-06-11 NOTE — PROGRESS NOTES
Discharge instructions given and discussed, signed copy in chart. Pt verbalized understanding and all questions answered. No new prescriptions ordered. Pt discharged home in stable condition escorted by unit clerk. Personal belongings with patient. IV removed and tolerated well. Tele box removed, monitor tech notified.

## 2023-06-13 ENCOUNTER — OFFICE VISIT (OUTPATIENT)
Dept: CARDIOLOGY | Facility: MEDICAL CENTER | Age: 83
End: 2023-06-13
Attending: INTERNAL MEDICINE
Payer: MEDICARE

## 2023-06-13 VITALS
WEIGHT: 174 LBS | OXYGEN SATURATION: 98 % | RESPIRATION RATE: 14 BRPM | SYSTOLIC BLOOD PRESSURE: 110 MMHG | BODY MASS INDEX: 27.97 KG/M2 | HEART RATE: 76 BPM | HEIGHT: 66 IN | DIASTOLIC BLOOD PRESSURE: 70 MMHG

## 2023-06-13 DIAGNOSIS — I48.0 PAROXYSMAL ATRIAL FIBRILLATION (HCC): ICD-10-CM

## 2023-06-13 DIAGNOSIS — Z95.818 PRESENCE OF WATCHMAN LEFT ATRIAL APPENDAGE CLOSURE DEVICE: ICD-10-CM

## 2023-06-13 DIAGNOSIS — Z45.09 ENCOUNTER FOR LOOP RECORDER CHECK: ICD-10-CM

## 2023-06-13 PROCEDURE — 93010 ELECTROCARDIOGRAM REPORT: CPT | Performed by: INTERNAL MEDICINE

## 2023-06-13 PROCEDURE — 3074F SYST BP LT 130 MM HG: CPT | Performed by: INTERNAL MEDICINE

## 2023-06-13 PROCEDURE — 93005 ELECTROCARDIOGRAM TRACING: CPT | Performed by: INTERNAL MEDICINE

## 2023-06-13 PROCEDURE — 99212 OFFICE O/P EST SF 10 MIN: CPT | Performed by: INTERNAL MEDICINE

## 2023-06-13 PROCEDURE — 3078F DIAST BP <80 MM HG: CPT | Performed by: INTERNAL MEDICINE

## 2023-06-13 PROCEDURE — 99214 OFFICE O/P EST MOD 30 MIN: CPT | Performed by: INTERNAL MEDICINE

## 2023-06-13 RX ORDER — AMIODARONE HYDROCHLORIDE 200 MG/1
200 TABLET ORAL DAILY
Qty: 60 TABLET | Refills: 3 | Status: SHIPPED | OUTPATIENT
Start: 2023-06-13 | End: 2024-02-08

## 2023-06-13 ASSESSMENT — FIBROSIS 4 INDEX: FIB4 SCORE: 4.09

## 2023-06-13 NOTE — PROGRESS NOTES
Arrhythmia Clinic Note (Established patient)    DOS: 6/13/2023    Chief complaint/Reason for consult: F/u AF    Interval History:  Pt is an 83 yo M. History of pAF s/p WATCHMAN and loop recorder. More recently admitted to [facility] ER after syncopal event. No inciting event on loop. Previously with nocturnal pauses not symptomatic. Last time we had put him on Multaq and lowered the BB slightly. Here for follow-up.    ROS (+ highlighted in red):  General--Negative for fatigue, weight loss or weight gain  Cardiovascular--Negative for CP, orthopnea, PND    Past Medical History:   Diagnosis Date    Anemia     Anesthesia     fam hx, daughter stopped breathing    Arrhythmia     afib    Atrial fibrillation (HCC)     Bowel habit changes     constipation    Cataract     bilat IOL    Dental disorder     upper    Hemorrhagic disorder (HCC)     Hypercholesterolemia     Hypertension     Snoring     Stroke (HCC) 2013    tia no residual def       Past Surgical History:   Procedure Laterality Date    [place] ERCP,DIAGNOSTIC N/A 04/20/2019    Procedure: ERCP, DIAGNOSTIC;  Surgeon: Antony Morrison M.D.;  Location: [facility];  Service: Gastroenterology    FRANCIA BY LAPAROSCOPY  04/19/2019    Procedure: LAPARASCOPIC- CONVERTED TO OPEN CHOLECYSTECTOMY;  Surgeon: Anita Merino M.D.;  Location: [facility];  Service: General    CATARACT PHACO WITH IOL Right 08/28/2018    Procedure: CATARACT PHACO WITH IOL;  Surgeon: Adolfo Santana M.D.;  Location: SURGERY SAME DAY [facility];  Service: Ophthalmology    CATARACT PHACO WITH IOL Left 08/14/2018    Procedure: CATARACT PHACO WITH IOL;  Surgeon: Adolfo Santana M.D.;  Location: SURGERY SAME DAY [facility];  Service: Ophthalmology    HERNIA REPAIR, INCISIONAL, UNILATERAL Left     OTHER      tonsils as a child    OTHER CARDIAC SURGERY  11-9-22    Watchman procedure    TONSILLECTOMY         Social History     Socioeconomic History    Marital status:       Spouse name: Not on file    Number of children: Not on file    Years of education: Not on file    Highest education level: Not on file   Occupational History    Not on file   Tobacco Use    Smoking status: Never    Smokeless tobacco: Never   Vaping Use    Vaping Use: Never used   Substance and Sexual Activity    Alcohol use: Yes     Alcohol/week: 0.6 oz     Types: 1 Cans of beer per week    Drug use: Never    Sexual activity: Not on file   Other Topics Concern    Not on file   Social History Narrative    Not on file     Social Determinants of Health     Financial Resource Strain: Low Risk  (5/18/2021)    Overall Financial Resource Strain (CARDIA)     Difficulty of Paying Living Expenses: Not hard at all   Food Insecurity: No Food Insecurity (5/18/2021)    Hunger Vital Sign     Worried About Running Out of Food in the Last Year: Never true     Ran Out of Food in the Last Year: Never true   Transportation Needs: No Transportation Needs (5/18/2021)    PRAPARE - Transportation     Lack of Transportation (Medical): No     Lack of Transportation (Non-Medical): No   Physical Activity: Not on file   Stress: Not on file   Social Connections: Not on file   Intimate Partner Violence: Not on file   Housing Stability: Not on file       Family History   Problem Relation Age of Onset    Dementia Mother     Cancer Mother         Breast    Cancer Brother         Esophageal       Allergies   Allergen Reactions    Other Environmental Unspecified     Seasonal allergies=rabbit brush       Current Outpatient Medications   Medication Sig Dispense Refill    VITAMIN E PO Take 1 Capsule by mouth every evening.      Ascorbic Acid (VITAMIN C PO) Take 1 Tablet by mouth every evening.      loratadine (CLARITIN) 10 MG Tab Take 10 mg by mouth 1 time a day as needed (Allergy).      dronedarone (MULTAQ) 400 MG Tab Take 1 Tablet by mouth 2 times a day with meals. 60 Tablet 5    aspirin 81 MG EC tablet Take 1 Tablet by mouth every day. 100  "Tablet 5    metoprolol SR (TOPROL XL) 50 MG TABLET SR 24 HR Take 1 Tablet by mouth every day. 30 Tablet 5    Cyanocobalamin (B-12 PO) Take 1 Tablet by mouth every evening.      ferrous sulfate 325 (65 Fe) MG tablet Take 325 mg by mouth every evening.      losartan (COZAAR) 25 MG Tab Take 25 mg by mouth every morning.  3    amLODIPine (NORVASC) 5 MG Tab Take 1 Tablet by mouth every day.       No current facility-administered medications for this visit.       Physical Exam:  Vitals:    06/13/23 0838   BP: 110/70   BP Location: Left arm   Patient Position: Sitting   BP Cuff Size: Adult   Pulse: 76   Resp: 14   SpO2: 98%   Weight: 78.9 kg (174 lb)   Height: 1.676 m (5' 6\")     General appearance: NAD, conversant  HEENT: PERRL, neck is supple with FROM  Lungs: Clear to auscultation, normal respiratory effort  CV: RRR, no murmurs/rubs/gallops, no JVD  Abdomen: Soft, non-tender with normal bowel sounds  Extremities: No peripheral edema, no clubbing or cyanosis  Skin: No rash, lesions, or ulcers  Psych: Alert and oriented to person, place and time    Data:  Labs reviewed    Prior echo/stress reviewed:  LVEF 70%    EKG interpreted by me:  Sinus, LAFB    Impression/Plan:  1. Paroxysmal atrial fibrillation (HCC)  EKG      2. Encounter for loop recorder check        3. Presence of Watchman left atrial appendage closure device- implanted 11/9/22 Dr Brown           -I do not see anything in the vicinity on loop of the syncopal event  -Sounds like vagal event as he was hypotensive  -He is having quite a bit of very rapid AF  -Multaq not doing anything  -Will stop Multaq, switch him to amio, see how he does with that  -F/u in August  Mary Brown MD    "

## 2023-06-15 ENCOUNTER — OFFICE VISIT (OUTPATIENT)
Dept: VASCULAR SURGERY | Facility: MEDICAL CENTER | Age: 83
End: 2023-06-15
Payer: MEDICARE

## 2023-06-15 VITALS
BODY MASS INDEX: 25.92 KG/M2 | WEIGHT: 175 LBS | HEART RATE: 74 BPM | OXYGEN SATURATION: 97 % | SYSTOLIC BLOOD PRESSURE: 138 MMHG | HEIGHT: 69 IN | DIASTOLIC BLOOD PRESSURE: 62 MMHG | TEMPERATURE: 96.9 F

## 2023-06-15 DIAGNOSIS — I71.40 ABDOMINAL AORTIC ANEURYSM (AAA) WITHOUT RUPTURE, UNSPECIFIED PART (HCC): ICD-10-CM

## 2023-06-15 ASSESSMENT — FIBROSIS 4 INDEX: FIB4 SCORE: 4.09

## 2023-06-22 ENCOUNTER — OFFICE VISIT (OUTPATIENT)
Dept: VASCULAR SURGERY | Facility: MEDICAL CENTER | Age: 83
End: 2023-06-22
Payer: MEDICARE

## 2023-06-22 VITALS
BODY MASS INDEX: 26.63 KG/M2 | TEMPERATURE: 98 F | DIASTOLIC BLOOD PRESSURE: 62 MMHG | HEIGHT: 68 IN | WEIGHT: 175.7 LBS | SYSTOLIC BLOOD PRESSURE: 116 MMHG | OXYGEN SATURATION: 97 % | HEART RATE: 74 BPM

## 2023-06-22 DIAGNOSIS — I71.43 INFRARENAL ABDOMINAL AORTIC ANEURYSM (AAA) WITHOUT RUPTURE (HCC): ICD-10-CM

## 2023-06-22 PROCEDURE — 3078F DIAST BP <80 MM HG: CPT | Performed by: SURGERY

## 2023-06-22 PROCEDURE — 99213 OFFICE O/P EST LOW 20 MIN: CPT | Performed by: SURGERY

## 2023-06-22 PROCEDURE — 3074F SYST BP LT 130 MM HG: CPT | Performed by: SURGERY

## 2023-06-22 ASSESSMENT — FIBROSIS 4 INDEX: FIB4 SCORE: 4.09

## 2023-06-22 NOTE — PROGRESS NOTES
Vascular Surgery  Presurgical H&P    Patient:Freeman Frost  MRN:[id number]  Primary care physician:Juan Hutton M.D.    Vascular Consultant: Wilber Ambrose MD    6/22/2023  _____________________________________________________    Chief Complaint/Reason for Visit:     AAA      History of Present Illness:   Freeman Frost is a 82 y.o. year old male seen previously for evaluation of abdominal aortic aneurysm.  He was sent for CT angiogram to further evaluate.  CT angiogram confirms a 5.7 cm infrarenal abdominal aortic aneurysm and a 3.5 cm right common iliac artery aneurysm, both amenable to endovascular repair.  Patient is asymptomatic.  He has a history of a Watchman device.  He underwent echocardiogram recently which shows EF of 70% and grade 1 diastolic dysfunction.  Overall he appears to be an acceptable candidate for endovascular repair of his aneurysms    Past Medical History:     Past Medical History:   Diagnosis Date    Anemia     Anesthesia     fam hx, daughter stopped breathing    Arrhythmia     afib    Atrial fibrillation (HCC)     Bowel habit changes     constipation    Cataract     bilat IOL    Dental disorder     upper    Hemorrhagic disorder (HCC)     Hypercholesterolemia     Hypertension     Snoring     Stroke (HCC) 2013    tia no residual def     Past Surgical History:     Past Surgical History:   Procedure Laterality Date    [place] ERCP,DIAGNOSTIC N/A 04/20/2019    Procedure: ERCP, DIAGNOSTIC;  Surgeon: Antony Morrison M.D.;  Location: SURGERY [facility];  Service: Gastroenterology    FRANCIA BY LAPAROSCOPY  04/19/2019    Procedure: LAPARASCOPIC- CONVERTED TO OPEN CHOLECYSTECTOMY;  Surgeon: Anita Merino M.D.;  Location: SURGERY [facility];  Service: General    CATARACT PHACO WITH IOL Right 08/28/2018    Procedure: CATARACT PHACO WITH IOL;  Surgeon: Adolfo Santana M.D.;  Location: SURGERY SAME DAY [facility];  Service: Ophthalmology    CATARACT PHACO WITH IOL Left  08/14/2018    Procedure: CATARACT PHACO WITH IOL;  Surgeon: Adolfo Santana M.D.;  Location: SURGERY SAME DAY [facility];  Service: Ophthalmology    HERNIA REPAIR, INCISIONAL, UNILATERAL Left     OTHER      tonsils as a child    OTHER CARDIAC SURGERY  11-9-22    Watchman procedure    TONSILLECTOMY       Allergies:     Allergies   Allergen Reactions    Other Environmental Unspecified     Seasonal allergies=rabbit brush     Medications:     Outpatient Encounter Medications as of 6/22/2023   Medication Sig Dispense Refill    amiodarone (CORDARONE) 200 MG Tab Take 1 Tablet by mouth every day. 60 Tablet 3    VITAMIN E PO Take 1 Capsule by mouth every evening.      Ascorbic Acid (VITAMIN C PO) Take 1 Tablet by mouth every evening.      loratadine (CLARITIN) 10 MG Tab Take 10 mg by mouth 1 time a day as needed (Allergy).      aspirin 81 MG EC tablet Take 1 Tablet by mouth every day. 100 Tablet 5    metoprolol SR (TOPROL XL) 50 MG TABLET SR 24 HR Take 1 Tablet by mouth every day. 30 Tablet 5    Cyanocobalamin (B-12 PO) Take 1 Tablet by mouth every evening.      ferrous sulfate 325 (65 Fe) MG tablet Take 325 mg by mouth every evening.      losartan (COZAAR) 25 MG Tab Take 25 mg by mouth every morning.  3    amLODIPine (NORVASC) 5 MG Tab Take 1 Tablet by mouth every day.       No facility-administered encounter medications on file as of 6/22/2023.     Social History:     Social History     Socioeconomic History    Marital status:      Spouse name: Not on file    Number of children: Not on file    Years of education: Not on file    Highest education level: Not on file   Occupational History    Not on file   Tobacco Use    Smoking status: Never    Smokeless tobacco: Never   Vaping Use    Vaping Use: Never used   Substance and Sexual Activity    Alcohol use: Yes     Alcohol/week: 0.6 oz     Types: 1 Cans of beer per week    Drug use: Never    Sexual activity: Not on file   Other Topics Concern    Not on file   Social  History Narrative    Not on file     Social Determinants of Health     Financial Resource Strain: Low Risk  (5/18/2021)    Overall Financial Resource Strain (CARDIA)     Difficulty of Paying Living Expenses: Not hard at all   Food Insecurity: No Food Insecurity (5/18/2021)    Hunger Vital Sign     Worried About Running Out of Food in the Last Year: Never true     Ran Out of Food in the Last Year: Never true   Transportation Needs: No Transportation Needs (5/18/2021)    PRAPARE - Transportation     Lack of Transportation (Medical): No     Lack of Transportation (Non-Medical): No   Physical Activity: Not on file   Stress: Not on file   Social Connections: Not on file   Intimate Partner Violence: Not on file   Housing Stability: Not on file      Social History     Tobacco Use   Smoking Status Never   Smokeless Tobacco Never   Vaping Use    Vaping Use: Never used     Social History     Substance and Sexual Activity   Alcohol Use Yes    Alcohol/week: 0.6 oz    Types: 1 Cans of beer per week     Social History     Substance and Sexual Activity   Drug Use Never      Family History:     Family History   Problem Relation Age of Onset    Dementia Mother     Cancer Mother         Breast    Cancer Brother         Esophageal       Review of Systems:   Constitutional: Negative for fever or chills  HENT:   Negative for hearing loss or tinnitus    Eyes:    Negative for blurred vision or loss of vision  Respiratory:  Negative for cough or hemoptysis  Cardiac:  Negative for chest pain or palpitations  Vascular:  Negative for claudication, Negative for rest pain  Gastrointestinal: Negative for vomiting or abdominal pain     Negative for hematochezia or melena   Genitourinary: Negative for dysuria or hematuria   Musculoskeletal: Negative for myalgias or acute joint pain  Skin:   Negative for itching or rash  Neurological:  Negative for dizziness or headaches     Negative for speech disturbance     Negative for extremity weakness or  "paresthesias  Endo/Heme:  Negative for easy bruising or bleeding         Exam:   /62 (BP Location: Left arm, Patient Position: Sitting, BP Cuff Size: Adult)   Pulse 74   Temp 36.7 °C (98 °F) (Temporal)   Ht 1.727 m (5' 8\")   Wt 79.7 kg (175 lb 11.2 oz)   SpO2 97%   BMI 26.72 kg/m²     Constitutional: Alert, oriented, no acute distress  HEENT:  Normocephalic and atraumatic, EOMI  Neck:   Supple, no JVD,   Cardiovascular: Regular rate and rhythm,   Pulmonary:  Good air entry bilaterally,    Abdominal:  Soft, non-tender, non-distended  Musculoskeletal: No tenderness, no deformity  Neurological:  CN II-XII grossly intact, no focal deficits  Skin:   Skin is warm and dry. No rash noted.  Vascular exam:    RUE: warm, cap refill<3 seconds, no edema, no tissue loss,   LUE: warm, cap refill<3 seconds, no edema, no tissue loss,   RLE: warm, cap refill<3 seconds, no edema, no tissue loss,   LLE: warm, cap refill<3 seconds, no edema, no tissue loss,           Imaging:   CTA shows 3.7cm ascending aortic aneurysm. Also 5.7cm infrarenal aortic aneurysm, and 3.5cm right common iliac artery aneurysm both amenable to endovascular repair    Assessment and Plan:   - 5.7cm infrarenal aortic aneurysm, asymptomatic  - 3.5cm right common iliac artery aneurysm , asymptomatic    Patient has an infrarenal aortic aneurysm and a common iliac artery aneurysm that both meet the size threshold for repair.  From a medical standpoint he is acceptable risk for surgery and his life expectancy at this point is greater than 5 years.  We discussed the option of ongoing surveillance versus endovascular repair and the pros and cons of both options.  Patient would like to proceed with endovascular repair at this point time.    We will schedule the patient for endovascular repair.  The images will be sent to [facility] for surgical planning, I believe the patient will need a right sided iliac branch " device.    _____________________________________________________  [facility]  [phone]  [address], [place] [address]

## 2023-06-26 ENCOUNTER — TELEPHONE (OUTPATIENT)
Dept: VASCULAR SURGERY | Facility: MEDICAL CENTER | Age: 83
End: 2023-06-26
Payer: MEDICARE

## 2023-06-26 NOTE — TELEPHONE ENCOUNTER
I called patient and was able to schedule procedure with patient's daughter Jacob. Patient is scheduled for 7/05 @ 8:00 am. Patient is to check in @ 6:00 am in the [facility].     Patient instructed nothing to eat or drink 8 hours prior and he will need a  once discharged.

## 2023-06-27 ENCOUNTER — APPOINTMENT (OUTPATIENT)
Dept: ADMISSIONS | Facility: MEDICAL CENTER | Age: 83
DRG: 269 | End: 2023-06-27
Attending: SURGERY
Payer: MEDICARE

## 2023-06-30 ENCOUNTER — PRE-ADMISSION TESTING (OUTPATIENT)
Dept: ADMISSIONS | Facility: MEDICAL CENTER | Age: 83
DRG: 269 | End: 2023-06-30
Attending: SURGERY
Payer: MEDICARE

## 2023-06-30 NOTE — OR NURSING
Patient and daughter Jacob (POA) instructed to contact Dr. Ambrose's office for directions on when to hold patient's daily baby aspirin.     Msg left with Dr. Ambrose's office for them to contact patient with directions. Per answering service, office will contact patient on Monday 7/3/23.     Jacob updated that Dr. Ambrose's office will contact her Monday.

## 2023-07-03 ENCOUNTER — TELEPHONE (OUTPATIENT)
Dept: VASCULAR SURGERY | Facility: MEDICAL CENTER | Age: 83
End: 2023-07-03
Payer: MEDICARE

## 2023-07-05 ENCOUNTER — ANESTHESIA (OUTPATIENT)
Dept: SURGERY | Facility: MEDICAL CENTER | Age: 83
DRG: 269 | End: 2023-07-05
Payer: MEDICARE

## 2023-07-05 ENCOUNTER — APPOINTMENT (OUTPATIENT)
Dept: RADIOLOGY | Facility: MEDICAL CENTER | Age: 83
DRG: 269 | End: 2023-07-05
Attending: SURGERY
Payer: MEDICARE

## 2023-07-05 ENCOUNTER — HOSPITAL ENCOUNTER (INPATIENT)
Facility: MEDICAL CENTER | Age: 83
LOS: 1 days | DRG: 269 | End: 2023-07-06
Attending: SURGERY | Admitting: SURGERY
Payer: MEDICARE

## 2023-07-05 ENCOUNTER — ANESTHESIA EVENT (OUTPATIENT)
Dept: SURGERY | Facility: MEDICAL CENTER | Age: 83
DRG: 269 | End: 2023-07-05
Payer: MEDICARE

## 2023-07-05 LAB
ABO GROUP BLD: NORMAL
BLD GP AB SCN SERPL QL: NORMAL
RH BLD: NORMAL

## 2023-07-05 PROCEDURE — 04V03DZ RESTRICTION OF ABDOMINAL AORTA WITH INTRALUMINAL DEVICE, PERCUTANEOUS APPROACH: ICD-10-PCS | Performed by: SURGERY

## 2023-07-05 PROCEDURE — 86850 RBC ANTIBODY SCREEN: CPT

## 2023-07-05 PROCEDURE — 160029 HCHG SURGERY MINUTES - 1ST 30 MINS LEVEL 4: Performed by: SURGERY

## 2023-07-05 PROCEDURE — 700111 HCHG RX REV CODE 636 W/ 250 OVERRIDE (IP)

## 2023-07-05 PROCEDURE — C1773 RET DEV, INSERTABLE: HCPCS

## 2023-07-05 PROCEDURE — 700111 HCHG RX REV CODE 636 W/ 250 OVERRIDE (IP): Mod: JZ | Performed by: STUDENT IN AN ORGANIZED HEALTH CARE EDUCATION/TRAINING PROGRAM

## 2023-07-05 PROCEDURE — C1769 GUIDE WIRE: HCPCS | Performed by: SURGERY

## 2023-07-05 PROCEDURE — C1894 INTRO/SHEATH, NON-LASER: HCPCS | Performed by: SURGERY

## 2023-07-05 PROCEDURE — 34717 EVASC RPR A-ILIAC NDGFT: CPT | Performed by: SURGERY

## 2023-07-05 PROCEDURE — 86901 BLOOD TYPING SEROLOGIC RH(D): CPT

## 2023-07-05 PROCEDURE — 86900 BLOOD TYPING SEROLOGIC ABO: CPT

## 2023-07-05 PROCEDURE — 700102 HCHG RX REV CODE 250 W/ 637 OVERRIDE(OP): Performed by: SURGERY

## 2023-07-05 PROCEDURE — 502240 HCHG MISC OR SUPPLY RC 0272: Performed by: SURGERY

## 2023-07-05 PROCEDURE — C1874 STENT, COATED/COV W/DEL SYS: HCPCS | Performed by: SURGERY

## 2023-07-05 PROCEDURE — A9270 NON-COVERED ITEM OR SERVICE: HCPCS | Performed by: SURGERY

## 2023-07-05 PROCEDURE — 160009 HCHG ANES TIME/MIN: Performed by: SURGERY

## 2023-07-05 PROCEDURE — 160041 HCHG SURGERY MINUTES - EA ADDL 1 MIN LEVEL 4: Performed by: SURGERY

## 2023-07-05 PROCEDURE — C1725 CATH, TRANSLUMIN NON-LASER: HCPCS | Performed by: SURGERY

## 2023-07-05 PROCEDURE — 700105 HCHG RX REV CODE 258: Performed by: STUDENT IN AN ORGANIZED HEALTH CARE EDUCATION/TRAINING PROGRAM

## 2023-07-05 PROCEDURE — 01926 ANES IVNTL RAD ICR ICAR/AORT: CPT | Performed by: STUDENT IN AN ORGANIZED HEALTH CARE EDUCATION/TRAINING PROGRAM

## 2023-07-05 PROCEDURE — 99100 ANES PT EXTEME AGE<1 YR&>70: CPT | Performed by: STUDENT IN AN ORGANIZED HEALTH CARE EDUCATION/TRAINING PROGRAM

## 2023-07-05 PROCEDURE — 700117 HCHG RX CONTRAST REV CODE 255: Performed by: SURGERY

## 2023-07-05 PROCEDURE — C1760 CLOSURE DEV, VASC: HCPCS | Performed by: SURGERY

## 2023-07-05 PROCEDURE — 502000 HCHG MISC OR IMPLANTS RC 0278: Performed by: SURGERY

## 2023-07-05 PROCEDURE — 700102 HCHG RX REV CODE 250 W/ 637 OVERRIDE(OP): Performed by: STUDENT IN AN ORGANIZED HEALTH CARE EDUCATION/TRAINING PROGRAM

## 2023-07-05 PROCEDURE — 770001 HCHG ROOM/CARE - MED/SURG/GYN PRIV*

## 2023-07-05 PROCEDURE — 34713 PERQ ACCESS & CLSR FEM ART: CPT | Performed by: SURGERY

## 2023-07-05 PROCEDURE — C2628 CATHETER, OCCLUSION: HCPCS | Performed by: SURGERY

## 2023-07-05 PROCEDURE — 04VC3DZ RESTRICTION OF RIGHT COMMON ILIAC ARTERY WITH INTRALUMINAL DEVICE, PERCUTANEOUS APPROACH: ICD-10-PCS | Performed by: SURGERY

## 2023-07-05 PROCEDURE — 700105 HCHG RX REV CODE 258: Mod: JZ | Performed by: SURGERY

## 2023-07-05 PROCEDURE — 160002 HCHG RECOVERY MINUTES (STAT): Performed by: SURGERY

## 2023-07-05 PROCEDURE — 160048 HCHG OR STATISTICAL LEVEL 1-5: Performed by: SURGERY

## 2023-07-05 PROCEDURE — 36620 INSERTION CATHETER ARTERY: CPT | Performed by: STUDENT IN AN ORGANIZED HEALTH CARE EDUCATION/TRAINING PROGRAM

## 2023-07-05 PROCEDURE — 85347 COAGULATION TIME ACTIVATED: CPT | Mod: 91

## 2023-07-05 PROCEDURE — 160035 HCHG PACU - 1ST 60 MINS PHASE I: Performed by: SURGERY

## 2023-07-05 PROCEDURE — A9270 NON-COVERED ITEM OR SERVICE: HCPCS | Performed by: STUDENT IN AN ORGANIZED HEALTH CARE EDUCATION/TRAINING PROGRAM

## 2023-07-05 PROCEDURE — 34705 EVAC RPR A-BIILIAC NDGFT: CPT | Performed by: SURGERY

## 2023-07-05 PROCEDURE — 700101 HCHG RX REV CODE 250: Performed by: STUDENT IN AN ORGANIZED HEALTH CARE EDUCATION/TRAINING PROGRAM

## 2023-07-05 DEVICE — IMPLANTABLE DEVICE: Type: IMPLANTABLE DEVICE | Site: GROIN | Status: FUNCTIONAL

## 2023-07-05 RX ORDER — CEFAZOLIN SODIUM 1 G/3ML
INJECTION, POWDER, FOR SOLUTION INTRAMUSCULAR; INTRAVENOUS PRN
Status: DISCONTINUED | OUTPATIENT
Start: 2023-07-05 | End: 2023-07-05 | Stop reason: SURG

## 2023-07-05 RX ORDER — OXYCODONE HYDROCHLORIDE 5 MG/1
5 TABLET ORAL
Status: DISCONTINUED | OUTPATIENT
Start: 2023-07-05 | End: 2023-07-06 | Stop reason: HOSPADM

## 2023-07-05 RX ORDER — HYDRALAZINE HYDROCHLORIDE 20 MG/ML
10 INJECTION INTRAMUSCULAR; INTRAVENOUS EVERY 4 HOURS PRN
Status: DISCONTINUED | OUTPATIENT
Start: 2023-07-05 | End: 2023-07-06 | Stop reason: HOSPADM

## 2023-07-05 RX ORDER — METOPROLOL SUCCINATE 50 MG/1
50 TABLET, EXTENDED RELEASE ORAL DAILY
Status: DISCONTINUED | OUTPATIENT
Start: 2023-07-06 | End: 2023-07-06 | Stop reason: HOSPADM

## 2023-07-05 RX ORDER — LORATADINE 10 MG/1
10 TABLET ORAL
Status: DISCONTINUED | OUTPATIENT
Start: 2023-07-05 | End: 2023-07-06 | Stop reason: HOSPADM

## 2023-07-05 RX ORDER — ACETAMINOPHEN 500 MG
TABLET ORAL
Status: COMPLETED
Start: 2023-07-05 | End: 2023-07-05

## 2023-07-05 RX ORDER — ONDANSETRON 2 MG/ML
INJECTION INTRAMUSCULAR; INTRAVENOUS PRN
Status: DISCONTINUED | OUTPATIENT
Start: 2023-07-05 | End: 2023-07-05 | Stop reason: SURG

## 2023-07-05 RX ORDER — DEXAMETHASONE SODIUM PHOSPHATE 4 MG/ML
INJECTION, SOLUTION INTRA-ARTICULAR; INTRALESIONAL; INTRAMUSCULAR; INTRAVENOUS; SOFT TISSUE PRN
Status: DISCONTINUED | OUTPATIENT
Start: 2023-07-05 | End: 2023-07-05 | Stop reason: SURG

## 2023-07-05 RX ORDER — LIDOCAINE HYDROCHLORIDE 40 MG/ML
SOLUTION TOPICAL PRN
Status: DISCONTINUED | OUTPATIENT
Start: 2023-07-05 | End: 2023-07-05 | Stop reason: SURG

## 2023-07-05 RX ORDER — ONDANSETRON 2 MG/ML
4 INJECTION INTRAMUSCULAR; INTRAVENOUS EVERY 4 HOURS PRN
Status: DISCONTINUED | OUTPATIENT
Start: 2023-07-05 | End: 2023-07-06 | Stop reason: HOSPADM

## 2023-07-05 RX ORDER — PROTAMINE SULFATE 10 MG/ML
INJECTION, SOLUTION INTRAVENOUS PRN
Status: DISCONTINUED | OUTPATIENT
Start: 2023-07-05 | End: 2023-07-05 | Stop reason: SURG

## 2023-07-05 RX ORDER — HYDROMORPHONE HYDROCHLORIDE 1 MG/ML
0.25 INJECTION, SOLUTION INTRAMUSCULAR; INTRAVENOUS; SUBCUTANEOUS
Status: DISCONTINUED | OUTPATIENT
Start: 2023-07-05 | End: 2023-07-06 | Stop reason: HOSPADM

## 2023-07-05 RX ORDER — DEXAMETHASONE SODIUM PHOSPHATE 4 MG/ML
4 INJECTION, SOLUTION INTRA-ARTICULAR; INTRALESIONAL; INTRAMUSCULAR; INTRAVENOUS; SOFT TISSUE
Status: DISCONTINUED | OUTPATIENT
Start: 2023-07-05 | End: 2023-07-06 | Stop reason: HOSPADM

## 2023-07-05 RX ORDER — ACETAMINOPHEN 325 MG/1
TABLET ORAL PRN
Status: DISCONTINUED | OUTPATIENT
Start: 2023-07-05 | End: 2023-07-05 | Stop reason: SURG

## 2023-07-05 RX ORDER — CLONIDINE HYDROCHLORIDE 0.1 MG/1
0.1 TABLET ORAL
Status: DISCONTINUED | OUTPATIENT
Start: 2023-07-05 | End: 2023-07-06 | Stop reason: HOSPADM

## 2023-07-05 RX ORDER — SODIUM CHLORIDE, SODIUM LACTATE, POTASSIUM CHLORIDE, CALCIUM CHLORIDE 600; 310; 30; 20 MG/100ML; MG/100ML; MG/100ML; MG/100ML
INJECTION, SOLUTION INTRAVENOUS CONTINUOUS
Status: ACTIVE | OUTPATIENT
Start: 2023-07-05 | End: 2023-07-05

## 2023-07-05 RX ORDER — AMLODIPINE BESYLATE 10 MG/1
5 TABLET ORAL DAILY
Status: DISCONTINUED | OUTPATIENT
Start: 2023-07-06 | End: 2023-07-06 | Stop reason: HOSPADM

## 2023-07-05 RX ORDER — IODIXANOL 270 MG/ML
310 INJECTION, SOLUTION INTRAVASCULAR ONCE
Status: COMPLETED | OUTPATIENT
Start: 2023-07-05 | End: 2023-07-05

## 2023-07-05 RX ORDER — ONDANSETRON 2 MG/ML
4 INJECTION INTRAMUSCULAR; INTRAVENOUS
Status: DISCONTINUED | OUTPATIENT
Start: 2023-07-05 | End: 2023-07-05 | Stop reason: HOSPADM

## 2023-07-05 RX ORDER — HYDROMORPHONE HYDROCHLORIDE 1 MG/ML
0.2 INJECTION, SOLUTION INTRAMUSCULAR; INTRAVENOUS; SUBCUTANEOUS
Status: DISCONTINUED | OUTPATIENT
Start: 2023-07-05 | End: 2023-07-05 | Stop reason: HOSPADM

## 2023-07-05 RX ORDER — OXYCODONE HCL 5 MG/5 ML
10 SOLUTION, ORAL ORAL
Status: DISCONTINUED | OUTPATIENT
Start: 2023-07-05 | End: 2023-07-05 | Stop reason: HOSPADM

## 2023-07-05 RX ORDER — OXYCODONE HYDROCHLORIDE 5 MG/1
2.5 TABLET ORAL
Status: DISCONTINUED | OUTPATIENT
Start: 2023-07-05 | End: 2023-07-06 | Stop reason: HOSPADM

## 2023-07-05 RX ORDER — HALOPERIDOL 5 MG/ML
1 INJECTION INTRAMUSCULAR
Status: DISCONTINUED | OUTPATIENT
Start: 2023-07-05 | End: 2023-07-05 | Stop reason: HOSPADM

## 2023-07-05 RX ORDER — SCOLOPAMINE TRANSDERMAL SYSTEM 1 MG/1
1 PATCH, EXTENDED RELEASE TRANSDERMAL
Status: DISCONTINUED | OUTPATIENT
Start: 2023-07-05 | End: 2023-07-06 | Stop reason: HOSPADM

## 2023-07-05 RX ORDER — CLONIDINE HYDROCHLORIDE 0.1 MG/1
0.2 TABLET ORAL
Status: DISCONTINUED | OUTPATIENT
Start: 2023-07-05 | End: 2023-07-06 | Stop reason: HOSPADM

## 2023-07-05 RX ORDER — HYDROMORPHONE HYDROCHLORIDE 1 MG/ML
0.4 INJECTION, SOLUTION INTRAMUSCULAR; INTRAVENOUS; SUBCUTANEOUS
Status: DISCONTINUED | OUTPATIENT
Start: 2023-07-05 | End: 2023-07-05 | Stop reason: HOSPADM

## 2023-07-05 RX ORDER — LABETALOL HYDROCHLORIDE 5 MG/ML
10 INJECTION, SOLUTION INTRAVENOUS EVERY 4 HOURS PRN
Status: DISCONTINUED | OUTPATIENT
Start: 2023-07-05 | End: 2023-07-06 | Stop reason: HOSPADM

## 2023-07-05 RX ORDER — OXYCODONE HCL 5 MG/5 ML
5 SOLUTION, ORAL ORAL
Status: DISCONTINUED | OUTPATIENT
Start: 2023-07-05 | End: 2023-07-05 | Stop reason: HOSPADM

## 2023-07-05 RX ORDER — HEPARIN SODIUM 1000 [USP'U]/ML
INJECTION, SOLUTION INTRAVENOUS; SUBCUTANEOUS PRN
Status: DISCONTINUED | OUTPATIENT
Start: 2023-07-05 | End: 2023-07-05 | Stop reason: HOSPADM

## 2023-07-05 RX ORDER — AMIODARONE HYDROCHLORIDE 200 MG/1
200 TABLET ORAL DAILY
Status: DISCONTINUED | OUTPATIENT
Start: 2023-07-06 | End: 2023-07-06 | Stop reason: HOSPADM

## 2023-07-05 RX ORDER — HYDROMORPHONE HYDROCHLORIDE 1 MG/ML
0.1 INJECTION, SOLUTION INTRAMUSCULAR; INTRAVENOUS; SUBCUTANEOUS
Status: DISCONTINUED | OUTPATIENT
Start: 2023-07-05 | End: 2023-07-05 | Stop reason: HOSPADM

## 2023-07-05 RX ORDER — LIDOCAINE HYDROCHLORIDE 20 MG/ML
INJECTION, SOLUTION EPIDURAL; INFILTRATION; INTRACAUDAL; PERINEURAL PRN
Status: DISCONTINUED | OUTPATIENT
Start: 2023-07-05 | End: 2023-07-05 | Stop reason: SURG

## 2023-07-05 RX ORDER — HALOPERIDOL 5 MG/ML
1 INJECTION INTRAMUSCULAR EVERY 6 HOURS PRN
Status: DISCONTINUED | OUTPATIENT
Start: 2023-07-05 | End: 2023-07-06 | Stop reason: HOSPADM

## 2023-07-05 RX ORDER — LOSARTAN POTASSIUM 50 MG/1
25 TABLET ORAL EVERY MORNING
Status: DISCONTINUED | OUTPATIENT
Start: 2023-07-06 | End: 2023-07-06 | Stop reason: HOSPADM

## 2023-07-05 RX ORDER — ASPIRIN 81 MG/1
81 TABLET ORAL DAILY
Status: DISCONTINUED | OUTPATIENT
Start: 2023-07-06 | End: 2023-07-06 | Stop reason: HOSPADM

## 2023-07-05 RX ORDER — ACETAMINOPHEN 325 MG/1
650 TABLET ORAL EVERY 6 HOURS
Status: DISCONTINUED | OUTPATIENT
Start: 2023-07-05 | End: 2023-07-06 | Stop reason: HOSPADM

## 2023-07-05 RX ORDER — ACETAMINOPHEN 325 MG/1
650 TABLET ORAL EVERY 6 HOURS PRN
Status: DISCONTINUED | OUTPATIENT
Start: 2023-07-10 | End: 2023-07-06 | Stop reason: HOSPADM

## 2023-07-05 RX ORDER — HEPARIN SODIUM 5000 [USP'U]/ML
5000 INJECTION, SOLUTION INTRAVENOUS; SUBCUTANEOUS EVERY 8 HOURS
Status: DISCONTINUED | OUTPATIENT
Start: 2023-07-06 | End: 2023-07-06 | Stop reason: HOSPADM

## 2023-07-05 RX ORDER — SODIUM CHLORIDE 9 MG/ML
INJECTION, SOLUTION INTRAVENOUS CONTINUOUS
Status: DISCONTINUED | OUTPATIENT
Start: 2023-07-05 | End: 2023-07-06 | Stop reason: HOSPADM

## 2023-07-05 RX ORDER — DIPHENHYDRAMINE HYDROCHLORIDE 50 MG/ML
25 INJECTION INTRAMUSCULAR; INTRAVENOUS EVERY 6 HOURS PRN
Status: DISCONTINUED | OUTPATIENT
Start: 2023-07-05 | End: 2023-07-06 | Stop reason: HOSPADM

## 2023-07-05 RX ORDER — PHENYLEPHRINE HCL IN 0.9% NACL 0.5 MG/5ML
SYRINGE (ML) INTRAVENOUS PRN
Status: DISCONTINUED | OUTPATIENT
Start: 2023-07-05 | End: 2023-07-05 | Stop reason: SURG

## 2023-07-05 RX ORDER — ROCURONIUM BROMIDE 10 MG/ML
INJECTION, SOLUTION INTRAVENOUS PRN
Status: DISCONTINUED | OUTPATIENT
Start: 2023-07-05 | End: 2023-07-05 | Stop reason: SURG

## 2023-07-05 RX ADMIN — HEPARIN SODIUM 2000 UNITS: 1000 INJECTION, SOLUTION INTRAVENOUS; SUBCUTANEOUS at 09:49

## 2023-07-05 RX ADMIN — ACETAMINOPHEN 1000 MG: 325 TABLET ORAL at 08:16

## 2023-07-05 RX ADMIN — ACETAMINOPHEN 650 MG: 325 TABLET, FILM COATED ORAL at 17:01

## 2023-07-05 RX ADMIN — SODIUM CHLORIDE: 9 INJECTION, SOLUTION INTRAVENOUS at 16:57

## 2023-07-05 RX ADMIN — HEPARIN SODIUM 2000 UNITS: 1000 INJECTION, SOLUTION INTRAVENOUS; SUBCUTANEOUS at 10:34

## 2023-07-05 RX ADMIN — ROCURONIUM BROMIDE 30 MG: 50 INJECTION, SOLUTION INTRAVENOUS at 09:15

## 2023-07-05 RX ADMIN — HEPARIN SODIUM 2000 UNITS: 1000 INJECTION, SOLUTION INTRAVENOUS; SUBCUTANEOUS at 11:25

## 2023-07-05 RX ADMIN — PROTAMINE SULFATE 10 MG: 10 INJECTION, SOLUTION INTRAVENOUS at 11:48

## 2023-07-05 RX ADMIN — LIDOCAINE HYDROCHLORIDE 4 ML: 40 SOLUTION TOPICAL at 08:31

## 2023-07-05 RX ADMIN — CEFAZOLIN 2 G: 1 INJECTION, POWDER, FOR SOLUTION INTRAMUSCULAR; INTRAVENOUS at 08:28

## 2023-07-05 RX ADMIN — SODIUM CHLORIDE, POTASSIUM CHLORIDE, SODIUM LACTATE AND CALCIUM CHLORIDE: 600; 310; 30; 20 INJECTION, SOLUTION INTRAVENOUS at 07:30

## 2023-07-05 RX ADMIN — HEPARIN SODIUM 8000 UNITS: 1000 INJECTION, SOLUTION INTRAVENOUS; SUBCUTANEOUS at 08:59

## 2023-07-05 RX ADMIN — PHENYLEPHRINE HYDROCHLORIDE 30 MCG/MIN: 10 INJECTION INTRAVENOUS at 08:40

## 2023-07-05 RX ADMIN — PROTAMINE SULFATE 10 MG: 10 INJECTION, SOLUTION INTRAVENOUS at 11:51

## 2023-07-05 RX ADMIN — DEXAMETHASONE SODIUM PHOSPHATE 4 MG: 4 INJECTION INTRA-ARTICULAR; INTRALESIONAL; INTRAMUSCULAR; INTRAVENOUS; SOFT TISSUE at 08:42

## 2023-07-05 RX ADMIN — ROCURONIUM BROMIDE 50 MG: 50 INJECTION, SOLUTION INTRAVENOUS at 08:28

## 2023-07-05 RX ADMIN — Medication 100 MCG: at 08:37

## 2023-07-05 RX ADMIN — PROPOFOL 100 MG: 10 INJECTION, EMULSION INTRAVENOUS at 08:28

## 2023-07-05 RX ADMIN — ROCURONIUM BROMIDE 10 MG: 50 INJECTION, SOLUTION INTRAVENOUS at 11:39

## 2023-07-05 RX ADMIN — ACETAMINOPHEN 650 MG: 325 TABLET, FILM COATED ORAL at 23:54

## 2023-07-05 RX ADMIN — PROTAMINE SULFATE 10 MG: 10 INJECTION, SOLUTION INTRAVENOUS at 11:49

## 2023-07-05 RX ADMIN — FENTANYL CITRATE 50 MCG: 50 INJECTION, SOLUTION INTRAMUSCULAR; INTRAVENOUS at 11:47

## 2023-07-05 RX ADMIN — PROTAMINE SULFATE 20 MG: 10 INJECTION, SOLUTION INTRAVENOUS at 11:50

## 2023-07-05 RX ADMIN — IODIXANOL 310 ML: 270 INJECTION, SOLUTION INTRAVASCULAR at 12:45

## 2023-07-05 RX ADMIN — ROCURONIUM BROMIDE 10 MG: 50 INJECTION, SOLUTION INTRAVENOUS at 10:52

## 2023-07-05 RX ADMIN — LIDOCAINE HYDROCHLORIDE 100 MG: 20 INJECTION, SOLUTION EPIDURAL; INFILTRATION; INTRACAUDAL at 11:57

## 2023-07-05 RX ADMIN — FENTANYL CITRATE 50 MCG: 50 INJECTION, SOLUTION INTRAMUSCULAR; INTRAVENOUS at 08:28

## 2023-07-05 RX ADMIN — ONDANSETRON 4 MG: 2 INJECTION INTRAMUSCULAR; INTRAVENOUS at 10:50

## 2023-07-05 RX ADMIN — SUGAMMADEX 200 MG: 100 INJECTION, SOLUTION INTRAVENOUS at 11:58

## 2023-07-05 RX ADMIN — ROCURONIUM BROMIDE 10 MG: 50 INJECTION, SOLUTION INTRAVENOUS at 10:16

## 2023-07-05 ASSESSMENT — COGNITIVE AND FUNCTIONAL STATUS - GENERAL
SUGGESTED CMS G CODE MODIFIER DAILY ACTIVITY: CH
SUGGESTED CMS G CODE MODIFIER MOBILITY: CH
DAILY ACTIVITIY SCORE: 24
MOBILITY SCORE: 24

## 2023-07-05 ASSESSMENT — LIFESTYLE VARIABLES
AVERAGE NUMBER OF DAYS PER WEEK YOU HAVE A DRINK CONTAINING ALCOHOL: 1
HOW MANY TIMES IN THE PAST YEAR HAVE YOU HAD 5 OR MORE DRINKS IN A DAY: 0
ALCOHOL_USE: YES
HAVE PEOPLE ANNOYED YOU BY CRITICIZING YOUR DRINKING: NO
TOTAL SCORE: 0
CONSUMPTION TOTAL: NEGATIVE
TOTAL SCORE: 0
TOTAL SCORE: 0
DOES PATIENT WANT TO STOP DRINKING: NO
EVER HAD A DRINK FIRST THING IN THE MORNING TO STEADY YOUR NERVES TO GET RID OF A HANGOVER: NO
ON A TYPICAL DAY WHEN YOU DRINK ALCOHOL HOW MANY DRINKS DO YOU HAVE: 1
HAVE YOU EVER FELT YOU SHOULD CUT DOWN ON YOUR DRINKING: NO
EVER FELT BAD OR GUILTY ABOUT YOUR DRINKING: NO

## 2023-07-05 ASSESSMENT — PAIN DESCRIPTION - PAIN TYPE
TYPE: ACUTE PAIN
TYPE: SURGICAL PAIN

## 2023-07-05 ASSESSMENT — FIBROSIS 4 INDEX: FIB4 SCORE: 4.09

## 2023-07-05 NOTE — OR SURGEON
Immediate Post OP Note    PreOp Diagnosis: AAA, RCIA aneurysm    PostOp Diagnosis: same      Procedure(s):  ENDOVASCULAR REPAIR OF ABDOMINAL AORTIC ANEURYSM - Wound Class: Clean    Surgeon(s):  Wilber Ambrose M.D.    Anesthesiologist/Type of Anesthesia:  Anesthesiologist: Christian Peng M.D./General    Surgical Staff:  Circulator: KIERSTEN Hanson Circulator: Maria C Adames  Scrub Person: Santana Robin  Radiology Technologist: Radha Oleary    Specimens removed if any:  * No specimens in log *    Estimated Blood Loss: 200cc    Complications: none        7/5/2023 3:28 PM Wilber Ambrose M.D.

## 2023-07-05 NOTE — PROGRESS NOTES
"Pt Eduin Frost admitted to room T426 via transport in [facility] from PACU at 1430.  Pt Blood Pressure 117/72   Pulse (Abnormal) 56   Temperature 36.6 °C (97.9 °F) (Temporal)   Respiration 18   Height 1.702 m (5' 7\")   Weight 78.8 kg (173 lb 11.6 oz)   Oxygen Saturation 98%   Body Mass Index 27.21 kg/m²    .vs pain reported at 4 on a scale of 0-10. Oriented to room call light and smoking policy.  Reviewed plan of care (equipment, incentive spirometer, sequential compression devices, medications, activity, diet, fall precautions, skin care, and pain) with patient and family. Welcome packet given and reviewed with patient , all questions answered. Education provided on oral hygiene program.    "

## 2023-07-05 NOTE — ANESTHESIA PREPROCEDURE EVALUATION
Case: [id number] Date/Time: 07/05/23 0745    Procedure: ENDOVASCULAR REPAIR OF ABDOMINAL AORTIC ANEURYSM    Pre-op diagnosis: AAA    Location: [facility] OR 19 / SURGERY [facility]    Surgeons: Wilber Ambrose M.D.      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    Relevant Problems   CARDIAC   (positive) Essential hypertension   (positive) Paroxysmal atrial fibrillation (HCC)   (positive) Thoracic aortic aneurysm         (positive) DAKOTAH (acute kidney injury) (HCC)       Physical Exam    Airway   Mallampati: II  TM distance: >3 FB  Neck ROM: full       Cardiovascular - normal exam  Rhythm: regular  Rate: normal  (-) murmur     Dental - normal exam           Pulmonary - normal exam  Breath sounds clear to auscultation     Abdominal    Neurological - normal exam                 Anesthesia Plan    ASA 2       Plan - general       Airway plan will be ETT          Induction: intravenous    Postoperative Plan: Postoperative administration of opioids is intended.    Pertinent diagnostic labs and testing reviewed    Informed Consent:    Anesthetic plan and risks discussed with patient.    Use of blood products discussed with: patient whom consented to blood products.

## 2023-07-05 NOTE — ANESTHESIA TIME REPORT
Anesthesia Start and Stop Event Times     Date Time Event    7/5/2023 0816 Ready for Procedure     0820 Anesthesia Start     1213 Anesthesia Stop        Responsible Staff  07/05/23    Name Role Begin End    Min MAXI Peng M.D. Anesth 0820 1213        Overtime Reason:  no overtime (within assigned shift)    Comments:

## 2023-07-05 NOTE — OP REPORT
VASCULAR SURGERY SERVICE                       Operative Note  _____________________________________________________    Patient: Freeman Frost  MRN:  [id number]  :  1940    Date:  2023     Preoperative Diagnosis:  - 5.7 cm infrarenal AAA and 3.5cm right common iliac aneurysm, asymptomatic     Postoperative Diagnosis:  - 5.7 cm infrarenal AAA and 3.5cm right common iliac aneurysm, asymptomatic     Procedure:  - Endovascular repair of infrarenal abdominal aortic aneurysm with Medford Excluder endograft with 2 docking limbs (13310)  -Insertion of right iliac branched endograft for repair of iliac artery aneurysm (02317)  -Percutaneous access of the bilateral common femoral arteries with ultrasound guidance (50247)  - Bilateral femoral artery percutaneous access and closure with pre-close technique using perclose devices (2 devices for each side) (36572)       -------------------------------------------------------------------------------------------------     Surgeon:                                 Wilber Ambrose MD     Assistant:                                Zaira CHRISTENSEN     Anesthesia:                             GETA     IVF:                                          Per anesthesia record     EBL:                                        Minimal     Blood Products:                      none     Heparin:                                  Systemically heparinized     Medford excluder devices used:                         Main trunk: 28 mm x 14.5 mm x 12 cm  Aortic extender cuff: 28.5mm  Right iliac limb: 20 mm x 9.5 cm   Right iliac branch device: 23 mm x 14.5 mm x 10 cm length   Right internal iliac stent: 8 mm x 59mm and 8mm x 39mm  Left common iliac artery limb: 27 mm x 10 cm     Complications:                        none     Disposition:                             Extubated and sent to PACU in stable condition     Justification for use of Surgical First Assist:  An experienced first  assistant was utilized during this operation due to the complexity of the operation.  My assistant participated with patient preparation for surgery, access of the common femoral arteries and placement of Perclose sutures, extensive manipulation of endovascular devices and then closure of the access sites.  The presence of the expert assist increased the efficiency of the operation and decreased the risk of intraoperative surgical complications.     -----------------------------------------------------------------------------------------------------     History:  76 y.o. male with an infrarenal abdominal aortic aneurysm for which he has been in surveillance and it has been slowly enlarging over time now up to around 6 cm in size.  He also has a right common iliac artery aneurysm as well.  Endovascular repair was recommended.  I explained the details of the operation and potential risks, including but not limited to bleeding, infection, injury to vessels or nerves, possible open incision, use of xray and contrast exposure, risks of anesthesia, and global risks such as stroke, heart attack, and potentially not surviving the operation or recovery.  All questions were answered.     Procedure Summary:  Patient was placed supine on the OR table and GETA was administered. Abdomen and groins were prepped and draped in the usual fashion. Timeout was called to identify the correct patient, procedure, and equipment, and everyone was in agreement. Patient did receive preoperative prophylactic antibiotics.     The femoral arteries were accessed percutaneously and dilated up to 6 Yakut sheaths. We deployed 2 Perclose ProStyle devices in each femoral artery and then we inserted a 16 Yakut Steger dry seal sheath in the right femoral artery and an 18 [place] Steger dry seal sheath in the left common femoral artery. Patient was systemically heparinized.    The main trunk was preselected based on CT scan measurements and it was advanced  up the left side and an Omni Flush catheter was advanced up the contralateral side to the level of the renal arteries. Contrast angiography was performed to identify the renal origins and the graft was deployed just below.  The graft opened in perfect position relative to the renal arteries and post-deployment angiography revealed patent renal orifices. The ipsilateral limb was deployed and then extended to just above the left hypogastric artery.    The gate was cannulated with a glidewire and an Omni Flush catheter was passed up to the main trunk and formed and spun to verify that we had good confirmatory gate cannulation. We placed a Lunderquist wire up the right side.  A glidewire was passed from the right femoral sheath up above the flow-divider and snared and brought out the ipsilateral sheath.  The right iliac branch device was preselected based on CT scan measurements.  It was loaded on the wires and advanced up the right side and deployed in the right common iliac artery and the gate opened nicely above the hypogastric artery.  An angled catheter and wire were used to come up over the aortic bifurcation and down through the gate into the hypogastric artery and good position in the hypogastric artery was confirmed with contrast injection.  At this point we deployed the covered stent extension from the iliac branch device down into the right hypogastric artery.  Deployment of the external iliac limb of the branch device was completed and then the device was ballooned in a kissing fashion.  Injection showed that the device was in good position and there was good flow into the external and hypogastric arteries. A marking catheter was used to select the length of the bridging limb, and then an iliac limb was passed and deployed, landing perfectly from the flow divider of the main trunk down to just above the flow divider of the right iliac branch device.     Reliant balloons were passed up both sides and inflated  at the proximal and distal seal zones and at overlap junctions.  Omniflush was advanced into the visceral aorta and completion angiogram revealed patent limbs with no evidence of endoleak.    The wires and sheaths were removed and the arteriotomies were closed with the Perclose sutures.  Feet remained well perfused.  The puncture sites were closed with skin glue.     All counts were correct. Patient was extubated and sent to PACU in stable condition.      [facility]   Voalte preferred or call my office [phone]  __________________________________________________  Patient:Freeman Frost   MRN:[id number]

## 2023-07-05 NOTE — ANESTHESIA PROCEDURE NOTES
Airway    Date/Time: 7/5/2023 8:31 AM    Performed by: Christian Peng M.D.  Authorized by: Christian Peng M.D.    Location:  OR  Urgency:  Elective  Indications for Airway Management:  Anesthesia      Spontaneous Ventilation: absent    Sedation Level:  Deep  Preoxygenated: Yes    Patient Position:  Sniffing  Mask Difficulty Assessment:  1 - vent by mask  Final Airway Type:  Endotracheal airway  Final Endotracheal Airway:  ETT  Cuffed: Yes    Technique Used for Successful ETT Placement:  Direct laryngoscopy  Devices/Methods Used in Placement:  Intubating stylet    Insertion Site:  Oral  Blade Type:  Keisha  Laryngoscope Blade/Videolaryngoscope Blade Size:  3  ETT Size (mm):  7.0  Measured from:  Teeth  ETT to Teeth (cm):  22  Placement Verified by: capnometry    Cormack-Lehane Classification:  Grade IIa - partial view of glottis  Number of Attempts at Approach:  1

## 2023-07-05 NOTE — ANESTHESIA PROCEDURE NOTES
Arterial Line    Performed by: Christian Peng M.D.  Authorized by: Christian Peng M.D.    Start Time:  7/5/2023 8:32 AM  End Time:  7/5/2023 8:36 AM  Localization: ultrasound guidance  Image captured, interpreted and electronically stored.  Patient Location:  OR  Indication: continuous blood pressure monitoring        Catheter Size:  20 G  Seldinger Technique?: Yes    Laterality:  Right  Site:  Radial artery  Line Secured:  Antimicrobial disc, tape and transparent dressing  Events: patient tolerated procedure well with no complications

## 2023-07-06 VITALS
SYSTOLIC BLOOD PRESSURE: 106 MMHG | OXYGEN SATURATION: 96 % | WEIGHT: 173.72 LBS | DIASTOLIC BLOOD PRESSURE: 59 MMHG | HEART RATE: 72 BPM | RESPIRATION RATE: 18 BRPM | TEMPERATURE: 97.7 F | HEIGHT: 67 IN | BODY MASS INDEX: 27.27 KG/M2

## 2023-07-06 LAB
ACT BLD: 227 SEC (ref 74–137)
ACT BLD: 257 SEC (ref 74–137)
ACT BLD: 263 SEC (ref 74–137)
ACT BLD: 263 SEC (ref 74–137)
ACT BLD: 269 SEC (ref 74–137)
ANION GAP SERPL CALC-SCNC: 13 MMOL/L (ref 7–16)
BUN SERPL-MCNC: 14 MG/DL (ref 8–22)
CALCIUM SERPL-MCNC: 8.5 MG/DL (ref 8.5–10.5)
CHLORIDE SERPL-SCNC: 103 MMOL/L (ref 96–112)
CO2 SERPL-SCNC: 22 MMOL/L (ref 20–33)
CREAT SERPL-MCNC: 1.12 MG/DL (ref 0.5–1.4)
ERYTHROCYTE [DISTWIDTH] IN BLOOD BY AUTOMATED COUNT: 56.2 FL (ref 35.9–50)
GFR SERPLBLD CREATININE-BSD FMLA CKD-EPI: 65 ML/MIN/1.73 M 2
GLUCOSE SERPL-MCNC: 116 MG/DL (ref 65–99)
HCT VFR BLD AUTO: 35.8 % (ref 42–52)
HGB BLD-MCNC: 11.4 G/DL (ref 14–18)
MCH RBC QN AUTO: 30.5 PG (ref 27–33)
MCHC RBC AUTO-ENTMCNC: 31.8 G/DL (ref 32.3–36.5)
MCV RBC AUTO: 95.7 FL (ref 81.4–97.8)
PLATELET # BLD AUTO: 89 K/UL (ref 164–446)
PLATELETS.RETICULATED NFR BLD AUTO: 13.2 % (ref 0.6–13.1)
PMV BLD AUTO: 11.7 FL (ref 9–12.9)
POTASSIUM SERPL-SCNC: 4.1 MMOL/L (ref 3.6–5.5)
RBC # BLD AUTO: 3.74 M/UL (ref 4.7–6.1)
SODIUM SERPL-SCNC: 138 MMOL/L (ref 135–145)
WBC # BLD AUTO: 10.2 K/UL (ref 4.8–10.8)

## 2023-07-06 PROCEDURE — 700111 HCHG RX REV CODE 636 W/ 250 OVERRIDE (IP): Performed by: SURGERY

## 2023-07-06 PROCEDURE — 85027 COMPLETE CBC AUTOMATED: CPT

## 2023-07-06 PROCEDURE — 700105 HCHG RX REV CODE 258: Performed by: SURGERY

## 2023-07-06 PROCEDURE — 85055 RETICULATED PLATELET ASSAY: CPT

## 2023-07-06 PROCEDURE — A9270 NON-COVERED ITEM OR SERVICE: HCPCS | Performed by: SURGERY

## 2023-07-06 PROCEDURE — 700102 HCHG RX REV CODE 250 W/ 637 OVERRIDE(OP): Performed by: SURGERY

## 2023-07-06 PROCEDURE — 80048 BASIC METABOLIC PNL TOTAL CA: CPT

## 2023-07-06 RX ADMIN — HEPARIN SODIUM 5000 UNITS: 5000 INJECTION, SOLUTION INTRAVENOUS; SUBCUTANEOUS at 05:14

## 2023-07-06 RX ADMIN — METOPROLOL SUCCINATE 50 MG: 50 TABLET, EXTENDED RELEASE ORAL at 05:14

## 2023-07-06 RX ADMIN — AMIODARONE HYDROCHLORIDE 200 MG: 200 TABLET ORAL at 05:14

## 2023-07-06 RX ADMIN — ACETAMINOPHEN 650 MG: 325 TABLET, FILM COATED ORAL at 05:14

## 2023-07-06 RX ADMIN — SODIUM CHLORIDE: 9 INJECTION, SOLUTION INTRAVENOUS at 02:39

## 2023-07-06 RX ADMIN — AMLODIPINE BESYLATE 5 MG: 10 TABLET ORAL at 05:13

## 2023-07-06 RX ADMIN — LOSARTAN POTASSIUM 25 MG: 50 TABLET, FILM COATED ORAL at 05:13

## 2023-07-06 RX ADMIN — ASPIRIN 81 MG: 81 TABLET, COATED ORAL at 05:13

## 2023-07-06 ASSESSMENT — PAIN DESCRIPTION - PAIN TYPE
TYPE: ACUTE PAIN
TYPE: ACUTE PAIN
TYPE: SURGICAL PAIN

## 2023-07-06 NOTE — CARE PLAN
The patient is Stable - Low risk of patient condition declining or worsening    Shift Goals  Clinical Goals: Pulse Checks; Safety; Monitor Vitals  Patient Goals: Rest    Progress made toward(s) clinical / shift goals:  Patient medicated per MAR. Non-pharmacologic comfort measures implemented. Safety discussed. Education provided. Ambulation and repositioning encouraged.     Problem: Fall Risk  Goal: Patient will remain free from falls  7/6/2023 0329 by Mary Appiah R.N.  Outcome: Progressing  7/6/2023 0329 by NADJA Thomas.N.  Outcome: Progressing     Problem: Knowledge Deficit - Standard  Goal: Patient and family/care givers will demonstrate understanding of plan of care, disease process/condition, diagnostic tests and medications  7/6/2023 0329 by Mary Appiah, R.N.  Outcome: Progressing  7/6/2023 0329 by NADJA Thomas.N.  Outcome: Progressing     Problem: Pain - Standard  Goal: Alleviation of pain or a reduction in pain to the patient’s comfort goal  Outcome: Progressing

## 2023-07-06 NOTE — DISCHARGE INSTRUCTIONS
Discharge Instructions:  Aortic Endograft placement    1. DIET: After discharge from the hospital you may resume your normal preoperative diet without restrictions.    2. ACTIVITIES: After discharge from the hospital, you may resume full routine activities. Lifting over 15 pounds and strenuous activities will make your incision sore and should be avoided for 2 weeks after surgery. Routine activities of daily living such as walking, going up and down stairs are acceptable.    3. DRIVING: You may drive whenever you are no longer taking narcotic pain medications and are able to perform the activities needed to drive safely, i.e. turning, bending, twisting, etc.    4. BATHING: OK to shower starting one day after surgery.  The incision is covered with skin glue which is waterproof.  It will start to peel off in 5-7 days which is normal.  Avoid submerging the incision in water (tub, bath, pool) for at least a week.     5. BOWEL FUNCTION: The combination of pain medication and decreased activity level can cause constipation in otherwise normal patients. If you feel this is occurring, take a laxative (Milk of Magnesia, Ex-Lax, Senokot, Miralax, Magnesium Citrate) until the problem has resolved.    6. PAIN MEDICATION: You will be given a prescription for pain medication at discharge. Please take these as directed. It is advisable to take your medication around the clock for the first 24 to 48 hours. You may continue any non-steroidal anti-inflammatory medications (NSAIDs) such as Advil in the post-operative period. These may and should be taken with your narcotic pain medication. It is important to remember not to take medications on an empty stomach as this may cause nausea. Do not consume alcohol while taking pain medication.  You can put ice packs on the groin(s) if it helps for additional pain relief.  Ice should be applied for 20 minutes at a time, with a 20 minute break before reapplying.    7. CALL IF YOU HAVE: (1)  Fevers to more than 101F, (2) Unusual chest or leg pain, (3) Drainage or fluid from incision that may be foul smelling, increased tenderness or soreness at the wound or the wound edges are no longer together, redness or swelling at the incision site.    8. FOLLOW-UP: Call and schedule a follow up appointment in 2 weeks.    If you have any additional questions at all, please do not hesitate to call the office and speak to my medical assistant, myself, or the physician on call.    Office address:   [facility]  [address] [place] [address] [id number] [phone]  Fax [phone]

## 2023-07-06 NOTE — DISCHARGE PLANNING
HTH/SCP TCN chart review completed. CM in rounds at time of TCN visit (*and note that pt has dc'd to home prior to conclusion of rounds). The most current review of medical record, knowledge of pt's PLOF and social support, LACE+ score of 52, 6 clicks scores of 24 mobility were considered.      Pt seen at bedside with family present. Introduced TCN program. Provided education regarding post acute levels of care. Discussed HTH/SCP plan benefits (Meds to Beds, medical uber and [facility] transitional care). Pt verbalizes understanding. Pt reports he has 2 outpatient follow up appointments already scheduled and declines additional needs.    Pt reports no functional concerns with dc to home and is anticipating dc soon. Per review and pt verifies he remains ambulatory with no AD post interventions. He reports no concerns with transportation at time of medical clearance or for outpatient follow ups. Note per review vascular MD anticipating outpatient follow up in 2-3 weeks as well.     Given aforementioned, no additional provider requests and no choice indicated. TCN will continue to follow and collaborate with discharge planning team as additional post acute needs arise. Thank you.     Completed today:  Choice obtained:  none indicated  [facility] referral not sent; LOW LACE and planning outpatient follow ups

## 2023-07-06 NOTE — PROGRESS NOTES
4 Eyes Skin Assessment Completed by KASI Bundy and KASI Arambula.    Head WDL  Ears Redness and Blanching  Nose WDL  Mouth upper dentures in place  Neck WDL  Breast/Chest WDL  Shoulder Blades WDL  Spine WDL  (R) Arm/Elbow/Hand Bruising  (L) Arm/Elbow/Hand Bruising  Abdomen WDL  Groin Incision bilateral durmabond  Scrotum/Coccyx/Buttocks Redness and Blanching  (R) Leg WDL  (L) Leg WDL  (R) Heel/Foot/Toe WDL  (L) Heel/Foot/Toe WDL          Devices In Places SCD's, oxygen tubing      Interventions In Place Gray Ear Foams and Pillows    Possible Skin Injury No

## 2023-07-06 NOTE — PROGRESS NOTES
Received report from previous shift RN at 1900.  Assessment complete.  A&O x 3, disoriented to time. Patient calls appropriately.  Patient ambulates with x1 assist with FWW. Bed alarm on.   Patient has 0/10 pain. No interventions for pain implemented at this time, will continue to monitor.  Denies N&V. Tolerating regular diet.  Skin per flowsheets.  + void, - flatus, + BM PTA.  Patient denies SOB on room air.  SCD's on.    Patient pleasant and cooperative throughout assessment.  Reviewed plan of care with patient, pt verbalizes understanding. Call light and personal belongings with in reach. Hourly rounding in place. All needs met at this time.

## 2023-07-06 NOTE — ANESTHESIA POSTPROCEDURE EVALUATION
Patient: Freeman Frost    Procedure Summary     Date: 07/05/23 Room / Location: [place] [id number] / SURGERY [facility]    Anesthesia Start: 0820 Anesthesia Stop: 1213    Procedure: ENDOVASCULAR REPAIR OF ABDOMINAL AORTIC ANEURYSM (Groin) Diagnosis:       AAA (abdominal aortic aneurysm) (HCC)      (AAA)    Surgeons: Wilber Ambrose M.D. Responsible Provider: Christian Peng M.D.    Anesthesia Type: general ASA Status: 2          Final Anesthesia Type: general  Last vitals  BP   Blood Pressure : 117/72, Arterial BP: 115/52    Temp   36.6 °C (97.9 °F)    Pulse   (!) 56   Resp   18    SpO2   98 %      Anesthesia Post Evaluation    Patient location during evaluation: PACU  Patient participation: complete - patient participated  Level of consciousness: awake and alert    Airway patency: patent  Anesthetic complications: no  Cardiovascular status: hemodynamically stable  Respiratory status: acceptable  Hydration status: euvolemic    PONV: none          No notable events documented.     Nurse Pain Score: 0 (NPRS)

## 2023-07-06 NOTE — PROGRESS NOTES
VASCULAR SURGERY SERVICE                        Progress Note  _________________________________    Doing well  Home today  FU 2-3 weeks    Wilber Ambrose MD  [facility]   Voalte preferred or call  office [phone]  __________________________________________________  Patient:Freeman Frost   MRN:[id number]

## 2023-07-07 ENCOUNTER — NON-PROVIDER VISIT (OUTPATIENT)
Dept: CARDIOLOGY | Facility: MEDICAL CENTER | Age: 83
End: 2023-07-07
Payer: MEDICARE

## 2023-07-07 PROCEDURE — 93298 REM INTERROG DEV EVAL SCRMS: CPT | Performed by: INTERNAL MEDICINE

## 2023-07-10 NOTE — CARDIAC REMOTE MONITOR - SCAN
Device transmission reviewed. Device demonstrated appropriate function.       Electronically Signed by: Mary Brown M.D.    7/20/2023  2:11 PM

## 2023-07-13 DIAGNOSIS — I71.40 ABDOMINAL AORTIC ANEURYSM (AAA) WITHOUT RUPTURE, UNSPECIFIED PART (HCC): ICD-10-CM

## 2023-07-14 LAB — EKG IMPRESSION: NORMAL

## 2023-07-14 PROCEDURE — 93010 ELECTROCARDIOGRAM REPORT: CPT | Performed by: INTERNAL MEDICINE

## 2023-07-20 ENCOUNTER — HOSPITAL ENCOUNTER (OUTPATIENT)
Dept: RADIOLOGY | Facility: MEDICAL CENTER | Age: 83
End: 2023-07-20
Attending: NURSE PRACTITIONER
Payer: MEDICARE

## 2023-07-20 ENCOUNTER — HOSPITAL ENCOUNTER (OUTPATIENT)
Dept: LAB | Facility: MEDICAL CENTER | Age: 83
End: 2023-07-20
Attending: NURSE PRACTITIONER
Payer: MEDICARE

## 2023-07-20 DIAGNOSIS — I71.40 ABDOMINAL AORTIC ANEURYSM (AAA) WITHOUT RUPTURE, UNSPECIFIED PART (HCC): ICD-10-CM

## 2023-07-20 LAB
ANION GAP SERPL CALC-SCNC: 11 MMOL/L (ref 7–16)
BUN SERPL-MCNC: 24 MG/DL (ref 8–22)
CALCIUM SERPL-MCNC: 9.1 MG/DL (ref 8.4–10.2)
CHLORIDE SERPL-SCNC: 109 MMOL/L (ref 96–112)
CO2 SERPL-SCNC: 21 MMOL/L (ref 20–33)
CREAT SERPL-MCNC: 1.31 MG/DL (ref 0.5–1.4)
FASTING STATUS PATIENT QL REPORTED: NORMAL
GFR SERPLBLD CREATININE-BSD FMLA CKD-EPI: 54 ML/MIN/1.73 M 2
GLUCOSE SERPL-MCNC: 147 MG/DL (ref 65–99)
POTASSIUM SERPL-SCNC: 4.4 MMOL/L (ref 3.6–5.5)
SODIUM SERPL-SCNC: 141 MMOL/L (ref 135–145)

## 2023-07-20 PROCEDURE — 36415 COLL VENOUS BLD VENIPUNCTURE: CPT

## 2023-07-20 PROCEDURE — 80048 BASIC METABOLIC PNL TOTAL CA: CPT

## 2023-07-20 PROCEDURE — 700117 HCHG RX CONTRAST REV CODE 255: Performed by: NURSE PRACTITIONER

## 2023-07-20 PROCEDURE — 71275 CT ANGIOGRAPHY CHEST: CPT

## 2023-07-20 RX ADMIN — IOHEXOL 100 ML: 350 INJECTION, SOLUTION INTRAVENOUS at 15:42

## 2023-07-21 ENCOUNTER — DOCUMENTATION (OUTPATIENT)
Dept: VASCULAR LAB | Facility: MEDICAL CENTER | Age: 83
End: 2023-07-21
Payer: MEDICARE

## 2023-07-22 NOTE — PROGRESS NOTES
Imaging reviewed in accordance with institution TEVAR/EVAR guideline  This imaging performed approx 1 mo after procedure    Review of CTA demonstrates stable endograft without endoleak.    Will have MA instruct patient to follow up with vascular surgeon and PCP for medical management unless referred to vascular medicine clinic.     Will need to follow up with PCP regarding lung findings.     Anticipate repeat imaging in one year in accordance with institution guideline    Michael Bloch, MD  Vascular Care:    Cc:   KAREEM Ambrose

## 2023-07-26 ENCOUNTER — DOCUMENTATION (OUTPATIENT)
Dept: VASCULAR LAB | Facility: MEDICAL CENTER | Age: 83
End: 2023-07-26
Payer: MEDICARE

## 2023-07-26 NOTE — PROGRESS NOTES
Left message for patient to let him know his CTA shows his stent graft looks good and that there were some non-specified lung findings he should f/u with pcp on. Let patient know he should f/u with Dr. Ambrose as scheduled and offered appt with Dr. Hutton if he would like to be seen in our office as well. If patient would like to be seen in our clinic, he can be scheduled in the openings for Aug or Sept.

## 2023-08-01 ENCOUNTER — OFFICE VISIT (OUTPATIENT)
Dept: VASCULAR SURGERY | Facility: MEDICAL CENTER | Age: 83
End: 2023-08-01
Payer: MEDICARE

## 2023-08-01 VITALS
OXYGEN SATURATION: 100 % | HEIGHT: 69 IN | WEIGHT: 172 LBS | BODY MASS INDEX: 25.48 KG/M2 | TEMPERATURE: 97.9 F | HEART RATE: 72 BPM | DIASTOLIC BLOOD PRESSURE: 56 MMHG | SYSTOLIC BLOOD PRESSURE: 102 MMHG

## 2023-08-01 DIAGNOSIS — I71.43 INFRARENAL ABDOMINAL AORTIC ANEURYSM (AAA) WITHOUT RUPTURE (HCC): ICD-10-CM

## 2023-08-01 PROCEDURE — 99024 POSTOP FOLLOW-UP VISIT: CPT | Performed by: SURGERY

## 2023-08-01 PROCEDURE — 3078F DIAST BP <80 MM HG: CPT | Performed by: SURGERY

## 2023-08-01 PROCEDURE — 3074F SYST BP LT 130 MM HG: CPT | Performed by: SURGERY

## 2023-08-01 ASSESSMENT — FIBROSIS 4 INDEX: FIB4 SCORE: 4.91

## 2023-08-01 NOTE — PROGRESS NOTES
"                 VASCULAR SURGERY                 Clinic Progress Note  _________________________________    7/5/23: underwent EVAR with iliac branch device.    8/1/23: Doing well. No complaints. Surveillance CTA has already been performed and shows the stent in good position, widely patent, no endoleak.    Vitals  /56 (BP Location: Left arm, Patient Position: Sitting, BP Cuff Size: Adult)   Pulse 72   Temp 36.6 °C (97.9 °F) (Temporal)   Ht 1.753 m (5' 9\")   Wt 78 kg (172 lb)   SpO2 100%   BMI 25.40 kg/m²     Abdomen benign  Puncture sites healed well  BLE well perfused    A/P)  Doing well  FU 6 months with surveillance duplex or sooner if concerns arise.        Wilber Ambrose MD  [facility]  [phone]  [address], [place] [address]  "

## 2023-08-07 ENCOUNTER — NON-PROVIDER VISIT (OUTPATIENT)
Dept: CARDIOLOGY | Facility: MEDICAL CENTER | Age: 83
End: 2023-08-07
Payer: MEDICARE

## 2023-08-07 PROCEDURE — 93298 REM INTERROG DEV EVAL SCRMS: CPT | Performed by: INTERNAL MEDICINE

## 2023-08-08 NOTE — CARDIAC REMOTE MONITOR - SCAN
Device transmission reviewed. Device demonstrated appropriate function.       Electronically Signed by: Cl Valdez M.D.    8/15/2023  3:30 PM

## 2023-08-18 LAB — EKG IMPRESSION: NORMAL

## 2023-09-07 ENCOUNTER — NON-PROVIDER VISIT (OUTPATIENT)
Dept: CARDIOLOGY | Facility: MEDICAL CENTER | Age: 83
End: 2023-09-07
Payer: MEDICARE

## 2023-09-07 PROCEDURE — 93298 REM INTERROG DEV EVAL SCRMS: CPT | Performed by: INTERNAL MEDICINE

## 2023-09-08 NOTE — CARDIAC REMOTE MONITOR - SCAN
Device transmission reviewed. Device demonstrated appropriate function.       Electronically Signed by: Cl Valdez M.D.    9/11/2023  8:59 PM

## 2023-09-12 PROBLEM — I77.4 CELIAC ARTERY STENOSIS (HCC): Status: ACTIVE | Noted: 2023-09-12

## 2023-09-12 PROBLEM — I70.1 RIGHT RENAL ARTERY STENOSIS (HCC): Status: ACTIVE | Noted: 2023-09-12

## 2023-09-12 PROBLEM — I77.1 CELIAC ARTERY STENOSIS (HCC): Status: ACTIVE | Noted: 2023-09-12

## 2023-09-26 ENCOUNTER — TELEPHONE (OUTPATIENT)
Dept: VASCULAR LAB | Facility: MEDICAL CENTER | Age: 83
End: 2023-09-26
Payer: MEDICARE

## 2023-09-26 NOTE — TELEPHONE ENCOUNTER
Unable to leave VM / schedule initial appt with Dr. Hutton in January.    Misha Starkey  [facility]   P#[phone]  Fax#[phone]

## 2023-10-08 ENCOUNTER — NON-PROVIDER VISIT (OUTPATIENT)
Dept: CARDIOLOGY | Facility: MEDICAL CENTER | Age: 83
End: 2023-10-08
Payer: MEDICARE

## 2023-10-08 PROCEDURE — 93298 REM INTERROG DEV EVAL SCRMS: CPT | Performed by: INTERNAL MEDICINE

## 2023-10-09 NOTE — CARDIAC REMOTE MONITOR - SCAN
Device transmission reviewed. Device demonstrated appropriate function.       Electronically Signed by: Billy Dudley M.D.    10/9/2023  2:54 PM

## 2023-10-11 ENCOUNTER — DOCUMENTATION (OUTPATIENT)
Dept: VASCULAR LAB | Facility: MEDICAL CENTER | Age: 83
End: 2023-10-11
Payer: MEDICARE

## 2023-10-11 ENCOUNTER — TELEPHONE (OUTPATIENT)
Dept: VASCULAR LAB | Facility: MEDICAL CENTER | Age: 83
End: 2023-10-11
Payer: MEDICARE

## 2023-10-11 NOTE — TELEPHONE ENCOUNTER
Failed contact with patient after few attempts for initial with Dr. Hutton    Dates provided below / staff message sent to Dr.Bloch:      10/11- vm   10/03- will speak to family first / will f/u in a week  09/26- unable to leave vm

## 2023-10-11 NOTE — PROGRESS NOTES
Patient referred to vascular care  Unfortunately our schedulers have been unable to reach patient despite multiple attempts  Unless patient establishes with a face-to-face visit in our office will be unable to take part in care  Await further patient contact.  Pending further contact, we will defer all further management of vascular disease and its risk factors to PCP and/or referring MD.    Michael J. Bloch, MD  Vascular Care    cc:   ODALYS Rodríguez

## 2023-10-23 ENCOUNTER — APPOINTMENT (OUTPATIENT)
Dept: CARDIOLOGY | Facility: MEDICAL CENTER | Age: 83
End: 2023-10-23
Attending: INTERNAL MEDICINE
Payer: MEDICARE

## 2023-10-26 ENCOUNTER — OFFICE VISIT (OUTPATIENT)
Dept: CARDIOLOGY | Facility: MEDICAL CENTER | Age: 83
End: 2023-10-26
Attending: INTERNAL MEDICINE
Payer: MEDICARE

## 2023-10-26 VITALS
SYSTOLIC BLOOD PRESSURE: 104 MMHG | WEIGHT: 177 LBS | HEART RATE: 70 BPM | RESPIRATION RATE: 16 BRPM | HEIGHT: 69 IN | BODY MASS INDEX: 26.22 KG/M2 | DIASTOLIC BLOOD PRESSURE: 66 MMHG | OXYGEN SATURATION: 97 %

## 2023-10-26 DIAGNOSIS — Z79.899 ENCOUNTER FOR MONITORING AMIODARONE THERAPY: ICD-10-CM

## 2023-10-26 DIAGNOSIS — Z51.81 ENCOUNTER FOR MONITORING AMIODARONE THERAPY: ICD-10-CM

## 2023-10-26 DIAGNOSIS — I48.0 PAROXYSMAL ATRIAL FIBRILLATION (HCC): ICD-10-CM

## 2023-10-26 PROBLEM — I71.23 ANEURYSM OF DESCENDING THORACIC AORTA WITHOUT RUPTURE (HCC): Status: ACTIVE | Noted: 2019-05-28

## 2023-10-26 PROBLEM — I71.43 INFRARENAL ABDOMINAL AORTIC ANEURYSM (AAA) WITHOUT RUPTURE (HCC): Status: ACTIVE | Noted: 2023-10-26

## 2023-10-26 PROCEDURE — 93291 INTERROG DEV EVAL SCRMS IP: CPT | Mod: 26 | Performed by: INTERNAL MEDICINE

## 2023-10-26 PROCEDURE — 3074F SYST BP LT 130 MM HG: CPT | Performed by: INTERNAL MEDICINE

## 2023-10-26 PROCEDURE — 93005 ELECTROCARDIOGRAM TRACING: CPT | Performed by: INTERNAL MEDICINE

## 2023-10-26 PROCEDURE — 93291 INTERROG DEV EVAL SCRMS IP: CPT | Performed by: INTERNAL MEDICINE

## 2023-10-26 PROCEDURE — 3078F DIAST BP <80 MM HG: CPT | Performed by: INTERNAL MEDICINE

## 2023-10-26 PROCEDURE — 99213 OFFICE O/P EST LOW 20 MIN: CPT | Performed by: INTERNAL MEDICINE

## 2023-10-26 PROCEDURE — 99214 OFFICE O/P EST MOD 30 MIN: CPT | Mod: 25 | Performed by: INTERNAL MEDICINE

## 2023-10-26 PROCEDURE — 99212 OFFICE O/P EST SF 10 MIN: CPT | Performed by: INTERNAL MEDICINE

## 2023-10-26 ASSESSMENT — FIBROSIS 4 INDEX: FIB4 SCORE: 4.97

## 2023-10-26 NOTE — PROGRESS NOTES
Arrhythmia Clinic Note (Established patient)    DOS: 10/26/2023    Chief complaint/Reason for consult: F/u amio ILR    Interval History:  Pt is an 82 yo M. S/p ILR for syncope. PAF with RVR not controlled with Multaq. Switched to amio in June. Here for follow-up. No complaints. No significant arrhythmia on the amio recorded on ILR. S/p WATCHMAN.    ROS (+ highlighted in red):  General--Negative for fatigue, weight loss or weight gain  Cardiovascular--Negative for CP, orthopnea, PND    Past Medical History:   Diagnosis Date    Anemia     Anesthesia     patient denies hx    Arrhythmia     afib    Atrial fibrillation (HCC)     Watchman    Bowel habit changes     constipation    Cataract     bilat IOL    Dental disorder     upper    Hemorrhagic disorder (HCC)     Hypercholesterolemia     Hypertension     Snoring     Stroke (HCC) 2013    tia no residual def       Past Surgical History:   Procedure Laterality Date    AZ EVASC RPR DPLMNT AORTO-AORTIC NDGFT N/A 7/5/2023    Procedure: ENDOVASCULAR REPAIR OF ABDOMINAL AORTIC ANEURYSM;  Surgeon: Wilber Ambrose M.D.;  Location: [facility];  Service: Vascular    AZ ERCP,DIAGNOSTIC N/A 04/20/2019    Procedure: ERCP, DIAGNOSTIC;  Surgeon: Antony Morrison M.D.;  Location: [facility];  Service: Gastroenterology    FRANCIA BY LAPAROSCOPY  04/19/2019    Procedure: LAPARASCOPIC- CONVERTED TO OPEN CHOLECYSTECTOMY;  Surgeon: Anita Merino M.D.;  Location: [facility];  Service: General    CATARACT PHACO WITH IOL Right 08/28/2018    Procedure: CATARACT PHACO WITH IOL;  Surgeon: Adolfo Santana M.D.;  Location: SURGERY SAME DAY [facility];  Service: Ophthalmology    CATARACT PHACO WITH IOL Left 08/14/2018    Procedure: CATARACT PHACO WITH IOL;  Surgeon: Adolfo Santana M.D.;  Location: SURGERY SAME DAY [facility];  Service: Ophthalmology    HERNIA REPAIR, INCISIONAL, UNILATERAL Left     OTHER      tonsils as a child    OTHER CARDIAC  SURGERY  11-9-22    Watchman procedure    TONSILLECTOMY         Social History     Socioeconomic History    Marital status:      Spouse name: Not on file    Number of children: Not on file    Years of education: Not on file    Highest education level: Not on file   Occupational History    Not on file   Tobacco Use    Smoking status: Never    Smokeless tobacco: Never   Vaping Use    Vaping Use: Never used   Substance and Sexual Activity    Alcohol use: Yes     Alcohol/week: 0.6 oz     Types: 1 Cans of beer per week     Comment: 1 beer, once a week at most    Drug use: Never    Sexual activity: Not on file   Other Topics Concern    Not on file   Social History Narrative    Not on file     Social Determinants of Health     Financial Resource Strain: Low Risk  (5/18/2021)    Overall Financial Resource Strain (CARDIA)     Difficulty of Paying Living Expenses: Not hard at all   Food Insecurity: No Food Insecurity (5/18/2021)    Hunger Vital Sign     Worried About Running Out of Food in the Last Year: Never true     Ran Out of Food in the Last Year: Never true   Transportation Needs: No Transportation Needs (5/18/2021)    PRAPARE - Transportation     Lack of Transportation (Medical): No     Lack of Transportation (Non-Medical): No   Physical Activity: Not on file   Stress: Not on file   Social Connections: Not on file   Intimate Partner Violence: Not on file   Housing Stability: Not on file       Family History   Problem Relation Age of Onset    Dementia Mother     Cancer Mother         Breast    Cancer Brother         Esophageal       Allergies   Allergen Reactions    Other Environmental Unspecified     Seasonal allergies=rabbit brush       Current Outpatient Medications   Medication Sig Dispense Refill    Multiple Vitamins-Minerals (PRESERVISION AREDS PO) Take 1 Tablet by mouth every day.      Multiple Vitamins-Minerals (MENS MULTIVITAMIN PO) Take 1 Tablet by mouth every day.      Probiotic Product (PROBIOTIC  "DAILY PO) Take 1 Tablet by mouth every day.      amiodarone (CORDARONE) 200 MG Tab Take 1 Tablet by mouth every day. 60 Tablet 3    loratadine (CLARITIN) 10 MG Tab Take 10 mg by mouth 1 time a day as needed (Allergy).      aspirin 81 MG EC tablet Take 1 Tablet by mouth every day. 100 Tablet 5    metoprolol SR (TOPROL XL) 50 MG TABLET SR 24 HR Take 1 Tablet by mouth every day. 30 Tablet 5    losartan (COZAAR) 25 MG Tab Take 25 mg by mouth every morning.  3    amLODIPine (NORVASC) 5 MG Tab Take 1 Tablet by mouth every day.       No current facility-administered medications for this visit.       Physical Exam:  Vitals:    10/26/23 0857   BP: 104/66   BP Location: Left arm   Patient Position: Sitting   BP Cuff Size: Adult   Pulse: 70   Resp: 16   SpO2: 97%   Weight: 80.3 kg (177 lb)   Height: 1.753 m (5' 9\")     General appearance: NAD, conversant  HEENT: PERRL, neck is supple with FROM  Lungs: Clear to auscultation, normal respiratory effort  CV: RRR, no murmurs/rubs/gallops, no JVD  Abdomen: Soft, non-tender with normal bowel sounds  Extremities: No peripheral edema, no clubbing or cyanosis  Skin: No rash, lesions, or ulcers  Psych: Alert and oriented to person, place and time    Data:  Labs reviewed    Prior echo/stress reviewed:  Well seated device without significant peridevice leak    EKG interpreted by me:  Sinus rhythm    Impression/Plan:  1. Paroxysmal atrial fibrillation (HCC)  EKG      2. Encounter for monitoring amiodarone therapy  Comp Metabolic Panel    TSH WITH REFLEX TO FT4      -Doing well  -Continue amio at 200  -ILR with appropriate function  -F/u in 6 mo, repeat TSH/LFTs prior to that    Mary Brown MD    "

## 2023-10-26 NOTE — PROGRESS NOTES
Chief Complaint   Patient presents with   • Atrial Fibrillation     follow up       Subjective:   Freeman Frost is an 80y.o. male who presents today for follow-up regarding atrial flutter and syncope.  Has a history of hypertension and TIAs in 2012 and was recently hospitalized for acute cholecystitis which was complicated and required conversion to an open procedure.  During this time he was noted to have atypical atrial flutter which spontaneously resolved.  He was unaware of it as he was quite ill.  He has been initiated on oral anticoagulation and is tolerating this well.  Echocardiogram showed mild elevated pulmonary pressures and a mildly dilated ascending aorta 4 cm.  He has not had a symptomatic recurrence of atrial flutter since that time.  However he did have a single syncopal episode right after being out of the hospital.  The garage door fell on his fingers and he broke a fingertip and bruised multiple nailbeds.  Shortly thereafter he stood up and had a syncopal event.  He was lightheaded and orthostatic and was seen in the emergency department and released.  This was not arrhythmic in nature.  He is a non-smoker who does not drink excessively or do drugs and has no family history of precocious CAD.  His daughter is our  supervisor Mrs. Byrd.    Since last visit he sustained another syncopal event after standing up and walking out to his patio lost consciousness and sustained a trauma and he is anticoagulated and has an anterior knee hematoma.  He did strike his head but had no intracranial issues thankfully.  Is not associated with much of a prodrome per his report.  He was given IV fluids and released with a diagnosis of orthostasis.  Currently not wearing pressure stockings.  Memory problems have been stable and he is currently undergoing evaluation.    Past Medical History:   Diagnosis Date   • Arrhythmia    • Bowel habit changes     constipation   • Cataract    • Dental disorder      upper   • Hypercholesterolemia    • Hypertension    • Snoring    • Stroke (HCC) 2013    tia no residual def     Past Surgical History:   Procedure Laterality Date   • PB ERCP,DIAGNOSTIC N/A 4/20/2019    Procedure: ERCP, DIAGNOSTIC;  Surgeon: Antony Morrison M.D.;  Location: SURGERY [facility];  Service: Gastroenterology   • FRANCIA BY LAPAROSCOPY  4/19/2019    Procedure: LAPARASCOPIC- CONVERTED TO OPEN CHOLECYSTECTOMY;  Surgeon: Anita Merino M.D.;  Location: SURGERY [facility];  Service: General   • CATARACT PHACO WITH IOL Right 8/28/2018    Procedure: CATARACT PHACO WITH IOL;  Surgeon: Adolfo Santana M.D.;  Location: SURGERY [facility] DAY [facility];  Service: Ophthalmology   • CATARACT PHACO WITH IOL Left 8/14/2018    Procedure: CATARACT PHACO WITH IOL;  Surgeon: Adolfo Santana M.D.;  Location: SURGERY SAME DAY [facility];  Service: Ophthalmology   • HERNIA REPAIR, INCISIONAL, UNILATERAL Left    • OTHER      tonsils as a child   • TONSILLECTOMY       Family History   Problem Relation Age of Onset   • Dementia Mother    • Cancer Mother         Breast   • Cancer Brother         Esophageal     Social History     Socioeconomic History   • Marital status:      Spouse name: Not on file   • Number of children: Not on file   • Years of education: Not on file   • Highest education level: Not on file   Occupational History   • Not on file   Tobacco Use   • Smoking status: Never Smoker   • Smokeless tobacco: Never Used   Vaping Use   • Vaping Use: Never used   Substance and Sexual Activity   • Alcohol use: Yes   • Drug use: No   • Sexual activity: Not on file   Other Topics Concern   • Not on file   Social History Narrative   • Not on file     Social Determinants of Health     Financial Resource Strain: Low Risk    • Difficulty of Paying Living Expenses: Not hard at all   Food Insecurity: No Food Insecurity   • Worried About Running Out of Food in the Last Year: Never true   • Ran Out of Food  "in the Last Year: Never true   Transportation Needs: No Transportation Needs   • Lack of Transportation (Medical): No   • Lack of Transportation (Non-Medical): No   Physical Activity:    • Days of Exercise per Week:    • Minutes of Exercise per Session:    Stress:    • Feeling of Stress :    Social Connections:    • Frequency of Communication with Friends and Family:    • Frequency of Social Gatherings with Friends and Family:    • Attends Confucianism Services:    • Active Member of Clubs or Organizations:    • Attends Club or Organization Meetings:    • Marital Status:    Intimate Partner Violence:    • Fear of Current or Ex-Partner:    • Emotionally Abused:    • Physically Abused:    • Sexually Abused:      Allergies   Allergen Reactions   • Other Environmental Unspecified     Seasonal allergies=rabbit brush     Outpatient Encounter Medications as of 6/4/2021   Medication Sig Dispense Refill   • Amoxicillin-Pot Clavulanate (AUGMENTIN PO) Take  by mouth 2 times a day.     • Cyanocobalamin (B-12 PO) Take 1 tablet by mouth every day.     • Acetaminophen (ARTHRITIS PAIN PO) Take 1 tablet by mouth 1 time a day as needed.     • apixaban (ELIQUIS) 5mg Tab Take 1 Tab by mouth 2 Times a Day. 180 Tab 3   • metoprolol SR (TOPROL XL) 100 MG TABLET SR 24 HR Take 1 Tab by mouth every day. 90 Tab 3   • ferrous sulfate 325 (65 Fe) MG tablet Take 325 mg by mouth every day.     • losartan (COZAAR) 25 MG Tab Take 25 mg by mouth every day.  3   • Multiple Vitamins-Minerals (PRESERVISION AREDS) Tab Take 1 Capsule by mouth 2 times a day.     • amLODIPine (NORVASC) 5 MG Tab Take 5 mg by mouth every day.     • atorvastatin (LIPITOR) 10 MG Tab Take 10 mg by mouth every day.       No facility-administered encounter medications on file as of 6/4/2021.     Review of Systems   All other systems reviewed and are negative.       Objective:   /74 (BP Location: Left arm, Patient Position: Sitting)   Pulse 88   Ht 1.753 m (5' 9\")   Wt 76.2 " kg (168 lb)   SpO2 96%   BMI 24.81 kg/m²     Physical Exam   Constitutional: He is oriented to person, place, and time. He appears well-developed. No distress.   HENT:   Head: Normocephalic and atraumatic.   Right Ear: External ear normal.   Left Ear: External ear normal.   Eyes: Pupils are equal, round, and reactive to light. Conjunctivae are normal. Right eye exhibits no discharge. Left eye exhibits no discharge. No scleral icterus.   Neck: No JVD present. No tracheal deviation present. No thyromegaly present.   Cardiovascular: Normal rate and regular rhythm. PMI is not displaced. Exam reveals no gallop and no friction rub.   No murmur heard.  Pulses:       Carotid pulses are 2+ on the right side and 2+ on the left side.       Radial pulses are 2+ on the left side.        Popliteal pulses are 2+ on the right side and 2+ on the left side.        Dorsalis pedis pulses are 2+ on the right side and 2+ on the left side.        Posterior tibial pulses are 2+ on the right side and 2+ on the left side.   Pulmonary/Chest: Effort normal and breath sounds normal. No respiratory distress. He has no wheezes. He has no rales. He exhibits no tenderness.   Abdominal: Soft. Bowel sounds are normal. He exhibits no distension. There is no abdominal tenderness.   Musculoskeletal:         General: No tenderness or deformity. Normal range of motion.      Cervical back: Normal range of motion and neck supple.   Neurological: He is alert and oriented to person, place, and time. No cranial nerve deficit (cranial nerves II through XII grossly intact). Coordination normal.   Skin: Skin is warm and dry. No rash noted. He is not diaphoretic. No erythema. No pallor.   Psychiatric: His behavior is normal. Thought content normal.   Vitals reviewed.    LABS:  Lab Results   Component Value Date/Time    CHOLSTRLTOT 172 07/31/2020 09:12 AM     (H) 07/31/2020 09:12 AM    HDL 56 07/31/2020 09:12 AM    TRIGLYCERIDE 74 07/31/2020 09:12 AM        Lab Results   Component Value Date/Time    WBC 6.1 05/18/2021 03:30 AM    RBC 4.08 (L) 05/18/2021 03:30 AM    HEMOGLOBIN 13.5 (L) 05/18/2021 03:30 AM    HEMATOCRIT 41.5 (L) 05/18/2021 03:30 AM    .7 (H) 05/18/2021 03:30 AM    NEUTSPOLYS 62.30 05/18/2021 03:30 AM    LYMPHOCYTES 21.30 (L) 05/18/2021 03:30 AM    MONOCYTES 10.80 05/18/2021 03:30 AM    EOSINOPHILS 3.30 05/18/2021 03:30 AM    BASOPHILS 1.50 05/18/2021 03:30 AM    ANISOCYTOSIS 1+ 07/01/2019 11:37 AM     Lab Results   Component Value Date/Time    SODIUM 139 05/18/2021 03:30 AM    POTASSIUM 4.2 05/18/2021 03:30 AM    CHLORIDE 105 05/18/2021 03:30 AM    CO2 23 05/18/2021 03:30 AM    GLUCOSE 106 (H) 05/18/2021 03:30 AM    BUN 17 05/18/2021 03:30 AM    CREATININE 0.86 05/18/2021 03:30 AM     Lab Results   Component Value Date    HBA1C 5.6 07/31/2020      Lab Results   Component Value Date/Time    ALKPHOSPHAT 115 (H) 05/18/2021 03:30 AM    ASTSGOT 16 05/18/2021 03:30 AM    ALTSGPT 9 05/18/2021 03:30 AM    TBILIRUBIN 1.8 (H) 05/18/2021 03:30 AM      No results found for: BNPBTYPENAT   No results found for: TSH  Lab Results   Component Value Date/Time    PROTHROMBTM 14.9 (H) 05/17/2021 06:10 PM    INR 1.20 (H) 05/17/2021 06:10 PM      EKGs from hospital stay reviewed showing sinus rhythm and alternatively atrial flutter to 153 bpm, atypical.    ECHO CONCLUSIONS (4/23/2019):  Normal left ventricular systolic function.  Left ventricular ejection fraction is visually estimated to be 65%.  Normal right ventricular size and systolic function.  Mild mitral regurgitation.  Mild tricuspid regurgitation.  Estimated right ventricular systolic pressure  is 45 mmHg.  Ascending aorta diameter is 4 cm.  Compared to the images of the prior study done 11/16/10 -  there has   been no significant change. The ascending aorta was not well seen on   the prior exam.        Assessment:     1. On continuous oral anticoagulation     2. Paroxysmal atrial fibrillation (HCC)     3.  Thoracic ascending aortic aneurysm (HCC)     4. Hypertension, essential     5. Syncope and collapse  CL-IMPLANTABLE LOOP RECORDER   6. Falls, sequela  CL-IMPLANTABLE LOOP RECORDER       Medical Decision Making:  Today's Assessment / Status / Plan:     Doing well tolerating anticoagulation however he has had several syncopal events now which were without precipitating events.  He may be having conversion pauses with A. fib/flutter and sinus rhythm versus orthostatic intolerance or vasovagal responses.  No prodrome but he does have some memory trouble so therefore it is possible he does not recall the prodrome if it is present.  Do recommend attention to hydration with electrolytes and compression stockings as well as avoidance of any triggers that may discover.  Also recommend loop recorder to evaluate for conversion pauses.  We discussed watchman left atrial occluder device in the lieu of anticoagulation due to his fall and bleeding risk.  They will consider this.  Continue current medical therapy otherwise.  Blood pressures were reviewed.    1.  Implantable loop recorder   2.  Compression stockings  3.  Hydration with electrolyte-containing fluids  4.  Consider watchman left atrial occluder device as above    Follow-up in 6 months     Non-Graft Cartilage Fenestration Text: The cartilage was fenestrated with a 2mm punch biopsy to help facilitate healing.

## 2023-11-02 ENCOUNTER — HOSPITAL ENCOUNTER (OUTPATIENT)
Dept: LAB | Facility: MEDICAL CENTER | Age: 83
End: 2023-11-02
Attending: INTERNAL MEDICINE
Payer: MEDICARE

## 2023-11-02 ENCOUNTER — HOSPITAL ENCOUNTER (OUTPATIENT)
Dept: LAB | Facility: MEDICAL CENTER | Age: 83
End: 2023-11-02
Attending: PSYCHIATRY & NEUROLOGY
Payer: MEDICARE

## 2023-11-02 ENCOUNTER — OFFICE VISIT (OUTPATIENT)
Dept: NEUROLOGY | Facility: MEDICAL CENTER | Age: 83
End: 2023-11-02
Attending: PSYCHIATRY & NEUROLOGY
Payer: MEDICARE

## 2023-11-02 VITALS
BODY MASS INDEX: 26.22 KG/M2 | HEIGHT: 69 IN | HEART RATE: 66 BPM | WEIGHT: 177.03 LBS | TEMPERATURE: 97.4 F | OXYGEN SATURATION: 95 % | SYSTOLIC BLOOD PRESSURE: 116 MMHG | DIASTOLIC BLOOD PRESSURE: 62 MMHG

## 2023-11-02 DIAGNOSIS — H35.3130 BILATERAL NONEXUDATIVE AGE-RELATED MACULAR DEGENERATION, UNSPECIFIED STAGE: ICD-10-CM

## 2023-11-02 DIAGNOSIS — F03.A0 MILD NEURODEGENERATIVE DEMENTIA (HCC): ICD-10-CM

## 2023-11-02 DIAGNOSIS — Z79.899 ENCOUNTER FOR MONITORING AMIODARONE THERAPY: ICD-10-CM

## 2023-11-02 DIAGNOSIS — F03.A0 MILD NEURODEGENERATIVE DEMENTIA (HCC): Primary | ICD-10-CM

## 2023-11-02 DIAGNOSIS — Z51.81 ENCOUNTER FOR MONITORING AMIODARONE THERAPY: ICD-10-CM

## 2023-11-02 LAB
25(OH)D3 SERPL-MCNC: 27 NG/ML (ref 30–100)
ALBUMIN SERPL BCP-MCNC: 4.6 G/DL (ref 3.2–4.9)
ALBUMIN/GLOB SERPL: 1.5 G/DL
ALP SERPL-CCNC: 129 U/L (ref 30–99)
ALT SERPL-CCNC: 13 U/L (ref 2–50)
ANION GAP SERPL CALC-SCNC: 12 MMOL/L (ref 7–16)
AST SERPL-CCNC: 20 U/L (ref 12–45)
BILIRUB SERPL-MCNC: 1.7 MG/DL (ref 0.1–1.5)
BUN SERPL-MCNC: 27 MG/DL (ref 8–22)
CALCIUM ALBUM COR SERPL-MCNC: 8.9 MG/DL (ref 8.5–10.5)
CALCIUM SERPL-MCNC: 9.4 MG/DL (ref 8.5–10.5)
CHLORIDE SERPL-SCNC: 106 MMOL/L (ref 96–112)
CO2 SERPL-SCNC: 25 MMOL/L (ref 20–33)
CREAT SERPL-MCNC: 1.5 MG/DL (ref 0.5–1.4)
FOLATE SERPL-MCNC: 29.5 NG/ML
GFR SERPLBLD CREATININE-BSD FMLA CKD-EPI: 46 ML/MIN/1.73 M 2
GLOBULIN SER CALC-MCNC: 3 G/DL (ref 1.9–3.5)
GLUCOSE SERPL-MCNC: 103 MG/DL (ref 65–99)
POTASSIUM SERPL-SCNC: 4.6 MMOL/L (ref 3.6–5.5)
PROT SERPL-MCNC: 7.6 G/DL (ref 6–8.2)
SODIUM SERPL-SCNC: 143 MMOL/L (ref 135–145)
T4 FREE SERPL-MCNC: 1.09 NG/DL (ref 0.93–1.7)
TSH SERPL DL<=0.005 MIU/L-ACNC: 8.15 UIU/ML (ref 0.38–5.33)
VIT B12 SERPL-MCNC: 352 PG/ML (ref 211–911)

## 2023-11-02 PROCEDURE — 3074F SYST BP LT 130 MM HG: CPT | Performed by: PSYCHIATRY & NEUROLOGY

## 2023-11-02 PROCEDURE — 84439 ASSAY OF FREE THYROXINE: CPT

## 2023-11-02 PROCEDURE — 82746 ASSAY OF FOLIC ACID SERUM: CPT

## 2023-11-02 PROCEDURE — G2212 PROLONG OUTPT/OFFICE VIS: HCPCS | Performed by: PSYCHIATRY & NEUROLOGY

## 2023-11-02 PROCEDURE — 82607 VITAMIN B-12: CPT

## 2023-11-02 PROCEDURE — 84425 ASSAY OF VITAMIN B-1: CPT

## 2023-11-02 PROCEDURE — 3078F DIAST BP <80 MM HG: CPT | Performed by: PSYCHIATRY & NEUROLOGY

## 2023-11-02 PROCEDURE — 99215 OFFICE O/P EST HI 40 MIN: CPT | Performed by: PSYCHIATRY & NEUROLOGY

## 2023-11-02 PROCEDURE — 84443 ASSAY THYROID STIM HORMONE: CPT

## 2023-11-02 PROCEDURE — 99212 OFFICE O/P EST SF 10 MIN: CPT | Performed by: PSYCHIATRY & NEUROLOGY

## 2023-11-02 PROCEDURE — 36415 COLL VENOUS BLD VENIPUNCTURE: CPT

## 2023-11-02 PROCEDURE — 82306 VITAMIN D 25 HYDROXY: CPT

## 2023-11-02 PROCEDURE — 80053 COMPREHEN METABOLIC PANEL: CPT

## 2023-11-02 ASSESSMENT — MONTREAL COGNITIVE ASSESSMENT (MOCA)
4. NAME EACH OF THE THREE ANIMALS SHOWN: 3/3
5. MEMORY TRIALS: SECOND TRIAL
7. [VIGILENCE] TAP WHEN HEARING DESIGNATED LETTER: 1/1
10. [FLUENCY] NAME WORDS STARTING WITH DESIGNATED LETTER: 0/1
WHAT IS THE VERSION OF MOCA ADMINISTERED: 7.3
11. FOR EACH PAIR OF WORDS, WHAT CATEGORY DO THEY BELONG TO (OUT OF 2): 1/2
DELAYED RECALL SUBSCORE: 1/5
9. REPEAT EACH SENTENCE: 1/2
8. SERIAL SUBTRACTION OF 7S: 1/5
WHAT IS THE TOTAL SCORE (OUT OF 30): 16
2. COPY DRAWING: 0/1
ORIENTATION SUBSCORE: 5/6
6. READ LIST OF DIGITS [FORWARD/BACKWARD]: 2/2
3. DRAW A CLOCK: CONTOUR, NUMBERS, HANDS: 1/3

## 2023-11-02 ASSESSMENT — PATIENT HEALTH QUESTIONNAIRE - PHQ9: CLINICAL INTERPRETATION OF PHQ2 SCORE: 0

## 2023-11-02 ASSESSMENT — FIBROSIS 4 INDEX: FIB4 SCORE: 4.97

## 2023-11-02 NOTE — PROGRESS NOTES
"Reason for Neurology Consult:  Concern for Dementia    History of present illness:    Freeman Frost 83 y.o. right handed gentleman who is here with his daughter. He was a  at [employer] x 31 years and retired in Feb 1995.    I reviewed Dr. Carolyn Mullins's prior notes including in 2022.    Problem List- Macular Degeneration bilaterally  (He stopped driving over 2 years ago due to primarily vision and secondarily memory)    He has been living independently and daughters lives 26 miles alone.    He is aware and has noticed for over 1-2 years that he can forget where he places certain items in the garage.     Daughter has noticed for the last 3-4 years that he was having more forgetfulness and increased with his drinking around the wife's illness.  Daughter has started to notice that he was putting items in the freezer that went on certain shelves  (occurring about 2 times a month) and this specific issue has been increasing in the last 6-8 months.    He continues to do cross word puzzles but he has more difficulty with doing them and he has look things up more of the definitions and he will call his daughter to ask her \"a certain cross word clue\" in the last 9 months or so.    Repeating self with progression over the last year and tending to occur multiple times a day- often will having the same conversation up to 3-4 times within 1.5 hours.   Less commonly he will ask questions over again in the last 6-9 months.    There has been no paranoia,delusions,hallucinations in the recent months.    Average Sleep- 6-7 hours most nights; goes to bed 10 pm am awakens around 6 am with awakenings 1-2 times to urinate.  Daughter has noticed he does snore loudly but it's unclear about kaushal self arousals due to grunting,snoring or gasping.    There is no history of involuntary movements of the body or limbs with very slight slowing of movement but no kaushal postural unsteadiness,tremors, change in handwriting, softening " of voice or kaushal falls in the last 6 months.    No  history of kaushal concussion(s),seizure type events or stroke events in adult life known.    He has had crab apple trees for over 30  years and recently identified some concern that he noticed which his daughter felt was normal. This was highly unusual for him to comment about his crab apples like this when in fact there was nothing wrong with them.    She had  started to organize his medications this Summer because he was sometimes missing his night time pills. He is not able to recognize what type of medications his medications or why he takes his medication.    Communication/Speech has not changed in the last few years.    No significant smoking use in adult life.  Alcohol use until 2 years ago>>  beer,wine and vodka (nearly every day) for over 20 years. Drinking increased in the last 2 years before he stopped and when wife was diving of cancer.    Family:    Father:  in  his early 60(s)- allergic reaction to barium ? [facility]  Mother: late in life (early stage Dementia)-  at age 84          Patient Active Problem List    Diagnosis Date Noted    Infrarenal abdominal aortic aneurysm (AAA) without rupture (HCC) 10/26/2023    Celiac artery stenosis (HCC) 2023    Right renal artery stenosis (HCC) 2023    ACP (advance care planning) 2023    Elevated troponin 2023    Presence of Watchman left atrial appendage closure device- implanted 22 Dr Brown  11/10/2022    Cerebral atrophy (HCC) 2022    Overweight (BMI 25.0-29.9) 2022    Dementia without behavioral disturbance (HCC) 2022    Alcohol dependence with unspecified alcohol-induced disorder (HCC) 2022    Status post placement of implantable loop recorder 2021    On continuous oral anticoagulation 2019    Aneurysm of descending thoracic aorta without rupture (HCC) 2019    Syncope and collapse 2019    Paroxysmal atrial  fibrillation (HCC) 05/09/2019    Essential hypertension 09/30/2018    Dyslipidemia 09/30/2018    DAKOTAH (acute kidney injury) (HCC) 09/30/2018       Past medical history:   Past Medical History:   Diagnosis Date    Anemia     Anesthesia     patient denies hx    Arrhythmia     afib    Atrial fibrillation (HCC)     Watchman    Bowel habit changes     constipation    Cataract     bilat IOL    Dental disorder     upper    Hemorrhagic disorder (HCC)     Hypercholesterolemia     Hypertension     Snoring     Stroke (HCC) 2013    tia no residual def       Past surgical history:   Past Surgical History:   Procedure Laterality Date    NH EVASC RPR DPLMNT AORTO-AORTIC NDGFT N/A 7/5/2023    Procedure: ENDOVASCULAR REPAIR OF ABDOMINAL AORTIC ANEURYSM;  Surgeon: Wilber Ambrose M.D.;  Location: SURGERY [facility];  Service: Vascular    NH ERCP,DIAGNOSTIC N/A 04/20/2019    Procedure: ERCP, DIAGNOSTIC;  Surgeon: Antony Morrison M.D.;  Location: [facility];  Service: Gastroenterology    FRANCIA BY LAPAROSCOPY  04/19/2019    Procedure: LAPARASCOPIC- CONVERTED TO OPEN CHOLECYSTECTOMY;  Surgeon: Anita Merino M.D.;  Location: SURGERY [facility];  Service: General    CATARACT PHACO WITH IOL Right 08/28/2018    Procedure: CATARACT PHACO WITH IOL;  Surgeon: Adolfo Santana M.D.;  Location: SURGERY [facility];  Service: Ophthalmology    CATARACT PHACO WITH IOL Left 08/14/2018    Procedure: CATARACT PHACO WITH IOL;  Surgeon: Adolfo Santana M.D.;  Location: SURGERY [facility] DAY [facility];  Service: Ophthalmology    HERNIA REPAIR, INCISIONAL, UNILATERAL Left     OTHER      tonsils as a child    OTHER CARDIAC SURGERY  11-9-22    Watchman procedure    TONSILLECTOMY           Social history:   Social History     Socioeconomic History    Marital status:      Spouse name: Not on file    Number of children: Not on file    Years of education: Not on file    Highest education level: Not on file    Occupational History    Not on file   Tobacco Use    Smoking status: Never    Smokeless tobacco: Never   Vaping Use    Vaping Use: Never used   Substance and Sexual Activity    Alcohol use: Yes     Alcohol/week: 0.6 oz     Types: 1 Cans of beer per week     Comment: 1 beer, once a week at most    Drug use: Never    Sexual activity: Not on file   Other Topics Concern    Not on file   Social History Narrative    Not on file     Social Determinants of Health     Financial Resource Strain: Low Risk  (5/18/2021)    Overall Financial Resource Strain (CARDIA)     Difficulty of Paying Living Expenses: Not hard at all   Food Insecurity: No Food Insecurity (5/18/2021)    Hunger Vital Sign     Worried About Running Out of Food in the Last Year: Never true     Ran Out of Food in the Last Year: Never true   Transportation Needs: No Transportation Needs (5/18/2021)    PRAPARE - Transportation     Lack of Transportation (Medical): No     Lack of Transportation (Non-Medical): No   Physical Activity: Not on file   Stress: Not on file   Social Connections: Not on file   Intimate Partner Violence: Not on file   Housing Stability: Not on file       Family history:   Family History   Problem Relation Age of Onset    Dementia Mother     Cancer Mother         Breast    Cancer Brother         Esophageal         Current medications:   Current Outpatient Medications   Medication    Multiple Vitamins-Minerals (PRESERVISION AREDS PO)    Multiple Vitamins-Minerals (MENS MULTIVITAMIN PO)    Probiotic Product (PROBIOTIC DAILY PO)    amiodarone (CORDARONE) 200 MG Tab    loratadine (CLARITIN) 10 MG Tab    aspirin 81 MG EC tablet    metoprolol SR (TOPROL XL) 50 MG TABLET SR 24 HR    losartan (COZAAR) 25 MG Tab    amLODIPine (NORVASC) 5 MG Tab     No current facility-administered medications for this visit.       Medication Allergy:  Allergies   Allergen Reactions    Other Environmental Unspecified     Seasonal allergies=rabbit brush  "          Physical examination:   Vitals:    23 1523   BP: 116/62   BP Location: Left arm   Patient Position: Sitting   BP Cuff Size: Adult   Pulse: 66   Temp: 36.3 °C (97.4 °F)   TempSrc: Temporal   SpO2: 95%   Weight: 80.3 kg (177 lb 0.5 oz)   Height: 1.753 m (5' 9.02\")       Normal cephalic atraumatic.  There is full range of movement around the neck in all directions without restrictions or discrete pain evoked triggers.  No lower extremity edema.      Neurological  Exam:      Damon Cognitive Assessment (MOCA) Version 7.1    Years of Education: High school grad    TOTAL SCORE: 17  (to be scanned into the MEDIA section in the E.M.R.)    Corry Cognitive Assessment (MOCA) Version Number: 7.3   VISUOSPACIAL / EXECUTIVE   Clock Drawin/3 (hands not placed at correct times and numbers not correct)  Spatial Drawing:    Cube Drawin/1 (cylinder is poorly drawn)    NAMING  Naming: 3/3    MEMORY  Memory: Second trial    ATTENTION  Digits: 2/2  Letters: 1/1  Subtraction: 1/5    LANGUAGE  Repeat Phrases: 1/2  Fluency: 0/1 (8 words that begin with B)    ABSTRACTION  Abstraction: 1/2    DELAYED RECALL  Recall words: 1/5  Category Cue (if applicable):    Multiple Choice Cue (if applicable):     ORIENTATION  Orientation: 5/6    Add 1 point if less than or equal to 12 yr education level:     MOCA TOTAL SCORE:  16 30  Biomechanical/Visual Limitations (if applicable):          Past Medical History:   Diagnosis Date    Anemia     Anesthesia     patient denies hx    Arrhythmia     afib    Atrial fibrillation (HCC)     Watchman    Bowel habit changes     constipation    Cataract     bilat IOL    Dental disorder     upper    Hemorrhagic disorder (HCC)     Hypercholesterolemia     Hypertension     Snoring     Stroke (HCC)     tia no residual def     Past Surgical History:   Procedure Laterality Date    DC EVASC RPR DPLMNT AORTO-AORTIC NDGFT N/A 2023    Procedure: ENDOVASCULAR REPAIR OF ABDOMINAL AORTIC " ANEURYSM;  Surgeon: Wilber Ambrose M.D.;  Location: SURGERY [facility];  Service: Vascular    [place]I ERCP,DIAGNOSTIC N/A 04/20/2019    Procedure: ERCP, DIAGNOSTIC;  Surgeon: Antony Morrison M.D.;  Location: SURGERY [facility];  Service: Gastroenterology    FRANCIA BY LAPAROSCOPY  04/19/2019    Procedure: LAPARASCOPIC- CONVERTED TO OPEN CHOLECYSTECTOMY;  Surgeon: Anita Merino M.D.;  Location: SURGERY [facility];  Service: General    CATARACT PHACO WITH IOL Right 08/28/2018    Procedure: CATARACT PHACO WITH IOL;  Surgeon: Adolfo Santana M.D.;  Location: SURGERY SAME DAY [facility];  Service: Ophthalmology    CATARACT PHACO WITH IOL Left 08/14/2018    Procedure: CATARACT PHACO WITH IOL;  Surgeon: Adolfo Santana M.D.;  Location: SURGERY SAME DAY [facility];  Service: Ophthalmology    HERNIA REPAIR, INCISIONAL, UNILATERAL Left     OTHER      tonsils as a child    OTHER CARDIAC SURGERY  11-9-22    Watchman procedure    TONSILLECTOMY        Social History     Socioeconomic History    Marital status:      Spouse name: Not on file    Number of children: Not on file    Years of education: Not on file    Highest education level: Not on file   Occupational History    Not on file   Tobacco Use    Smoking status: Never    Smokeless tobacco: Never   Vaping Use    Vaping Use: Never used   Substance and Sexual Activity    Alcohol use: Yes     Alcohol/week: 0.6 oz     Types: 1 Cans of beer per week     Comment: 1 beer, once a week at most    Drug use: Never    Sexual activity: Not on file   Other Topics Concern    Not on file   Social History Narrative    Not on file     Social Determinants of Health     Financial Resource Strain: Low Risk  (5/18/2021)    Overall Financial Resource Strain (CARDIA)     Difficulty of Paying Living Expenses: Not hard at all   Food Insecurity: No Food Insecurity (5/18/2021)    Hunger Vital Sign     Worried About Running Out of Food in the Last Year: Never true      "Ran Out of Food in the Last Year: Never true   Transportation Needs: No Transportation Needs (2021)    PRAPARE - Transportation     Lack of Transportation (Medical): No     Lack of Transportation (Non-Medical): No   Physical Activity: Not on file   Stress: Not on file   Social Connections: Not on file   Intimate Partner Violence: Not on file   Housing Stability: Not on file     Family History:   Family History   Problem Relation Age of Onset    Dementia Mother     Cancer Mother         Breast    Cancer Brother         Esophageal         Infection Prevention         Allergies: Other environmental  Objective:/62 (BP Location: Left arm, Patient Position: Sitting, BP Cuff Size: Adult)   Pulse 66   Temp 36.3 °C (97.4 °F) (Temporal)   Ht 1.753 m (5' 9.02\")   Wt 80.3 kg (177 lb 0.5 oz)   SpO2 95%        Lab:   Recent Results (from the past 672 hour(s))   EKG    Collection Time: 10/26/23  8:54 AM   Result Value Ref Range    Report       [facility]    Test Date:  2023-10-26  Pt Name:    ANKITA DAVID                 Department: [facility]  MRN:        [id number]                      Room:  Gender:     Male                         Technician: NITA  :        1940                   Requested By:SYMONE VEE  Order #:    [id number]                    Reading MD:    Measurements  Intervals                                Axis  Rate:       70                           P:          39  OH:         239                          QRS:        -54  QRSD:       90                           T:          21  QT:         406  QTc:        438    Interpretive Statements  Sinus rhythm  Prolonged OH interval  Left anterior fascicular block  Low voltage, precordial leads  Compared to ECG 2023 08:43:51  First degree AV block now present             Mental status: Awake, alert and fully oriented to person, place, time, and situation. Normal attention and concentration.  Did not appear/act " combative,irritable,anxious,paranoid/delusional or aggressive to or with me.    Speech and language: Speech is fluent without errors, clear, intact to repetition, and intact to naming.     Follows 3 step motor commands in sequence without significant delay and correctly.    Cranial nerve exam:  II: Pupils are equally round and reactive to light. Visual fields are intact by confrontation.  III, IV, VI: EOMI, no diplopia, no ptosis.  Acuity: 20/60-80 right eye and 20/40 left eye with glasses on.  V: Sensation to light touch is normal over V1-3 distributions bilaterally.  .  VII: Facial movements are symmetrical. There is no facial droop. .  VIII: Hearing intact to soft speech and finger rub bilaterally  IX: Palate elevates symmetrically, uvula is midline. Dysarthria is not present.  XI: Shoulder shrug are symmetrical and strong.   XII: Tongue protrudes midline.      Motor exam:  Muscle tone is normal in all 4 limbs. and No abnormal movements appreciated.    Muscle strength:    Neck Flexors/Extensors: 5/5       Right  Left  Deltoid   5/5  5/5      Biceps   5/5  5/5  Triceps              5/5  5/5   Wrist extensors 5/5  5/5  Wrist flexors  5/5  5/5     5/5  5/5  Interossei  5/5  5/5  Thenar (APB)  5/5  5/5   Hip flexors  5/5  5/5  Quadriceps  5/5  5/5    Hamstrings  5/5  5/5  Dorsiflexors  5/5  5/5  Plantarflexors  5/5  5/5  Toe extension  5/5  5/5        Reflexes:       Right  Left  Biceps   2/4  2/4  Triceps              2/4  2/4  Brachioradialis  2/4  2/4  Knee jerk  2/4  2/4  Ankle jerk  2/4  2/4     Frontal release signs are absent    bilaterally toes are downgoing to plantar stimulation..    Coordination (finger-to-nose, heel/knee/shin, rapid alternating movements) was normal.     There was no ataxia, no tremors, and no dysmetria.     Station and gait >> easily stands up from exam chair without retropulsion,veering,leaning,swaying (to either side).       Labs and Tests:    0 Result Notes    1 HM Topic             Component  Ref Range & Units 4 mo ago  (23) 4 mo ago  (23) 10 mo ago  (22) 12 mo ago  (22) 1 yr ago  (10/17/22) 2 yr ago  (21) 2 yr ago  (21)   Sodium  135 - 145 mmol/L 143  141  141  140  142  141  139    Potassium  3.6 - 5.5 mmol/L 3.8  4.6  5.0  4.5  4.9  4.3  4.2    Chloride  96 - 112 mmol/L 112  108  105  103  105  107  105    Co2  20 - 33 mmol/L 17 Low   20  24  26  27  20  23    Anion Gap  7.0 - 16.0 14.0  13.0  12.0  11.0  10.0  14.0  11.0    Glucose  65 - 99 mg/dL 107 High   128 High   105 High   99  95  96  106 High     Bun  8 - 22 mg/dL 26 High   28 High   25 High   21  21  23 High   17    Creatinine  0.50 - 1.40 mg/dL 1.13  1.67 High   1.12  1.15  1.23  1.12  0.86    Calcium  8.4 - 10.2 mg/dL 8.6  9.9  10.1 R  9.8 R  9.5 R  8.4  8.9    AST(SGOT)  12 - 45 U/L 16  16     12  16    ALT(SGPT)  2 - 50 U/L 9  10     9  9    Alkaline Phosphatase  30 - 99 U/L 104 High   134 High      95  115 High     Total Bilirubin  0.1 - 1.5 mg/dL 1.2  1.3     1.6 High   1.8 High     Albumin  3.2 - 4.9 g/dL 3.8  4.7     3.8  3.7    Total Protein  6.0 - 8.2 g/dL 6.2  7.7     6.1  6.5    Globulin  1.9 - 3.5 g/dL 2.4  3.0     2.3  2.8    A-G Ratio  g/dL 1.6  1.6     1.7  1.3    Resulting Agency V V M M M V V                     Result Notes       Component  Ref Range & Units 4 mo ago  (23) 4 mo ago  (23)   Correct Calcium  8.5 - 10.5 mg/dL 8.8  9.3             Ref Range & Units 4 mo ago 3 yr ago 4 yr ago 5 yr ago 6 yr ago 7 yr ago 8 yr ago   Cholesterol,Tot  100 - 199 mg/dL 122  172  136  155  153  106  114    Triglycerides  0 - 149 mg/dL 137  74  46  65  60  89  55    HDL  >=40 mg/dL 23 Abnormal   56  43  47  53  34 Abnormal   59    LDL  <100 mg/dL 72  101 High   84  95  88  54  44    Resulting Agency V M M M M M M                      NEUROIMAGIN/10/2023 2:21 PM     HISTORY/REASON FOR EXAM:  Syncope.        TECHNIQUE/EXAM DESCRIPTION:   MRI of the brain without and with contrast.      T1 sagittal, T2 fast spin-echo axial, T1 coronal, FLAIR coronal, diffusion-weighted and apparent diffusion coefficient (ADC map) axial images were obtained of the whole brain. T1 postcontrast axial and T1 postcontrast coronal images were obtained.     The study was performed on a Duela 1.5 Maria Luz MRI scanner.     15 mL ProHance contrast was administered intravenously.     COMPARISON:  MRI scan of the brain 8/3/2020     FINDINGS:  The calvariae are unremarkable. There are no extra-axial fluid collections. The ventricular system and basal cisterns are moderately prominent. There is moderate prominence of cortical sulci and gyri. There are scattered punctate and patchy areas of   increased T2 signal intensity in the periventricular white matter. There is a small gyriform area of decreased T1 surrounding increased T2 signal intensity inferiorly in the left occipital lobe. There are no mass effects or shift of midline structures.   There are no hemorrhagic lesions. The diffusion-weighted axial images show no evidence of acute cerebral infarction.     The postcontrast images show no areas of abnormal parenchymal or meningeal enhancement.     The brainstem and posterior fossa structures are unremarkable.     Vascular flow voids in the vertebrobasilar and carotid arteries, [place], and dural venous sinuses are intact.     The paranasal sinuses and mastoids in the field of view are unremarkable.     IMPRESSION:     1.  Age-related cerebral atrophy.     2.  Mild to moderate. Ventricular white matter changes consistent with chronic microvascular ischemic gliosis.     3.  Small chronic areas of cortical infarction involving the inferior aspect of the left occipital lobe.           Exam Ended: 06/10/23  3:29 PM Last Resulted: 06/10/23  5:23 PM             Impression/Plans/Recommendations:       Mild Dementia and likely Neurodegenerative and suspect for Alzheimer's Disease given the progression of slow decline and  "increasing forgetfulness and repeating information often with slowed and reduced word fluency and clearly difficulty with some executive function tasks.    MOCA of  17/30.    Global Deterioration score today in the 4 to 5 range per daughter.    Functional Activity Questionnaire score of 11 with several 2 and 3 per daughter.    Today, I reviewed the clinical criteria and reviewed several  scenarios of the differences being using the medical terms of normal brain aging (age associated memory impairment),  Mild Cognitive Impairment (MCI) and Dementia.    Dementia  is a syndrome but statistically and for the majority of patients  occurs due  to a more rapid aging of the brain tissue or potentially from injury to certain parts of one's brain ( often from such contributing factors as  the cumulative effects of alcohol, from one or more ischemic or hemmorhagic stroke(s), from neurodegeneration or long standing with/without suboptimally controlled Hypertension). It is for some of these potential factors as to why I recommend a brain imaging test (Head CT or Brain MRI) be done for the 1st time or in certain circumstances repeated for comparison purposes  as such imaging can suggest one or more factors as to the reason(s) for the person's cognitive/memory changes. In fact, a normal or \"age related\" finding on a brain imaging test in and of itself is useful clinical and objective information to have in the evaluation of a person who has either an acute, evolving or even chronic (months to years) long cognitive/memory complaint.    Additional factors or contributors to Brain Health issues can be summarized in several books/references which I discussed as well today.     Goals going forwards include:    A. Paying attention to one's risk factors and reducing their prevalence or potential impact on one's changing memory/thinking> an excellent example would be to stop smoking, reduce or eliminate alcohol use (depending on the " amount and frequency of usage), maintain good blood pressure control by buying a digital arm blood pressure cuff such as an OMRON Series 3 or 5 and checking one's blood pressure atleast 3 days per week (in the am and early afternoon) that the numbers are under 140/90 and working as needed with the primary care doctor  to optimize blood pressure control).    B. Encouraging proper sleep hygiene which for most adults is 7 to 8 hours of uninterrupted sleep per night.      C. Encouraging some form of exercise preferable aerobic forms to be done (4 to 5 days per week- 30 minutes minimum per day)> 150 minutes per week as a goal. Example activities could include fast walking (up a slight incline), jogging, cycling (road or stationary), swimming,tennis. Essentially, even basic walking on a flat surface or a treadmill would be better than doing nothing.    D. Maintaining or forming new social contacts with family and friends  and a positive attitude despite the concerns and/or ongoing issues with thinking and/or memory.    E. Eating well which means a diet low in salt  (under 2 grams per day), sugar and saturated fat.    F. Maintaining one's BMI (Body Mass Index) under 30.    G. Consideration of the use of cognitive enhancers (acetylcholinerase inhibitors such as Aricept vs an NMDA Receptor agent like Namenda). Pros and cons of such compounds in terms of predicted efficacy and side effects profiles reviewed.    He was not able to tolerate Aricept which he took for 9 months (when he was 1st taking it it caused much increased confusion for 1 month) and then stabilized and then stopped it    H. Brain PET Scan to be done - Eduin wants this done and wants to be more specific on his diagnosis (daughter agrees).    I. Alzheimer's Association information give to daughter today.    J. Additional blood work to be done (Vitamin levels-- B and D)    K. Will hold off on using IV anti amyloid drugs after our discussion today about the pros  "and cons. Information provided to daughter today as well.    M. I offered a social work consultation to discuss with daughter but she did not want that referral.    N. Referral to Sleep Group for SIDNEY evaluation.    O. Dementia Care Self Management Book given to daughter and also I suggested she buy \"The 36 Hour Day Book\".       I have performed  a history and physical exam and a directed /focused  ROS today.    Total time spent today or this patient's care was 75 minutes and included reviewing  the diagnostic workup to date (such as labs and imaging as well as interpreting such tests relevant to this patient's neurological condition),  reviewing/obtaining separately obtained history (from patient and/or daughter)  for today's neurological problem(s) ,counseling and educating the patient and family member on issues related to cognition/memory and cognitive health factors and documenting  the clinical information in the EMR.    Follow up in 6 months or so.        Antony Childs MD  [facility].   [facility]    "

## 2023-11-07 LAB — VIT B1 BLD-MCNC: 179 NMOL/L (ref 70–180)

## 2023-11-08 ENCOUNTER — NON-PROVIDER VISIT (OUTPATIENT)
Dept: CARDIOLOGY | Facility: MEDICAL CENTER | Age: 83
End: 2023-11-08
Payer: MEDICARE

## 2023-11-08 PROCEDURE — 93298 REM INTERROG DEV EVAL SCRMS: CPT | Performed by: INTERNAL MEDICINE

## 2023-11-09 ENCOUNTER — TELEPHONE (OUTPATIENT)
Dept: HEALTH INFORMATION MANAGEMENT | Facility: OTHER | Age: 83
End: 2023-11-09

## 2023-11-09 NOTE — CARDIAC REMOTE MONITOR - SCAN
Device transmission reviewed. Device demonstrated appropriate function.       Electronically Signed by: Cl Valdez M.D.    11/11/2023  2:41 PM

## 2023-11-14 ENCOUNTER — HOSPITAL ENCOUNTER (OUTPATIENT)
Dept: RADIOLOGY | Facility: MEDICAL CENTER | Age: 83
End: 2023-11-14
Attending: PSYCHIATRY & NEUROLOGY
Payer: MEDICARE

## 2023-11-14 DIAGNOSIS — F03.A0 MILD NEURODEGENERATIVE DEMENTIA (HCC): ICD-10-CM

## 2023-11-14 PROCEDURE — A9552 F18 FDG: HCPCS

## 2023-12-09 ENCOUNTER — NON-PROVIDER VISIT (OUTPATIENT)
Dept: CARDIOLOGY | Facility: MEDICAL CENTER | Age: 83
End: 2023-12-09
Payer: MEDICARE

## 2023-12-09 PROCEDURE — 93298 REM INTERROG DEV EVAL SCRMS: CPT | Performed by: INTERNAL MEDICINE

## 2023-12-11 NOTE — CARDIAC REMOTE MONITOR - SCAN
Device transmission reviewed. Device demonstrated appropriate function.       Electronically Signed by: Mary Brown M.D.    12/22/2023  7:59 AM

## 2023-12-19 LAB — EKG IMPRESSION: NORMAL

## 2023-12-19 PROCEDURE — 93010 ELECTROCARDIOGRAM REPORT: CPT | Performed by: INTERNAL MEDICINE

## 2023-12-29 ENCOUNTER — TELEPHONE (OUTPATIENT)
Dept: CARDIOLOGY | Facility: MEDICAL CENTER | Age: 83
End: 2023-12-29
Payer: MEDICARE

## 2023-12-29 DIAGNOSIS — R79.89 ELEVATED TSH: ICD-10-CM

## 2023-12-29 RX ORDER — LEVOTHYROXINE SODIUM 0.03 MG/1
25 TABLET ORAL
Qty: 90 TABLET | Refills: 0 | Status: SHIPPED | OUTPATIENT
Start: 2023-12-29 | End: 2024-03-26

## 2023-12-29 NOTE — TELEPHONE ENCOUNTER
S/w patients daughterCarson in regards to SS recommendations of starting Synthroid 25mcg, following up with PCP.   Short supply sent to pharmacy until PCP appt.   Patients daughter verbalized understanding.

## 2023-12-29 NOTE — TELEPHONE ENCOUNTER
----- Message from Mary Brown M.D. sent at 12/29/2023  2:42 AM PST -----  Let's start synthroid at 25 mcg and have him f/u with PCP regarding hypothyroidism    ----- Message -----  From: Michelle Liang R.N.  Sent: 11/3/2023   4:40 PM PST  To: Mary Brown M.D.    SS: Please review and advise on abnormals. Thank you.

## 2024-01-09 ENCOUNTER — NON-PROVIDER VISIT (OUTPATIENT)
Dept: CARDIOLOGY | Facility: MEDICAL CENTER | Age: 84
End: 2024-01-09
Payer: MEDICARE

## 2024-01-10 PROCEDURE — 93298 REM INTERROG DEV EVAL SCRMS: CPT | Mod: 26 | Performed by: INTERNAL MEDICINE

## 2024-01-10 NOTE — CARDIAC REMOTE MONITOR - SCAN
Device transmission reviewed. Device demonstrated appropriate function.       Electronically Signed by: Billy Dudley M.D.    1/10/2024  9:11 AM

## 2024-01-26 ENCOUNTER — OFFICE VISIT (OUTPATIENT)
Dept: CARDIOLOGY | Facility: MEDICAL CENTER | Age: 84
End: 2024-01-26
Attending: NURSE PRACTITIONER
Payer: MEDICARE

## 2024-01-26 VITALS
SYSTOLIC BLOOD PRESSURE: 128 MMHG | BODY MASS INDEX: 26.22 KG/M2 | RESPIRATION RATE: 15 BRPM | DIASTOLIC BLOOD PRESSURE: 56 MMHG | HEIGHT: 69 IN | HEART RATE: 64 BPM | WEIGHT: 177 LBS | OXYGEN SATURATION: 97 %

## 2024-01-26 DIAGNOSIS — I48.0 PAROXYSMAL ATRIAL FIBRILLATION (HCC): ICD-10-CM

## 2024-01-26 DIAGNOSIS — Z79.899 ENCOUNTER FOR MONITORING AMIODARONE THERAPY: Primary | ICD-10-CM

## 2024-01-26 DIAGNOSIS — Z51.81 ENCOUNTER FOR MONITORING AMIODARONE THERAPY: Primary | ICD-10-CM

## 2024-01-26 LAB — EKG IMPRESSION: NORMAL

## 2024-01-26 PROCEDURE — 93005 ELECTROCARDIOGRAM TRACING: CPT | Performed by: NURSE PRACTITIONER

## 2024-01-26 PROCEDURE — 99212 OFFICE O/P EST SF 10 MIN: CPT | Performed by: NURSE PRACTITIONER

## 2024-01-26 PROCEDURE — 3078F DIAST BP <80 MM HG: CPT | Performed by: NURSE PRACTITIONER

## 2024-01-26 PROCEDURE — 93010 ELECTROCARDIOGRAM REPORT: CPT | Performed by: INTERNAL MEDICINE

## 2024-01-26 PROCEDURE — 99213 OFFICE O/P EST LOW 20 MIN: CPT | Performed by: NURSE PRACTITIONER

## 2024-01-26 PROCEDURE — 99214 OFFICE O/P EST MOD 30 MIN: CPT | Performed by: NURSE PRACTITIONER

## 2024-01-26 PROCEDURE — 3074F SYST BP LT 130 MM HG: CPT | Performed by: NURSE PRACTITIONER

## 2024-01-26 RX ORDER — METOPROLOL SUCCINATE 50 MG/1
50 TABLET, EXTENDED RELEASE ORAL DAILY
Qty: 90 TABLET | Refills: 3 | Status: SHIPPED | OUTPATIENT
Start: 2024-01-26

## 2024-01-26 ASSESSMENT — ENCOUNTER SYMPTOMS
CLAUDICATION: 0
BRUISES/BLEEDS EASILY: 0
GASTROINTESTINAL NEGATIVE: 1
HALLUCINATIONS: 0
NAUSEA: 0
MUSCULOSKELETAL NEGATIVE: 1
RESPIRATORY NEGATIVE: 1
SHORTNESS OF BREATH: 0
DEPRESSION: 0
SENSORY CHANGE: 0
WHEEZING: 0
DIZZINESS: 0
ORTHOPNEA: 0
NEUROLOGICAL NEGATIVE: 1
PND: 0
CONSTITUTIONAL NEGATIVE: 1
EYES NEGATIVE: 1
NERVOUS/ANXIOUS: 0
PALPITATIONS: 0

## 2024-01-26 ASSESSMENT — FIBROSIS 4 INDEX: FIB4 SCORE: 5.17

## 2024-01-26 NOTE — ASSESSMENT & PLAN NOTE
- Watchman for stroke protection   - continue aspirin 81mg daily   - ILR for burden monitoring    -

## 2024-01-26 NOTE — ASSESSMENT & PLAN NOTE
- lab work semi-annual: CMP, TSH   - continue 200mg daily   - in 6 months will trial 100mg daily   - ILR for monitoring of return of arrhythmia burden

## 2024-01-26 NOTE — PROGRESS NOTES
Atrial Fibrillation Follow up Note    DOS: 1/26/2024  [id number]  Freeman Frost    Chief complaint/Reason for consult: PAF and ILR    HPI: Pt is a 83 y.o. male who presents to the clinic today in follow up for PAF and longitudinal antiarrhythmic medication use. Patient has a past medical history significant for but not limited to: hypertension, CKD3, PAF, ILR in situ, Watchman in situ, dyslipidemia. No new episodes of arrhythmia on ILR. AF burden less than 0.1%. Continue amiodarone 200mg daily. Lab work and 6 month follow up.       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    Snoring     Stroke (HCC) 2013    tia no residual def       Past Surgical History:   Procedure Laterality Date    AL EVASC RPR DPLMNT AORTO-AORTIC NDGFT N/A 7/5/2023    Procedure: ENDOVASCULAR REPAIR OF ABDOMINAL AORTIC ANEURYSM;  Surgeon: Wilber Ambrose M.D.;  Location: [facility];  Service: Vascular    AL ERCP,DIAGNOSTIC N/A 04/20/2019    Procedure: ERCP, DIAGNOSTIC;  Surgeon: Antony Morrison M.D.;  Location: [facility];  Service: Gastroenterology    FRANCIA BY LAPAROSCOPY  04/19/2019    Procedure: LAPARASCOPIC- CONVERTED TO OPEN CHOLECYSTECTOMY;  Surgeon: Anita Merino M.D.;  Location: [facility];  Service: General    CATARACT PHACO WITH IOL Right 08/28/2018    Procedure: CATARACT PHACO WITH IOL;  Surgeon: Adolfo Santana M.D.;  Location: SURGERY SAME DAY [facility];  Service: Ophthalmology    CATARACT PHACO WITH IOL Left 08/14/2018    Procedure: CATARACT PHACO WITH IOL;  Surgeon: Adolfo Santana M.D.;  Location: SURGERY SAME DAY [facility];  Service:  Ophthalmology    HERNIA REPAIR, INCISIONAL, UNILATERAL Left     OTHER      tonsils as a child    OTHER CARDIAC SURGERY  11-9-22    Watchman procedure    TONSILLECTOMY         Social History     Socioeconomic History    Marital status:      Spouse name: Not on file    Number of children: Not on file    Years of education: Not on file    Highest education level: Not on file   Occupational History    Not on file   Tobacco Use    Smoking status: Never    Smokeless tobacco: Never   Vaping Use    Vaping Use: Never used   Substance and Sexual Activity    Alcohol use: Yes     Alcohol/week: 0.6 oz     Types: 1 Cans of beer per week     Comment: 1 beer, once a week at most    Drug use: Never    Sexual activity: Not on file   Other Topics Concern    Not on file   Social History Narrative    Not on file     Social Determinants of Health     Financial Resource Strain: Low Risk  (5/18/2021)    Overall Financial Resource Strain (CARDIA)     Difficulty of Paying Living Expenses: Not hard at all   Food Insecurity: No Food Insecurity (5/18/2021)    Hunger Vital Sign     Worried About Running Out of Food in the Last Year: Never true     Ran Out of Food in the Last Year: Never true   Transportation Needs: No Transportation Needs (5/18/2021)    PRAPARE - Transportation     Lack of Transportation (Medical): No     Lack of Transportation (Non-Medical): No   Physical Activity: Not on file   Stress: Not on file   Social Connections: Not on file   Intimate Partner Violence: Not on file   Housing Stability: Not on file       Family History   Problem Relation Age of Onset    Dementia Mother     Cancer Mother         Breast    Cancer Brother         Esophageal       Allergies   Allergen Reactions    Other Environmental Unspecified     Seasonal allergies=rabbit brush       Current Outpatient Medications   Medication Sig Dispense Refill    metoprolol SR (TOPROL XL) 50 MG TABLET SR 24 HR Take 1 Tablet by mouth every day. 90 Tablet 3     levothyroxine (SYNTHROID) 25 MCG Tab Take 1 Tablet by mouth every morning on an empty stomach. 90 Tablet 0    Multiple Vitamins-Minerals (PRESERVISION AREDS PO) Take 1 Tablet by mouth every day.      Multiple Vitamins-Minerals (MENS MULTIVITAMIN PO) Take 1 Tablet by mouth every day.      Probiotic Product (PROBIOTIC DAILY PO) Take 1 Tablet by mouth every day.      amiodarone (CORDARONE) 200 MG Tab Take 1 Tablet by mouth every day. 60 Tablet 3    loratadine (CLARITIN) 10 MG Tab Take 10 mg by mouth 1 time a day as needed (Allergy).      aspirin 81 MG EC tablet Take 1 Tablet by mouth every day. 100 Tablet 5    losartan (COZAAR) 25 MG Tab Take 25 mg by mouth every morning.  3    amLODIPine (NORVASC) 5 MG Tab Take 1 Tablet by mouth every day.       No current facility-administered medications for this visit.       Vitals:    01/26/24 1017   BP: 128/56   Pulse: 64   Resp: 15   SpO2: 97%         Review of Systems   Constitutional: Negative.  Negative for malaise/fatigue.   HENT: Negative.     Eyes: Negative.    Respiratory: Negative.  Negative for shortness of breath and wheezing.    Cardiovascular:  Negative for chest pain, palpitations, orthopnea, claudication, leg swelling and PND.   Gastrointestinal: Negative.  Negative for nausea.   Genitourinary: Negative.    Musculoskeletal: Negative.    Skin: Negative.    Neurological: Negative.  Negative for dizziness and sensory change.   Endo/Heme/Allergies: Negative.  Does not bruise/bleed easily.   Psychiatric/Behavioral:  Negative for depression and hallucinations. The patient is not nervous/anxious.         Device Type: [employer] ILR  Battery Longevity: Good  Lead Impedance: stable and within normal limits  Indication: AF  Events: none    Device interrogated and programmed by Rishi Watson      EKG interpreted by me: Sinus    Physical Exam  Constitutional:       Appearance: Normal appearance.   HENT:      Head: Normocephalic.   Eyes:      Pupils: Pupils are equal,  "round, and reactive to light.   Neck:      Vascular: No JVD.   Cardiovascular:      Rate and Rhythm: Normal rate and regular rhythm.      Pulses: Normal pulses.      Heart sounds: Normal heart sounds.   Pulmonary:      Effort: Pulmonary effort is normal.      Breath sounds: Normal breath sounds.   Abdominal:      General: Abdomen is flat.      Palpations: Abdomen is soft.   Musculoskeletal:      Cervical back: Normal range of motion.      Right lower leg: No edema.      Left lower leg: No edema.   Skin:     General: Skin is warm and dry.   Neurological:      Mental Status: He is alert and oriented to person, place, and time.   Psychiatric:         Mood and Affect: Mood normal.         Behavior: Behavior normal.          Data:  Lipids:   Lab Results   Component Value Date/Time    CHOLSTRLTOT 122 06/09/2023 01:29 AM    TRIGLYCERIDE 137 06/09/2023 01:29 AM    HDL 23 (A) 06/09/2023 01:29 AM    LDL 72 06/09/2023 01:29 AM        BMP:  Lab Results   Component Value Date/Time    SODIUM 143 11/02/2023 1724    POTASSIUM 4.6 11/02/2023 1724    CHLORIDE 106 11/02/2023 1724    CO2 25 11/02/2023 1724    GLUCOSE 103 (H) 11/02/2023 1724    BUN 27 (H) 11/02/2023 1724    CREATININE 1.50 (H) 11/02/2023 1724    CALCIUM 9.4 11/02/2023 1724    ANION 12.0 11/02/2023 1724       GFR:  Lab Results   Component Value Date/Time    IFAFRICA >60 07/27/2021 1723    IFNOTAFR >60 07/27/2021 1723        TSH:   Lab Results   Component Value Date/Time    TSHULTRASEN 8.150 (H) 11/02/2023 1724       MAGNESIUM:  Lab Results   Component Value Date/Time    MAGNESIUM 2.0 06/09/2023 0129    MAGNESIUM 2.2 05/18/2021 0330    MAGNESIUM 2.1 10/02/2018 0505        THYROXINE (T4):   No results found for: \"FREEDIR\"     CBC:   Lab Results   Component Value Date/Time    WBC 10.2 07/06/2023 03:29 AM    RBC 3.74 (L) 07/06/2023 03:29 AM    HEMOGLOBIN 11.4 (L) 07/06/2023 03:29 AM    HEMATOCRIT 35.8 (L) 07/06/2023 03:29 AM    MCV 95.7 07/06/2023 03:29 AM    MCH 30.5 " "07/06/2023 03:29 AM    MCHC 31.8 (L) 07/06/2023 03:29 AM    RDW 56.2 (H) 07/06/2023 03:29 AM    PLATELETCT 89 (L) 07/06/2023 03:29 AM    MPV 11.7 07/06/2023 03:29 AM    NEUTSPOLYS 73.00 (H) 06/08/2023 11:48 AM    LYMPHOCYTES 16.00 (L) 06/08/2023 11:48 AM    MONOCYTES 5.00 06/08/2023 11:48 AM    EOSINOPHILS 1.00 06/08/2023 11:48 AM    BASOPHILS 0.00 06/08/2023 11:48 AM    IMMGRAN 2.00 (H) 11/07/2022 02:29 PM    NRBC 0.00 06/08/2023 11:48 AM    NEUTS 8.89 (H) 06/08/2023 11:48 AM    LYMPHS 1.82 06/08/2023 11:48 AM    MONOS 0.57 06/08/2023 11:48 AM    EOS 0.11 06/08/2023 11:48 AM    BASO 0.00 06/08/2023 11:48 AM    IMMGRANAB 0.13 (H) 11/07/2022 02:29 PM    NRBCAB 0.00 06/08/2023 11:48 AM        CBC w/o DIFF  Lab Results   Component Value Date/Time    WBC 10.2 07/06/2023 03:29 AM    RBC 3.74 (L) 07/06/2023 03:29 AM    HEMOGLOBIN 11.4 (L) 07/06/2023 03:29 AM    MCV 95.7 07/06/2023 03:29 AM    MCH 30.5 07/06/2023 03:29 AM    MCHC 31.8 (L) 07/06/2023 03:29 AM    RDW 56.2 (H) 07/06/2023 03:29 AM    MPV 11.7 07/06/2023 03:29 AM       LIVER:  Lab Results   Component Value Date/Time    ALKPHOSPHAT 129 (H) 11/02/2023 05:24 PM    ASTSGOT 20 11/02/2023 05:24 PM    ALTSGPT 13 11/02/2023 05:24 PM    TBILIRUBIN 1.7 (H) 11/02/2023 05:24 PM       BNP:  No results found for: \"BNPBTYPENAT\"    PT/INR:  Lab Results   Component Value Date/Time    PROTHROMBTM 15.1 (H) 11/09/2022 08:34 AM    PROTHROMBTM 15.2 (H) 11/07/2022 02:29 PM    PROTHROMBTM 14.9 (H) 05/17/2021 06:10 PM    INR 1.21 (H) 11/09/2022 08:34 AM    INR 1.22 (H) 11/07/2022 02:29 PM    INR 1.20 (H) 05/17/2021 06:10 PM             Impression/Plan:  Encounter for monitoring amiodarone therapy   - lab work semi-annual: CMP, TSH   - continue 200mg daily   - in 6 months will trial 100mg daily   - ILR for monitoring of return of arrhythmia burden    Paroxysmal atrial fibrillation (HCC)   - Watchman for stroke protection   - continue aspirin 81mg daily   - ILR for burden monitoring    - "            A total of 31 minutes of time was spent on day of encounter reviewing medical record, performing history and examination, counseling, ordering medication/test/consults, collaborating with referring service, and documentation.    Rishi LO-EP  Cardiac Electrophysiology

## 2024-02-08 DIAGNOSIS — I48.0 PAROXYSMAL ATRIAL FIBRILLATION (HCC): ICD-10-CM

## 2024-02-08 RX ORDER — AMIODARONE HYDROCHLORIDE 200 MG/1
200 TABLET ORAL
Qty: 90 TABLET | Refills: 1 | Status: SHIPPED | OUTPATIENT
Start: 2024-02-08

## 2024-02-09 ENCOUNTER — NON-PROVIDER VISIT (OUTPATIENT)
Dept: CARDIOLOGY | Facility: MEDICAL CENTER | Age: 84
End: 2024-02-09
Payer: MEDICARE

## 2024-02-09 PROCEDURE — 93298 REM INTERROG DEV EVAL SCRMS: CPT | Mod: 26 | Performed by: INTERNAL MEDICINE

## 2024-02-12 NOTE — CARDIAC REMOTE MONITOR - SCAN
Device transmission reviewed. Device demonstrated appropriate function.       Electronically Signed by: Cl Valdez M.D.    2/20/2024  10:01 AM

## 2024-03-11 ENCOUNTER — NON-PROVIDER VISIT (OUTPATIENT)
Dept: CARDIOLOGY | Facility: MEDICAL CENTER | Age: 84
End: 2024-03-11
Payer: MEDICARE

## 2024-03-11 PROCEDURE — 93298 REM INTERROG DEV EVAL SCRMS: CPT | Mod: 26 | Performed by: INTERNAL MEDICINE

## 2024-03-12 NOTE — CARDIAC REMOTE MONITOR - SCAN
Device transmission reviewed. Device demonstrated appropriate function.       Electronically Signed by: Mary Brown M.D.    3/30/2024  12:19 PM

## 2024-03-26 DIAGNOSIS — R79.89 ELEVATED TSH: ICD-10-CM

## 2024-03-26 RX ORDER — LEVOTHYROXINE SODIUM 0.03 MG/1
25 TABLET ORAL
Qty: 100 TABLET | Refills: 3 | Status: SHIPPED | OUTPATIENT
Start: 2024-03-26

## 2024-03-26 NOTE — TELEPHONE ENCOUNTER
Is the patient due for a refill? Yes    Was the patient seen the past year? Yes    Date of last office visit: 1/26/24    Does the patient have an upcoming appointment?  Yes   If yes, When? 7/26/24    Provider to refill:MT    Does the patients insurance require a 100 day supply?  Yes

## 2024-04-11 ENCOUNTER — NON-PROVIDER VISIT (OUTPATIENT)
Dept: CARDIOLOGY | Facility: MEDICAL CENTER | Age: 84
End: 2024-04-11
Payer: MEDICARE

## 2024-04-16 PROCEDURE — 93298 REM INTERROG DEV EVAL SCRMS: CPT | Mod: 26 | Performed by: INTERNAL MEDICINE

## 2024-04-22 PROBLEM — N18.31 CHRONIC KIDNEY DISEASE, STAGE 3A: Status: ACTIVE | Noted: 2024-04-22

## 2024-04-23 ENCOUNTER — TELEPHONE (OUTPATIENT)
Dept: VASCULAR LAB | Facility: MEDICAL CENTER | Age: 84
End: 2024-04-23
Payer: MEDICARE

## 2024-04-23 DIAGNOSIS — I71.40 ABDOMINAL AORTIC ANEURYSM (AAA) WITHOUT RUPTURE, UNSPECIFIED PART (HCC): ICD-10-CM

## 2024-04-23 DIAGNOSIS — Z86.79 STATUS POST ENDOVASCULAR ANEURYSM REPAIR (EVAR): ICD-10-CM

## 2024-04-23 DIAGNOSIS — Z98.890 STATUS POST ENDOVASCULAR ANEURYSM REPAIR (EVAR): ICD-10-CM

## 2024-04-23 NOTE — TELEPHONE ENCOUNTER
----- Message from Michael J Bloch, M.D. sent at 4/22/2024  6:07 PM PDT -----  Regarding: RE: Imaging ania ?  Yes, aortoiliac duplex aug 2024. See my note from july 2023    ----- Message -----  From: Amber Elena R.N.  Sent: 4/19/2024   9:52 AM PDT  To: Michael J Bloch, M.D.  Subject: Imaging ania ?                                    Can you please clarify where we are on surveillance. I have him in Aug for A/O duplex but not seeing notes in the chart on it. Please help

## 2024-04-23 NOTE — TELEPHONE ENCOUNTER
Orders placed for vascular surveillance imaging.    [employer] message sent to pt to remind that they are due for their surveillance vascular imaging.       Amber Elena R.N.   [facility]

## 2024-05-06 ENCOUNTER — OFFICE VISIT (OUTPATIENT)
Dept: NEUROLOGY | Facility: MEDICAL CENTER | Age: 84
End: 2024-05-06
Attending: PSYCHIATRY & NEUROLOGY
Payer: MEDICARE

## 2024-05-06 VITALS
BODY MASS INDEX: 25.7 KG/M2 | WEIGHT: 173.5 LBS | HEART RATE: 67 BPM | DIASTOLIC BLOOD PRESSURE: 70 MMHG | HEIGHT: 69 IN | TEMPERATURE: 97.8 F | SYSTOLIC BLOOD PRESSURE: 148 MMHG | OXYGEN SATURATION: 91 %

## 2024-05-06 DIAGNOSIS — E55.9 VITAMIN D DEFICIENCY: ICD-10-CM

## 2024-05-06 DIAGNOSIS — Z86.73 HISTORY OF ISCHEMIC STROKE: ICD-10-CM

## 2024-05-06 DIAGNOSIS — H35.3230 BILATERAL EXUDATIVE AGE-RELATED MACULAR DEGENERATION, UNSPECIFIED STAGE (HCC): ICD-10-CM

## 2024-05-06 DIAGNOSIS — I10 ESSENTIAL HYPERTENSION: ICD-10-CM

## 2024-05-06 DIAGNOSIS — F03.A0 MILD DEMENTIA, UNSPECIFIED DEMENTIA TYPE, UNSPECIFIED WHETHER BEHAVIORAL, PSYCHOTIC, OR MOOD DISTURBANCE OR ANXIETY (HCC): Primary | ICD-10-CM

## 2024-05-06 PROCEDURE — 3078F DIAST BP <80 MM HG: CPT | Performed by: PSYCHIATRY & NEUROLOGY

## 2024-05-06 PROCEDURE — 3077F SYST BP >= 140 MM HG: CPT | Performed by: PSYCHIATRY & NEUROLOGY

## 2024-05-06 PROCEDURE — 99214 OFFICE O/P EST MOD 30 MIN: CPT | Performed by: PSYCHIATRY & NEUROLOGY

## 2024-05-06 RX ORDER — CYANOCOBALAMIN (VITAMIN B-12) 1000 MCG
TABLET ORAL
COMMUNITY

## 2024-05-06 RX ORDER — CHOLECALCIFEROL (VITAMIN D3) 50 MCG
TABLET ORAL
COMMUNITY

## 2024-05-06 ASSESSMENT — MONTREAL COGNITIVE ASSESSMENT (MOCA)
4. NAME EACH OF THE THREE ANIMALS SHOWN: 3/3
3. DRAW A CLOCK: CONTOUR, NUMBERS, HANDS: 2/3
10. [FLUENCY] NAME WORDS STARTING WITH DESIGNATED LETTER: 0/1
ORIENTATION SUBSCORE: 3/6
WHAT IS THE VERSION OF MOCA ADMINISTERED: 8.1
8. SERIAL SUBTRACTION OF 7S: 0/5
2. COPY DRAWING: 0/1
7. [VIGILENCE] TAP WHEN HEARING DESIGNATED LETTER: 1/1
11. FOR EACH PAIR OF WORDS, WHAT CATEGORY DO THEY BELONG TO (OUT OF 2): 1/2
5. MEMORY TRIALS: SECOND TRIAL
DELAYED RECALL SUBSCORE: 1/5
6. READ LIST OF DIGITS [FORWARD/BACKWARD]: 2/2
9. REPEAT EACH SENTENCE: 2/2
WHAT IS THE TOTAL SCORE (OUT OF 30): 15
1. ALTERNATING TRAIL MAKING: 0/1

## 2024-05-06 ASSESSMENT — PATIENT HEALTH QUESTIONNAIRE - PHQ9: CLINICAL INTERPRETATION OF PHQ2 SCORE: 0

## 2024-05-06 ASSESSMENT — FIBROSIS 4 INDEX: FIB4 SCORE: 5.17

## 2024-05-06 NOTE — PROGRESS NOTES
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    Freeman Frost 83 y.o. right handed gentleman who is here with his daughter. He was a  at [employer] x 31 years and retired in Feb 1995.     I reviewed Dr. Carolyn Mullins's prior notes including in 2022.     Problem List- Macular Degeneration bilaterally  (He stopped driving over 2 years ago due to primarily vision and secondarily memory)     He has been living independently and daughters lives 26 miles alone.     He is aware and has noticed for over 1-2 years that he can forget where he places certain items in the garage.      Daughter has noticed for the last 3-4 years that he was having more forgetfulness and increased with his drinking around the wife's illness.  Daughter has started to notice that he was putting items in the freezer that went on certain shelves  (occurring about 2 times a month) and this specific issue has been increasing in the last 6-8 months.     He continues to do cross word puzzles but he has more difficulty with doing them and he has look things up more of the definitions and he will call his daughter to ask her \"a certain cross word clue\" in the last 9 months or so.    He has not had problems or evolving problems with urgency/frequency or kaushal incontinence in the last 3-6 months or so.    Repeating self with progression over the last year and tending to occur multiple times a day- often will having the same conversation up to 3-4 times within 1.5 hours.   Less commonly he will ask questions over again in the last 6-9 months.     There has been no paranoia,delusions,hallucinations in the recent months.     Average Sleep- 6-7 hours most nights; goes to bed 10 pm am awakens around 6 am with awakenings 1-2 times to urinate.  Daughter has noticed he does snore loudly but it's unclear about kaushal self arousals due to " grunting,snoring or gasping.     There is no history of involuntary movements of the body or limbs with very slight slowing of movement but no kaushal postural unsteadiness,tremors, change in handwriting, softening of voice or kaushal falls in the last 6 months.     No  history of kaushal concussion(s),seizure type events or stroke events in adult life known.    There has been no evolving changes of his gait-balance per his daughter in the last 6-12 months or so. No falls or near falls in the last 3-6 months or so.     He has had crab apple trees for over 30  years and recently identified some concern that he noticed which his daughter felt was normal. This was highly unusual for him to comment about his crab apples like this when in fact there was nothing wrong with them.     She had  started to organize his medications this Summer because he was sometimes missing his night time pills. He is not able to recognize what type of medications his medications or why he takes his medication.     Communication/Speech has not changed in the last few years.     No significant smoking use in adult life.  Alcohol use until 2 years ago>>  beer,wine and vodka (nearly every day) for over 20 years. Drinking increased in the last 2 years before he stopped and when wife was diving of cancer.     Family:     Father:  in  his early 60(s)- allergic reaction to barium [facility]  Mother: late in life (early stage Dementia)-  at age 84      Patient Active Problem List    Diagnosis Date Noted    Chronic kidney disease, stage 3a 2024    Encounter for monitoring amiodarone therapy 2024    Bilateral nonexudative age-related macular degeneration 2023    Mild neurodegenerative dementia (HCC) 2023    Infrarenal abdominal aortic aneurysm (AAA) without rupture (HCC) 10/26/2023    Celiac artery stenosis (HCC) 2023    Right renal artery stenosis (HCC) 2023    ACP (advance care planning) 2023     Elevated troponin 06/08/2023    Presence of Watchman left atrial appendage closure device- implanted 11/9/22 Dr Brown  11/10/2022    Cerebral atrophy (HCC) 07/21/2022    Overweight (BMI 25.0-29.9) 07/21/2022    Dementia without behavioral disturbance (HCC) 07/21/2022    Alcohol dependence with unspecified alcohol-induced disorder (HCC) 07/21/2022    Status post placement of implantable loop recorder 12/01/2021    On continuous oral anticoagulation 05/28/2019    Aneurysm of descending thoracic aorta without rupture (HCC) 05/28/2019    Syncope and collapse 05/09/2019    Paroxysmal atrial fibrillation (HCC) 05/09/2019    Essential hypertension 09/30/2018    Dyslipidemia 09/30/2018    DAKOTAH (acute kidney injury) (HCC) 09/30/2018       Past medical history:   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    Snoring     Stroke ([facility]) 2013    tia no residual def       Past surgical history:   Past Surgical History:   Procedure Laterality Date    KS EVASC RPR DPLMNT AORTO-AORTIC NDGFT N/A 7/5/2023    Procedure: ENDOVASCULAR REPAIR OF ABDOMINAL AORTIC ANEURYSM;  Surgeon: Wilber Ambrose M.D.;  Location: [facility];  Service: Vascular    KS ERCP,DIAGNOSTIC N/A 04/20/2019    Procedure: ERCP, DIAGNOSTIC;  Surgeon: Antony Morrison M.D.;  Location: [facility];  Service: Gastroenterology    FRANCIA BY LAPAROSCOPY  04/19/2019    Procedure: LAPARASCOPIC- CONVERTED TO OPEN CHOLECYSTECTOMY;  Surgeon: Anita Merino M.D.;  Location: [facility];  Service: General    CATARACT PHACO WITH IOL Right 08/28/2018    Procedure: CATARACT PHACO WITH IOL;  Surgeon: Adolfo Santana,  M.D.;  Location: SURGERY SAME DAY [facility]S;  Service: Ophthalmology    CATARACT PHACO WITH IOL Left 08/14/2018    Procedure: CATARACT PHACO WITH IOL;  Surgeon: Adolfo Santana M.D.;  Location: SURGERY SAME DAY [facility];  Service: Ophthalmology    HERNIA REPAIR, INCISIONAL, UNILATERAL Left     OTHER      tonsils as a child    OTHER CARDIAC SURGERY  11-9-22    Watchman procedure    TONSILLECTOMY           Social history:   Social History     Socioeconomic History    Marital status:      Spouse name: Not on file    Number of children: Not on file    Years of education: Not on file    Highest education level: Not on file   Occupational History    Not on file   Tobacco Use    Smoking status: Never    Smokeless tobacco: Never   Vaping Use    Vaping Use: Never used   Substance and Sexual Activity    Alcohol use: Yes     Alcohol/week: 0.6 oz     Types: 1 Cans of beer per week     Comment: 1 beer, once a week at most    Drug use: Never    Sexual activity: Not on file   Other Topics Concern    Not on file   Social History Narrative    Not on file     Social Determinants of Health     Financial Resource Strain: Low Risk  (5/18/2021)    Overall Financial Resource Strain (CARDIA)     Difficulty of Paying Living Expenses: Not hard at all   Food Insecurity: No Food Insecurity (5/18/2021)    Hunger Vital Sign     Worried About Running Out of Food in the Last Year: Never true     Ran Out of Food in the Last Year: Never true   Transportation Needs: No Transportation Needs (5/18/2021)    PRAPARE - Transportation     Lack of Transportation (Medical): No     Lack of Transportation (Non-Medical): No   Physical Activity: Not on file   Stress: Not on file   Social Connections: Not on file   Intimate Partner Violence: Not on file   Housing Stability: Not on file       Family history:   Family History   Problem Relation Age of Onset    Dementia Mother     Cancer Mother         Breast    Cancer Brother         Esophageal  "        Current medications:   Current Outpatient Medications   Medication    Cyanocobalamin (B-12) 1000 MCG Tab    Cholecalciferol (D3) 2000 UNIT Tab    levothyroxine (SYNTHROID) 25 MCG Tab    amiodarone (CORDARONE) 200 MG Tab    metoprolol SR (TOPROL XL) 50 MG TABLET SR 24 HR    Multiple Vitamins-Minerals (PRESERVISION AREDS PO)    Multiple Vitamins-Minerals (MENS MULTIVITAMIN PO)    Probiotic Product (PROBIOTIC DAILY PO)    loratadine (CLARITIN) 10 MG Tab    aspirin 81 MG EC tablet    losartan (COZAAR) 25 MG Tab    amLODIPine (NORVASC) 5 MG Tab     No current facility-administered medications for this visit.       Medication Allergy:  Allergies   Allergen Reactions    Other Environmental Unspecified     Seasonal allergies=rabbit brush           Physical examination:   Vitals:    24 1028   BP: (!) 148/70   BP Location: Right arm   Patient Position: Sitting   BP Cuff Size: Adult   Pulse: 67   Temp: 36.6 °C (97.8 °F)   TempSrc: Temporal   SpO2: 91%   Weight: 78.7 kg (173 lb 8 oz)   Height: 1.753 m (5' 9\")       Normal cephalic atraumatic.  There is full range of movement around the neck in all directions without restrictions or discrete pain evoked triggers.  No lower extremity edema.      Neurological  Exam:      Damon Cognitive Assessment (MOCA) Version 7.1    Years of Education: High School Graduate    TOTAL SCORE: 15/30  (to be scanned into the MEDIA section in the E.M.R.)    Damon Cognitive Assessment (MOCA) Version Number: 8.1   VISUOSPACIAL / EXECUTIVE   Clock Drawin/3  Spatial Drawin/1 (this was very difficutl for him to do)  Cube Drawin/1 (this was very difficutl for him to do)    NAMING  Naming: 3/3    MEMORY  Memory: Second trial    ATTENTION  Digits: 2/2  Letters: 1/1  Subtraction: 0/5 (this was very difficulty for him to do)    LANGUAGE  Repeat Phrases: 2/2  Fluency: 0/1 (got 8 words that begin with an F in 5 minutes)    ABSTRACTION  Abstraction: 1/2    DELAYED RECALL  Recall " "words: 1/5  Category Cue (if applicable):    Multiple Choice Cue (if applicable):     ORIENTATION  Orientation: 3/6 (he did not know the date,month \"June\" or year \"2022\")    Add 1 point if less than or equal to 12 yr education level:     MOCA TOTAL SCORE:  15 /30  Biomechanical/Visual Limitations (if applicable):          Mental status: Awake, alert and fully oriented to person, place, and situation. (He does not know the month,year or date- \"2022, June\"  Normal attention and concentration.  Did not appear/act combative,irritable,anxious,paranoid/delusional or aggressive to or with me.    Speech and language: Speech is fluent without errors, clear, and intact to repetition.     Follows 3 step motor commands in sequence without significant delay and correctly.    Cranial nerve exam:  II: Pupils are equally round and reactive to light.     Visual fields are intact by confrontation.    20/50 Right Eye and  Left Eye to Snellen Card.  III, IV, VI: EOMI, no diplopia, no ptosis.  V: Sensation to light touch is normal over V1-3 distributions bilaterally.  .  VII: Facial movements are symmetrical. There is no facial droop. .  VIII: Hearing intact to soft speech and finger rub bilaterally  IX: Palate elevates symmetrically, uvula is midline. Dysarthria is not present.  XI: Shoulder shrug are symmetrical and strong.   XII: Tongue protrudes midline.      Motor exam:  Muscle tone is normal in all 4 limbs.    Muscle strength:    Neck Flexors/Extensors: 5/5       Right  Left  Deltoid   5/5  5/5      Biceps   5/5  5/5  Triceps             5/5  5/5   Wrist extensors 5/5  5/5  Wrist flexors  5/5  5/5     5/5  5/5  Interossei  5/5  5/5  Thenar (APB)  5/5  5/5   Hip flexors  5/5  5/5  Quadriceps  5/5  5/5    Hamstrings  5/5  5/5  Dorsiflexors  5/5  5/5  Plantarflexors  5/5  5/5  Toe extension  5/5  5/5        Reflexes:       Right  Left  Biceps   2/4  2/4  Triceps              2/4  2/4  Brachioradialis 2/4  2/4  Knee " jerk  2/4  2/4  Ankle jerk  2/4  2/4     Frontal release signs are absent    bilaterally toes are downgoing to plantar stimulation..    Coordination (finger-to-nose, heel/knee/shin, rapid alternating movements) was normal.     There was no ataxia, no tremors, and no dysmetria.     Station and gait >     Easily stands up from exam chair without retropulsion,veering,leaning,swaying (to either side).     Negative Rombergism    Labs and Tests:    0 Result Notes       1 Patient Communication       Component  Ref Range & Units 6 mo ago 8 yr ago   25-Hydroxy   Vitamin D 25  30 - 100 ng/mL 27 Low  13 Low  CM   Comment: Adult Ranges:   <20 ng/mL - Deficiency  20-29 ng/mL - Insufficiency   ng/mL - Sufficiency  Electrochemiluminescence binding assay performed using Roche abisai e  immunoassay analyzer.  The Elecsys Vitamin D total II assay is intended for  the quantitative determination of total 25 hydroxyvitamin D in human serum  and plasma. This assay is to be used as an aid in the assessment of vitamin  D sufficiency in adults.          Status: Final result       Visible to patient: Yes (not seen)       Next appt: 07/26/2024 at 11:15 AM in Cardiology (Rishi Watson, ODALYS.P.R.N.)       Dx: Mild neurodegenerative dementia (HCC)    0 Result Notes       Component  Ref Range & Units 6 mo ago 2 yr ago   Vitamin B1  70 - 180 nmol/L 179 136 CM   Comment: INTERPRETIVE INFORMATION: Vitamin B1, Whole Blood  This assay measures the concentration of thiamine diphosphate  (TDP), the primary active form of vitamin B1. Approximately 90  percent of vitamin B1 present in whole blood is TDP. Thiamine and  thiamine monophosphate, which comprise the remaining 10 percent,  are not measured.  This test was developed and its performance characteristics  determined by [employer]. It has not been cleared or  approved by the US Food and Drug Administration. This test was  performed in a CLIA certified laboratory and is intended  for  clinical purposes.  Performed By: [facility]  [address] [place] [id number]  : Antony Flynn MD, PhD  CLIA Number: [id number]          VITAMIN B12  Order: [id number]   Status: Final result       Visible to patient: Yes (seen)       Next appt: 2024 at 11:15 AM in Cardiology (Rishi Watson, A.P.R.N.)    0 Result Notes       1 Patient Communication            Component  Ref Range & Units 6 mo ago  (23) 3 yr ago  (20) 4 yr ago  (19) 4 yr ago  (19) 7 yr ago  (17) 7 yr ago  (10/31/16) 7 yr ago  (10/20/16)   Vitamin B12 -True Cobalamin  211 - 911 pg/mL 352 1048 High  433 256 >1500 High  340 495   Resulting Agency M M M M M M M              Specimen Collected: 23  5:24 PM              NEUROIMAGIN/10/2023 2:21 PM     HISTORY/REASON FOR EXAM:  Syncope.        TECHNIQUE/EXAM DESCRIPTION:   MRI of the brain without and with contrast.     T1 sagittal, T2 fast spin-echo axial, T1 coronal, FLAIR coronal, diffusion-weighted and apparent diffusion coefficient (ADC map) axial images were obtained of the whole brain. T1 postcontrast axial and T1 postcontrast coronal images were obtained.     The study was performed on a [employer] Signa 1.5 Maria Luz MRI scanner.     15 mL ProHance contrast was administered intravenously.     COMPARISON:  MRI scan of the brain 8/3/2020     FINDINGS:  The calvariae are unremarkable. There are no extra-axial fluid collections. The ventricular system and basal cisterns are moderately prominent. There is moderate prominence of cortical sulci and gyri. There are scattered punctate and patchy areas of   increased T2 signal intensity in the periventricular white matter. There is a small gyriform area of decreased T1 surrounding increased T2 signal intensity inferiorly in the left occipital lobe. There are no mass effects or shift of midline structures.   There are no hemorrhagic lesions. The diffusion-weighted axial images  show no evidence of acute cerebral infarction.     The postcontrast images show no areas of abnormal parenchymal or meningeal enhancement.     The brainstem and posterior fossa structures are unremarkable.     Vascular flow voids in the vertebrobasilar and carotid arteries, [place], and dural venous sinuses are intact.     The paranasal sinuses and mastoids in the field of view are unremarkable.     IMPRESSION:     1.  Age-related cerebral atrophy.     2.  Mild to moderate. Ventricular white matter changes consistent with chronic microvascular ischemic gliosis.     3.  Small chronic areas of cortical infarction involving the inferior aspect of the left occipital lobe.           Exam Ended: 06/10/23  3:29 PM Last Resulted: 06/10/23             11/14/2023 3:05 PM     HISTORY/REASON FOR EXAM:  Concern for Alzheimer's Disease ; progressive memory decline over 4  years     TECHNIQUE/EXAM DESCRIPTION AND NUMBER OF VIEWS:  PET CT Metabolic evaluation of the brain.     Initially, 11.0 mCi F-18 FDG was administered intravenously under standardized conditions. Approximately 30 minutes after FDG administration, the patient was placed in the supine position on the PET CT table for brain imaging. CT images, PET images and   fused CT/PET images were reviewed on a computer workstation. Blood glucose level was 109 mg/dL. The low dose unenhanced CT images were used for attenuation correction and anatomic correlation only.     COMPARISON: CT head 5/17/2021     FINDINGS:  No regional decreased activity to indicate neurodegenerative disorder.  Decreased activity in the LEFT occipital region corresponding to chronic infarct.  Brain volume: Diffuse atrophy.  Additional findings: None. .     IMPRESSION:     1.  No regional hypometabolism pattern to suggest neurodegeneration.  2.  Diffuse atrophy with ventriculomegaly.  3.  Chronic LEFT occipital infarct.      Impression/Plans/Recommendations:      Mild Dementia and likely  "Neurodegenerative and suspect for Alzheimer's Disease (despite Negative Brain PET Scan that is only 80% sensitive for A.D.) and  given the progression of slow decline and increasing forgetfulness and repeating information often with slowed and reduced word fluency and clearly difficulty with some executive function tasks.    MOCA of  15/30 today.     Functional Activity Questionnaire score of 15 with several 2 and 3 per daughter (see Media Section)    Alzheimer's Questionnaire score of 21 today per daughter.     2. 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    3.  History of small occipital (left) infarct by prior Brain MRI.    4. HTN- mildly elevated today.      Today, I reviewed the clinical criteria and reviewed several  scenarios of the differences being using the medical terms of normal brain aging (age associated memory impairment),  Mild Cognitive Impairment (MCI) and Dementia.    Dementia  is a syndrome but statistically and for the majority of patients  occurs due  to a more rapid aging of the brain tissue or potentially from injury to certain parts of one's brain ( often from such contributing factors as  the cumulative effects of alcohol, from one or more ischemic or hemmorhagic stroke(s), from neurodegeneration or long standing with/without suboptimally controlled Hypertension). It is for some of these potential factors as to why I recommend a brain imaging test (Head CT or Brain MRI) be done for the 1st time or in certain circumstances repeated for comparison purposes  as such imaging can suggest one or more factors as to the reason(s) for the person's cognitive/memory changes. In fact, a normal or \"age related\" finding on a brain imaging test in and of itself is useful clinical and objective information to have in the evaluation of a person who has either an acute, evolving or even chronic (months to years) long cognitive/memory complaint.   "   Additional factors or contributors to Brain Health issues can be summarized in several books/references which I discussed as well today.      Goals going forwards include:     A. Paying attention to one's risk factors and reducing their prevalence or potential impact on one's changing memory/thinking> an excellent example would be to stop smoking, reduce or eliminate alcohol use (depending on the amount and frequency of usage), maintain good blood pressure control by buying a digital arm blood pressure cuff such as an OMRON Series 3 or 5 and checking one's blood pressure atleast 3 days per week (in the am and early afternoon) that the numbers are under 140/90 and working as needed with the primary care doctor  to optimize blood pressure control).     B. Encouraging proper sleep hygiene which for most adults is 7 to 8 hours of uninterrupted sleep per night.       C. Encouraging some form of exercise preferable aerobic forms to be done (4 to 5 days per week- 30 minutes minimum per day)> 150 minutes per week as a goal. Example activities could include fast walking (up a slight incline), jogging, cycling (road or stationary), swimming,tennis. Essentially, even basic walking on a flat surface or a treadmill would be better than doing nothing.     D. Maintaining or forming new social contacts with family and friends  and a positive attitude despite the concerns and/or ongoing issues with thinking and/or memory.     E. Eating well which means a diet low in salt  (under 2 grams per day), sugar and saturated fat.     F. Maintaining one's BMI (Body Mass Index) under 30.     G. Consideration of the use of cognitive enhancers (acetylcholinerase inhibitors such as Aricept vs an NMDA Receptor agent like Namenda). Pros and cons of such compounds in terms of predicted efficacy and side effects profiles reviewed.    He was previously not able to tolerate Aricept which he took for 9 months (when he was 1st taking it it caused much  "increased confusion for 1 month) and then stabilized and then stopped it       H. Will hold off on using IV anti amyloid drugs after our discussion today about the pros and cons. Information provided to daughter today as well. (This drug has not been studied in patients with \"wet\" Macular Degeneration and could significantly increase his risk for retinal bleeding).      M. I offered a social work consultation to discuss with daughter but she did not want that referral at this time.     N. Referral to Sleep Group for SIDNEY evaluation.     O. Dementia Care Self Management Book given to daughter at last visit and also I suggested she buy \"The 36 Hour Day Book\".    P. I asked daughter today  to contact Eduin's PCP to have him started on a high intensity statin for future stroke prevention.    Q. Encouraged home BP monitoring with goal BP  under 130/80 range.        I have performed  a history and physical exam and a directed /focused  ROS today.     Total time spent today or this patient's care was 38    minutes and included reviewing  the diagnostic workup to date (such as labs and imaging as well as interpreting such tests relevant to this patient's neurological condition),  reviewing/obtaining separately obtained history (from patient and/or daughter)  for today's neurological problem(s) ,counseling and educating the patient and family member on issues related to cognition/memory and cognitive health factors and documenting  the clinical information in the EMR.     Follow up in 6 to 9  months or so.        "

## 2024-05-12 ENCOUNTER — NON-PROVIDER VISIT (OUTPATIENT)
Dept: CARDIOLOGY | Facility: MEDICAL CENTER | Age: 84
End: 2024-05-12
Payer: MEDICARE

## 2024-05-13 PROCEDURE — 93298 REM INTERROG DEV EVAL SCRMS: CPT | Mod: 26 | Performed by: INTERNAL MEDICINE

## 2024-05-24 DIAGNOSIS — I48.0 PAROXYSMAL ATRIAL FIBRILLATION (HCC): ICD-10-CM

## 2024-05-24 RX ORDER — ASPIRIN 81 MG/1
81 TABLET, COATED ORAL
Qty: 100 TABLET | Refills: 5 | OUTPATIENT
Start: 2024-05-24

## 2024-06-12 ENCOUNTER — NON-PROVIDER VISIT (OUTPATIENT)
Dept: CARDIOLOGY | Facility: MEDICAL CENTER | Age: 84
End: 2024-06-12
Payer: MEDICARE

## 2024-06-13 PROCEDURE — 93298 REM INTERROG DEV EVAL SCRMS: CPT | Mod: 26 | Performed by: INTERNAL MEDICINE

## 2024-06-13 NOTE — CARDIAC REMOTE MONITOR - SCAN
Device transmission reviewed. Device demonstrated appropriate function.       Electronically Signed by: Billy Dudley M.D.    6/13/2024  11:48 AM

## 2024-07-10 ENCOUNTER — TELEPHONE (OUTPATIENT)
Dept: VASCULAR LAB | Facility: MEDICAL CENTER | Age: 84
End: 2024-07-10
Payer: MEDICARE

## 2024-07-13 ENCOUNTER — NON-PROVIDER VISIT (OUTPATIENT)
Dept: CARDIOLOGY | Facility: MEDICAL CENTER | Age: 84
End: 2024-07-13
Payer: MEDICARE

## 2024-07-13 PROCEDURE — 93298 REM INTERROG DEV EVAL SCRMS: CPT | Mod: 26 | Performed by: INTERNAL MEDICINE

## 2024-07-26 ENCOUNTER — APPOINTMENT (OUTPATIENT)
Dept: CARDIOLOGY | Facility: MEDICAL CENTER | Age: 84
End: 2024-07-26
Attending: NURSE PRACTITIONER
Payer: MEDICARE

## 2024-08-13 ENCOUNTER — NON-PROVIDER VISIT (OUTPATIENT)
Dept: CARDIOLOGY | Facility: MEDICAL CENTER | Age: 84
End: 2024-08-13
Payer: MEDICARE

## 2024-08-13 PROCEDURE — 93298 REM INTERROG DEV EVAL SCRMS: CPT | Mod: 26 | Performed by: INTERNAL MEDICINE

## 2024-08-15 NOTE — CARDIAC REMOTE MONITOR - SCAN
Device transmission reviewed. Device demonstrated appropriate function.       Electronically Signed by: Mary Brown M.D.    8/30/2024  10:09 AM

## 2024-09-04 ENCOUNTER — PATIENT MESSAGE (OUTPATIENT)
Dept: HEALTH INFORMATION MANAGEMENT | Facility: OTHER | Age: 84
End: 2024-09-04

## 2024-09-13 ENCOUNTER — NON-PROVIDER VISIT (OUTPATIENT)
Dept: CARDIOLOGY | Facility: MEDICAL CENTER | Age: 84
End: 2024-09-13
Payer: MEDICARE

## 2024-09-16 NOTE — CARDIAC REMOTE MONITOR - SCAN
Device transmission reviewed. Device demonstrated appropriate function.       Electronically Signed by: Mary Brown M.D.    9/23/2024  3:32 PM

## 2024-10-14 ENCOUNTER — NON-PROVIDER VISIT (OUTPATIENT)
Dept: CARDIOLOGY | Facility: MEDICAL CENTER | Age: 84
End: 2024-10-14
Payer: MEDICARE

## 2024-10-14 PROCEDURE — 93298 REM INTERROG DEV EVAL SCRMS: CPT | Mod: 26 | Performed by: INTERNAL MEDICINE

## 2024-11-13 ENCOUNTER — TELEPHONE (OUTPATIENT)
Dept: HEALTH INFORMATION MANAGEMENT | Facility: OTHER | Age: 84
End: 2024-11-13

## 2024-11-14 ENCOUNTER — NON-PROVIDER VISIT (OUTPATIENT)
Dept: CARDIOLOGY | Facility: MEDICAL CENTER | Age: 84
End: 2024-11-14
Payer: MEDICARE

## 2024-11-15 PROCEDURE — 93298 REM INTERROG DEV EVAL SCRMS: CPT | Mod: 26 | Performed by: INTERNAL MEDICINE

## 2024-11-15 NOTE — CARDIAC REMOTE MONITOR - SCAN
Device transmission reviewed. Device demonstrated appropriate function.       Electronically Signed by: Mary Brown M.D.    11/26/2024  12:30 PM

## 2024-12-15 ENCOUNTER — NON-PROVIDER VISIT (OUTPATIENT)
Dept: CARDIOLOGY | Facility: MEDICAL CENTER | Age: 84
End: 2024-12-15
Payer: MEDICARE

## 2024-12-16 PROCEDURE — 93298 REM INTERROG DEV EVAL SCRMS: CPT | Mod: 26 | Performed by: INTERNAL MEDICINE

## 2024-12-16 NOTE — CARDIAC REMOTE MONITOR - SCAN
Device transmission reviewed. Device demonstrated appropriate function.       Electronically Signed by: Mary Brown M.D.    12/22/2024  3:12 AM

## 2025-01-13 ENCOUNTER — TELEPHONE (OUTPATIENT)
Dept: HEALTH INFORMATION MANAGEMENT | Facility: OTHER | Age: 85
End: 2025-01-13

## 2025-01-15 ENCOUNTER — NON-PROVIDER VISIT (OUTPATIENT)
Dept: CARDIOLOGY | Facility: MEDICAL CENTER | Age: 85
End: 2025-01-15
Payer: MEDICARE

## 2025-01-15 PROCEDURE — 93298 REM INTERROG DEV EVAL SCRMS: CPT | Mod: 26 | Performed by: INTERNAL MEDICINE

## 2025-02-04 ENCOUNTER — APPOINTMENT (OUTPATIENT)
Dept: NEUROLOGY | Facility: MEDICAL CENTER | Age: 85
End: 2025-02-04
Attending: PSYCHIATRY & NEUROLOGY
Payer: MEDICARE

## 2025-02-15 ENCOUNTER — NON-PROVIDER VISIT (OUTPATIENT)
Dept: CARDIOLOGY | Facility: MEDICAL CENTER | Age: 85
End: 2025-02-15
Payer: MEDICARE

## 2025-02-18 PROCEDURE — 93298 REM INTERROG DEV EVAL SCRMS: CPT | Mod: 26 | Performed by: INTERNAL MEDICINE

## 2025-03-18 ENCOUNTER — NON-PROVIDER VISIT (OUTPATIENT)
Dept: CARDIOLOGY | Facility: MEDICAL CENTER | Age: 85
End: 2025-03-18
Payer: MEDICARE

## 2025-03-19 PROCEDURE — 93298 REM INTERROG DEV EVAL SCRMS: CPT | Mod: 26 | Performed by: INTERNAL MEDICINE

## 2025-03-20 NOTE — CARDIAC REMOTE MONITOR - SCAN
Device transmission reviewed. Device demonstrated appropriate function.       Electronically Signed by: Billy Dudley M.D.    3/24/2025  11:52 AM

## 2025-04-18 ENCOUNTER — NON-PROVIDER VISIT (OUTPATIENT)
Dept: CARDIOLOGY | Facility: MEDICAL CENTER | Age: 85
End: 2025-04-18

## 2025-04-21 PROCEDURE — 93298 REM INTERROG DEV EVAL SCRMS: CPT | Mod: 26 | Performed by: STUDENT IN AN ORGANIZED HEALTH CARE EDUCATION/TRAINING PROGRAM

## 2025-05-09 NOTE — CARDIAC REMOTE MONITOR - SCAN
Device transmission reviewed. Device demonstrated appropriate function.       Electronically Signed by: Barbie Thayer MD, PhD    5/23/2025  6:42 PM

## 2025-05-19 ENCOUNTER — NON-PROVIDER VISIT (OUTPATIENT)
Dept: CARDIOLOGY | Facility: MEDICAL CENTER | Age: 85
End: 2025-05-19

## 2025-05-19 PROCEDURE — 93298 REM INTERROG DEV EVAL SCRMS: CPT | Mod: 26 | Performed by: INTERNAL MEDICINE

## 2025-05-21 NOTE — CARDIAC REMOTE MONITOR - SCAN
Device transmission reviewed. Device demonstrated appropriate function.       Electronically Signed by: Billy Dudley M.D.    5/21/2025  11:33 AM

## 2025-06-19 ENCOUNTER — NON-PROVIDER VISIT (OUTPATIENT)
Dept: CARDIOLOGY | Facility: MEDICAL CENTER | Age: 85
End: 2025-06-19

## 2025-06-20 PROCEDURE — 93298 REM INTERROG DEV EVAL SCRMS: CPT | Mod: 26 | Performed by: INTERNAL MEDICINE

## 2025-07-20 ENCOUNTER — NON-PROVIDER VISIT (OUTPATIENT)
Dept: CARDIOLOGY | Facility: MEDICAL CENTER | Age: 85
End: 2025-07-20

## 2025-07-20 PROCEDURE — 93298 REM INTERROG DEV EVAL SCRMS: CPT | Mod: 26 | Performed by: INTERNAL MEDICINE

## 2025-07-28 ENCOUNTER — TELEPHONE (OUTPATIENT)
Dept: CARDIOLOGY | Facility: MEDICAL CENTER | Age: 85
End: 2025-07-28

## 2025-07-28 NOTE — TELEPHONE ENCOUNTER
Phone Number Called: [phone]    Call outcome: Did not leave a detailed message. Requested patient to call back.    Message: Called to inform patient of remote transmission.   Left voicemail asking pt to call back to discus.

## 2025-07-28 NOTE — TELEPHONE ENCOUNTER
Patients remote monitor transmitted 07/26/2025.  AF with RVR episode detected at 02:48 through 03:36am with ventricular rates of ?170bpm or greater.  Report attached for review.

## 2025-07-30 NOTE — TELEPHONE ENCOUNTER
Phone Number Called: [phone]     Call outcome: S/w patient's daughter Jacob    Message: Called to inform patient of remote transmission.   Per daughter she stated the patient has been stressed recently. Has dementia.  Patient has no current symptoms.  Recent /73.  Taking metoprolol not amiodarone.  Not on OAC  Staying hydrated   Daughter advised that patient has not been seen since January 2024 and is due for a f/u. She verbalized understanding.    MT- Please review and advise, thank you.    Schedulers- Could you please contact daughter for f/u. Thank you!

## 2025-07-30 NOTE — TELEPHONE ENCOUNTER
PTS DAUGHTER LANETTE CALLED CARDIO TEAM REQUESTING TO SPEAK WITH RN ELIZABETH DUNCAN, REGARDING THIS ENCOUNTER, MESSAGED RN AND ROUTED CONVERSATION TO HER REQUESTING FOR PTS DAUGHTER TO BE CALLED BACK, PHONE NUMBER ON FILE IS CORRECT.

## 2025-07-31 ENCOUNTER — TELEPHONE (OUTPATIENT)
Dept: CARDIOLOGY | Facility: MEDICAL CENTER | Age: 85
End: 2025-07-31

## 2025-07-31 NOTE — TELEPHONE ENCOUNTER
Attempted to contact daughter regarding MT message. She did not answer. Left a VM with this information and to call back with any further questions.

## 2025-08-20 ENCOUNTER — NON-PROVIDER VISIT (OUTPATIENT)
Dept: CARDIOLOGY | Facility: MEDICAL CENTER | Age: 85
End: 2025-08-20

## 2025-08-20 PROCEDURE — 93298 REM INTERROG DEV EVAL SCRMS: CPT | Mod: 26 | Performed by: INTERNAL MEDICINE

## (undated) DEVICE — BALLOON RELIANT

## (undated) DEVICE — PACK BASIC CATARACT - (4/BX)

## (undated) DEVICE — WATER IRRIGATION STERILE 1000ML (12EA/CA)

## (undated) DEVICE — TIP MINI FLARE 30 DEGREE OZIL - (6/BX)

## (undated) DEVICE — GLOVE BIOGEL PI ORTHO SZ 6 SURGICAL PF LF (40PR/BX)

## (undated) DEVICE — PEN SKIN MARKER W/RULER - (50EA/BX)

## (undated) DEVICE — BLADE 3.0MM ANGLED DBL BEVEL WITH SAFETY (10/CA)

## (undated) DEVICE — STAPLE 45MM BLUE 3.5MM ECHELON (12EA/BX)

## (undated) DEVICE — NEPTUNE 4 PORT MANIFOLD - (20/PK)

## (undated) DEVICE — TUBE CONNECTING SUCTION - CLEAR PLASTIC STERILE 72 IN (50EA/CA)

## (undated) DEVICE — SET EXTENSION WITH 2 PORTS (48EA/CA) ***PART #2C8610 IS A SUBSTITUTE*****

## (undated) DEVICE — SET SUCTION/IRRIGATION WITH DISPOSABLE TIP (6/CA )PART #0250-070-520 IS A SUB

## (undated) DEVICE — SYRINGE NON SAFETY 10 CC 20 GA X 1-1/2 IN (100/BX 4BX/CA)

## (undated) DEVICE — CHLORAPREP 26 ML APPLICATOR - ORANGE TINT(25/CA)

## (undated) DEVICE — CLIP MED LG INTNL HRZN TI ESCP - (20/BX)

## (undated) DEVICE — SODIUM CHL IRRIGATION 0.9% 1000ML (12EA/CA)

## (undated) DEVICE — SUTURE 4-0 SILK FS-2 18 INCH - (12PK/BX)

## (undated) DEVICE — HEMOSTAT SURG ABSORBABLE - 4 X 8 IN SURGICEL (24EA/CA)

## (undated) DEVICE — CANNULA INJECTION 27G (EYE) - 10/BX ALCON

## (undated) DEVICE — NEEDLE CYSTOTOME OPTH VSTC  0.4MM X 16MM - (10/CA)

## (undated) DEVICE — STAPLER SKIN DISP - (6/BX 10BX/CA) VISISTAT

## (undated) DEVICE — SET INTRO MIRCROPUNCTURE - MPIS-501-SST

## (undated) DEVICE — GUIDEWIRES STARTER (PTFE COATED) ROSEN 0.035 180CM 4 1.5 MM J

## (undated) DEVICE — MASK ANESTHESIA ADULT  - (100/CA)

## (undated) DEVICE — CANISTER SUCTION RIGID RED 1500CC (40EA/CA)

## (undated) DEVICE — DRAIN FLAT SUCTION 10MMX20CM - LATEX FREE (10EA/CA)

## (undated) DEVICE — CON SEDATION/>5 YR 1ST 15 MIN

## (undated) DEVICE — SHEATH

## (undated) DEVICE — SUCTION INSTRUMENT YANKAUER BULBOUS TIP W/O VENT (50EA/CA)

## (undated) DEVICE — HEAD HOLDER JUNIOR/ADULT

## (undated) DEVICE — SENSOR OXIMETER ADULT SPO2 RD SET (20EA/BX)

## (undated) DEVICE — TRANSDUCER ADULT DISP. SINGLE BONDED STERILE - (20EA/CA)

## (undated) DEVICE — CARTRIDGE MONARCH C - (10EA/BX)

## (undated) DEVICE — PAD LAP STERILE 18 X 18 - (5/PK 40PK/CA)

## (undated) DEVICE — KIT  I.V. START (100EA/CA)

## (undated) DEVICE — SUTURE DEVICE CLOSURE REPAIR SYSTEM PERCLOSE PROSTYLE (10EA/BX)

## (undated) DEVICE — Device

## (undated) DEVICE — GOWN WARMING STANDARD FLEX - (30/CA)

## (undated) DEVICE — GUIDEWIRES STARTER (PTFE COATED) ROSEN 0.035 260CM 4 1.5 MM J

## (undated) DEVICE — GLOVE BIOGEL PI ULTRATOUCH SZ 7.0 SURGICAL PF LF- POWDER FREE (50/BX 4BX/CA)

## (undated) DEVICE — TUBE SUCTION YANKAUER  1/4 X 6FT (20EA/CA)"

## (undated) DEVICE — TUBING CLEARLINK DUO-VENT - C-FLO (48EA/CA)

## (undated) DEVICE — TIP POLYMER I&A

## (undated) DEVICE — GUIDEWIRE 25CM 0.35 260CM ANGLED GLIDEWIRE ADVANTAGE (1/BX)

## (undated) DEVICE — DRESSING NON-ADHERING 8 X 3 - (50/BX)

## (undated) DEVICE — GLOVE BIOGEL SZ 6 PF LATEX - (50EA/BX 4BX/CA)

## (undated) DEVICE — SLEEVE, VASO, THIGH, MED

## (undated) DEVICE — SPHINCTERTOME TRUETOME 44 20MM PRELOAD JAG 035IN

## (undated) DEVICE — KNIFE MICROSURGICAL 15 DEGREE GREEN

## (undated) DEVICE — EXTRACTOR PRO XL 9-12 MM ABOVE

## (undated) DEVICE — SET LEADWIRE 5 LEAD BEDSIDE DISPOSABLE ECG (1SET OF 5/EA)

## (undated) DEVICE — GUIDEWIRES STARTER (PTFE COATED) J CURVED FIXED CORE 0.035 180CM 10 3 MM J FS

## (undated) DEVICE — DECANTER FLD BLS - (50/CA)

## (undated) DEVICE — TUBE E-T HI-LO CUFF 7.5MM (10EA/PK)

## (undated) DEVICE — CATHETER IV 20 GA X 1-1/4 ---SURG.& SDS ONLY--- (50EA/BX)

## (undated) DEVICE — SHEATH RO 6F 10CM (10EA/BX)

## (undated) DEVICE — LACTATED RINGERS INJ 1000 ML - (14EA/CA 60CA/PF)

## (undated) DEVICE — SURGIFOAM (SIZE 100) - (6EA/CA)

## (undated) DEVICE — DRESSING TRANSPARENT FILM TEGADERM 2.375 X 2.75"  (100EA/BX)"

## (undated) DEVICE — ELECTRODE DUAL RETURN W/ CORD - (50/PK)

## (undated) DEVICE — NEEDLE FILTER ASPIRATION 18 GA X 1 1/2 IN (100EA/BX)

## (undated) DEVICE — SHEATH DRYSEAL FLEX INTRODUCER 16FR X 28CM

## (undated) DEVICE — ELECTRODE 5MM LHK LAPSCP STERILE DISP- MEGADYNE  (5/CA)

## (undated) DEVICE — APPLIER 5MM MED/LARGE CLIP - (3/BX)

## (undated) DEVICE — SYRINGE 30 ML LL (56/BX)

## (undated) DEVICE — SUTURE 1 VICRYL PLUS CTX - 36 INCH (36/BX)

## (undated) DEVICE — KIT, EYE POST-OP FOR PHACOS

## (undated) DEVICE — MULTI TORQUE DEVICE DISP (5EA/BX)

## (undated) DEVICE — DRESSING AQUACEL AG ADVANTAGE 3.5 X 10" (10EA/BX)"

## (undated) DEVICE — GUIDEWIRE HYDROPHILIC COATED ANGLED TIP .035 X 260CM (5EA/BX)"

## (undated) DEVICE — STAPLER POWERED 45MM (3EA/BX)

## (undated) DEVICE — SENSOR SPO2 NEO LNCS ADHESIVE (20/BX) SEE USER NOTES

## (undated) DEVICE — CANNULA W/ SUPPLY TUBING O2 - (50/CA)

## (undated) DEVICE — STAPLE 45MM WHTE 2.5MM - (12/BX)

## (undated) DEVICE — SUTURE 4-0 PROLENE PS-2 18 (36PK/BX)"

## (undated) DEVICE — ELECTRODE 850 FOAM ADHESIVE - HYDROGEL RADIOTRNSPRNT (50/PK)

## (undated) DEVICE — CANNULA DIVIDED ADULT CO2 - SAMPLE W/FEMALE CONNCT (25/CA)

## (undated) DEVICE — GLOVE BIOGEL M SZ 8 SURGICAL PF LTX - (50/BX 4BX/CA)

## (undated) DEVICE — CON SEDATION EA ADDL 15 MIN

## (undated) DEVICE — SUTURE GENERAL

## (undated) DEVICE — DERMABOND ADVANCED - (12EA/BX)

## (undated) DEVICE — SUTURE 3-0 ETHILON FS-1 - (36/BX) 30 INCH

## (undated) DEVICE — SUTURE 4-0 PROLENE RB-1 D/A 36 (36PK/BX)"

## (undated) DEVICE — GLOVE BIOGEL SZ 6.5 SURGICAL PF LTX (50PR/BX 4BX/CA)

## (undated) DEVICE — DRAPE IOBAN LARGE 2 INCISE FILM (5EA/CA)

## (undated) DEVICE — KIT ANESTHESIA W/CIRCUIT & 3/LT BAG W/FILTER (20EA/CA)

## (undated) DEVICE — HUMID-VENT HEAT AND MOISTURE EXCHANGE- (50/BX)

## (undated) DEVICE — SHEET CARDIOVASCULAR - (4/CA)

## (undated) DEVICE — RESERVOIR SUCTION 100 CC - SILICONE (20EA/CA)

## (undated) DEVICE — DEVICE INFLATION DIGITAL BLUE DIAMOND (5EA/BX)

## (undated) DEVICE — GLOVE BIOGEL SZ 7 SURGICAL PF LTX - (50PR/BX 4BX/CA)

## (undated) DEVICE — DRAPE LARGE 3 QUARTER - (20/CA)

## (undated) DEVICE — GLOVE SZ 7.5 BIOGEL PI MICRO - PF LF (50PR/BX)

## (undated) DEVICE — CANNULA W/SEAL 5X100 Z-THRE - ADED KII (12/BX)

## (undated) DEVICE — WIRE GUIDE LUNDQST.035X180 - TSMG-35-180-4-LES ORDER BY BOX (5EA/BX)

## (undated) DEVICE — SYRINGE STERILE 10 ML LL (200/BX)

## (undated) DEVICE — PROTECTOR ULNA NERVE - (36PR/CA)

## (undated) DEVICE — CANISTER SUCTION 3000ML MECHANICAL FILTER AUTO SHUTOFF MEDI-VAC NONSTERILE LF DISP  (40EA/CA)

## (undated) DEVICE — SODIUM CHL. INJ. 0.9% 500ML (24EA/CA 50CA/PF)

## (undated) DEVICE — SUTURE 4-0 VICRYL PLUS FS-2 - 27 INCH (36/BX)

## (undated) DEVICE — GLOVE BIOGEL INDICATOR SZ 6.5 SURGICAL PF LTX - (50PR/BX 4BX/CA)

## (undated) DEVICE — COVER LIGHT HANDLE FLEXIBLE - SOFT (2EA/PK 80PK/CA)

## (undated) DEVICE — BAG, SPONGE COUNT 50600

## (undated) DEVICE — KIT ROOM DECONTAMINATION

## (undated) DEVICE — TOWELS CLOTH SURGICAL - (4/PK 20PK/CA)

## (undated) DEVICE — HEMOSTAT ARISTA PWD 3 GRAM - (5/CA)

## (undated) DEVICE — GLOVE, LITE (PAIR)

## (undated) DEVICE — TROCAR 5X100 BLADED Z-THREAD - KII (6/BX)

## (undated) DEVICE — GRAFT MESH SEPRAFILM PRO PACK - 5/BX CONTAINS 6 3X5 PIECES

## (undated) DEVICE — TUBING INSUFFLATION - (10/BX)

## (undated) DEVICE — GOWN SURGEONS X-LARGE - DISP. (30/CA)